# Patient Record
Sex: FEMALE | Race: WHITE | NOT HISPANIC OR LATINO | Employment: OTHER | URBAN - METROPOLITAN AREA
[De-identification: names, ages, dates, MRNs, and addresses within clinical notes are randomized per-mention and may not be internally consistent; named-entity substitution may affect disease eponyms.]

---

## 2017-02-14 ENCOUNTER — TELEPHONE (OUTPATIENT)
Dept: FAMILY MEDICINE | Facility: CLINIC | Age: 77
End: 2017-02-14

## 2017-02-14 NOTE — TELEPHONE ENCOUNTER
----- Message from Lida Cobb sent at 2/14/2017 11:10 AM CST -----  Contact: self   Patient wants to speak with a nurse regarding vaccine please call back at [phone]

## 2017-02-14 NOTE — TELEPHONE ENCOUNTER
----- Message from Jacklyn Kemp sent at 2/14/2017  4:17 PM CST -----  Pt called returning a call back at to the nurse/pls call back at [phone]

## 2017-02-14 NOTE — TELEPHONE ENCOUNTER
Called pt, her daughter is expecting mid march and the OB is requesting everyone get Tdap. She has concerns with her history of having reaction after flu vaccine. She wants to know if Dr. Hills recommends her to get it and if so, can she get it feeling a little under the weather (cold symptoms) or wait until all cleared up. Please advise.

## 2017-02-15 ENCOUNTER — PATIENT MESSAGE (OUTPATIENT)
Dept: FAMILY MEDICINE | Facility: CLINIC | Age: 77
End: 2017-02-15

## 2017-02-15 NOTE — TELEPHONE ENCOUNTER
Called pt, notified of Dr. Hills message and she verbally understood. She will go to her local drug store and get it.

## 2017-02-15 NOTE — TELEPHONE ENCOUNTER
I would recommend it.  It is different than the flu vaccine and there is no cross reactivity.  Timing is not important as long as she does not have fever.

## 2017-02-15 NOTE — TELEPHONE ENCOUNTER
----- Message from Lida Cobb sent at 2/15/2017  3:04 PM CST -----  Contact: self   Placed a call to pod, patient missed call from your office please call back at [phone] (home)

## 2017-02-17 ENCOUNTER — CLINICAL SUPPORT (OUTPATIENT)
Dept: FAMILY MEDICINE | Facility: CLINIC | Age: 77
End: 2017-02-17
Payer: MEDICARE

## 2017-02-17 ENCOUNTER — TELEPHONE (OUTPATIENT)
Dept: FAMILY MEDICINE | Facility: CLINIC | Age: 77
End: 2017-02-17

## 2017-02-17 DIAGNOSIS — Z23 NEED FOR TDAP VACCINATION: Primary | ICD-10-CM

## 2017-02-17 PROCEDURE — 90471 IMMUNIZATION ADMIN: CPT | Mod: S$GLB,,, | Performed by: FAMILY MEDICINE

## 2017-02-17 PROCEDURE — 90715 TDAP VACCINE 7 YRS/> IM: CPT | Mod: S$GLB,,, | Performed by: FAMILY MEDICINE

## 2017-02-17 NOTE — TELEPHONE ENCOUNTER
----- Message from Harleen Ayoub sent at 2/17/2017 12:01 PM CST -----  Contact: yin   Requesting TDAP   Call back

## 2017-03-15 ENCOUNTER — PATIENT OUTREACH (OUTPATIENT)
Dept: ADMINISTRATIVE | Facility: HOSPITAL | Age: 77
End: 2017-03-15

## 2017-03-29 ENCOUNTER — OFFICE VISIT (OUTPATIENT)
Dept: FAMILY MEDICINE | Facility: CLINIC | Age: 77
End: 2017-03-29
Payer: MEDICARE

## 2017-03-29 VITALS
DIASTOLIC BLOOD PRESSURE: 72 MMHG | HEIGHT: 64 IN | BODY MASS INDEX: 26.76 KG/M2 | WEIGHT: 156.75 LBS | TEMPERATURE: 98 F | HEART RATE: 82 BPM | OXYGEN SATURATION: 98 % | RESPIRATION RATE: 12 BRPM | SYSTOLIC BLOOD PRESSURE: 138 MMHG

## 2017-03-29 DIAGNOSIS — Z12.31 ENCOUNTER FOR SCREENING MAMMOGRAM FOR BREAST CANCER: ICD-10-CM

## 2017-03-29 DIAGNOSIS — I10 ESSENTIAL HYPERTENSION: ICD-10-CM

## 2017-03-29 DIAGNOSIS — Z00.00 ROUTINE HEALTH MAINTENANCE: Primary | ICD-10-CM

## 2017-03-29 DIAGNOSIS — E03.9 HYPOTHYROIDISM, UNSPECIFIED TYPE: ICD-10-CM

## 2017-03-29 PROCEDURE — 99999 PR PBB SHADOW E&M-EST. PATIENT-LVL III: CPT | Mod: PBBFAC,,, | Performed by: FAMILY MEDICINE

## 2017-03-29 PROCEDURE — 99397 PER PM REEVAL EST PAT 65+ YR: CPT | Mod: S$GLB,,, | Performed by: FAMILY MEDICINE

## 2017-03-29 PROCEDURE — 3075F SYST BP GE 130 - 139MM HG: CPT | Mod: S$GLB,,, | Performed by: FAMILY MEDICINE

## 2017-03-29 PROCEDURE — 3078F DIAST BP <80 MM HG: CPT | Mod: S$GLB,,, | Performed by: FAMILY MEDICINE

## 2017-03-29 RX ORDER — LEVOTHYROXINE SODIUM 88 UG/1
88 TABLET ORAL DAILY
Qty: 90 TABLET | Refills: 3 | Status: SHIPPED | OUTPATIENT
Start: 2017-03-29 | End: 2018-04-08 | Stop reason: SDUPTHER

## 2017-03-29 RX ORDER — MAGNESIUM 30 MG
1 TABLET ORAL
COMMUNITY
End: 2023-06-30 | Stop reason: CLARIF

## 2017-03-29 RX ORDER — ESTRADIOL 1 MG/1
TABLET ORAL
Qty: 90 TABLET | Refills: 3 | Status: SHIPPED | OUTPATIENT
Start: 2017-03-29 | End: 2018-04-22 | Stop reason: SDUPTHER

## 2017-03-29 RX ORDER — LOSARTAN POTASSIUM AND HYDROCHLOROTHIAZIDE 25; 100 MG/1; MG/1
1 TABLET ORAL DAILY
Qty: 90 TABLET | Refills: 3 | Status: SHIPPED | OUTPATIENT
Start: 2017-03-29 | End: 2017-06-07 | Stop reason: SDUPTHER

## 2017-03-29 NOTE — PROGRESS NOTES
HPI  Dorothy Kramer is a 76 y.o. female with multiple medical diagnoses as listed in the medical history and problem list that presents for Annual Exam  .      HPI  She is s/p her hip replacement.    PAST MEDICAL HISTORY:  Past Medical History:   Diagnosis Date    DDD (degenerative disc disease), cervical     DJD (degenerative joint disease) of hip     Dyspepsia     GERD (gastroesophageal reflux disease)     History of melanoma 5/20/2015    HTN (hypertension)     Hyperlipidemia     Melanoma     Migraine headache     Skin cancer     melanoma on face    Thyroid disease     hypo    Trouble in sleeping     Uterine prolaps     followed by GYN    Vulvar lesion 5/20/2015       PAST SURGICAL HISTORY:  Past Surgical History:   Procedure Laterality Date    APPENDECTOMY  1962    BACK SURGERY      BREAST BIOPSY      BREAST LUMPECTOMY  1989    CERVICAL FUSION  1991    CHOLECYSTECTOMY      HYSTERECTOMY      TRACEE/BSO for benign disease    IN REMOVAL OF OVARY/TUBE(S)      TONSILLECTOMY      TONSILLECTOMY, ADENOIDECTOMY, BILATERAL MYRINGOTOMY AND TUBES      TOTAL ABDOMINAL HYSTERECTOMY W/ BILATERAL SALPINGOOPHORECTOMY  1990       SOCIAL HISTORY:  Social History     Social History    Marital status:      Spouse name: N/A    Number of children: N/A    Years of education: N/A     Occupational History    Not on file.     Social History Main Topics    Smoking status: Never Smoker    Smokeless tobacco: Never Used    Alcohol use No    Drug use: No    Sexual activity: Not Currently     Other Topics Concern    Not on file     Social History Narrative       FAMILY HISTORY:  Family History   Problem Relation Age of Onset    Heart failure Mother     Cancer Mother      Bladder    Colon cancer Mother 63    Cancer Father      Bladder    Parkinsonism Father     Uterine cancer Maternal Aunt     Ovarian cancer Neg Hx     Breast cancer Neg Hx        ALLERGIES AND MEDICATIONS: updated and reviewed.  Review  "of patient's allergies indicates:  No Known Allergies  Current Outpatient Prescriptions   Medication Sig Dispense Refill    aspirin (ECOTRIN) 81 MG EC tablet Take 81 mg by mouth once daily.      estradiol (ESTRACE) 1 MG tablet TAKE 1 TABLET (1 MG TOTAL) BY MOUTH ONCE DAILY. 90 tablet 3    levothyroxine (SYNTHROID) 88 MCG tablet TAKE 1 TABLET (88 MCG TOTAL) BY MOUTH ONCE DAILY. 90 tablet 3    losartan-hydrochlorothiazide 100-25 mg (HYZAAR) 100-25 mg per tablet Take 1 tablet by mouth once daily. 90 tablet 3    magnesium 30 mg Tab Take 1 tablet by mouth.      MULTIVIT-IRON-MIN-FOLIC ACID 3,500-18-0.4 UNIT-MG-MG ORAL CHEW Take by mouth.       No current facility-administered medications for this visit.        ROS  Review of Systems   Constitutional: Negative for fatigue, fever and unexpected weight change.   HENT: Negative for congestion, hearing loss, rhinorrhea and sore throat.    Eyes: Negative for visual disturbance.   Respiratory: Negative for cough, chest tightness, shortness of breath and wheezing.    Cardiovascular: Negative for chest pain, palpitations and leg swelling.   Gastrointestinal: Negative for abdominal distention, abdominal pain, blood in stool, constipation, diarrhea, nausea and vomiting.   Genitourinary: Negative for difficulty urinating, dysuria, frequency, hematuria, menstrual problem, pelvic pain, urgency and vaginal bleeding.   Musculoskeletal: Negative for back pain, joint swelling and neck pain.   Skin: Negative for rash.   Neurological: Negative for dizziness, tremors, weakness, light-headedness, numbness and headaches.   Psychiatric/Behavioral: Negative for confusion, dysphoric mood and sleep disturbance. The patient is not nervous/anxious.        Physical Exam  Vitals:    03/29/17 0856   BP: 138/72   Pulse: 82   Resp: 12   Temp: 98.2 °F (36.8 °C)    Body mass index is 26.91 kg/(m^2).  Weight: 71.1 kg (156 lb 12 oz)   Height: 5' 4" (162.6 cm)     Physical Exam   Constitutional: She is " oriented to person, place, and time. She appears well-developed and well-nourished.   HENT:   Head: Normocephalic and atraumatic.   Right Ear: External ear normal.   Left Ear: External ear normal.   Nose: Nose normal.   Mouth/Throat: Oropharynx is clear and moist.   Eyes: Conjunctivae and EOM are normal. Pupils are equal, round, and reactive to light. No scleral icterus.   Neck: Normal range of motion. Neck supple. No JVD present. No thyromegaly present.   Cardiovascular: Normal rate, regular rhythm and normal heart sounds.  Exam reveals no gallop and no friction rub.    No murmur heard.  Pulmonary/Chest: Effort normal and breath sounds normal. She has no wheezes. She has no rales.   Abdominal: Soft. Bowel sounds are normal. She exhibits no distension and no mass. There is no tenderness. There is no rebound and no guarding.   Musculoskeletal: Normal range of motion. She exhibits no edema.   Lymphadenopathy:     She has no cervical adenopathy.   Neurological: She is alert and oriented to person, place, and time. She has normal strength. No cranial nerve deficit or sensory deficit.   Skin: Skin is warm and dry. No rash noted.   Vitals reviewed.      Health Maintenance       Date Due Completion Date    Mammogram 9/11/2016 9/11/2015    Colonoscopy 4/25/2017 4/25/2007 (N/S)    Override on 4/25/2007: (N/S)    DEXA SCAN 2/27/2018 2/27/2015    Lipid Panel 4/22/2021 4/22/2016    TETANUS VACCINE 2/17/2027 2/17/2017          Assessment & Plan    Routine health maintenance  - Health maintenance reviewed  - Diet and exercise education.    Essential hypertension  -     Comprehensive metabolic panel; Future; Expected date: 3/29/17  -     Lipid panel; Future; Expected date: 3/29/17  - Continue current therapy  - Serial blood pressure monitoring  - Diet and exercise education.    Hypothyroidism, unspecified type  -     TSH; Future; Expected date: 3/29/17  - Continue current therapy     Encounter for screening mammogram for breast  cancer  -     Mammo Digital Screening Bilat with CAD; Future; Expected date: 3/29/17        Return in about 6 months (around 9/29/2017).

## 2017-03-29 NOTE — MR AVS SNAPSHOT
[facility]  [address]  [facility] [address]  Phone: [phone]  Fax: [phone]                  Dorothy Kramer   3/29/2017 8:45 AM   Office Visit    Description:  Female : 1940   Provider:  Demetrio Hills MD   Department:  [facility]           Reason for Visit     Annual Exam           Diagnoses this Visit        Comments    Routine health maintenance    -  Primary     Essential hypertension         Hypothyroidism, unspecified type         Encounter for screening mammogram for breast cancer                To Do List           Future Appointments        Provider Department Dept Phone    4/3/2017 8:45 AM LAB, [place] [facility] [phone]    4/3/2017 3:30 PM [facility] [id number] [facility] [phone]      Goals (5 Years of Data)     None      Follow-Up and Disposition     Return in about 6 months (around 2017).    Follow-up and Disposition History       These Medications        Disp Refills Start End    levothyroxine (SYNTHROID) 88 MCG tablet 90 tablet 3 3/29/2017     Take 1 tablet (88 mcg total) by mouth once daily. - Oral    Pharmacy: [facility]/pharmacy #[id number] - [place] - [address] Ph #: [phone]       losartan-hydrochlorothiazide 100-25 mg (HYZAAR) 100-25 mg per tablet 90 tablet 3 3/29/2017     Take 1 tablet by mouth once daily. - Oral    Pharmacy: [facility]/pharmacy #[id number] - [place] - [address] Ph #: [phone]       estradiol (ESTRACE) 1 MG tablet 90 tablet 3 3/29/2017     TAKE 1 TABLET (1 MG TOTAL) BY MOUTH ONCE DAILY.    Pharmacy: [facility]/pharmacy #[id number] - [place] - [address] Ph #: [phone]         [facility] On [facility]     [facility] Nurse Care Line -  Assistance  Registered nurses in the [facility] provide clinical advisement, health education, appointment booking, and other advisory services.  Call for this free service at [phone].            "  Medications           Message regarding Medications     Verify the changes and/or additions to your medication regime listed below are the same as discussed with your clinician today.  If any of these changes or additions are incorrect, please notify your healthcare provider.        CHANGE how you are taking these medications     Start Taking Instead of    levothyroxine (SYNTHROID) 88 MCG tablet levothyroxine (SYNTHROID) 88 MCG tablet    Dosage:  Take 1 tablet (88 mcg total) by mouth once daily. Dosage:  TAKE 1 TABLET (88 MCG TOTAL) BY MOUTH ONCE DAILY.    Reason for Change:  Reorder            Verify that the below list of medications is an accurate representation of the medications you are currently taking.  If none reported, the list may be blank. If incorrect, please contact your healthcare provider. Carry this list with you in case of emergency.           Current Medications     aspirin (ECOTRIN) 81 MG EC tablet Take 81 mg by mouth once daily.    estradiol (ESTRACE) 1 MG tablet TAKE 1 TABLET (1 MG TOTAL) BY MOUTH ONCE DAILY.    levothyroxine (SYNTHROID) 88 MCG tablet Take 1 tablet (88 mcg total) by mouth once daily.    losartan-hydrochlorothiazide 100-25 mg (HYZAAR) 100-25 mg per tablet Take 1 tablet by mouth once daily.    magnesium 30 mg Tab Take 1 tablet by mouth.    MULTIVIT-IRON-MIN-FOLIC ACID 3,500-18-0.4 UNIT-MG-MG ORAL CHEW Take by mouth.           Clinical Reference Information           Your Vitals Were     BP Pulse Temp Resp Height Weight    138/72 (BP Location: Left arm, Patient Position: Sitting) 82 98.2 °F (36.8 °C) (Oral) 12 5' 4" (1.626 m) 71.1 kg (156 lb 12 oz)    SpO2 BMI             98% 26.91 kg/m2         Blood Pressure          Most Recent Value    BP  138/72      Allergies as of 3/29/2017     No Known Allergies      Immunizations Administered on Date of Encounter - 3/29/2017     None      Orders Placed During Today's Visit     Future Labs/Procedures Expected by Expires    Comprehensive " metabolic panel  3/29/2017 6/27/2017    Lipid panel  3/29/2017 6/27/2017    Mammo Digital Screening Bilat with CAD  3/29/2017 6/27/2017    TSH  3/29/2017 6/27/2017      Instructions    Consider Dr. Nba Becker for urinary issues       Language Assistance Services     ATTENTION: Language assistance services are available, free of charge. Please call [phone].      ATENCIÓN: Si habla español, tiene a river disposición servicios gratuitos de asistencia lingüística. Llame al [phone].     CHÚ Ý: N?u b?n nói Ti?ng Vi?t, có các d?ch v? h? tr? ngôn ng? mi?n phí dành cho b?n. G?i s? [phone].         [facility] complies with applicable Federal civil rights laws and does not discriminate on the basis of race, color, national origin, age, disability, or sex.

## 2017-04-03 ENCOUNTER — HOSPITAL ENCOUNTER (OUTPATIENT)
Dept: RADIOLOGY | Facility: HOSPITAL | Age: 77
Discharge: HOME | End: 2017-04-03
Attending: FAMILY MEDICINE
Payer: MEDICARE

## 2017-04-03 DIAGNOSIS — Z12.31 ENCOUNTER FOR SCREENING MAMMOGRAM FOR BREAST CANCER: ICD-10-CM

## 2017-04-03 PROCEDURE — 77067 SCR MAMMO BI INCL CAD: CPT | Mod: 26,,, | Performed by: RADIOLOGY

## 2017-04-03 PROCEDURE — 77067 SCR MAMMO BI INCL CAD: CPT | Mod: TC

## 2017-04-03 PROCEDURE — 77063 BREAST TOMOSYNTHESIS BI: CPT | Mod: 26,,, | Performed by: RADIOLOGY

## 2017-06-07 RX ORDER — LOSARTAN POTASSIUM AND HYDROCHLOROTHIAZIDE 25; 100 MG/1; MG/1
1 TABLET ORAL DAILY
Qty: 90 TABLET | Refills: 3 | Status: SHIPPED | OUTPATIENT
Start: 2017-06-07 | End: 2018-05-08 | Stop reason: SDUPTHER

## 2017-08-10 ENCOUNTER — OFFICE VISIT (OUTPATIENT)
Dept: FAMILY MEDICINE | Facility: CLINIC | Age: 77
End: 2017-08-10
Payer: MEDICARE

## 2017-08-10 VITALS
OXYGEN SATURATION: 99 % | WEIGHT: 158.75 LBS | HEART RATE: 74 BPM | SYSTOLIC BLOOD PRESSURE: 146 MMHG | HEIGHT: 64 IN | BODY MASS INDEX: 27.1 KG/M2 | DIASTOLIC BLOOD PRESSURE: 66 MMHG

## 2017-08-10 DIAGNOSIS — M75.40 IMPINGEMENT SYNDROME, SHOULDER, UNSPECIFIED LATERALITY: ICD-10-CM

## 2017-08-10 DIAGNOSIS — I10 ESSENTIAL HYPERTENSION: ICD-10-CM

## 2017-08-10 DIAGNOSIS — M16.11 PRIMARY OSTEOARTHRITIS OF RIGHT HIP: ICD-10-CM

## 2017-08-10 DIAGNOSIS — N81.6 RECTOCELE: ICD-10-CM

## 2017-08-10 DIAGNOSIS — Z96.641 STATUS POST RIGHT HIP REPLACEMENT: ICD-10-CM

## 2017-08-10 DIAGNOSIS — M54.32 SCIATICA OF LEFT SIDE: ICD-10-CM

## 2017-08-10 DIAGNOSIS — E78.1 HYPERTRIGLYCERIDEMIA: ICD-10-CM

## 2017-08-10 DIAGNOSIS — E03.9 HYPOTHYROIDISM, UNSPECIFIED TYPE: ICD-10-CM

## 2017-08-10 DIAGNOSIS — Z85.820 HISTORY OF MELANOMA: ICD-10-CM

## 2017-08-10 DIAGNOSIS — Z00.00 ENCOUNTER FOR PREVENTIVE HEALTH EXAMINATION: Primary | ICD-10-CM

## 2017-08-10 PROCEDURE — G0439 PPPS, SUBSEQ VISIT: HCPCS | Mod: S$GLB,,, | Performed by: NURSE PRACTITIONER

## 2017-08-10 PROCEDURE — 99499 UNLISTED E&M SERVICE: CPT | Mod: S$GLB,,, | Performed by: NURSE PRACTITIONER

## 2017-08-10 PROCEDURE — 99999 PR PBB SHADOW E&M-EST. PATIENT-LVL IV: CPT | Mod: PBBFAC,,, | Performed by: NURSE PRACTITIONER

## 2017-08-10 RX ORDER — GABAPENTIN 100 MG/1
100 CAPSULE ORAL NIGHTLY
Qty: 30 CAPSULE | Refills: 2 | Status: SHIPPED | OUTPATIENT
Start: 2017-08-10 | End: 2018-05-01

## 2017-08-10 NOTE — PATIENT INSTRUCTIONS
Counseling and Referral of Other Preventative  (Italic type indicates deductible and co-insurance are waived)    Patient Name: Dorothy Kramer  Today's Date: 8/10/2017      SERVICE LIMITATIONS RECOMMENDATION    Vaccines    · Pneumococcal (once after 65)    · Influenza (annually)    · Hepatitis B (if medium/high risk)    · Prevnar 13      Hepatitis B medium/high risk factors:       - End-stage renal disease       - Hemophiliacs who received Factor VII or         IX concentrates       - Clients of institutions for the mentally             retarded       - Persons who live in the same house as          a HepB carrier       - Homosexual men       - Illicit injectable drug abusers     Pneumococcal: Done, no repeat necessary     Influenza: N/A     Hepatitis B: N/A     Prevnar 13: Done, no repeat necessary    Mammogram (biennial age 50-74)  Annually (age 40 or over)  Done this year, repeat every year    Pap (up to age 70 and after 70 if unknown history or abnormal study last 10 years)    N/A     The USPSTF recommends against screening for cervical cancer in women older than age 65 years who have had adequate prior screening and are not otherwise at high risk for cervical cancer.      Colorectal cancer screening (to age 75)    · Fecal occult blood test (annual)  · Flexible sigmoidoscopy (5y)  · Screening colonoscopy (10y)  · Barium enema   Last done 2007, recommend to repeat every 3-5  years    Diabetes self-management training (no USPSTF recommendations)  Requires referral by treating physician for patient with diabetes or renal disease. 10 hours of initial DSMT sessions of no less than 30 minutes each in a continuous 12-month period. 2 hours of follow-up DSMT in subsequent years.  N/A    Bone mass measurements (age 65 & older, biennial)  Requires diagnosis related to osteoporosis or estrogen deficiency. Biennial benefit unless patient has history of long-term glucocorticoid  Last done 2015, recommend to repeat every 3  years     Glaucoma screening (no USPSTF recommendation)  Diabetes mellitus, family history   , age 50 or over    American, age 65 or over  Recommend follow up with eye care professional regularly    Medical nutrition therapy for diabetes or renal disease (no recommended schedule)  Requires referral by treating physician for patient with diabetes or renal disease or kidney transplant within the past 3 years.  Can be provided in same year as diabetes self-management training (DSMT), and CMS recommends medical nutrition therapy take place after DSMT. Up to 3 hours for initial year and 2 hours in subsequent years.  N/A    Cardiovascular screening blood tests (every 5 years)  · Fasting lipid panel  Order as a panel if possible  Done this year, repeat every year    Diabetes screening tests (at least every 3 years, Medicare covers annually or at 6-month intervals for prediabetic patients)  · Fasting blood sugar (FBS) or glucose tolerance test (GTT)  Patient must be diagnosed with one of the following:       - Hypertension       - Dyslipidemia       - Obesity (BMI 30kg/m2)       - Previous elevated impaired FBS or GTT       ... or any two of the following:       - Overweight (BMI 25 but <30)       - Family history of diabetes       - Age 65 or older       - History of gestational diabetes or birth of baby weighing more than 9 pounds  Done this year, repeat every year    HIV screening (annually for increased risk patients)  · HIV-1 and HIV-2 by EIA, or KIA, rapid antibody test or oral mucosa transudate  Patients must be at increased risk for HIV infection per USPSTF guidelines or pregnant. Tests covered annually for patient at increased risk or as requested by the patient. Pregnant patients may receive up to 3 tests during pregnancy.  Risks discussed, screening is not recommended    Smoking cessation counseling (up to 8 sessions per year)  Patients must be asymptomatic of tobacco-related conditions to  receive as a preventative service.  Non-smoker    Subsequent annual wellness visit  At least 12 months since last AWV  Return in one year     The following information is provided to all patients.  This information is to help you find resources for any of the problems found today that may be affecting your health:                Living healthy guide: www.[facility].[place].[facility]      Understanding Diabetes: www.diabetes.org      Eating healthy: www.cdc.gov/healthyweight      CDC home safety checklist: www.cdc.gov/steadi/patient.html      Agency on Aging: www.goea.[place]      Alcoholics anonymous (AA): www.aa.org      Physical Activity: www.paz.nih.gov/ut5jstz      Tobacco use: www.quitwithusla.org

## 2017-08-17 NOTE — PROGRESS NOTES
"Dorothy Kramer presented for a  Medicare AWV and comprehensive Health Risk Assessment today. The following components were reviewed and updated:    · Medical history  · Family History  · Social history  · Allergies and Current Medications  · Health Risk Assessment  · Health Maintenance  · Care Team     ** See Completed Assessments for Annual Wellness Visit within the encounter summary.**       The following assessments were completed:  · Living Situation  · CAGE  · Depression Screening  · Timed Get Up and Go  · Whisper Test  · Cognitive Function Screening  · Nutrition Screening  · ADL Screening  · PAQ Screening    Vitals:    08/10/17 1506   BP: (!) 146/66   Pulse: 74   SpO2: 99%   Weight: 72 kg (158 lb 11.7 oz)   Height: 5' 4" (1.626 m)     Body mass index is 27.25 kg/m².  Physical Exam   Constitutional: She is oriented to person, place, and time. She appears well-developed and well-nourished.   Eyes: Conjunctivae and EOM are normal. Pupils are equal, round, and reactive to light. No scleral icterus.   Cardiovascular: Normal rate, regular rhythm and normal heart sounds.  Exam reveals no gallop and no friction rub.    No murmur heard.  Pulmonary/Chest: Effort normal and breath sounds normal. She has no wheezes. She has no rales.   Musculoskeletal: Normal range of motion. She exhibits no edema.   Neurological: She is alert and oriented to person, place, and time. She has normal strength. No cranial nerve deficit or sensory deficit.   Skin: Skin is warm and dry.   Psychiatric: She has a normal mood and affect. Her behavior is normal.   Vitals reviewed.        Diagnoses and health risks identified today and associated recommendations/orders:    1. Encounter for preventive health examination  Recommend yearly HRA visit    2. Essential hypertension  Stable.   Taking arb and hctz  Followed by Demetrio Hills MD .       3. Hypertriglyceridemia  Stable.   Continue to monitor   Followed by Demetrio Hills MD .       4. " History of melanoma  Stable.   Recommend yearly skin checks  Followed by Demetrio Hills MD .       5. Hypothyroidism, unspecified type  Stable.   Taking levothyroxine  Followed by MD Bryce Khan       6. Rectocele  Stable.     Followed by Demetrio Hills MD .       7. Impingement syndrome, shoulder, unspecified laterality  Unchanged   Taking gabapentin  Followed by Demetrio Hills MD .       8. Primary osteoarthritis of right hip  Stable.     Followed by MD Bryce Khan       9. Status post right hip replacement  Stable.     Followed by Demetrio Hills MD .       10. Sciatica of left side  Taking gabapentin  Continue exercises/stretches  Followed by Demetrio Hills MD .         Provided Dorothy with a 5-10 year written screening schedule and personal prevention plan. Recommendations were developed using the USPSTF age appropriate recommendations. Education, counseling, and referrals were provided as needed. After Visit Summary printed and given to patient which includes a list of additional screenings\tests needed.    Return in about 1 year (around 8/10/2018).    Barbara Dotson NP

## 2018-03-14 ENCOUNTER — TELEPHONE (OUTPATIENT)
Dept: FAMILY MEDICINE | Facility: CLINIC | Age: 78
End: 2018-03-14

## 2018-03-14 ENCOUNTER — PATIENT MESSAGE (OUTPATIENT)
Dept: FAMILY MEDICINE | Facility: CLINIC | Age: 78
End: 2018-03-14

## 2018-03-14 NOTE — TELEPHONE ENCOUNTER
----- Message from Aggie Bhatti sent at 3/13/2018  4:33 PM CDT -----  Returning your call.  Please call patient at [phone]

## 2018-03-14 NOTE — TELEPHONE ENCOUNTER
----- Message from Tammy Snell sent at 3/14/2018  2:09 PM CDT -----  Contact: self                             attn:  Mariaelena  Patient [phone] or [phone] is returning call to Nurse Mariaelena from earlier today/please call

## 2018-03-14 NOTE — TELEPHONE ENCOUNTER
----- Message from Alberto Arroyo sent at 3/14/2018 12:24 PM CDT -----  Contact: self [phone]  Patient returning the nurse phone call.. Thanks!

## 2018-04-09 RX ORDER — LEVOTHYROXINE SODIUM 88 UG/1
88 TABLET ORAL DAILY
Qty: 90 TABLET | Refills: 3 | Status: SHIPPED | OUTPATIENT
Start: 2018-04-09 | End: 2018-05-01 | Stop reason: SDUPTHER

## 2018-04-23 RX ORDER — ESTRADIOL 1 MG/1
TABLET ORAL
Qty: 90 TABLET | Refills: 3 | Status: SHIPPED | OUTPATIENT
Start: 2018-04-23 | End: 2018-05-01 | Stop reason: SDUPTHER

## 2018-05-01 ENCOUNTER — OFFICE VISIT (OUTPATIENT)
Dept: FAMILY MEDICINE | Facility: CLINIC | Age: 78
End: 2018-05-01
Payer: MEDICARE

## 2018-05-01 VITALS
HEART RATE: 79 BPM | WEIGHT: 159.81 LBS | SYSTOLIC BLOOD PRESSURE: 138 MMHG | BODY MASS INDEX: 27.28 KG/M2 | HEIGHT: 64 IN | OXYGEN SATURATION: 99 % | DIASTOLIC BLOOD PRESSURE: 72 MMHG

## 2018-05-01 DIAGNOSIS — Z78.0 POSTMENOPAUSAL: ICD-10-CM

## 2018-05-01 DIAGNOSIS — E03.9 HYPOTHYROIDISM, UNSPECIFIED TYPE: ICD-10-CM

## 2018-05-01 DIAGNOSIS — N81.6 RECTOCELE: ICD-10-CM

## 2018-05-01 DIAGNOSIS — Z96.641 STATUS POST RIGHT HIP REPLACEMENT: ICD-10-CM

## 2018-05-01 DIAGNOSIS — I10 ESSENTIAL HYPERTENSION: ICD-10-CM

## 2018-05-01 DIAGNOSIS — M16.11 PRIMARY OSTEOARTHRITIS OF RIGHT HIP: ICD-10-CM

## 2018-05-01 DIAGNOSIS — M54.32 SCIATICA OF LEFT SIDE: ICD-10-CM

## 2018-05-01 DIAGNOSIS — M75.40 IMPINGEMENT SYNDROME OF SHOULDER REGION, UNSPECIFIED LATERALITY: ICD-10-CM

## 2018-05-01 DIAGNOSIS — Z85.820 HISTORY OF MELANOMA: ICD-10-CM

## 2018-05-01 DIAGNOSIS — Z00.00 ENCOUNTER FOR PREVENTIVE HEALTH EXAMINATION: Primary | ICD-10-CM

## 2018-05-01 DIAGNOSIS — E78.1 HYPERTRIGLYCERIDEMIA: ICD-10-CM

## 2018-05-01 PROCEDURE — 3075F SYST BP GE 130 - 139MM HG: CPT | Mod: CPTII,S$GLB,, | Performed by: NURSE PRACTITIONER

## 2018-05-01 PROCEDURE — 99999 PR PBB SHADOW E&M-EST. PATIENT-LVL IV: CPT | Mod: PBBFAC,,, | Performed by: NURSE PRACTITIONER

## 2018-05-01 PROCEDURE — 3078F DIAST BP <80 MM HG: CPT | Mod: CPTII,S$GLB,, | Performed by: NURSE PRACTITIONER

## 2018-05-01 PROCEDURE — 99499 UNLISTED E&M SERVICE: CPT | Mod: S$PBB,,, | Performed by: NURSE PRACTITIONER

## 2018-05-01 PROCEDURE — G0439 PPPS, SUBSEQ VISIT: HCPCS | Mod: S$GLB,,, | Performed by: NURSE PRACTITIONER

## 2018-05-01 RX ORDER — ESTRADIOL 1 MG/1
TABLET ORAL
Qty: 90 TABLET | Refills: 0 | Status: SHIPPED | OUTPATIENT
Start: 2018-05-01 | End: 2018-05-08 | Stop reason: SDUPTHER

## 2018-05-01 RX ORDER — LEVOTHYROXINE SODIUM 88 UG/1
88 TABLET ORAL DAILY
Qty: 90 TABLET | Refills: 0 | Status: SHIPPED | OUTPATIENT
Start: 2018-05-01 | End: 2018-05-08 | Stop reason: SDUPTHER

## 2018-05-01 NOTE — PATIENT INSTRUCTIONS
Counseling and Referral of Other Preventative  (Italic type indicates deductible and co-insurance are waived)    Patient Name: Dorothy Kramer  Today's Date: 5/1/2018    Health Maintenance       Date Due Completion Date    Colonoscopy 04/25/2017 4/25/2007 (N/S)    Override on 4/25/2007: (N/S)    DEXA SCAN 02/27/2018 2/27/2015    Influenza Vaccine 08/01/2018 9/1/2016 (Not Clinical)    Override on 9/1/2016: Not Clinically Appropriate (Patient has had repeated and more reaction per administration)    Override on 2/4/2014: Not Clinically Appropriate    Lipid Panel 04/03/2022 4/3/2017    TETANUS VACCINE 02/17/2027 2/17/2017        Orders Placed This Encounter   Procedures    DXA Bone Density Spine And Hip     The following information is provided to all patients.  This information is to help you find resources for any of the problems found today that may be affecting your health:                Living healthy guide: www.[facility].[place].[facility]      Understanding Diabetes: www.diabetes.org      Eating healthy: www.cdc.gov/healthyweight      CDC home safety checklist: www.cdc.gov/steadi/patient.html      Agency on Aging: www.goea.[place].[facility]      Alcoholics anonymous (AA): www.aa.org      Physical Activity: www.paz.nih.gov/io0iesm      Tobacco use: www.quitwithusla.org

## 2018-05-08 ENCOUNTER — OFFICE VISIT (OUTPATIENT)
Dept: FAMILY MEDICINE | Facility: CLINIC | Age: 78
End: 2018-05-08
Payer: MEDICARE

## 2018-05-08 VITALS
BODY MASS INDEX: 27.25 KG/M2 | SYSTOLIC BLOOD PRESSURE: 136 MMHG | DIASTOLIC BLOOD PRESSURE: 72 MMHG | WEIGHT: 159.63 LBS | HEART RATE: 76 BPM | HEIGHT: 64 IN

## 2018-05-08 DIAGNOSIS — Z78.0 ASYMPTOMATIC MENOPAUSAL STATE: ICD-10-CM

## 2018-05-08 DIAGNOSIS — Z00.00 ROUTINE HEALTH MAINTENANCE: Primary | ICD-10-CM

## 2018-05-08 DIAGNOSIS — E03.9 HYPOTHYROIDISM, UNSPECIFIED TYPE: ICD-10-CM

## 2018-05-08 DIAGNOSIS — M25.50 ARTHRALGIA, UNSPECIFIED JOINT: ICD-10-CM

## 2018-05-08 DIAGNOSIS — I10 ESSENTIAL HYPERTENSION: ICD-10-CM

## 2018-05-08 PROCEDURE — 3078F DIAST BP <80 MM HG: CPT | Mod: CPTII,S$GLB,, | Performed by: FAMILY MEDICINE

## 2018-05-08 PROCEDURE — 99499 UNLISTED E&M SERVICE: CPT | Mod: S$PBB,,, | Performed by: FAMILY MEDICINE

## 2018-05-08 PROCEDURE — 99397 PER PM REEVAL EST PAT 65+ YR: CPT | Mod: S$GLB,,, | Performed by: FAMILY MEDICINE

## 2018-05-08 PROCEDURE — 99999 PR PBB SHADOW E&M-EST. PATIENT-LVL III: CPT | Mod: PBBFAC,,, | Performed by: FAMILY MEDICINE

## 2018-05-08 PROCEDURE — 3075F SYST BP GE 130 - 139MM HG: CPT | Mod: CPTII,S$GLB,, | Performed by: FAMILY MEDICINE

## 2018-05-08 RX ORDER — LOSARTAN POTASSIUM AND HYDROCHLOROTHIAZIDE 25; 100 MG/1; MG/1
1 TABLET ORAL DAILY
Qty: 90 TABLET | Refills: 3 | Status: SHIPPED | OUTPATIENT
Start: 2018-05-08 | End: 2019-05-27 | Stop reason: SDUPTHER

## 2018-05-08 RX ORDER — LEVOTHYROXINE SODIUM 88 UG/1
88 TABLET ORAL DAILY
Qty: 90 TABLET | Refills: 3 | Status: SHIPPED | OUTPATIENT
Start: 2018-05-08 | End: 2019-06-05 | Stop reason: SDUPTHER

## 2018-05-08 RX ORDER — ESTRADIOL 1 MG/1
TABLET ORAL
Qty: 90 TABLET | Refills: 3 | Status: SHIPPED | OUTPATIENT
Start: 2018-05-08 | End: 2019-06-05 | Stop reason: SDUPTHER

## 2018-05-08 NOTE — PROGRESS NOTES
HPI  Dorothy Kramer is a 77 y.o. female with multiple medical diagnoses as listed in the medical history and problem list that presents for Hypertension; Hypothyroidism; dexa scan; and Mammogram  .      HPI  Here today for routine health maintenance.    PAST MEDICAL HISTORY:  Past Medical History:   Diagnosis Date    DDD (degenerative disc disease), cervical     DJD (degenerative joint disease) of hip     Dyspepsia     GERD (gastroesophageal reflux disease)     History of melanoma 5/20/2015    HTN (hypertension)     Hyperlipidemia     Melanoma     Migraine headache     Skin cancer     melanoma on face    Thyroid disease     hypo    Trouble in sleeping     Uterine prolaps     followed by GYN    Vulvar lesion 5/20/2015       PAST SURGICAL HISTORY:  Past Surgical History:   Procedure Laterality Date    APPENDECTOMY  1962    BACK SURGERY      BREAST BIOPSY      BREAST LUMPECTOMY  1989    CERVICAL FUSION  1991    CHOLECYSTECTOMY      HYSTERECTOMY      TRACEE/BSO for benign disease    ME REMOVAL OF OVARY/TUBE(S)      TONSILLECTOMY      TONSILLECTOMY, ADENOIDECTOMY, BILATERAL MYRINGOTOMY AND TUBES      TOTAL ABDOMINAL HYSTERECTOMY W/ BILATERAL SALPINGOOPHORECTOMY  1990       SOCIAL HISTORY:  Social History     Social History    Marital status:      Spouse name: N/A    Number of children: N/A    Years of education: N/A     Occupational History    Not on file.     Social History Main Topics    Smoking status: Never Smoker    Smokeless tobacco: Never Used    Alcohol use No    Drug use: No    Sexual activity: Not Currently     Other Topics Concern    Not on file     Social History Narrative    No narrative on file       FAMILY HISTORY:  Family History   Problem Relation Age of Onset    Heart failure Mother     Cancer Mother         Bladder    Colon cancer Mother 63    Cancer Father         Bladder    Parkinsonism Father     Uterine cancer Maternal Aunt     Ovarian cancer Neg Hx      Breast cancer Neg Hx        ALLERGIES AND MEDICATIONS: updated and reviewed.  Review of patient's allergies indicates:  No Known Allergies  Current Outpatient Prescriptions   Medication Sig Dispense Refill    aspirin (ECOTRIN) 81 MG EC tablet Take 81 mg by mouth once daily.      estradiol (ESTRACE) 1 MG tablet TAKE 1 TABLET (1 MG TOTAL) BY MOUTH ONCE DAILY. 90 tablet 0    levothyroxine (SYNTHROID) 88 MCG tablet Take 1 tablet (88 mcg total) by mouth once daily. 90 tablet 0    losartan-hydrochlorothiazide 100-25 mg (HYZAAR) 100-25 mg per tablet TAKE 1 TABLET BY MOUTH ONCE DAILY. 90 tablet 3    magnesium 30 mg Tab Take 1 tablet by mouth.      MULTIVIT-IRON-MIN-FOLIC ACID 3,500-18-0.4 UNIT-MG-MG ORAL CHEW Take by mouth.       No current facility-administered medications for this visit.        ROS  Review of Systems   Constitutional: Negative for fatigue, fever and unexpected weight change.   HENT: Negative for congestion, hearing loss, rhinorrhea and sore throat.    Eyes: Negative for visual disturbance.   Respiratory: Negative for cough, chest tightness, shortness of breath and wheezing.    Cardiovascular: Negative for chest pain, palpitations and leg swelling.   Gastrointestinal: Negative for abdominal distention, abdominal pain, blood in stool, constipation, diarrhea, nausea and vomiting.   Genitourinary: Negative for difficulty urinating, dysuria, frequency, hematuria, menstrual problem, pelvic pain, urgency and vaginal bleeding.   Musculoskeletal: Positive for arthralgias (pain in right hip) and neck pain (with radiation into upper back). Negative for back pain and joint swelling.   Skin: Negative for rash.   Neurological: Positive for headaches (worse in the morning). Negative for dizziness, tremors, weakness, light-headedness and numbness.   Psychiatric/Behavioral: Positive for sleep disturbance (snoring and witnessed apnea when supine). Negative for confusion and dysphoric mood. The patient is not  "nervous/anxious.        Physical Exam  Vitals:    05/08/18 1453   BP: 136/72   Pulse: 76    Body mass index is 27.4 kg/m².  Weight: 72.4 kg (159 lb 9.8 oz)   Height: 5' 4" (162.6 cm)     Physical Exam   Constitutional: She is oriented to person, place, and time. She appears well-developed and well-nourished.   HENT:   Head: Normocephalic and atraumatic.   Right Ear: External ear normal.   Left Ear: External ear normal.   Nose: Nose normal.   Mouth/Throat: Oropharynx is clear and moist.   Eyes: Conjunctivae and EOM are normal. Pupils are equal, round, and reactive to light. No scleral icterus.   Neck: Normal range of motion. Neck supple. No JVD present. No thyromegaly present.   Cardiovascular: Normal rate, regular rhythm and normal heart sounds.  Exam reveals no gallop and no friction rub.    No murmur heard.  Pulmonary/Chest: Effort normal and breath sounds normal. She has no wheezes. She has no rales.   Abdominal: Soft. Bowel sounds are normal. She exhibits no distension and no mass. There is no tenderness. There is no rebound and no guarding.   Musculoskeletal: Normal range of motion. She exhibits no edema.   Lymphadenopathy:     She has no cervical adenopathy.   Neurological: She is alert and oriented to person, place, and time. She has normal strength. No cranial nerve deficit or sensory deficit.   Skin: Skin is warm and dry. No rash noted.   Vitals reviewed.      Health Maintenance       Date Due Completion Date    Colonoscopy 04/25/2017 4/25/2007 (N/S)    Override on 4/25/2007: (N/S)    DEXA SCAN 02/27/2018 2/27/2015    Mammogram 04/04/2018 4/4/2017    Influenza Vaccine 08/01/2018 9/1/2016 (ClinicallyNA)    Override on 9/1/2016: Not Clinically Appropriate (Patient has had repeated and more reaction per administration)    Override on 2/4/2014: Not Clinically Appropriate    Lipid Panel 04/03/2022 4/3/2017    TETANUS VACCINE 02/17/2027 2/17/2017          Assessment & Plan    Routine health maintenance  - Health " maintenance reviewed  - Diet and exercise education.    Essential hypertension  -     Comprehensive metabolic panel; Future; Expected date: 05/08/2018  -     Lipid panel; Future; Expected date: 05/08/2018  - Continue current therapy  - Serial blood pressure monitoring  - Diet and exercise education.    Hypothyroidism, unspecified type  -     TSH; Future; Expected date: 05/08/2018  - Continue current therapy    Arthralgia, unspecified joint  -     Sedimentation rate, manual; Future; Expected date: 05/08/2018  -     C-reactive protein; Future; Expected date: 05/08/2018  -     SASCHA; Future; Expected date: 05/08/2018  -     Rheumatoid factor; Future; Expected date: 05/08/2018    Asymptomatic menopausal state  - Check BMD      Follow-up in about 1 year (around 5/8/2019).

## 2018-05-08 NOTE — PROGRESS NOTES
"Dorothy Kramer presented for a  Medicare AWV and comprehensive Health Risk Assessment today. The following components were reviewed and updated:    · Medical history  · Family History  · Social history  · Allergies and Current Medications  · Health Risk Assessment  · Health Maintenance  · Care Team     ** See Completed Assessments for Annual Wellness Visit within the encounter summary.**       The following assessments were completed:  · Living Situation  · CAGE  · Depression Screening  · Timed Get Up and Go  · Whisper Test  · Cognitive Function Screening           · Nutrition Screening  · ADL Screening  · PAQ Screening    Vitals:    05/01/18 1303   BP: 138/72   BP Location: Left arm   Patient Position: Sitting   BP Method: Medium (Manual)   Pulse: 79   SpO2: 99%   Weight: 72.5 kg (159 lb 13.3 oz)   Height: 5' 4" (1.626 m)     Body mass index is 27.44 kg/m².  Physical Exam   Constitutional: She is oriented to person, place, and time. She appears well-developed and well-nourished.   HENT:   Head: Normocephalic and atraumatic.   Right Ear: External ear normal.   Left Ear: External ear normal.   Nose: Nose normal.   Mouth/Throat: Oropharynx is clear and moist.   Eyes: Conjunctivae are normal. No scleral icterus.   Cardiovascular: Normal rate, regular rhythm and normal heart sounds.  Exam reveals no gallop and no friction rub.    No murmur heard.  Pulmonary/Chest: Effort normal and breath sounds normal. She has no wheezes. She has no rales.   Musculoskeletal: Normal range of motion. She exhibits no edema.   Neurological: She is alert and oriented to person, place, and time. She has normal strength. No cranial nerve deficit or sensory deficit.   Skin: Skin is warm and dry. No rash noted.   Vitals reviewed.        Diagnoses and health risks identified today and associated recommendations/orders:    1. Encounter for preventive health examination  Reviewed health maintenance and provided recommendations        2. " Postmenopausal    - DXA Bone Density Spine And Hip; Future    3. Essential hypertension  Stable.   Controlled on current medications.  Followed by Demetrio Hills MD .      4. Hypertriglyceridemia  Continue to monitor   Followed by Demetrio Hills MD .      5. History of melanoma  Continue skin checks as recommended by dermatology  Followed by dermatology.      6. Hypothyroidism, unspecified type  Continue to monitor   Followed by Demetrio Hills MD .      7. Impingement syndrome of shoulder region, unspecified laterality  Unchanged  Followed by Demetrio Hills MD .      8. Rectocele  Stable.     Followed by Demetrio Hills MD .      9. Primary osteoarthritis of right hip  Stable.     Followed by Demetrio Hills MD .      10. Sciatica of left side  Stable.     Followed by Demetrio Hills MD .      11. Status post right hip replacement  Stable.     Followed by Demetrio Hills MD .        Provided Dorothy with a 5-10 year written screening schedule and personal prevention plan. Recommendations were developed using the USPSTF age appropriate recommendations. Education, counseling, and referrals were provided as needed. After Visit Summary printed and given to patient which includes a list of additional screenings\tests needed.    Follow-up in about 1 year (around 5/1/2019).    Barbara Dotson NP

## 2018-05-11 ENCOUNTER — HOSPITAL ENCOUNTER (OUTPATIENT)
Dept: RADIOLOGY | Facility: HOSPITAL | Age: 78
Discharge: HOME | End: 2018-05-11
Attending: FAMILY MEDICINE
Payer: MEDICARE

## 2018-05-11 DIAGNOSIS — Z78.0 ASYMPTOMATIC MENOPAUSAL STATE: ICD-10-CM

## 2018-05-11 PROCEDURE — 77080 DXA BONE DENSITY AXIAL: CPT | Mod: 26,,, | Performed by: RADIOLOGY

## 2018-05-11 PROCEDURE — 77080 DXA BONE DENSITY AXIAL: CPT | Mod: TC,PO

## 2018-11-02 ENCOUNTER — PATIENT MESSAGE (OUTPATIENT)
Dept: FAMILY MEDICINE | Facility: CLINIC | Age: 78
End: 2018-11-02

## 2018-11-02 ENCOUNTER — TELEPHONE (OUTPATIENT)
Dept: FAMILY MEDICINE | Facility: CLINIC | Age: 78
End: 2018-11-02

## 2018-11-02 DIAGNOSIS — Z12.11 SCREEN FOR COLON CANCER: Primary | ICD-10-CM

## 2018-11-02 NOTE — TELEPHONE ENCOUNTER
Phoned pt per request. Pt states she is requesting to have a colonoscopy ASAP as she is noticing bright red blood tinged mucus in her stool and her mother had colon cancer. Pended order for a colonoscopy but is very concerned. Please review and advise. Thank you. CLC

## 2018-11-05 ENCOUNTER — TELEPHONE (OUTPATIENT)
Dept: FAMILY MEDICINE | Facility: CLINIC | Age: 78
End: 2018-11-05

## 2018-11-05 NOTE — TELEPHONE ENCOUNTER
----- Message from Jane Horta sent at 11/5/2018  1:58 PM CST -----  Contact: self  Patient would like to schedule a colonoscopy appointment. Please call patient at [phone] or [phone]. Thanks!

## 2018-11-05 NOTE — TELEPHONE ENCOUNTER
Spoke to pt. Pt is requesting to have a colonoscopy for blood in her stool. Order for colonoscopy in. Thank you!

## 2018-11-06 ENCOUNTER — PATIENT MESSAGE (OUTPATIENT)
Dept: FAMILY MEDICINE | Facility: CLINIC | Age: 78
End: 2018-11-06

## 2018-11-07 ENCOUNTER — LAB VISIT (OUTPATIENT)
Dept: LAB | Facility: HOSPITAL | Age: 78
End: 2018-11-07
Attending: FAMILY MEDICINE
Payer: MEDICARE

## 2018-11-07 ENCOUNTER — TELEPHONE (OUTPATIENT)
Dept: FAMILY MEDICINE | Facility: CLINIC | Age: 78
End: 2018-11-07

## 2018-11-07 DIAGNOSIS — Z12.11 SCREEN FOR COLON CANCER: ICD-10-CM

## 2018-11-07 LAB
BASOPHILS # BLD AUTO: 0.05 K/UL
BASOPHILS NFR BLD: 0.6 %
DIFFERENTIAL METHOD: ABNORMAL
EOSINOPHIL # BLD AUTO: 0.1 K/UL
EOSINOPHIL NFR BLD: 1.3 %
ERYTHROCYTE [DISTWIDTH] IN BLOOD BY AUTOMATED COUNT: 12.7 %
HCT VFR BLD AUTO: 41.1 %
HGB BLD-MCNC: 13.4 G/DL
IMM GRANULOCYTES # BLD AUTO: 0.01 K/UL
IMM GRANULOCYTES NFR BLD AUTO: 0.1 %
LYMPHOCYTES # BLD AUTO: 2.6 K/UL
LYMPHOCYTES NFR BLD: 32.8 %
MCH RBC QN AUTO: 30.5 PG
MCHC RBC AUTO-ENTMCNC: 32.6 G/DL
MCV RBC AUTO: 94 FL
MONOCYTES # BLD AUTO: 0.9 K/UL
MONOCYTES NFR BLD: 10.7 %
NEUTROPHILS # BLD AUTO: 4.3 K/UL
NEUTROPHILS NFR BLD: 54.5 %
NRBC BLD-RTO: 0 /100 WBC
PLATELET # BLD AUTO: 449 K/UL
PMV BLD AUTO: 10.3 FL
RBC # BLD AUTO: 4.39 M/UL
WBC # BLD AUTO: 7.93 K/UL

## 2018-11-07 PROCEDURE — 85025 COMPLETE CBC W/AUTO DIFF WBC: CPT | Mod: HCWC

## 2018-11-07 PROCEDURE — 36415 COLL VENOUS BLD VENIPUNCTURE: CPT | Mod: HCWC,PO

## 2018-11-07 NOTE — TELEPHONE ENCOUNTER
Pt has been scheduled for colon on 11/9. Reviewed mg citrate prep. Pt is aware I will be mailing instructions and confirmation letter.

## 2018-11-09 ENCOUNTER — ANESTHESIA (OUTPATIENT)
Dept: ENDOSCOPY | Facility: HOSPITAL | Age: 78
End: 2018-11-09
Payer: MEDICARE

## 2018-11-09 ENCOUNTER — ANESTHESIA EVENT (OUTPATIENT)
Dept: ENDOSCOPY | Facility: HOSPITAL | Age: 78
End: 2018-11-09
Payer: MEDICARE

## 2018-11-09 ENCOUNTER — HOSPITAL ENCOUNTER (OUTPATIENT)
Facility: HOSPITAL | Age: 78
Discharge: HOME | End: 2018-11-09
Attending: INTERNAL MEDICINE | Admitting: INTERNAL MEDICINE
Payer: MEDICARE

## 2018-11-09 VITALS
HEIGHT: 64 IN | HEART RATE: 71 BPM | SYSTOLIC BLOOD PRESSURE: 150 MMHG | TEMPERATURE: 98 F | WEIGHT: 157 LBS | DIASTOLIC BLOOD PRESSURE: 66 MMHG | OXYGEN SATURATION: 95 % | BODY MASS INDEX: 26.8 KG/M2 | RESPIRATION RATE: 14 BRPM

## 2018-11-09 DIAGNOSIS — Z12.11 COLON CANCER SCREENING: ICD-10-CM

## 2018-11-09 PROCEDURE — 25000003 PHARM REV CODE 250: Mod: HCWC,PO | Performed by: INTERNAL MEDICINE

## 2018-11-09 PROCEDURE — 27201012 HC FORCEPS, HOT/COLD, DISP: Mod: HCWC,PO | Performed by: INTERNAL MEDICINE

## 2018-11-09 PROCEDURE — 45380 COLONOSCOPY AND BIOPSY: CPT | Mod: HCWC,PO | Performed by: INTERNAL MEDICINE

## 2018-11-09 PROCEDURE — 88305 TISSUE EXAM BY PATHOLOGIST: CPT | Mod: HCWC | Performed by: PATHOLOGY

## 2018-11-09 PROCEDURE — 88305 TISSUE EXAM BY PATHOLOGIST: CPT | Mod: 26,HCWC,, | Performed by: PATHOLOGY

## 2018-11-09 PROCEDURE — 45380 COLONOSCOPY AND BIOPSY: CPT | Mod: HCWC,,, | Performed by: INTERNAL MEDICINE

## 2018-11-09 PROCEDURE — D9220A PRA ANESTHESIA: Mod: HCWC,ANES,, | Performed by: ANESTHESIOLOGY

## 2018-11-09 PROCEDURE — 37000008 HC ANESTHESIA 1ST 15 MINUTES: Mod: HCWC,PO | Performed by: INTERNAL MEDICINE

## 2018-11-09 PROCEDURE — 63600175 PHARM REV CODE 636 W HCPCS: Mod: HCWC,PO | Performed by: NURSE ANESTHETIST, CERTIFIED REGISTERED

## 2018-11-09 PROCEDURE — 37000009 HC ANESTHESIA EA ADD 15 MINS: Mod: HCWC,PO | Performed by: INTERNAL MEDICINE

## 2018-11-09 RX ORDER — PROPOFOL 10 MG/ML
VIAL (ML) INTRAVENOUS
Status: DISCONTINUED | OUTPATIENT
Start: 2018-11-09 | End: 2018-11-09

## 2018-11-09 RX ORDER — SODIUM CHLORIDE 0.9 % (FLUSH) 0.9 %
3 SYRINGE (ML) INJECTION
Status: DISCONTINUED | OUTPATIENT
Start: 2018-11-09 | End: 2018-11-09 | Stop reason: HOSPADM

## 2018-11-09 RX ORDER — LIDOCAINE HCL/PF 100 MG/5ML
SYRINGE (ML) INTRAVENOUS
Status: DISCONTINUED | OUTPATIENT
Start: 2018-11-09 | End: 2018-11-09

## 2018-11-09 RX ORDER — LIDOCAINE HYDROCHLORIDE 10 MG/ML
1 INJECTION INFILTRATION; PERINEURAL ONCE
Status: COMPLETED | OUTPATIENT
Start: 2018-11-09 | End: 2018-11-09

## 2018-11-09 RX ORDER — SODIUM CHLORIDE 0.9 % (FLUSH) 0.9 %
3 SYRINGE (ML) INJECTION
Status: CANCELLED | OUTPATIENT
Start: 2018-11-09

## 2018-11-09 RX ORDER — SODIUM CHLORIDE, SODIUM LACTATE, POTASSIUM CHLORIDE, CALCIUM CHLORIDE 600; 310; 30; 20 MG/100ML; MG/100ML; MG/100ML; MG/100ML
INJECTION, SOLUTION INTRAVENOUS CONTINUOUS
Status: DISCONTINUED | OUTPATIENT
Start: 2018-11-09 | End: 2018-11-09 | Stop reason: HOSPADM

## 2018-11-09 RX ADMIN — PROPOFOL 20 MG: 10 INJECTION, EMULSION INTRAVENOUS at 11:11

## 2018-11-09 RX ADMIN — PROPOFOL 25 MG: 10 INJECTION, EMULSION INTRAVENOUS at 11:11

## 2018-11-09 RX ADMIN — PROPOFOL 75 MG: 10 INJECTION, EMULSION INTRAVENOUS at 11:11

## 2018-11-09 RX ADMIN — SODIUM CHLORIDE, SODIUM LACTATE, POTASSIUM CHLORIDE, AND CALCIUM CHLORIDE: .6; .31; .03; .02 INJECTION, SOLUTION INTRAVENOUS at 10:11

## 2018-11-09 RX ADMIN — LIDOCAINE HYDROCHLORIDE 50 MG: 20 INJECTION, SOLUTION INTRAVENOUS at 11:11

## 2018-11-09 RX ADMIN — LIDOCAINE HYDROCHLORIDE: 10 INJECTION, SOLUTION EPIDURAL; INFILTRATION; INTRACAUDAL; PERINEURAL at 10:11

## 2018-11-09 NOTE — [employer] PATIENT INSTRUCTIONS
Discharge Summary/Instructions after an Endoscopic Procedure  Patient Name: Dorothy Kramer  Patient MRN: [id number]  Patient YOB: 1940 Friday, November 09, 2018  Fadi Oscar MD  RESTRICTIONS:  During your procedure today, you received medications for sedation.  These   medications may affect your judgment, balance and coordination.  Therefore,   for 24 hours, you have the following restrictions:   - DO NOT drive a car, operate machinery, make legal/financial decisions,   sign important papers or drink alcohol.    ACTIVITY:  Today: no heavy lifting, straining or running due to procedural   sedation/anesthesia.  The following day: return to full activity including work.  DIET:  Eat and drink normally unless instructed otherwise.     TREATMENT FOR COMMON SIDE EFFECTS:  - Mild abdominal pain, nausea, belching, bloating or excessive gas:  rest,   eat lightly and use a heating pad.  - Sore Throat: treat with throat lozenges and/or gargle with warm salt   water.  - Because air was used during the procedure, expelling large amounts of air   from your rectum or belching is normal.  - If a bowel prep was taken, you may not have a bowel movement for 1-3 days.    This is normal.  SYMPTOMS TO WATCH FOR AND REPORT TO YOUR PHYSICIAN:  1. Abdominal pain or bloating, other than gas cramps.  2. Chest pain.  3. Back pain.  4. Signs of infection such as: chills or fever occurring within 24 hours   after the procedure.  5. Rectal bleeding, which would show as bright red, maroon, or black stools.   (A tablespoon of blood from the rectum is not serious, especially if   hemorrhoids are present.)  6. Vomiting.  7. Weakness or dizziness.  GO DIRECTLY TO THE NEAREST EMERGENCY ROOM IF YOU HAVE ANY OF THE FOLLOWING:      Difficulty breathing              Chills and/or fever over 101 F   Persistent vomiting and/or vomiting blood   Severe abdominal pain   Severe chest pain   Black, tarry stools   Bleeding- more than one  tablespoon   Any other symptom or condition that you feel may need urgent attention  Your doctor recommends these additional instructions:  If any biopsies were taken, your doctors clinic will contact you in 1 to 2   weeks with any results.  We are waiting for your pathology results.   Your physician has recommended a repeat colonoscopy in five years for   screening purposes.   You are being discharged to home.  For questions, problems or results please call your physician - Fadi Oscar MD at Work: [phone].  EMERGENCY PHONE NUMBER: [phone], LAB RESULTS: [phone]  IF A COMPLICATION OR EMERGENCY SITUATION ARISES AND YOU ARE UNABLE TO REACH   YOUR PHYSICIAN - GO DIRECTLY TO THE EMERGENCY ROOM.  ___________________________________________  Nurse Signature  ___________________________________________  Patient/Designated Responsible Party Signature  Fadi Oscar MD  11/9/2018 11:22:29 AM  This report has been verified and signed electronically.  [employer]

## 2018-11-09 NOTE — TRANSFER OF CARE
"Anesthesia Transfer of Care Note    Patient: Dorothy Kramer    Procedure(s) Performed: Procedure(s) (LRB):  COLONOSCOPY (N/A)    Patient location: PACU    Anesthesia Type: general    Transport from OR: Transported from OR on room air with adequate spontaneous ventilation    Post pain: adequate analgesia    Post assessment: no apparent anesthetic complications    Post vital signs: stable    Level of consciousness: sedated    Nausea/Vomiting: no nausea/vomiting    Complications: none    Transfer of care protocol was followed      Last vitals:   Visit Vitals  /70 (BP Location: Right arm, Patient Position: Lying)   Pulse 74   Temp 36.5 °C (97.7 °F) (Skin)   Resp 16   Ht 5' 4" (1.626 m)   Wt 71.2 kg (157 lb)   SpO2 96%   Breastfeeding? No   BMI 26.95 kg/m²     "

## 2018-11-09 NOTE — H&P
History & Physical - Short Stay  Gastroenterology      SUBJECTIVE:     Procedure: Colonoscopy    Chief Complaint/Indication for Procedure: Screening    PTA Medications   Medication Sig    aspirin (ECOTRIN) 81 MG EC tablet Take 81 mg by mouth once daily.    estradiol (ESTRACE) 1 MG tablet TAKE 1 TABLET (1 MG TOTAL) BY MOUTH ONCE DAILY.    levothyroxine (SYNTHROID) 88 MCG tablet Take 1 tablet (88 mcg total) by mouth once daily.    losartan-hydrochlorothiazide 100-25 mg (HYZAAR) 100-25 mg per tablet Take 1 tablet by mouth once daily.    magnesium 30 mg Tab Take 1 tablet by mouth.    MULTIVIT-IRON-MIN-FOLIC ACID 3,500-18-0.4 UNIT-MG-MG ORAL CHEW Take by mouth.       Review of patient's allergies indicates:  No Known Allergies     Past Medical History:   Diagnosis Date    DDD (degenerative disc disease), cervical     DJD (degenerative joint disease) of hip     Dyspepsia     GERD (gastroesophageal reflux disease)     History of melanoma 5/20/2015    HTN (hypertension)     Melanoma     Migraine headache     Skin cancer     melanoma on face    Thyroid disease     hypo    Trouble in sleeping     Uterine prolaps     followed by GYN    Vulvar lesion 5/20/2015     Past Surgical History:   Procedure Laterality Date    APPENDECTOMY  1962    BACK SURGERY      BREAST BIOPSY      BREAST LUMPECTOMY  1989    CERVICAL FUSION  1991    CHOLECYSTECTOMY      COLONOSCOPY      HYSTERECTOMY      TRACEE/BSO for benign disease    JOINT REPLACEMENT Right 2016    Hip replacement    [place] REMOVAL OF OVARY/TUBE(S)      TONSILLECTOMY      TOTAL ABDOMINAL HYSTERECTOMY W/ BILATERAL SALPINGOOPHORECTOMY  1990     Family History   Problem Relation Age of Onset    Heart failure Mother     Cancer Mother         Bladder    Colon cancer Mother 63    Cancer Father         Bladder    Parkinsonism Father     Uterine cancer Maternal Aunt     Ovarian cancer Neg Hx     Breast cancer Neg Hx      Social History     Tobacco Use     Smoking status: Never Smoker    Smokeless tobacco: Never Used   Substance Use Topics    Alcohol use: No    Drug use: No         OBJECTIVE:     Vital Signs (Most Recent)  Temp: 97.7 °F (36.5 °C) (11/09/18 1021)  Pulse: 88 (11/09/18 1021)  Resp: 17 (11/09/18 1021)  BP: (!) 178/79 (11/09/18 1021)  SpO2: 96 % (11/09/18 1021)    Physical Exam                                                        GENERAL:  Comfortable, in no acute distress.                                 HEENT EXAM:  Nonicteric.  No adenopathy.  Oropharynx is clear.               NECK:  Supple.                                                               LUNGS:  Clear.                                                               CARDIAC:  Regular rate and rhythm.  S1, S2.  No murmur.                      ABDOMEN:  Soft, positive bowel sounds, nontender.  No hepatosplenomegaly or masses.  No rebound or guarding.                                             EXTREMITIES:  No edema.     MENTAL STATUS:  Normal, alert and oriented.      ASSESSMENT/PLAN:     Assessment: Colorectal cancer screening    Plan: Colonoscopy    Anesthesia Plan: General    ASA Grade: ASA 2 - Patient with mild systemic disease with no functional limitations    MALLAMPATI SCORE:  I (soft palate, uvula, fauces, and tonsillar pillars visible)

## 2018-11-09 NOTE — ANESTHESIA PREPROCEDURE EVALUATION
11/09/2018  Dorothy Kramer is a 77 y.o., female.    Anesthesia Evaluation    I have reviewed the Patient Summary Reports.    I have reviewed the Nursing Notes.      Review of Systems  Anesthesia Hx:  No problems with previous Anesthesia    Cardiovascular:   Hypertension    Hepatic/GI:   GERD, well controlled    Musculoskeletal:   Arthritis     Neurological:   Neuromuscular Disease, Headaches    Endocrine:   Hypothyroidism        Physical Exam  General:  Well nourished    Airway/Jaw/Neck:  Airway Findings: Mouth Opening: Normal Tongue: Normal  Mallampati: III  Improves to II with phonation.  TM Distance: Normal, at least 6 cm  Jaw/Neck Findings:  Neck ROM: Normal ROM     Eyes/Ears/Nose:  Eyes/Ears/Nose Findings:    Dental:  Dental Findings: In tact   Chest/Lungs:  Chest/Lungs Findings: Normal Respiratory Rate     Heart/Vascular:  Heart Findings: Rate: Normal  Rhythm: Regular Rhythm        Mental Status:  Mental Status Findings:  Cooperative, Alert and Oriented         Anesthesia Plan  Type of Anesthesia, risks & benefits discussed:  Anesthesia Type:  general  Patient's Preference: General  Intra-op Monitoring Plan: standard ASA monitors  Intra-op Monitoring Plan Comments:   Post Op Pain Control Plan:   Post Op Pain Control Plan Comments:   Induction:   IV  Beta Blocker:  Patient is not currently on a Beta-Blocker (No further documentation required).       Informed Consent: Patient understands risks and agrees with Anesthesia plan.  Questions answered. Anesthesia consent signed with patient.  ASA Score: 3     Day of Surgery Review of History & Physical:    H&P update referred to the surgeon.         Ready For Surgery From Anesthesia Perspective.

## 2018-11-09 NOTE — DISCHARGE INSTRUCTIONS

## 2018-11-09 NOTE — DISCHARGE SUMMARY
Discharge Note  Short Stay      SUMMARY     Admit Date: 11/9/2018    Attending Physician: Fadi Oscar MD     Discharge Physician: Fadi Oscar MD    Discharge Date: 11/9/2018 11:23 AM    Final Diagnosis: Screen for colon cancer [Z12.11]    Disposition: HOME OR SELF CARE    Patient Instructions:   Current Discharge Medication List      CONTINUE these medications which have NOT CHANGED    Details   aspirin (ECOTRIN) 81 MG EC tablet Take 81 mg by mouth once daily.      estradiol (ESTRACE) 1 MG tablet TAKE 1 TABLET (1 MG TOTAL) BY MOUTH ONCE DAILY.  Qty: 90 tablet, Refills: 3      levothyroxine (SYNTHROID) 88 MCG tablet Take 1 tablet (88 mcg total) by mouth once daily.  Qty: 90 tablet, Refills: 3      losartan-hydrochlorothiazide 100-25 mg (HYZAAR) 100-25 mg per tablet Take 1 tablet by mouth once daily.  Qty: 90 tablet, Refills: 3      magnesium 30 mg Tab Take 1 tablet by mouth.      MULTIVIT-IRON-MIN-FOLIC ACID 3,500-18-0.4 UNIT-MG-MG ORAL CHEW Take by mouth.             Discharge Procedure Orders (must include Diet, Follow-up, Activity)    Follow Up:  Follow up with PCP as previously scheduled  Resume routine diet.  Activity as tolerated.    No driving day of procedure.

## 2018-11-12 NOTE — ANESTHESIA POSTPROCEDURE EVALUATION
"Anesthesia Post Evaluation    Patient: Dorothy Kramer    Procedure(s) Performed: Procedure(s) (LRB):  COLONOSCOPY (N/A)    Final Anesthesia Type: general  Patient location during evaluation: PACU  Patient participation: Yes- Able to Participate  Level of consciousness: awake and alert, oriented and awake  Post-procedure vital signs: reviewed and stable  Pain management: adequate  Airway patency: patent  PONV status at discharge: No PONV  Anesthetic complications: no      Cardiovascular status: blood pressure returned to baseline and hemodynamically stable  Respiratory status: unassisted, spontaneous ventilation and room air  Hydration status: euvolemic  Follow-up not needed.        Visit Vitals  BP (!) 150/66   Pulse 71   Temp 36.6 °C (97.9 °F) (Skin)   Resp 14   Ht 5' 4" (1.626 m)   Wt 71.2 kg (157 lb)   SpO2 95%   Breastfeeding? No   BMI 26.95 kg/m²       Pain/Veena Score: No Data Recorded      "

## 2018-11-14 ENCOUNTER — PATIENT MESSAGE (OUTPATIENT)
Dept: GASTROENTEROLOGY | Facility: CLINIC | Age: 78
End: 2018-11-14

## 2018-11-14 NOTE — TELEPHONE ENCOUNTER
I spoke with pt, reviewed C-scope results (biopsies pending). Pt feeling better, stool getting back to normal, small amount of blood today (minimal, pt feels resolving), although mucus present. No pain. No fever. I suspect the minimal focal erythema may be related to prior diverticulitis. Recommended continued observation as long as pt continues to improve. Pt to update me next 1-2 days. If symptoms recur, will start antibiotic course.

## 2018-11-16 ENCOUNTER — PATIENT MESSAGE (OUTPATIENT)
Dept: GASTROENTEROLOGY | Facility: CLINIC | Age: 78
End: 2018-11-16

## 2018-11-19 ENCOUNTER — PATIENT MESSAGE (OUTPATIENT)
Dept: GASTROENTEROLOGY | Facility: CLINIC | Age: 78
End: 2018-11-19

## 2018-11-19 RX ORDER — METRONIDAZOLE 500 MG/1
500 TABLET ORAL 3 TIMES DAILY
Qty: 30 TABLET | Refills: 0 | Status: SHIPPED | OUTPATIENT
Start: 2018-11-19 | End: 2019-02-04

## 2018-11-19 RX ORDER — CIPROFLOXACIN 500 MG/1
500 TABLET ORAL 2 TIMES DAILY
Qty: 20 TABLET | Refills: 0 | Status: SHIPPED | OUTPATIENT
Start: 2018-11-19 | End: 2018-11-23

## 2018-11-22 ENCOUNTER — PATIENT MESSAGE (OUTPATIENT)
Dept: GASTROENTEROLOGY | Facility: CLINIC | Age: 78
End: 2018-11-22

## 2018-11-23 ENCOUNTER — PATIENT MESSAGE (OUTPATIENT)
Dept: GASTROENTEROLOGY | Facility: CLINIC | Age: 78
End: 2018-11-23

## 2018-11-23 ENCOUNTER — TELEPHONE (OUTPATIENT)
Dept: FAMILY MEDICINE | Facility: CLINIC | Age: 78
End: 2018-11-23

## 2018-11-23 ENCOUNTER — OFFICE VISIT (OUTPATIENT)
Dept: URGENT CARE | Facility: CLINIC | Age: 78
End: 2018-11-23
Payer: MEDICARE

## 2018-11-23 VITALS
TEMPERATURE: 98 F | HEIGHT: 64 IN | DIASTOLIC BLOOD PRESSURE: 68 MMHG | WEIGHT: 157 LBS | OXYGEN SATURATION: 99 % | HEART RATE: 80 BPM | SYSTOLIC BLOOD PRESSURE: 177 MMHG | BODY MASS INDEX: 26.8 KG/M2

## 2018-11-23 DIAGNOSIS — R00.2 PALPITATIONS: Primary | ICD-10-CM

## 2018-11-23 PROCEDURE — 99214 OFFICE O/P EST MOD 30 MIN: CPT | Mod: S$GLB,,, | Performed by: FAMILY MEDICINE

## 2018-11-23 PROCEDURE — 3078F DIAST BP <80 MM HG: CPT | Mod: CPTII,S$GLB,, | Performed by: FAMILY MEDICINE

## 2018-11-23 PROCEDURE — 3077F SYST BP >= 140 MM HG: CPT | Mod: CPTII,S$GLB,, | Performed by: FAMILY MEDICINE

## 2018-11-23 PROCEDURE — 93005 ELECTROCARDIOGRAM TRACING: CPT | Mod: S$GLB,,, | Performed by: FAMILY MEDICINE

## 2018-11-23 PROCEDURE — 1101F PT FALLS ASSESS-DOCD LE1/YR: CPT | Mod: CPTII,S$GLB,, | Performed by: FAMILY MEDICINE

## 2018-11-23 PROCEDURE — 93010 ELECTROCARDIOGRAM REPORT: CPT | Mod: S$GLB,,, | Performed by: INTERNAL MEDICINE

## 2018-11-23 NOTE — TELEPHONE ENCOUNTER
----- Message from Arianna Polk RT sent at 11/23/2018 11:04 AM CST -----  Contact: pt    pt , requesting an appt to be worked in today issues with her colon, was advised to go to [facility] by Dr. Oscar's nurse, thanks

## 2018-11-23 NOTE — PROGRESS NOTES
"Subjective:       Patient ID: Dorothy Kramer is a 77 y.o. female.    Vitals:  height is 5' 4" (1.626 m) and weight is 71.2 kg (157 lb). Her oral temperature is 97.5 °F (36.4 °C). Her blood pressure is 177/68 (abnormal) and her pulse is 80. Her oxygen saturation is 99%.     Chief Complaint: Palpitations    xWednesday pt states she has been having heart palpitations intermittently. Pt concerned palpitations may be due to new medication (flagyl and cipro)  she began taking recently. She was given by GI secondary to ?diverticulitis causing bleeding and mucoid stools. Pt was told to come to  by nurse. Currently no palpitation sx.       Palpitations    This is a new problem. The current episode started in the past 7 days. The problem occurs intermittently. The symptoms are aggravated by unknown. Associated symptoms include anxiety, coughing and an irregular heartbeat. Pertinent negatives include no chest pain, dizziness, fever, nausea, shortness of breath, vomiting or weakness. She has tried nothing for the symptoms.       Constitution: Negative for chills, fatigue and fever.   HENT: Negative for congestion and sore throat.    Neck: Negative for painful lymph nodes.   Cardiovascular: Positive for palpitations. Negative for chest pain and leg swelling.   Eyes: Negative for double vision and blurred vision.   Respiratory: Positive for cough. Negative for shortness of breath.    Gastrointestinal: Negative for nausea, vomiting and diarrhea.   Genitourinary: Negative for dysuria, frequency, urgency and history of kidney stones.   Musculoskeletal: Negative for joint pain, joint swelling, muscle cramps and muscle ache.   Skin: Negative for color change, pale, rash and bruising.   Allergic/Immunologic: Negative for seasonal allergies.   Neurological: Negative for dizziness, history of vertigo, light-headedness, passing out and headaches.   Hematologic/Lymphatic: Negative for swollen lymph nodes.   Psychiatric/Behavioral: Positive " for agitation and nervous/anxious. Negative for sleep disturbance and depression. The patient is nervous/anxious.        Objective:      Physical Exam   Constitutional: She is oriented to person, place, and time. She appears well-developed and well-nourished. She is cooperative.  Non-toxic appearance. She does not appear ill. No distress.   HENT:   Head: Normocephalic and atraumatic.   Right Ear: Hearing, tympanic membrane, external ear and ear canal normal.   Left Ear: Hearing, tympanic membrane, external ear and ear canal normal.   Nose: Nose normal. No mucosal edema, rhinorrhea or nasal deformity. No epistaxis. Right sinus exhibits no maxillary sinus tenderness and no frontal sinus tenderness. Left sinus exhibits no maxillary sinus tenderness and no frontal sinus tenderness.   Mouth/Throat: Uvula is midline, oropharynx is clear and moist and mucous membranes are normal. No trismus in the jaw. Normal dentition. No uvula swelling. No posterior oropharyngeal erythema.   Eyes: Conjunctivae and lids are normal. Right eye exhibits no discharge. Left eye exhibits no discharge. No scleral icterus.   Sclera clear bilat   Neck: Trachea normal, normal range of motion, full passive range of motion without pain and phonation normal. Neck supple.   Cardiovascular: Normal rate, regular rhythm, normal heart sounds, intact distal pulses and normal pulses.   Pulmonary/Chest: Effort normal and breath sounds normal. No respiratory distress.   Abdominal: Soft. Normal appearance and bowel sounds are normal. She exhibits no distension, no pulsatile midline mass and no mass. There is no tenderness.   Musculoskeletal: Normal range of motion. She exhibits no edema or deformity.   Neurological: She is alert and oriented to person, place, and time. She exhibits normal muscle tone. Coordination normal.   Skin: Skin is warm, dry and intact. She is not diaphoretic. No pallor.   Psychiatric: She has a normal mood and affect. Her speech is  normal and behavior is normal. Judgment and thought content normal. Cognition and memory are normal.   Nursing note and vitals reviewed.      Assessment:       1. Palpitations        Plan:         Palpitations  -     IN OFFICE EKG 12-LEAD (to Muse)    EKG: rate 83  Advised pt to go to ED if palpations return for monitoring and further w/u. D/w pt regarding initiating bblocker and anticoag. Pt on ASA (which discussed could be causing bleeding) and would rather not start a new medicine, but instead to stop abx since she is still having some bleeding and mucoid stools since starting them. Pt OK with plan

## 2018-11-23 NOTE — TELEPHONE ENCOUNTER
Spoke to patient. Patient says she thinks she has been having episodes of palpations from taking flagyl prescribed by Dr. Oscar for possible diverticulitis. Advised patient go to Urgent care or ER for evaluation. Patient verbalized understanding.

## 2018-11-26 ENCOUNTER — PATIENT MESSAGE (OUTPATIENT)
Dept: GASTROENTEROLOGY | Facility: CLINIC | Age: 78
End: 2018-11-26

## 2018-11-26 ENCOUNTER — TELEPHONE (OUTPATIENT)
Dept: URGENT CARE | Facility: CLINIC | Age: 78
End: 2018-11-26

## 2018-11-28 ENCOUNTER — PATIENT MESSAGE (OUTPATIENT)
Dept: GASTROENTEROLOGY | Facility: CLINIC | Age: 78
End: 2018-11-28

## 2018-12-31 ENCOUNTER — PATIENT MESSAGE (OUTPATIENT)
Dept: GASTROENTEROLOGY | Facility: CLINIC | Age: 78
End: 2018-12-31

## 2019-01-24 ENCOUNTER — TELEPHONE (OUTPATIENT)
Dept: GASTROENTEROLOGY | Facility: CLINIC | Age: 79
End: 2019-01-24

## 2019-01-24 RX ORDER — AMOXICILLIN AND CLAVULANATE POTASSIUM 875; 125 MG/1; MG/1
1 TABLET, FILM COATED ORAL 2 TIMES DAILY
Qty: 20 TABLET | Refills: 0 | Status: SHIPPED | OUTPATIENT
Start: 2019-01-24 | End: 2019-02-04 | Stop reason: ALTCHOICE

## 2019-01-24 NOTE — TELEPHONE ENCOUNTER
Pt is aware you are out till Monday. I did advise her to call her PCP or go to ER if she felt she can not wait. Pt has been having the same symptoms she had back in November. Persistent bloody bowel movements with mucus. She would like to know if she needs another round of antibiotics. Please advise.

## 2019-01-24 NOTE — TELEPHONE ENCOUNTER
----- Message from Seema Paniagua sent at 1/24/2019  7:51 AM CST -----  Contact: [facility] request  Message     ----- Message from [facility], System Message sent at 1/23/2019  2:52 PM CST -----    Appointment Request From: Dorothy Kramer    With Provider: Dayne    Preferred Date Range: Any    Preferred Times: Any time    Reason for visit: follow-up after colonoscopy; problem persists    Comments:  Had colonoscopy 11/9/18.Continued to have bloody mucous in stool.  Started antibiotic; discontinued 11/23.  Problem persists.  Need help.

## 2019-01-25 ENCOUNTER — TELEPHONE (OUTPATIENT)
Dept: GASTROENTEROLOGY | Facility: CLINIC | Age: 79
End: 2019-01-25

## 2019-01-25 NOTE — TELEPHONE ENCOUNTER
----- Message from RT Musa sent at 1/25/2019  9:39 AM CST -----  Contact: pt    pt  / [phone], returned missed call, called pod, thanks.

## 2019-01-25 NOTE — TELEPHONE ENCOUNTER
Discussed with pt that since she has not actually been seen by one of our providers to further evaluate her having continuous flare ups that she should come in. Pt agreed and appt was scheduled with NP.

## 2019-01-31 ENCOUNTER — PATIENT MESSAGE (OUTPATIENT)
Dept: FAMILY MEDICINE | Facility: CLINIC | Age: 79
End: 2019-01-31

## 2019-02-04 ENCOUNTER — OFFICE VISIT (OUTPATIENT)
Dept: GASTROENTEROLOGY | Facility: CLINIC | Age: 79
End: 2019-02-04
Payer: MEDICARE

## 2019-02-04 ENCOUNTER — LAB VISIT (OUTPATIENT)
Dept: LAB | Facility: HOSPITAL | Age: 79
End: 2019-02-04
Attending: NURSE PRACTITIONER
Payer: MEDICARE

## 2019-02-04 VITALS
WEIGHT: 160.06 LBS | HEIGHT: 64 IN | HEART RATE: 78 BPM | DIASTOLIC BLOOD PRESSURE: 83 MMHG | RESPIRATION RATE: 18 BRPM | SYSTOLIC BLOOD PRESSURE: 145 MMHG | BODY MASS INDEX: 27.33 KG/M2

## 2019-02-04 DIAGNOSIS — R19.5 MUCUS IN STOOL: ICD-10-CM

## 2019-02-04 DIAGNOSIS — K92.1 HEMATOCHEZIA: ICD-10-CM

## 2019-02-04 DIAGNOSIS — Z87.19 HISTORY OF COLITIS: ICD-10-CM

## 2019-02-04 DIAGNOSIS — Z87.19 HISTORY OF DIVERTICULITIS: ICD-10-CM

## 2019-02-04 DIAGNOSIS — K52.9 FREQUENT STOOLS: ICD-10-CM

## 2019-02-04 DIAGNOSIS — R19.8 RECTAL DISCHARGE: Primary | ICD-10-CM

## 2019-02-04 DIAGNOSIS — R19.8 RECTAL DISCHARGE: ICD-10-CM

## 2019-02-04 LAB
BASOPHILS # BLD AUTO: 0.03 K/UL
BASOPHILS NFR BLD: 0.4 %
CREAT SERPL-MCNC: 0.8 MG/DL
CRP SERPL-MCNC: 9.5 MG/L
DIFFERENTIAL METHOD: ABNORMAL
EOSINOPHIL # BLD AUTO: 0 K/UL
EOSINOPHIL NFR BLD: 0.4 %
ERYTHROCYTE [DISTWIDTH] IN BLOOD BY AUTOMATED COUNT: 13.1 %
EST. GFR  (AFRICAN AMERICAN): >60 ML/MIN/1.73 M^2
EST. GFR  (NON AFRICAN AMERICAN): >60 ML/MIN/1.73 M^2
HCT VFR BLD AUTO: 41.7 %
HGB BLD-MCNC: 14 G/DL
LYMPHOCYTES # BLD AUTO: 3 K/UL
LYMPHOCYTES NFR BLD: 35.6 %
MCH RBC QN AUTO: 30.6 PG
MCHC RBC AUTO-ENTMCNC: 33.6 G/DL
MCV RBC AUTO: 91 FL
MONOCYTES # BLD AUTO: 0.9 K/UL
MONOCYTES NFR BLD: 10.2 %
NEUTROPHILS # BLD AUTO: 4.5 K/UL
NEUTROPHILS NFR BLD: 53.4 %
PLATELET # BLD AUTO: 426 K/UL
PMV BLD AUTO: 9.7 FL
RBC # BLD AUTO: 4.57 M/UL
WBC # BLD AUTO: 8.37 K/UL

## 2019-02-04 PROCEDURE — 87427 SHIGA-LIKE TOXIN AG IA: CPT | Mod: HCNC

## 2019-02-04 PROCEDURE — 87045 FECES CULTURE AEROBIC BACT: CPT | Mod: HCNC

## 2019-02-04 PROCEDURE — 1101F PT FALLS ASSESS-DOCD LE1/YR: CPT | Mod: HCNC,CPTII,S$GLB, | Performed by: NURSE PRACTITIONER

## 2019-02-04 PROCEDURE — 3079F PR MOST RECENT DIASTOLIC BLOOD PRESSURE 80-89 MM HG: ICD-10-PCS | Mod: HCNC,CPTII,S$GLB, | Performed by: NURSE PRACTITIONER

## 2019-02-04 PROCEDURE — 89055 LEUKOCYTE ASSESSMENT FECAL: CPT | Mod: HCNC

## 2019-02-04 PROCEDURE — 86140 C-REACTIVE PROTEIN: CPT | Mod: HCNC

## 2019-02-04 PROCEDURE — 82272 OCCULT BLD FECES 1-3 TESTS: CPT | Mod: HCNC

## 2019-02-04 PROCEDURE — 87329 GIARDIA AG IA: CPT | Mod: HCNC

## 2019-02-04 PROCEDURE — 1101F PR PT FALLS ASSESS DOC 0-1 FALLS W/OUT INJ PAST YR: ICD-10-PCS | Mod: HCNC,CPTII,S$GLB, | Performed by: NURSE PRACTITIONER

## 2019-02-04 PROCEDURE — 82565 ASSAY OF CREATININE: CPT | Mod: HCNC,PO

## 2019-02-04 PROCEDURE — 87046 STOOL CULTR AEROBIC BACT EA: CPT | Mod: 59,HCNC

## 2019-02-04 PROCEDURE — 3077F SYST BP >= 140 MM HG: CPT | Mod: HCNC,CPTII,S$GLB, | Performed by: NURSE PRACTITIONER

## 2019-02-04 PROCEDURE — 99214 PR OFFICE/OUTPT VISIT, EST, LEVL IV, 30-39 MIN: ICD-10-PCS | Mod: HCNC,S$GLB,, | Performed by: NURSE PRACTITIONER

## 2019-02-04 PROCEDURE — 3079F DIAST BP 80-89 MM HG: CPT | Mod: HCNC,CPTII,S$GLB, | Performed by: NURSE PRACTITIONER

## 2019-02-04 PROCEDURE — 99999 PR PBB SHADOW E&M-EST. PATIENT-LVL IV: ICD-10-PCS | Mod: PBBFAC,HCNC,, | Performed by: NURSE PRACTITIONER

## 2019-02-04 PROCEDURE — 36415 COLL VENOUS BLD VENIPUNCTURE: CPT | Mod: HCNC,PO

## 2019-02-04 PROCEDURE — 99999 PR PBB SHADOW E&M-EST. PATIENT-LVL IV: CPT | Mod: PBBFAC,HCNC,, | Performed by: NURSE PRACTITIONER

## 2019-02-04 PROCEDURE — 87798 DETECT AGENT NOS DNA AMP: CPT | Mod: HCNC

## 2019-02-04 PROCEDURE — 99214 OFFICE O/P EST MOD 30 MIN: CPT | Mod: HCNC,S$GLB,, | Performed by: NURSE PRACTITIONER

## 2019-02-04 PROCEDURE — 87209 SMEAR COMPLEX STAIN: CPT | Mod: HCNC

## 2019-02-04 PROCEDURE — 3077F PR MOST RECENT SYSTOLIC BLOOD PRESSURE >= 140 MM HG: ICD-10-PCS | Mod: HCNC,CPTII,S$GLB, | Performed by: NURSE PRACTITIONER

## 2019-02-04 PROCEDURE — 85025 COMPLETE CBC W/AUTO DIFF WBC: CPT | Mod: HCNC,PO

## 2019-02-04 PROCEDURE — 87425 ROTAVIRUS AG IA: CPT | Mod: HCNC

## 2019-02-04 PROCEDURE — 83993 ASSAY FOR CALPROTECTIN FECAL: CPT | Mod: HCNC

## 2019-02-04 NOTE — Clinical Note
Jon Oscar,I saw one of your established patients today. Just wanted to forward the progress note for your review and to see if you have any further recommendations.Thanks,Susi

## 2019-02-04 NOTE — PATIENT INSTRUCTIONS
Lower GI Bleeding (Stable)  You have signs of blood in your stool. This is called rectal bleeding. The bleeding may have begun in another part of your gastrointestinal (GI) tract. If the blood is bright red, it is likely coming from the lower part of the GI tract. If the blood is black or dark, it might be coming from higher up in the GI tract. Very small amounts of GI bleeding may not be visible and can only be discovered during a test on your stool. Possible causes of lower GI bleeding include:  · Hemorrhoids  · Anal fissures  · Diverticulitis  · Inflammatory bowel disease (Crohn's disease or ulcerative colitis)  · Polyps (growths) in the intestine  Note: Iron supplements and medicines for diarrhea or upset stomach can cause black stools. Foods such as licorice and red beets can also discolor the stool and be mistaken for bleeding. These are not bleeding and are not a cause for alarm.  Home care  You have not lost a large amount of blood and your condition appears stable at this time. You may resume normal activity as long as you feel well.  Avoid NSAIDs, such as aspirin, ibuprofen, or naproxen. They can irritate the stomach and cause further bleeding. If you are taking these medicines for other medical reasons, talk to your healthcare provider before you stop them.   Follow-up care  Follow up with your healthcare provider as advised. Further tests may be needed to find the cause of your bleeding.  When to seek medical advice  Call your healthcare provider for any of the following:  · Large amount of rectal bleeding   · Increasing abdominal pain  · Weakness, dizziness  Call 911  Get emergency medical care if any of the following occur:  · Loss of consciousness  · Vomiting blood  Date Last Reviewed: 6/24/2015  © 5384-1873 [employer]. [address] [place] [id number]. All rights reserved. This information is not intended as a substitute for professional medical care. Always follow your  healthcare professional's instructions.        Understanding Diverticulosis and Diverticulitis     Pouches or diverticula usually occur in the lower part of the colon called the sigmoid.     The colon (large intestine) is the last part of the digestive tract. It absorbs water from stool and changes it from a liquid to a solid. In certain cases, small pouches called diverticula can form in the colon wall. This condition is called diverticulosis. The pouches can become infected. If this happens, it becomes a more serious problem called diverticulitis. These problems can be painful. But they can be managed.  Managing your condition  Diet changes or medicines may be prescribed.   If you have diverticulosis  Recommendations include:  · Diet changes are often enough to control symptoms. The main changes are adding fiber (roughage) and drinking more water. Fiber absorbs water as it travels through your colon. This helps your stool stay soft and move smoothly. Water helps this process.  · If needed, you may be told to take over-the-counter stool softeners.  · To help relieve pain, antispasmodic medicines may be prescribed.  · Watch for changes in your bowel movements. Tell the healthcare provider if you notice any changes.  · Begin an exercise program. Ask your healthcare provider how to get started.  · Get plenty of rest and sleep.   If you have diverticulitis  Treatment depends on how bad your symptoms are.  · For mild symptoms. You may be put on a liquid diet for a short time. Antibiotics are usually prescribed. If these two steps relieve your symptoms, you may then be prescribed a high-fiber diet. If you still have symptoms, your healthcare provider will discuss more treatment choices with you.  · For severe symptoms. You may need to be admitted to the hospital. There, you can be given IV antibiotics and fluids. You will also be put on a low-fiber or liquid diet. Although not common, surgery is needed in some people  with severe symptoms.  [place] to colon health     Diverticulitis occurs when the pouches become infected or inflamed.     Help keep your colon healthy with a diet that includes plenty of high-fiber fruits, vegetables, and whole grains. Drink plenty of liquids like water and juice. Maintain a healthy lifestyle including regular exercise, stress management, and adequate rest and sleep.   Date Last Reviewed: 7/1/2016  © 9377-0936 [employer]. [address] [place] [address], [place] [address]. All rights reserved. This information is not intended as a substitute for professional medical care. Always follow your healthcare professional's instructions.

## 2019-02-04 NOTE — PROGRESS NOTES
Subjective:       Patient ID: Dorothy Kramer is a 78 y.o. female Body mass index is 27.47 kg/m².    Chief Complaint: GI Problem (mucus and blood in stool)    This patient is new to me.  Established patient of Dr. Oscar.    GI Problem   The primary symptoms include hematochezia. Primary symptoms do not include fever, weight loss, fatigue, abdominal pain, nausea, vomiting, diarrhea, melena, hematemesis or dysuria. Primary symptoms comment: CHIEF COMPLAINT: RECTAL MUCUS AND BLEEDING. The illness began more than 7 days ago. The problem has been gradually improving (since augmentin course; prior to augmentin, symptoms had persisted/were unchanged since it started in 10/2018).   The hematochezia began more than 1 week ago (started late 10/2018, prior to colonoscopy). Frequency: most days it is blood-tinged mucus; occasional has seen blood clots; denies bleeding in between bowel movements. The hematochezia is a chronic problem.   The illness does not include chills, dysphagia, odynophagia or constipation. Associated symptoms comments: Bowel movements 3-6 times a day between 7am-2pm, start with a lot of mucus and sometimes blood and then gets lesser throughout the day; stool soft pasty, rated 4-6 on [facility] stool chart  TREATMENTS TRIED: probiotic daily, reports has taken magnesium supplement daily for the past 3 years for muscle spasms (had stopped it a few weeks ago but had to restart due to muscle spasms recurring)  PAST TREATMENT: Augmentin finished yesterday (last dose was last night)- has noticed symptoms improving, 11/2018 took 4 days of Cipro and flagyl, Cipro caused heart palpitations and was advised to stop it; patient also had stopped flagyl at that time as well. Significant associated medical issues include diverticulitis. Associated medical issues do not include hemorrhoids.     Review of Systems   Constitutional: Negative for appetite change (reports not eating as much; avoiding spicy foods), chills, fatigue,  fever and weight loss.   HENT: Negative for sore throat and trouble swallowing.    Respiratory: Negative for cough, choking and shortness of breath.    Cardiovascular: Negative for chest pain.   Gastrointestinal: Positive for anal bleeding, blood in stool and hematochezia. Negative for abdominal pain, constipation, diarrhea, dysphagia, hematemesis, melena, nausea, rectal pain and vomiting.   Genitourinary: Negative for dysuria.   Neurological: Negative for weakness.       No LMP recorded. Patient has had a hysterectomy.  Past Medical History:   Diagnosis Date    DDD (degenerative disc disease), cervical     Diverticulitis     Diverticulosis     DJD (degenerative joint disease) of hip     Dyspepsia     GERD (gastroesophageal reflux disease)     History of melanoma 5/20/2015    HTN (hypertension)     Melanoma     Migraine headache     Rectocele     Skin cancer     melanoma on face    Thyroid disease     hypo    Trouble in sleeping     Uterine prolaps     followed by GYN    Vulvar lesion 5/20/2015     Past Surgical History:   Procedure Laterality Date    APPENDECTOMY  1962    BACK SURGERY      BREAST BIOPSY      BREAST LUMPECTOMY  1989    CERVICAL FUSION  1991    CHOLECYSTECTOMY      COLONOSCOPY      COLONOSCOPY N/A 11/9/2018    Performed by [facility]    HYSTERECTOMY      TRACEE/BSO for benign disease    JOINT REPLACEMENT Right 2016    Hip replacement    MA REMOVAL OF OVARY/TUBE(S)      TONSILLECTOMY      TOTAL ABDOMINAL HYSTERECTOMY W/ BILATERAL SALPINGOOPHORECTOMY  1990     Family History   Problem Relation Age of Onset    Heart failure Mother     Cancer Mother         Bladder    Colon cancer Mother 63    Cancer Father         Bladder    Parkinsonism Father     Diverticulitis Father     Uterine cancer Maternal Aunt     Ovarian cancer Neg Hx     Breast cancer Neg Hx     Crohn's disease Neg Hx     Ulcerative colitis Neg Hx      Wt Readings from Last 10  Encounters:   02/04/19 72.6 kg (160 lb 0.9 oz)   11/23/18 71.2 kg (157 lb)   11/08/18 71.2 kg (157 lb)   05/08/18 72.4 kg (159 lb 9.8 oz)   05/01/18 72.5 kg (159 lb 13.3 oz)   08/10/17 72 kg (158 lb 11.7 oz)   03/29/17 71.1 kg (156 lb 12 oz)   09/02/16 70.3 kg (155 lb)   09/01/16 70.5 kg (155 lb 6.8 oz)   06/22/16 70.8 kg (156 lb)     Lab Results   Component Value Date    WBC 7.93 11/07/2018    HGB 13.4 11/07/2018    HCT 41.1 11/07/2018    MCV 94 11/07/2018     (H) 11/07/2018     CMP  Sodium   Date Value Ref Range Status   05/11/2018 141 136 - 145 mmol/L Final     Potassium   Date Value Ref Range Status   05/11/2018 3.7 3.5 - 5.1 mmol/L Final     Chloride   Date Value Ref Range Status   05/11/2018 103 95 - 110 mmol/L Final     CO2   Date Value Ref Range Status   05/11/2018 27 23 - 29 mmol/L Final     Glucose   Date Value Ref Range Status   05/11/2018 103 70 - 110 mg/dL Final     BUN, Bld   Date Value Ref Range Status   05/11/2018 18 8 - 23 mg/dL Final     Creatinine   Date Value Ref Range Status   05/11/2018 0.8 0.5 - 1.4 mg/dL Final     Calcium   Date Value Ref Range Status   05/11/2018 9.3 8.7 - 10.5 mg/dL Final     Total Protein   Date Value Ref Range Status   05/11/2018 7.2 6.0 - 8.4 g/dL Final     Albumin   Date Value Ref Range Status   05/11/2018 3.7 3.5 - 5.2 g/dL Final     Total Bilirubin   Date Value Ref Range Status   05/11/2018 0.9 0.1 - 1.0 mg/dL Final     Comment:     For infants and newborns, interpretation of results should be based  on gestational age, weight and in agreement with clinical  observations.  Premature Infant recommended reference ranges:  Up to 24 hours.............<8.0 mg/dL  Up to 48 hours............<12.0 mg/dL  3-5 days..................<15.0 mg/dL  6-29 days.................<15.0 mg/dL       Alkaline Phosphatase   Date Value Ref Range Status   05/11/2018 59 55 - 135 U/L Final     AST   Date Value Ref Range Status   05/11/2018 16 10 - 40 U/L Final     ALT   Date Value Ref  "Range Status   05/11/2018 15 10 - 44 U/L Final     Anion Gap   Date Value Ref Range Status   05/11/2018 11 8 - 16 mmol/L Final     eGFR if    Date Value Ref Range Status   05/11/2018 >60.0 >60 mL/min/1.73 m^2 Final     eGFR if non    Date Value Ref Range Status   05/11/2018 >60.0 >60 mL/min/1.73 m^2 Final     Comment:     Calculation used to obtain the estimated glomerular filtration  rate (eGFR) is the CKD-EPI equation.        Lab Results   Component Value Date    TSH 1.382 05/11/2018 11/9/18 Colonoscopy was reviewed and procedure report states:   "Findings:       Multiple small-mouthed diverticula were found in the sigmoid colon,        with focal erythema/inflammation. Biopsies were taken with a cold        forceps for histology.       The exam was otherwise without abnormality to cecum, including        terminal ileum.  Impression:          - Diverticulosis in the sigmoid colon with focal                        inflammation. Biopsied.                       - The examination was otherwise normal.  Recommendation:      - Await pathology results.                       - Repeat colonoscopy in 5 years for screening                        purposes.                       - Discharge patient to home (ambulatory).".  Biopsy results:   "FINAL PATHOLOGIC DIAGNOSIS  Sigmoid colon, biopsies:  Chronic active colitis with cryptitis.  No evidence of dysplasia or malignancy."  Objective:      Physical Exam   Constitutional: She is oriented to person, place, and time. She appears well-developed and well-nourished. No distress.   HENT:   Mouth/Throat: Oropharynx is clear and moist and mucous membranes are normal. No oral lesions. No oropharyngeal exudate.   Eyes: Conjunctivae are normal. Pupils are equal, round, and reactive to light. No scleral icterus.   Cardiovascular: Normal rate.   Pulmonary/Chest: Effort normal and breath sounds normal. No respiratory distress. She has no wheezes. "   Abdominal: Soft. Normal appearance and bowel sounds are normal. She exhibits no distension, no abdominal bruit and no mass. There is no tenderness. There is no rigidity, no rebound, no guarding, no tenderness at McBurney's point and negative Zhang's sign.   Patient declined rectal exam.   Neurological: She is alert and oriented to person, place, and time.   Skin: Skin is warm and dry. No rash noted. She is not diaphoretic. No erythema. No pallor.   Non-jaundiced   Psychiatric: She has a normal mood and affect. Her behavior is normal. Judgment and thought content normal.   Nursing note and vitals reviewed.      Assessment:       1. Rectal discharge    2. Mucus in stool    3. Hematochezia    4. Frequent stools    5. History of diverticulitis    6. History of colitis        Plan:       Rectal discharge, Frequent stools, & Mucus in stool  -     CBC auto differential; Future; Expected date: 02/04/2019  -     C-reactive protein; Future; Expected date: 02/04/2019  -     Calprotectin; Future; Expected date: 02/04/2019  -     Stool Exam-Ova,Cysts,Parasites; Future; Expected date: 02/04/2019  -     Giardia / Cryptosporidum, EIA; Future; Expected date: 02/04/2019  -     Occult blood x 1, stool; Future; Expected date: 02/04/2019  -     WBC, Stool; Future; Expected date: 02/04/2019  -     Rotavirus antigen, stool; Future; Expected date: 02/04/2019  -     Stool culture; Future; Expected date: 02/04/2019  -     Adenovirus Molecular Detection, PCR, Non-Blood Stool; Future; Expected date: 02/04/2019  -     CT Abdomen Pelvis With Contrast; Future; Expected date: 02/04/2019  -     Creatinine, serum; Future; Expected date: 02/04/2019  - CONTINUE OTC probiotic as directed  - avoid lactose  - recommended increase fiber in diet, especially soluble fiber since this can help bulk up the stool consistency and may help to slow down how fast the stool goes through the colon and can prevent diarrhea  - discussed with patient that certain  medications, such as magnesium, may be contributing to symptoms. I recommend follow-up with provider who manages medication to discuss about possible alternative therapy, patient verbalized understanding    Hematochezia  -     CBC auto differential; Future; Expected date: 02/04/2019  -     CT Abdomen Pelvis With Contrast; Future; Expected date: 02/04/2019  - discussed about different etiologies that can cause rectal bleeding, such as diverticulosis, polyps, colon mass, colon inflammation or infection, anal fissure or hemorrhoids. Colitis noted on last colonoscopy- patient just completed Augmentin for it.  - You may resume normal activity as long as you feel well.  - Avoid/minimize aspirin and anti-inflammatory drugs such as ibuprofen (Advil, Motrin) and naproxen (Aleve and Naprosyn).  - Avoid alcohol.    History of diverticulitis  -     CT Abdomen Pelvis With Contrast; Future; Expected date: 02/04/2019  - discussed the diagnosis of diverticulosis and diverticulitis, & to prevent diverticulitis, high fiber diet is recommended.  -Recommended high fiber diet after episode has completely resolved. Recommended daily exercise, adequate water intake (six 8-oz glasses of water daily), and high fiber diet. OTC fiber supplements are recommended if diet does not reach daily fiber goal (25 grams daily), such as Metamucil, Citrucel, or FiberCon (take as directed, separate from other oral medications by >2 hours).  - Advised to avoid/minimize popcorn, corn, seeds, and nuts.    History of colitis  -     CBC auto differential; Future; Expected date: 02/04/2019  -     C-reactive protein; Future; Expected date: 02/04/2019  -     Calprotectin; Future; Expected date: 02/04/2019  -     CT Abdomen Pelvis With Contrast; Future; Expected date: 02/04/2019  - avoid/minimize use of NSAIDs- since they can cause GI upset, bleeding and/or ulcers. If NSAID must be taken, recommend take with food.    Follow-up in about 1 month (around 3/4/2019),  or if symptoms worsen or fail to improve, for FOLLOW-UP WITH DR. MCCALLUM FOR FURTHER EVALUATION AND MANAGEMENT.      If no improvement in symptoms or symptoms worsen, call/follow-up at clinic or go to ER.

## 2019-02-05 ENCOUNTER — TELEPHONE (OUTPATIENT)
Dept: GASTROENTEROLOGY | Facility: CLINIC | Age: 79
End: 2019-02-05

## 2019-02-05 RX ORDER — MESALAMINE 0.38 G/1
1.5 CAPSULE, EXTENDED RELEASE ORAL DAILY
Qty: 120 CAPSULE | Refills: 2 | Status: SHIPPED | OUTPATIENT
Start: 2019-02-05 | End: 2019-03-28 | Stop reason: SDUPTHER

## 2019-02-05 NOTE — TELEPHONE ENCOUNTER
I called the patient and discussed the recommendations from Dr. Oscar. Discussed the medication with the patient and recommended to keep follow-up appointment with Dr. Oscar as scheduled for continued evaluation and management. Patient verbalized understanding and agreed with management plan. I addressed all of the patient's questions and concerns.  DARRIN

## 2019-02-06 ENCOUNTER — HOSPITAL ENCOUNTER (OUTPATIENT)
Dept: RADIOLOGY | Facility: HOSPITAL | Age: 79
Discharge: HOME | End: 2019-02-06
Attending: NURSE PRACTITIONER
Payer: MEDICARE

## 2019-02-06 DIAGNOSIS — Z87.19 HISTORY OF COLITIS: ICD-10-CM

## 2019-02-06 DIAGNOSIS — Z87.19 HISTORY OF DIVERTICULITIS: ICD-10-CM

## 2019-02-06 DIAGNOSIS — R19.8 RECTAL DISCHARGE: ICD-10-CM

## 2019-02-06 DIAGNOSIS — K92.1 HEMATOCHEZIA: ICD-10-CM

## 2019-02-06 DIAGNOSIS — R19.5 MUCUS IN STOOL: ICD-10-CM

## 2019-02-06 LAB
CRYPTOSP AG STL QL IA: NEGATIVE
G LAMBLIA AG STL QL IA: NEGATIVE
O+P STL TRI STN: NORMAL
OB PNL STL: NEGATIVE
RV AG STL QL IA.RAPID: NEGATIVE
WBC #/AREA STL HPF: NORMAL /[HPF]

## 2019-02-06 PROCEDURE — 74177 CT ABDOMEN PELVIS WITH CONTRAST: ICD-10-PCS | Mod: 26,HCNC,, | Performed by: RADIOLOGY

## 2019-02-06 PROCEDURE — 25500020 PHARM REV CODE 255: Mod: HCNC,PO | Performed by: NURSE PRACTITIONER

## 2019-02-06 PROCEDURE — 74177 CT ABD & PELVIS W/CONTRAST: CPT | Mod: 26,HCNC,, | Performed by: RADIOLOGY

## 2019-02-06 PROCEDURE — 74177 CT ABD & PELVIS W/CONTRAST: CPT | Mod: TC,HCNC,PO

## 2019-02-06 RX ADMIN — IOHEXOL 30 ML: 350 INJECTION, SOLUTION INTRAVENOUS at 10:02

## 2019-02-06 RX ADMIN — IOHEXOL 75 ML: 350 INJECTION, SOLUTION INTRAVENOUS at 10:02

## 2019-02-07 LAB
E COLI SXT1 STL QL IA: NEGATIVE
E COLI SXT2 STL QL IA: NEGATIVE

## 2019-02-08 ENCOUNTER — PATIENT MESSAGE (OUTPATIENT)
Dept: GASTROENTEROLOGY | Facility: CLINIC | Age: 79
End: 2019-02-08

## 2019-02-08 ENCOUNTER — TELEPHONE (OUTPATIENT)
Dept: GASTROENTEROLOGY | Facility: CLINIC | Age: 79
End: 2019-02-08

## 2019-02-08 DIAGNOSIS — R93.429 ABNORMAL CT SCAN, KIDNEY: Primary | ICD-10-CM

## 2019-02-08 LAB
HADV DNA SERPL QL NAA+PROBE: NEGATIVE
SPECIMEN SOURCE: NORMAL

## 2019-02-08 NOTE — TELEPHONE ENCOUNTER
Spoke to pt at length regarding ct rslt/labs/recommendations. U/S sched. Instr she will be notified when resulted. Verbs understanding

## 2019-02-08 NOTE — TELEPHONE ENCOUNTER
"Please call to inform & review the results with the patient- radiology report of the  Ct abdomen pelvis showed "Moderate diverticulosis, without findings of acute diverticulitis." There was an area of the sigmoid colon with some thickening which is nonspecific (can relate to history of colitis or possible from under distension. Recommend following up with Dr. Oscar for further evaluation and management of this finding. Recommend high fiber diet (20-30 grams of fiber daily)/OTC fiber supplements daily as directed.    "Hepatic steatosis. [ For fatty liver recommend: low fat, low cholesterol diet, maintain good control of blood sugars and cholesterol levels, exercise, weight loss (if overweight), minimize/avoid alcohol and tylenol products, & follow-up with PCP for continued evaluation and management; if specialist is needed, recommend seeing hepatology.]    Indeterminate left renal lesion.  Further evaluation with renal ultrasound is recommended." I placed order for renal ultrasound but this needs to continue to be evaluated and managed by PCP.  Otherwise, unremarkable findings.    Blood work showed elevated inflammatory marker which can be due to history of colitis (similar to prior level from 9 months ago). Normal kidney function levels, stable blood counts with mild elevation in platelet count (improved since last blood work). Continue current medications.  Some stool studies are still pending (we will notify you once received and reviewed), but the ones that have come back showed negative/normal findings.    Continue with previous recommendations. If no improvement in symptoms or symptoms worsen, call/follow-up at clinic or go to ER.  Please release results to patient's mychart once you have discussed results and recommendations with patient.  Thanks,  Susi CHADWICK    "

## 2019-02-09 LAB — BACTERIA STL CULT: NORMAL

## 2019-02-11 ENCOUNTER — PATIENT MESSAGE (OUTPATIENT)
Dept: GASTROENTEROLOGY | Facility: CLINIC | Age: 79
End: 2019-02-11

## 2019-02-11 RX ORDER — DICYCLOMINE HYDROCHLORIDE 10 MG/1
10 CAPSULE ORAL EVERY 6 HOURS PRN
Qty: 50 CAPSULE | Refills: 0 | Status: SHIPPED | OUTPATIENT
Start: 2019-02-11 | End: 2019-03-13

## 2019-02-11 NOTE — TELEPHONE ENCOUNTER
Try bentyl PRN cramps (script sent). Continue Apriso for now, while we see if bentyl alleviates the cramps.

## 2019-02-12 LAB — CALPROTECTIN STL-MCNT: 109.3 MCG/G

## 2019-02-13 ENCOUNTER — TELEPHONE (OUTPATIENT)
Dept: FAMILY MEDICINE | Facility: CLINIC | Age: 79
End: 2019-02-13

## 2019-02-13 NOTE — TELEPHONE ENCOUNTER
Annual [facility] wellness visit offered~ pt declines to schedule at this time and requests a call back in 4 months. Ms. Kramer was offered my direct number for scheduling~ pt declined.

## 2019-02-14 ENCOUNTER — TELEPHONE (OUTPATIENT)
Dept: GASTROENTEROLOGY | Facility: CLINIC | Age: 79
End: 2019-02-14

## 2019-02-14 ENCOUNTER — PATIENT MESSAGE (OUTPATIENT)
Dept: GASTROENTEROLOGY | Facility: CLINIC | Age: 79
End: 2019-02-14

## 2019-02-14 DIAGNOSIS — R19.5 ABNORMAL STOOL TEST: Primary | ICD-10-CM

## 2019-02-14 DIAGNOSIS — K52.9 COLITIS: ICD-10-CM

## 2019-02-14 NOTE — TELEPHONE ENCOUNTER
Please call to inform & review the results with the patient- remaining stool studies showed mild elevation (which is borderline) in fecal calprotectin. Elevations in this can be seen with Inflammatory bowel disease. Recommend continuing current medications and recommendations, including follow-up with Dr. Oscar as scheduled and repeat level in 4-6 weeks.  The other remaining stool studies were negative/normal.  Continue with previous recommendations. If no improvement in symptoms or symptoms worsen, call/follow-up at clinic or go to ER.  Please release results to patient's mychart once you have discussed results and recommendations with patient.  Thanks,  Susi BEE-C

## 2019-02-15 ENCOUNTER — HOSPITAL ENCOUNTER (OUTPATIENT)
Dept: RADIOLOGY | Facility: HOSPITAL | Age: 79
Discharge: HOME | End: 2019-02-15
Attending: NURSE PRACTITIONER
Payer: MEDICARE

## 2019-02-15 ENCOUNTER — TELEPHONE (OUTPATIENT)
Dept: GASTROENTEROLOGY | Facility: CLINIC | Age: 79
End: 2019-02-15

## 2019-02-15 DIAGNOSIS — R93.429 ABNORMAL CT SCAN, KIDNEY: ICD-10-CM

## 2019-02-15 PROCEDURE — 76770 US EXAM ABDO BACK WALL COMP: CPT | Mod: 26,HCNC,, | Performed by: RADIOLOGY

## 2019-02-15 PROCEDURE — 76770 US RETROPERITONEAL COMPLETE: ICD-10-PCS | Mod: 26,HCNC,, | Performed by: RADIOLOGY

## 2019-02-15 PROCEDURE — 76770 US EXAM ABDO BACK WALL COMP: CPT | Mod: TC,HCNC,PO

## 2019-02-15 NOTE — TELEPHONE ENCOUNTER
----- Message from CRISTAL Hunt sent at 2/15/2019 12:28 PM CST -----  Please call to inform & review the results with the patient- The radiology report of the renal ultrasound showed a left kidney cyst: Recommend follow-up with Primary Care Provider for continued evaluation and management of this finding.   Continue with previous recommendations.  Thanks,  Susi WUP-C

## 2019-02-15 NOTE — TELEPHONE ENCOUNTER
Spoke with pt and informed of results and recommendations per Sabine Hernandez NP. Pt verbalized understanding.

## 2019-03-13 ENCOUNTER — TELEPHONE (OUTPATIENT)
Dept: FAMILY MEDICINE | Facility: CLINIC | Age: 79
End: 2019-03-13

## 2019-03-13 NOTE — TELEPHONE ENCOUNTER
----- Message from Edithzaria Hunter sent at 3/13/2019 12:09 PM CDT -----  Contact: Patient  Type:  Patient Returning Call    Who Called:  Patient  Who Left Message for Patient:  NA  Does the patient know what this is regarding?:  appt  Best Call Back Number:  [phone]   Additional Information: Patient is stating 3/29 is okay if she does not answer please leave vm about new appt time.Please advise.

## 2019-03-15 DIAGNOSIS — Z12.31 SCREENING MAMMOGRAM, ENCOUNTER FOR: Primary | ICD-10-CM

## 2019-03-18 ENCOUNTER — TELEPHONE (OUTPATIENT)
Dept: FAMILY MEDICINE | Facility: CLINIC | Age: 79
End: 2019-03-18

## 2019-03-18 NOTE — TELEPHONE ENCOUNTER
----- Message from Anel Irizarry sent at 3/18/2019  1:26 PM CDT -----  Contact: pt  Pt states that her appointment on 3/29 that the nurse schedule is not a good day,needing in the afternoon on 3/27 or 3/28,please...[phone] or [phone]

## 2019-03-18 NOTE — TELEPHONE ENCOUNTER
Pt was rescheduled from 3/15 to 3/29, but states that 3/29 will not work for her. Pt is wondering if she could come in 3/27 or 3/28 instead? Please advise if pt can be worked in.

## 2019-03-26 ENCOUNTER — PATIENT MESSAGE (OUTPATIENT)
Dept: GASTROENTEROLOGY | Facility: CLINIC | Age: 79
End: 2019-03-26

## 2019-03-26 ENCOUNTER — OFFICE VISIT (OUTPATIENT)
Dept: GASTROENTEROLOGY | Facility: CLINIC | Age: 79
End: 2019-03-26
Payer: MEDICARE

## 2019-03-26 DIAGNOSIS — K52.9 COLITIS: Primary | ICD-10-CM

## 2019-03-26 PROCEDURE — 1101F PT FALLS ASSESS-DOCD LE1/YR: CPT | Mod: HCNC,CPTII,S$GLB, | Performed by: INTERNAL MEDICINE

## 2019-03-26 PROCEDURE — 99213 PR OFFICE/OUTPT VISIT, EST, LEVL III, 20-29 MIN: ICD-10-PCS | Mod: HCNC,S$GLB,, | Performed by: INTERNAL MEDICINE

## 2019-03-26 PROCEDURE — 99213 OFFICE O/P EST LOW 20 MIN: CPT | Mod: HCNC,S$GLB,, | Performed by: INTERNAL MEDICINE

## 2019-03-26 PROCEDURE — 99999 PR PBB SHADOW E&M-EST. PATIENT-LVL II: CPT | Mod: PBBFAC,HCNC,, | Performed by: INTERNAL MEDICINE

## 2019-03-26 PROCEDURE — 99999 PR PBB SHADOW E&M-EST. PATIENT-LVL II: ICD-10-PCS | Mod: PBBFAC,HCNC,, | Performed by: INTERNAL MEDICINE

## 2019-03-26 PROCEDURE — 1101F PR PT FALLS ASSESS DOC 0-1 FALLS W/OUT INJ PAST YR: ICD-10-PCS | Mod: HCNC,CPTII,S$GLB, | Performed by: INTERNAL MEDICINE

## 2019-03-27 NOTE — PROGRESS NOTES
Pt returns for evaluation of colitis. Doing well on Apriso 2 tabs daily (pt decreased dose). No abd pain. No bleeding or diarrhea. Appetite and weight stable. No fever or jaundice. Recent CT notable for tics without inflammation. C-scope focal left colon colitis, biopsies benign (chronic active colitis). Pt concerned re: potential long term side effects from apriso.    REVIEW OF SYSTEMS:   Constitutional: Negative for fever, appetite change and unexpected weight change.  HENT: Negative for sore throat and trouble swallowing.  Eyes: Negative for visual disturbance.  Respiratory: Negative for chest tightness, shortness of breath and wheezing.  Cardiovascular: Negative for chest pain.  Gastrointestinal:  as per HPI  Genitourinary: Negative for dysuria, frequency and hematuria.    PHYSICAL EXAMINATION:                                                        GENERAL:  Comfortable, in no acute distress.      SKIN: Non-jaundiced.                               IMP: Focal colitis - responding to Apriso, currently doing well.    PLAN: I discussed chronic colitis, potential need for either long-term maintenance therapy vs intermittent therapy for flares, and potential medication side effects, which pt understands. I also discussed underlying IBS, as well as possible diverticulitis, which may cloud etiology of symptoms going forward. Pt agreeable to 3 month course of Apriso, and if continues to do well, will stop the medication at that point (early June) and see how see does.

## 2019-03-28 ENCOUNTER — OFFICE VISIT (OUTPATIENT)
Dept: FAMILY MEDICINE | Facility: CLINIC | Age: 79
End: 2019-03-28
Payer: MEDICARE

## 2019-03-28 VITALS
SYSTOLIC BLOOD PRESSURE: 138 MMHG | BODY MASS INDEX: 27.02 KG/M2 | WEIGHT: 157.44 LBS | OXYGEN SATURATION: 99 % | DIASTOLIC BLOOD PRESSURE: 72 MMHG | HEART RATE: 77 BPM

## 2019-03-28 DIAGNOSIS — I10 ESSENTIAL HYPERTENSION: ICD-10-CM

## 2019-03-28 DIAGNOSIS — M54.2 NECK PAIN: ICD-10-CM

## 2019-03-28 DIAGNOSIS — E03.9 HYPOTHYROIDISM, UNSPECIFIED TYPE: ICD-10-CM

## 2019-03-28 DIAGNOSIS — Z00.00 ROUTINE HEALTH MAINTENANCE: Primary | ICD-10-CM

## 2019-03-28 DIAGNOSIS — K52.9 COLITIS: ICD-10-CM

## 2019-03-28 DIAGNOSIS — M54.50 LOW BACK PAIN, NON-SPECIFIC: ICD-10-CM

## 2019-03-28 PROCEDURE — 99999 PR PBB SHADOW E&M-EST. PATIENT-LVL IV: CPT | Mod: PBBFAC,HCNC,, | Performed by: FAMILY MEDICINE

## 2019-03-28 PROCEDURE — 99213 OFFICE O/P EST LOW 20 MIN: CPT | Mod: HCNC,S$GLB,, | Performed by: FAMILY MEDICINE

## 2019-03-28 PROCEDURE — 99999 PR PBB SHADOW E&M-EST. PATIENT-LVL IV: ICD-10-PCS | Mod: PBBFAC,HCNC,, | Performed by: FAMILY MEDICINE

## 2019-03-28 PROCEDURE — 3075F SYST BP GE 130 - 139MM HG: CPT | Mod: HCNC,CPTII,S$GLB, | Performed by: FAMILY MEDICINE

## 2019-03-28 PROCEDURE — 3075F PR MOST RECENT SYSTOLIC BLOOD PRESS GE 130-139MM HG: ICD-10-PCS | Mod: HCNC,CPTII,S$GLB, | Performed by: FAMILY MEDICINE

## 2019-03-28 PROCEDURE — 3078F DIAST BP <80 MM HG: CPT | Mod: HCNC,CPTII,S$GLB, | Performed by: FAMILY MEDICINE

## 2019-03-28 PROCEDURE — 3078F PR MOST RECENT DIASTOLIC BLOOD PRESSURE < 80 MM HG: ICD-10-PCS | Mod: HCNC,CPTII,S$GLB, | Performed by: FAMILY MEDICINE

## 2019-03-28 PROCEDURE — 99213 PR OFFICE/OUTPT VISIT, EST, LEVL III, 20-29 MIN: ICD-10-PCS | Mod: HCNC,S$GLB,, | Performed by: FAMILY MEDICINE

## 2019-03-28 RX ORDER — MESALAMINE 375 MG/1
CAPSULE, EXTENDED RELEASE ORAL
Qty: 120 CAPSULE | Refills: 1 | Status: SHIPPED | OUTPATIENT
Start: 2019-03-28 | End: 2019-06-17 | Stop reason: SDUPTHER

## 2019-03-28 NOTE — PROGRESS NOTES
HPI  Dorothy Kramer is a 78 y.o. female with multiple medical diagnoses as listed in the medical history and problem list that presents for Annual Exam  .      HPI  Here today for routine health maintenance.    PAST MEDICAL HISTORY:  Past Medical History:   Diagnosis Date    DDD (degenerative disc disease), cervical     Diverticulitis     Diverticulosis     DJD (degenerative joint disease) of hip     Dyspepsia     GERD (gastroesophageal reflux disease)     History of melanoma 5/20/2015    HTN (hypertension)     Melanoma     Migraine headache     Rectocele     Skin cancer     melanoma on face    Thyroid disease     hypo    Trouble in sleeping     Uterine prolaps     followed by GYN    Vulvar lesion 5/20/2015       PAST SURGICAL HISTORY:  Past Surgical History:   Procedure Laterality Date    APPENDECTOMY  1962    BACK SURGERY      BREAST BIOPSY      BREAST LUMPECTOMY  1989    CERVICAL FUSION  1991    CHOLECYSTECTOMY      COLONOSCOPY      COLONOSCOPY N/A 11/9/2018    Performed by [facility]    HYSTERECTOMY      TRACEE/BSO for benign disease    JOINT REPLACEMENT Right 2016    Hip replacement    IN REMOVAL OF OVARY/TUBE(S)      TONSILLECTOMY      TOTAL ABDOMINAL HYSTERECTOMY W/ BILATERAL SALPINGOOPHORECTOMY  1990       SOCIAL HISTORY:  Social History     Socioeconomic History    Marital status:      Spouse name: Not on file    Number of children: Not on file    Years of education: Not on file    Highest education level: Not on file   Occupational History    Not on file   Social Needs    Financial resource strain: Not on file    Food insecurity:     Worry: Not on file     Inability: Not on file    Transportation needs:     Medical: Not on file     Non-medical: Not on file   Tobacco Use    Smoking status: Never Smoker    Smokeless tobacco: Never Used   Substance and Sexual Activity    Alcohol use: No    Drug use: No    Sexual activity: Not Currently    Lifestyle    Physical activity:     Days per week: Not on file     Minutes per session: Not on file    Stress: Not on file   Relationships    Social connections:     Talks on phone: Not on file     Gets together: Not on file     Attends Hindu service: Not on file     Active member of club or organization: Not on file     Attends meetings of clubs or organizations: Not on file     Relationship status: Not on file    Intimate partner violence:     Fear of current or ex partner: Not on file     Emotionally abused: Not on file     Physically abused: Not on file     Forced sexual activity: Not on file   Other Topics Concern    Not on file   Social History Narrative    Not on file       FAMILY HISTORY:  Family History   Problem Relation Age of Onset    Heart failure Mother     Cancer Mother         Bladder    Colon cancer Mother 63    Cancer Father         Bladder    Parkinsonism Father     Diverticulitis Father     Uterine cancer Maternal Aunt     Ovarian cancer Neg Hx     Breast cancer Neg Hx     Crohn's disease Neg Hx     Ulcerative colitis Neg Hx        ALLERGIES AND MEDICATIONS: updated and reviewed.  Review of patient's allergies indicates:   Allergen Reactions    Ciprofloxacin Palpitations    Flagyl [metronidazole] Palpitations     Current Outpatient Medications   Medication Sig Dispense Refill    APRISO 0.375 gram Cp24 TAKE 4 CAPSULES (1.5 G TOTAL) BY MOUTH ONCE DAILY. 120 capsule 1    estradiol (ESTRACE) 1 MG tablet TAKE 1 TABLET (1 MG TOTAL) BY MOUTH ONCE DAILY. 90 tablet 3    Lactobacillus rhamnosus GG (CULTURELLE) 10 billion cell capsule Take 1 capsule by mouth once daily.      levothyroxine (SYNTHROID) 88 MCG tablet Take 1 tablet (88 mcg total) by mouth once daily. 90 tablet 3    losartan-hydrochlorothiazide 100-25 mg (HYZAAR) 100-25 mg per tablet Take 1 tablet by mouth once daily. 90 tablet 3    magnesium 30 mg Tab Take 1 tablet by mouth.      MULTIVIT-IRON-MIN-FOLIC ACID  3,500-18-0.4 UNIT-MG-MG ORAL CHEW Take by mouth.       No current facility-administered medications for this visit.        ROS  Review of Systems   Constitutional: Negative for activity change, appetite change, fatigue, fever and unexpected weight change.   HENT: Negative for congestion, hearing loss, rhinorrhea and sore throat.    Eyes: Negative for visual disturbance.   Respiratory: Negative for cough, chest tightness, shortness of breath and wheezing.    Cardiovascular: Negative for chest pain, palpitations and leg swelling.   Gastrointestinal: Positive for abdominal pain (recent difficulty with colitis, now improved after treatment with Apriso). Negative for abdominal distention, blood in stool, constipation, diarrhea, nausea and vomiting.   Genitourinary: Negative for difficulty urinating, dysuria, frequency, hematuria, menstrual problem, pelvic pain, urgency and vaginal bleeding.   Musculoskeletal: Positive for arthralgias, back pain and neck pain. Negative for joint swelling.   Skin: Negative for rash and wound.   Neurological: Negative for dizziness, tremors, speech difficulty, weakness, light-headedness, numbness and headaches.   Psychiatric/Behavioral: Negative for agitation, behavioral problems, confusion, dysphoric mood and sleep disturbance. The patient is not nervous/anxious.        Physical Exam  Vitals:    03/28/19 0907   BP: 138/72   Pulse: 77    Body mass index is 27.02 kg/m².  Weight: 71.4 kg (157 lb 6.5 oz)         Physical Exam   Constitutional: She is oriented to person, place, and time. She appears well-developed and well-nourished.   HENT:   Head: Normocephalic and atraumatic.   Right Ear: External ear normal.   Left Ear: External ear normal.   Nose: Nose normal.   Mouth/Throat: Oropharynx is clear and moist.   Eyes: Pupils are equal, round, and reactive to light. Conjunctivae and EOM are normal. No scleral icterus.   Neck: Normal range of motion. Neck supple. No JVD present. No thyromegaly  present.   Cardiovascular: Normal rate, regular rhythm and normal heart sounds. Exam reveals no gallop and no friction rub.   No murmur heard.  Pulmonary/Chest: Effort normal and breath sounds normal. She has no wheezes. She has no rales.   Abdominal: Soft. Bowel sounds are normal. She exhibits no distension and no mass. There is no tenderness. There is no rebound and no guarding.   Musculoskeletal: Normal range of motion. She exhibits no edema.   Lymphadenopathy:     She has no cervical adenopathy.   Neurological: She is alert and oriented to person, place, and time. She has normal strength. No cranial nerve deficit or sensory deficit.   Skin: Skin is warm and dry. No rash noted.   Vitals reviewed.      Health Maintenance       Date Due Completion Date    Mammogram 04/04/2018 4/4/2017    Influenza Vaccine 08/01/2018 9/1/2016 (ClinicallyNA)    Override on 9/1/2016: Not Clinically Appropriate (Patient has had repeated and more reaction per administration)    Override on 2/4/2014: Not Clinically Appropriate    DEXA SCAN 05/11/2021 5/11/2018    Lipid Panel 05/11/2023 5/11/2018    TETANUS VACCINE 02/17/2027 2/17/2017          Assessment & Plan    Routine health maintenance  - Health maintenance reviewed  - Diet and exercise education.    Essential hypertension  -     Comprehensive metabolic panel; Future; Expected date: 03/28/2019  -     Lipid panel; Future; Expected date: 03/28/2019  - Continue current therapy  - Serial blood pressure monitoring  - Diet and exercise education.     Hypothyroidism, unspecified type  -     TSH; Future; Expected date: 03/28/2019  - Continue current therapy    Colitis  - Continue current therapy  - Continue GI    Low back pain, non-specific  -     X-Ray Lumbar Spine Ap And Lateral; Future; Expected date: 03/28/2019    Neck pain  -     X-Ray Cervical Spine AP And Lateral; Future; Expected date: 03/28/2019  - Consider PT      Follow up in about 6 months (around 9/28/2019).

## 2019-04-01 ENCOUNTER — PATIENT MESSAGE (OUTPATIENT)
Dept: FAMILY MEDICINE | Facility: CLINIC | Age: 79
End: 2019-04-01

## 2019-05-17 ENCOUNTER — HOSPITAL ENCOUNTER (OUTPATIENT)
Dept: RADIOLOGY | Facility: HOSPITAL | Age: 79
Discharge: HOME | End: 2019-05-17
Attending: FAMILY MEDICINE
Payer: MEDICARE

## 2019-05-17 DIAGNOSIS — M54.50 LOW BACK PAIN, NON-SPECIFIC: ICD-10-CM

## 2019-05-17 DIAGNOSIS — M54.2 NECK PAIN: ICD-10-CM

## 2019-05-17 PROCEDURE — 72100 XR LUMBAR SPINE AP AND LATERAL: ICD-10-PCS | Mod: 26,HCNC,, | Performed by: RADIOLOGY

## 2019-05-17 PROCEDURE — 72100 X-RAY EXAM L-S SPINE 2/3 VWS: CPT | Mod: TC,HCNC,FY,PO

## 2019-05-17 PROCEDURE — 72100 X-RAY EXAM L-S SPINE 2/3 VWS: CPT | Mod: 26,HCNC,, | Performed by: RADIOLOGY

## 2019-05-17 PROCEDURE — 72040 X-RAY EXAM NECK SPINE 2-3 VW: CPT | Mod: 26,HCNC,, | Performed by: RADIOLOGY

## 2019-05-17 PROCEDURE — 72040 XR CERVICAL SPINE AP LATERAL: ICD-10-PCS | Mod: 26,HCNC,, | Performed by: RADIOLOGY

## 2019-05-17 PROCEDURE — 72040 X-RAY EXAM NECK SPINE 2-3 VW: CPT | Mod: TC,HCNC,FY,PO

## 2019-05-20 ENCOUNTER — PES CALL (OUTPATIENT)
Dept: ADMINISTRATIVE | Facility: CLINIC | Age: 79
End: 2019-05-20

## 2019-05-27 RX ORDER — LOSARTAN POTASSIUM AND HYDROCHLOROTHIAZIDE 25; 100 MG/1; MG/1
TABLET ORAL
Qty: 90 TABLET | Refills: 3 | Status: SHIPPED | OUTPATIENT
Start: 2019-05-27 | End: 2019-12-02 | Stop reason: ALTCHOICE

## 2019-06-05 ENCOUNTER — OFFICE VISIT (OUTPATIENT)
Dept: FAMILY MEDICINE | Facility: CLINIC | Age: 79
End: 2019-06-05
Payer: MEDICARE

## 2019-06-05 VITALS
OXYGEN SATURATION: 98 % | WEIGHT: 150.38 LBS | BODY MASS INDEX: 25.67 KG/M2 | DIASTOLIC BLOOD PRESSURE: 76 MMHG | HEART RATE: 82 BPM | SYSTOLIC BLOOD PRESSURE: 138 MMHG | HEIGHT: 64 IN

## 2019-06-05 DIAGNOSIS — E78.1 HYPERTRIGLYCERIDEMIA: ICD-10-CM

## 2019-06-05 DIAGNOSIS — M54.40 LOW BACK PAIN WITH SCIATICA, SCIATICA LATERALITY UNSPECIFIED, UNSPECIFIED BACK PAIN LATERALITY, UNSPECIFIED CHRONICITY: ICD-10-CM

## 2019-06-05 DIAGNOSIS — E03.9 HYPOTHYROIDISM, UNSPECIFIED TYPE: ICD-10-CM

## 2019-06-05 DIAGNOSIS — M25.562 LEFT KNEE PAIN, UNSPECIFIED CHRONICITY: ICD-10-CM

## 2019-06-05 DIAGNOSIS — M25.552 LEFT HIP PAIN: ICD-10-CM

## 2019-06-05 DIAGNOSIS — Z00.00 ENCOUNTER FOR PREVENTIVE HEALTH EXAMINATION: Primary | ICD-10-CM

## 2019-06-05 DIAGNOSIS — I10 ESSENTIAL HYPERTENSION: ICD-10-CM

## 2019-06-05 DIAGNOSIS — M54.2 CERVICALGIA: ICD-10-CM

## 2019-06-05 PROCEDURE — G0439 PR MEDICARE ANNUAL WELLNESS SUBSEQUENT VISIT: ICD-10-PCS | Mod: HCNC,S$GLB,, | Performed by: NURSE PRACTITIONER

## 2019-06-05 PROCEDURE — 3078F PR MOST RECENT DIASTOLIC BLOOD PRESSURE < 80 MM HG: ICD-10-PCS | Mod: HCNC,CPTII,S$GLB, | Performed by: NURSE PRACTITIONER

## 2019-06-05 PROCEDURE — 3075F PR MOST RECENT SYSTOLIC BLOOD PRESS GE 130-139MM HG: ICD-10-PCS | Mod: HCNC,CPTII,S$GLB, | Performed by: NURSE PRACTITIONER

## 2019-06-05 PROCEDURE — G0439 PPPS, SUBSEQ VISIT: HCPCS | Mod: HCNC,S$GLB,, | Performed by: NURSE PRACTITIONER

## 2019-06-05 PROCEDURE — 99999 PR PBB SHADOW E&M-EST. PATIENT-LVL V: CPT | Mod: PBBFAC,HCNC,, | Performed by: NURSE PRACTITIONER

## 2019-06-05 PROCEDURE — 99999 PR PBB SHADOW E&M-EST. PATIENT-LVL V: ICD-10-PCS | Mod: PBBFAC,HCNC,, | Performed by: NURSE PRACTITIONER

## 2019-06-05 PROCEDURE — 3078F DIAST BP <80 MM HG: CPT | Mod: HCNC,CPTII,S$GLB, | Performed by: NURSE PRACTITIONER

## 2019-06-05 PROCEDURE — 3075F SYST BP GE 130 - 139MM HG: CPT | Mod: HCNC,CPTII,S$GLB, | Performed by: NURSE PRACTITIONER

## 2019-06-05 RX ORDER — ESTRADIOL 1 MG/1
TABLET ORAL
Qty: 90 TABLET | Refills: 3 | Status: SHIPPED | OUTPATIENT
Start: 2019-06-05 | End: 2020-03-19 | Stop reason: SDUPTHER

## 2019-06-05 RX ORDER — LEVOTHYROXINE SODIUM 88 UG/1
88 TABLET ORAL DAILY
Qty: 90 TABLET | Refills: 3 | Status: SHIPPED | OUTPATIENT
Start: 2019-06-05 | End: 2020-03-19 | Stop reason: SDUPTHER

## 2019-06-05 NOTE — PATIENT INSTRUCTIONS
Counseling and Referral of Other Preventative  (Italic type indicates deductible and co-insurance are waived)    Patient Name: Dorothy Kramer  Today's Date: 6/5/2019    Health Maintenance       Date Due Completion Date    Shingles Vaccine (2 of 3) 09/26/2015 8/1/2015    Influenza Vaccine 08/01/2019 9/1/2016 (ClinicallyNA)    Override on 9/1/2016: Not Clinically Appropriate (Patient has had repeated and more reaction per administration)    Override on 2/4/2014: Not Clinically Appropriate    DEXA SCAN 05/11/2021 5/11/2018    Lipid Panel 05/17/2024 5/17/2019    TETANUS VACCINE 02/17/2027 2/17/2017        Orders Placed This Encounter   Procedures    Ambulatory Referral to Physical/Occupational Therapy     The following information is provided to all patients.  This information is to help you find resources for any of the problems found today that may be affecting your health:                Living healthy guide: www.[facility].[email or web address].[facility]      Understanding Diabetes: www.diabetes.org      Eating healthy: www.cdc.gov/healthyweight      [facility] home safety checklist: www.cdc.gov/steadi/patient.html      Agency on Aging: www.goea.[place].[facility]      Alcoholics anonymous (AA): www.aa.org      Physical Activity: www.paz.nih.gov/vo2nluy      Tobacco use: www.quitwithusla.org

## 2019-06-05 NOTE — PROGRESS NOTES
"Dorothy Kramer presented for a  Medicare AWV and comprehensive Health Risk Assessment today. The following components were reviewed and updated:    · Medical history  · Family History  · Social history  · Allergies and Current Medications  · Health Risk Assessment  · Health Maintenance  · Care Team     ** See Completed Assessments for Annual Wellness Visit within the encounter summary.**     The following assessments were completed:  · Living Situation  · CAGE  · Depression Screening  · Timed Get Up and Go  · Whisper Test  · Cognitive Function Screening      · Nutrition Screening  · ADL Screening  · PAQ Screening    Vitals:    06/05/19 1158   BP: 138/76   BP Location: Left arm   Patient Position: Sitting   BP Method: Medium (Manual)   Pulse: 82   SpO2: 98%   Weight: 68.2 kg (150 lb 5.7 oz)   Height: 5' 4" (1.626 m)     Body mass index is 25.81 kg/m².  Physical Exam   Constitutional: She is oriented to person, place, and time. She appears well-nourished.   Cardiovascular: Normal rate, regular rhythm, normal heart sounds and intact distal pulses.   Pulmonary/Chest: Effort normal and breath sounds normal.   Neurological: She is alert and oriented to person, place, and time.   Skin: Skin is warm and dry.   Vitals reviewed.      Diagnoses and health risks identified today and associated recommendations/orders:    1. Encounter for preventive health examination  Reviewed and discussed health maintenance.    shingrix rx sent erx to [facility] per pt request    2. Cervicalgia  - Ambulatory Referral to Physical/Occupational Therapy    3. Low back pain with sciatica, sciatica laterality unspecified, unspecified back pain laterality, unspecified chronicity  - Ambulatory Referral to Physical/Occupational Therapy    4. Left knee pain, unspecified chronicity  - Ambulatory Referral to Physical/Occupational Therapy    5. Left hip pain  - Ambulatory Referral to Physical/Occupational Therapy    6. Hypothyroidism, unspecified type  Stable- " continue current treatment and follow up routinely with PCP     7. Hypertriglyceridemia  Stable- continue current treatment and follow up routinely with PCP     8. Essential hypertension  Stable- continue current treatment and follow up routinely with PCP     Provided Dorothy with a 5-10 year written screening schedule and personal prevention plan. Recommendations were developed using the USPSTF age appropriate recommendations. Education, counseling, and referrals were provided as needed. After Visit Summary printed and given to patient which includes a list of additional screenings\tests needed.    Zoe Alfonso, NP

## 2019-06-17 RX ORDER — MESALAMINE 0.38 G/1
1.5 CAPSULE, EXTENDED RELEASE ORAL DAILY
Qty: 120 CAPSULE | Refills: 1 | Status: SHIPPED | OUTPATIENT
Start: 2019-06-17 | End: 2019-07-11 | Stop reason: SDUPTHER

## 2019-06-30 ENCOUNTER — OFFICE VISIT (OUTPATIENT)
Dept: URGENT CARE | Facility: CLINIC | Age: 79
End: 2019-06-30
Payer: MEDICARE

## 2019-06-30 VITALS
HEIGHT: 64 IN | RESPIRATION RATE: 16 BRPM | SYSTOLIC BLOOD PRESSURE: 152 MMHG | BODY MASS INDEX: 25.61 KG/M2 | HEART RATE: 97 BPM | OXYGEN SATURATION: 96 % | DIASTOLIC BLOOD PRESSURE: 74 MMHG | TEMPERATURE: 99 F | WEIGHT: 150 LBS

## 2019-06-30 DIAGNOSIS — Z98.1 HISTORY OF FUSION OF CERVICAL SPINE: ICD-10-CM

## 2019-06-30 DIAGNOSIS — M54.2 ACUTE NECK PAIN: Primary | ICD-10-CM

## 2019-06-30 DIAGNOSIS — M54.2 CERVICALGIA: ICD-10-CM

## 2019-06-30 DIAGNOSIS — M43.10 ANTEROLISTHESIS: ICD-10-CM

## 2019-06-30 PROCEDURE — 72040 X-RAY EXAM NECK SPINE 2-3 VW: CPT | Mod: S$GLB,,, | Performed by: RADIOLOGY

## 2019-06-30 PROCEDURE — 3078F DIAST BP <80 MM HG: CPT | Mod: CPTII,S$GLB,, | Performed by: NURSE PRACTITIONER

## 2019-06-30 PROCEDURE — 99214 PR OFFICE/OUTPT VISIT, EST, LEVL IV, 30-39 MIN: ICD-10-PCS | Mod: S$GLB,,, | Performed by: NURSE PRACTITIONER

## 2019-06-30 PROCEDURE — 99214 OFFICE O/P EST MOD 30 MIN: CPT | Mod: S$GLB,,, | Performed by: NURSE PRACTITIONER

## 2019-06-30 PROCEDURE — 72040 XR CERVICAL SPINE AP LATERAL: ICD-10-PCS | Mod: S$GLB,,, | Performed by: RADIOLOGY

## 2019-06-30 PROCEDURE — 3077F PR MOST RECENT SYSTOLIC BLOOD PRESSURE >= 140 MM HG: ICD-10-PCS | Mod: CPTII,S$GLB,, | Performed by: NURSE PRACTITIONER

## 2019-06-30 PROCEDURE — 3077F SYST BP >= 140 MM HG: CPT | Mod: CPTII,S$GLB,, | Performed by: NURSE PRACTITIONER

## 2019-06-30 PROCEDURE — 3078F PR MOST RECENT DIASTOLIC BLOOD PRESSURE < 80 MM HG: ICD-10-PCS | Mod: CPTII,S$GLB,, | Performed by: NURSE PRACTITIONER

## 2019-06-30 PROCEDURE — 1101F PT FALLS ASSESS-DOCD LE1/YR: CPT | Mod: CPTII,S$GLB,, | Performed by: NURSE PRACTITIONER

## 2019-06-30 PROCEDURE — 1101F PR PT FALLS ASSESS DOC 0-1 FALLS W/OUT INJ PAST YR: ICD-10-PCS | Mod: CPTII,S$GLB,, | Performed by: NURSE PRACTITIONER

## 2019-06-30 RX ORDER — TRAMADOL HYDROCHLORIDE 50 MG/1
50 TABLET ORAL EVERY 8 HOURS PRN
Qty: 15 TABLET | Refills: 0 | Status: SHIPPED | OUTPATIENT
Start: 2019-06-30 | End: 2019-12-19

## 2019-06-30 RX ORDER — METAXALONE 800 MG/1
800 TABLET ORAL 2 TIMES DAILY PRN
Qty: 20 TABLET | Refills: 0 | Status: SHIPPED | OUTPATIENT
Start: 2019-06-30 | End: 2019-07-10

## 2019-06-30 NOTE — PATIENT INSTRUCTIONS
Follow up with your doctor in a few days.  Return to the urgent care or go to the ER if symptoms get worse.    The final reading of your xray showed no dislocation or fracture.      Follow up with spine referral. Please call 1 [phone] if you do not hear from [facility] to get your referral appointment we discussed.        Tylenol, hot shower 10min to neck.  Skelaxin as needed- do not take with tramadol.  Tramadol for break through pain.      Neck Pain    There are several possible causes of neck pain when there is no injury:  · You can get a minor ligament sprain or muscle strain from a sudden minor neck movement. Sleeping with your neck in an awkward position can also cause this.  · Some people respond to emotional stress by tensing the muscles of their neck, shoulders, and upper back. Chronic spasm in these muscles can cause neck pain and sometimes headaches.  · Gradual wear and tear of the joints in the spine can cause degenerative arthritis. This can be a source of occasional or chronic neck pain.  · The spinal disks may bulge and put pressure on a nearby spinal nerve. This can happen as a natural result of aging or repeated small injuries to the neck. The spinal disks are the cushions between each spinal bone. This causes tingling, pain, or numbness that spreads from the neck to the shoulder, arm, or hand on one side.  Acute neck pain usually gets better in 1 to 2 weeks. Neck pain related to disk disease, arthritis in the spinal joints, or spinal stenosis can become chronic and last for months or years. Spinal stenosis is narrowing of the spinal canal.  X-rays are usually not ordered for the initial evaluation of neck pain. However, X-rays may be done if you had a forceful physical injury, such as a car accident or fall. If pain continues and doesnt respond to medical treatment, X-rays and other tests may be done at a later time.  Home care  · Rest and relax the muscles. Use a comfortable pillow that  supports the head. It should also help keep the spine in a neutral position. The position of the head should not be tilted forward or backward. A rolled up towel may help for a custom fit.  · Some people find relief with heat. Heat can be applied with either a warm shower or bath or a moist towel heated in the microwave and massage. Others prefer cold packs. You can make an ice pack by filling a plastic bag that seals at the top with ice cubes or crushed ice and then wrapping it with a thin towel. Try both and use the method that feels best for 15 to 20 minutes, several times a day.  · Whether using ice or heat, be careful that you do not injure your skin. Never put ice directly on the skin. Always wrap the ice in a towel or other type of cloth.This is very important, especially in people with poor skin sensations.   · Try to reduce your stress level. Emotional stress can lead to neck muscle tension and get in the way of or delay the healing process.  · You may use over-the-counter pain medicine to control pain, unless another medicine was prescribed. If you have chronic liver or kidney disease or ever had a stomach ulcer or GI bleeding, talk with your healthcare provider before using these medicines.  Follow-up care  Follow up with your healthcare provider if your symptoms do not show signs of improvement after one week. Physical therapy or further tests may be needed.  If X-rays, CT scans, or MRI scans were taken, you will be told of any new findings that may affect your care.  Call 911  Call 911 if you have:  · Sudden weakness or numbness in one or both arms  · Neck swelling, difficulty or painful swallowing  · Difficulty breathing  · Chest pain  When to seek medical advice  Call your healthcare provider right away if any of these occur:  · Pain becomes worse or spreads into one or both arm  · Increasing headache  · Fever of 100.4°F (38°C) or above lasting for 24 to 48 hours  Date Last Reviewed: 7/1/2016  ©  4531-1048 [employer]. [address] [place] [address], [place] [address]. All rights reserved. This information is not intended as a substitute for professional medical care. Always follow your healthcare professional's instructions.

## 2019-06-30 NOTE — PROGRESS NOTES
"Subjective:       Patient ID: Dorothy Kramer is a 78 y.o. female.    Vitals:  height is 5' 4" (1.626 m) and weight is 68 kg (150 lb). Her oral temperature is 99.1 °F (37.3 °C). Her blood pressure is 152/74 (abnormal) and her pulse is 97. Her respiration is 16 and oxygen saturation is 96%.     Chief Complaint: Neck Pain    Pt went to physical therapy on 6-28-19. Pt states she feels like the physical therapist may have broken her neck by using traction.  Pt has C5-6 fusion x 28 years ago. Hx of degenerative changes and cervalgia. She reports pain at all times, going up and causing headache, limited range of motion.   Last visit with primary care, referred to PT for cervalgia. Last xray cervical spine approx 1 month ago.   Tylenol with no relief.     Neck Pain    This is a new problem. The current episode started in the past 7 days. The problem occurs constantly. The problem has been gradually worsening. The pain is associated with a twisting injury. The pain is present in the left side, midline and occipital region. The pain is at a severity of 10/10. The pain is severe. Pertinent negatives include no chest pain, fever, headaches or weakness.       Constitution: Negative for chills, fatigue and fever.   HENT: Negative for congestion and sore throat.    Neck: Positive for neck pain. Negative for painful lymph nodes.   Cardiovascular: Negative for chest pain and leg swelling.   Eyes: Negative for double vision and blurred vision.   Respiratory: Negative for cough and shortness of breath.    Gastrointestinal: Negative for nausea, vomiting and diarrhea.   Genitourinary: Negative for dysuria, frequency, urgency and history of kidney stones.   Musculoskeletal: Negative for joint pain, joint swelling, muscle cramps and muscle ache.   Skin: Negative for color change, pale, rash and bruising.   Allergic/Immunologic: Negative for seasonal allergies.   Neurological: Negative for dizziness, history of vertigo, light-headedness, " passing out and headaches.   Hematologic/Lymphatic: Negative for swollen lymph nodes.   Psychiatric/Behavioral: Negative for nervous/anxious, sleep disturbance and depression. The patient is not nervous/anxious.        Objective:      Physical Exam   Constitutional: She is oriented to person, place, and time. Vital signs are normal. She appears well-developed and well-nourished. She is active and cooperative. No distress.   HENT:   Head: Normocephalic and atraumatic.   Nose: Nose normal.   Mouth/Throat: Oropharynx is clear and moist and mucous membranes are normal.   Eyes: Conjunctivae and lids are normal.   Neck: Trachea normal, normal range of motion, full passive range of motion without pain and phonation normal. Neck supple.   Cardiovascular: Normal rate, regular rhythm, normal heart sounds, intact distal pulses and normal pulses.   Pulmonary/Chest: Effort normal and breath sounds normal.   Abdominal: Soft. Normal appearance and bowel sounds are normal. She exhibits no abdominal bruit, no pulsatile midline mass and no mass.   Musculoskeletal: She exhibits no edema or deformity.        Cervical back: She exhibits decreased range of motion, tenderness and bony tenderness.        Back:    Cervical spine with midline and paraspinal tenderness, minimal flexion, extension, lateral rotation and flexion with pain. Denies numbness/tingling to arms.    Neurological: She is alert and oriented to person, place, and time. She has normal strength and normal reflexes. No sensory deficit.   Skin: Skin is warm, dry and intact. She is not diaphoretic.   Psychiatric: She has a normal mood and affect. Her speech is normal and behavior is normal. Judgment and thought content normal. Cognition and memory are normal.   Nursing note and vitals reviewed.    Xr Cervical Spine Ap Lateral    Result Date: 6/30/2019  EXAMINATION: XR CERVICAL SPINE AP LATERAL CLINICAL HISTORY: Pain, unspecified TECHNIQUE: AP, lateral and open mouth views of  the cervical spine were performed. COMPARISON: 05/17/2019, 04/07/2014 FINDINGS: Four views. Patient's neck is somewhat flexed.  This limits evaluation of the cervical lordosis. Allowing for the above, lateral imaging demonstrates grade 1 anterolisthesis of C3 on C4 and minimally C4 on C5.  There is osseous fusion of C5-C6.  There is disc space height loss primarily involving C6-C7 with endplate degenerative changes and disc degenerative disease at this level.  The facet joints are aligned.  No significant loss of vertebral body height.  There is osteopenia.  AP spinal alignment is remarkable for slight levo scoliotic curvature of the upper cervical spine.  The visualized lung apices are grossly unremarkable.  The odontoid is intact.  The lateral masses of C1 are in anatomic relationship with C2.  The paraspinous and hypopharyngeal soft tissues are grossly unremarkable.     1. Allowing for positioning, no convincing acute displaced fracture or dislocation of the cervical spine noting degenerative changes and additional findings as above. Electronically signed by: Brian Ramsey MD Date:    06/30/2019 Time:    16:02    Assessment:       1. Acute neck pain    2. History of fusion of cervical spine    3. Cervicalgia    4. Anterolisthesis        Plan:         Acute neck pain  -     XR CERVICAL SPINE AP LATERAL; Future; Expected date: 06/30/2019  -     metaxalone (SKELAXIN) 800 MG tablet; Take 1 tablet (800 mg total) by mouth 2 (two) times daily as needed for Pain.  Dispense: 20 tablet; Refill: 0  -     traMADol (ULTRAM) 50 mg tablet; Take 1 tablet (50 mg total) by mouth every 8 (eight) hours as needed for Pain.  Dispense: 15 tablet; Refill: 0  -     Ambulatory Referral to Spine Surgery    History of fusion of cervical spine  -     Ambulatory Referral to Spine Surgery    Cervicalgia  -     metaxalone (SKELAXIN) 800 MG tablet; Take 1 tablet (800 mg total) by mouth 2 (two) times daily as needed for Pain.  Dispense: 20  tablet; Refill: 0  -     traMADol (ULTRAM) 50 mg tablet; Take 1 tablet (50 mg total) by mouth every 8 (eight) hours as needed for Pain.  Dispense: 15 tablet; Refill: 0  -     Ambulatory Referral to Spine Surgery    Anterolisthesis  -     Ambulatory Referral to Spine Surgery      Patient Instructions   Follow up with your doctor in a few days.  Return to the urgent care or go to the ER if symptoms get worse.    The final reading of your xray showed no dislocation or fracture.      Follow up with spine referral. Please call [phone] if [facility] to get your referral appointment we discussed.        Tylenol, hot shower 10min to neck.  Skelaxin as needed- do not take with tramadol.  Tramadol for break through pain.      Neck Pain    There are several possible causes of neck pain when there is no injury:  · You can get a minor ligament sprain or muscle strain from a sudden minor neck movement. Sleeping with your neck in an awkward position can also cause this.  · Some people respond to emotional stress by tensing the muscles of their neck, shoulders, and upper back. Chronic spasm in these muscles can cause neck pain and sometimes headaches.  · Gradual wear and tear of the joints in the spine can cause degenerative arthritis. This can be a source of occasional or chronic neck pain.  · The spinal disks may bulge and put pressure on a nearby spinal nerve. This can happen as a natural result of aging or repeated small injuries to the neck. The spinal disks are the cushions between each spinal bone. This causes tingling, pain, or numbness that spreads from the neck to the shoulder, arm, or hand on one side.  Acute neck pain usually gets better in 1 to 2 weeks. Neck pain related to disk disease, arthritis in the spinal joints, or spinal stenosis can become chronic and last for months or years. Spinal stenosis is narrowing of the spinal canal.  X-rays are usually not ordered for the initial evaluation  of neck pain. However, X-rays may be done if you had a forceful physical injury, such as a car accident or fall. If pain continues and doesnt respond to medical treatment, X-rays and other tests may be done at a later time.  Home care  · Rest and relax the muscles. Use a comfortable pillow that supports the head. It should also help keep the spine in a neutral position. The position of the head should not be tilted forward or backward. A rolled up towel may help for a custom fit.  · Some people find relief with heat. Heat can be applied with either a warm shower or bath or a moist towel heated in the microwave and massage. Others prefer cold packs. You can make an ice pack by filling a plastic bag that seals at the top with ice cubes or crushed ice and then wrapping it with a thin towel. Try both and use the method that feels best for 15 to 20 minutes, several times a day.  · Whether using ice or heat, be careful that you do not injure your skin. Never put ice directly on the skin. Always wrap the ice in a towel or other type of cloth.This is very important, especially in people with poor skin sensations.   · Try to reduce your stress level. Emotional stress can lead to neck muscle tension and get in the way of or delay the healing process.  · You may use over-the-counter pain medicine to control pain, unless another medicine was prescribed. If you have chronic liver or kidney disease or ever had a stomach ulcer or GI bleeding, talk with your healthcare provider before using these medicines.  Follow-up care  Follow up with your healthcare provider if your symptoms do not show signs of improvement after one week. Physical therapy or further tests may be needed.  If X-rays, CT scans, or MRI scans were taken, you will be told of any new findings that may affect your care.  Call 911  Call 911 if you have:  · Sudden weakness or numbness in one or both arms  · Neck swelling, difficulty or painful swallowing  · Difficulty  breathing  · Chest pain  When to seek medical advice  Call your healthcare provider right away if any of these occur:  · Pain becomes worse or spreads into one or both arm  · Increasing headache  · Fever of 100.4°F (38°C) or above lasting for 24 to 48 hours  Date Last Reviewed: 7/1/2016  © 7035-1370 [employer]. [address] [place] [address], [place] [id number]. All rights reserved. This information is not intended as a substitute for professional medical care. Always follow your healthcare professional's instructions.

## 2019-07-01 NOTE — PROGRESS NOTES
[facility] - Neurosurgery  Clinic Consult     Consult Requested By: Lindsay Sexton  PCP: Demetrio Hills MD    Chief Complaint:   Chief Complaint   Patient presents with    Cervical Spine Pain (C-spine)     patient reports cervical pain that started after physical therapy; denies arm pain;denies numbness and tingling; pain 8/10         Past Medical History:   Diagnosis Date    DDD (degenerative disc disease), cervical     Diverticulitis     Diverticulosis     DJD (degenerative joint disease) of hip     Dyspepsia     GERD (gastroesophageal reflux disease)     History of melanoma 5/20/2015    HTN (hypertension)     Melanoma     Migraine headache     Rectocele     Skin cancer     melanoma on face    Thyroid disease     hypo    Trouble in sleeping     Uterine prolaps     followed by GYN    Vulvar lesion 5/20/2015     Past Surgical History:   Procedure Laterality Date    APPENDECTOMY  1962    BACK SURGERY      BREAST BIOPSY      BREAST LUMPECTOMY  1989    CERVICAL FUSION  1991    CHOLECYSTECTOMY      COLONOSCOPY      COLONOSCOPY N/A 11/9/2018    Performed by F[facility]    HYSTERECTOMY      TRACEE/BSO for benign disease    JOINT REPLACEMENT Right 2016    Hip replacement    AL REMOVAL OF OVARY/TUBE(S)      TONSILLECTOMY      TOTAL ABDOMINAL HYSTERECTOMY W/ BILATERAL SALPINGOOPHORECTOMY  1990     Family History   Problem Relation Age of Onset    Heart failure Mother     Cancer Mother         Bladder    Colon cancer Mother 63    Cancer Father         Bladder    Parkinsonism Father     Diverticulitis Father     Uterine cancer Maternal Aunt     Ovarian cancer Neg Hx     Breast cancer Neg Hx     Crohn's disease Neg Hx     Ulcerative colitis Neg Hx      Social History     Tobacco Use    Smoking status: Never Smoker    Smokeless tobacco: Never Used   Substance Use Topics    Alcohol use: No    Drug use: No      Review of patient's allergies indicates:    Allergen Reactions    Ciprofloxacin Palpitations    Flagyl [metronidazole] Palpitations       Current Outpatient Medications:     estradiol (ESTRACE) 1 MG tablet, TAKE 1 TABLET (1 MG TOTAL) BY MOUTH ONCE DAILY., Disp: 90 tablet, Rfl: 3    Lactobacillus rhamnosus GG (CULTURELLE) 10 billion cell capsule, Take 1 capsule by mouth daily as needed. , Disp: , Rfl:     levothyroxine (SYNTHROID) 88 MCG tablet, Take 1 tablet (88 mcg total) by mouth once daily., Disp: 90 tablet, Rfl: 3    losartan-hydrochlorothiazide 100-25 mg (HYZAAR) 100-25 mg per tablet, TAKE 1 TABLET BY MOUTH EVERY DAY, Disp: 90 tablet, Rfl: 3    magnesium 30 mg Tab, Take 1 tablet by mouth., Disp: , Rfl:     mesalamine (APRISO) 0.375 gram Cp24, Take 4 capsules (1.5 g total) by mouth once daily., Disp: 120 capsule, Rfl: 1    metaxalone (SKELAXIN) 800 MG tablet, Take 1 tablet (800 mg total) by mouth 2 (two) times daily as needed for Pain., Disp: 20 tablet, Rfl: 0    traMADol (ULTRAM) 50 mg tablet, Take 1 tablet (50 mg total) by mouth every 8 (eight) hours as needed for Pain., Disp: 15 tablet, Rfl: 0    Review of Systems:   Constitutional: no fever, chills or night sweats. No changes in weight   Eyes: no visual changes   ENT: no nasal congestion or sore throat   Respiratory: no cough or shortness of breath   Cardiovascular: no chest pain or palpitations   Gastrointestinal: no nausea or vomiting   Genitourinary: no hematuria or dysuria   Integument/Breast: no rash or pruritis   Hematologic/Lymphatic: no easy bruising or lymphadenopathy   Musculoskeletal: +myalgias, neck pain   Neurological: no seizures or tremors   Behavioral/Psych: no auditory or visual hallucinations   Endocrine: no heat or cold intolerance         OBJECTIVE:     Vital Signs (Most Recent):  Vitals:    07/02/19 0938   BP: (!) 143/62   Pulse: 82         Physical Exam:   General: well developed, well nourished, no distress. Appears uncomfortable   Neurologic: Alert and oriented.  Thought content appropriate. GCS 15.   Head: normocephalic, atraumatic  Eyes: EOMI.  Neck: trachea midline, no JVD   Cardiovascular: no LE edema  Pulmonary: normal respirations, no signs of respiratory distress  Abdomen: non-distended  Sensory: intact to light touch throughout  Skin: Skin is warm, dry and intact.    Motor Strength: Moves all extremities spontaneously with good tone. No abnormal movements seen.     Strength  Deltoids Triceps Biceps Wrist Extension Wrist Flexion Hand  Interossei   Upper: R 5/5 5/5 5/5 5/5 5/5 5/5 5/5    L 5/5 5/5 5/5 5/5 5/5 5/5 5/5     DTR's: 2 + and symmetric in UE and LE  Castro: absent  Gait: slow, fluid     Cervical Spine: limited ROM secondary to pain, tenderness to the entire posterior neck, severe tenderness at skull base        Provider Dictation:     Dorothy Kramer is a 78 y.o. right handed female who presents for evaluation of acute neck pain. Patient reports fusion of the cervical spine 30 years ago. She reports she has been experiencing neck pain for several years, but has treated it with NSAIDs. She was diagnosed with colitis in November 2018 and could no longer take NSAIDs. She reports worsening of her neck pain. She was referred to physical therapy, but delayed treatment until last week. Friday was her first session and she states she feels as though the therapist broke her neck. She sought evaluation at urgent care where x-rays were obtained which revealed degenerative changes. She was sent home with mild narcotic and muscle relaxant. She states neither has provided her pain relief. Topical pain creams have not provided her relief. She reports severe posterior neck pain. Denies upper extremity pain, numbness, tingling, weakness. She has limited ROM of neck due to pain. She has not been sleeping well due to the pain. She states while riding in the car to today's appointment, something changed and now she is able to move her head more. She states the pain has  improved slightly.     Imaging independently reviewed. XR cervical spine AP/lat reveals osseous fusion at C5-6 with severe disc degeneration at C6-7. There is anterolisthesis C3-4 and C4-5    On exam, patient is neurologically intact. She is severely tender in the posterior neck and at the base of the skull     VAS 8/10 neck   PHQ 7  Neck Disability Index 80    The patient has a history of previous cervical fusion with adjacent segment degeneration. Patient has chronic neck pain previously treated with NSAIDs until she was diagnosed with colitis. She started physical therapy for her chronic neck with acute exacerbation of pain. The patient reports only severe neck pain. Denies upper extremity symptoms. She is neurologically intact on exam with severe tenderness to the posterior neck. I have recommend rest, muscle relaxants, and continued use of OTC pain creams. I have recommended returning to PT after a few weeks of rest. Patient states she will never go back to PT. I have ordered dynamic xrays and MRI cervical spine to further evaluate adjacent segment degeneration. I will call the patient with the results once complete and give further recommendations.     Patient verbalized understanding of plan. Encouraged to call with any questions or concerns.     History of fusion of cervical spine  -     MRI Cervical Spine Without Contrast; Future; Expected date: 07/02/2019  -     X-Ray Cervical Spine AP Lat with Flexion  Extension; Future; Expected date: 07/02/2019    Acute neck pain  -     MRI Cervical Spine Without Contrast; Future; Expected date: 07/02/2019  -     X-Ray Cervical Spine AP Lat with Flexion  Extension; Future; Expected date: 07/02/2019    DDD (degenerative disc disease), cervical  -     MRI Cervical Spine Without Contrast; Future; Expected date: 07/02/2019  -     X-Ray Cervical Spine AP Lat with Flexion  Extension; Future; Expected date: 07/02/2019    Myofascial pain  -     MRI Cervical Spine Without  Contrast; Future; Expected date: 07/02/2019  -     X-Ray Cervical Spine AP Lat with Flexion  Extension; Future; Expected date: 07/02/2019    Neck muscle spasm  -     MRI Cervical Spine Without Contrast; Future; Expected date: 07/02/2019  -     X-Ray Cervical Spine AP Lat with Flexion  Extension; Future; Expected date: 07/02/2019

## 2019-07-02 ENCOUNTER — OFFICE VISIT (OUTPATIENT)
Dept: NEUROSURGERY | Facility: CLINIC | Age: 79
End: 2019-07-02
Payer: MEDICARE

## 2019-07-02 VITALS
DIASTOLIC BLOOD PRESSURE: 62 MMHG | SYSTOLIC BLOOD PRESSURE: 143 MMHG | HEART RATE: 82 BPM | HEIGHT: 64 IN | BODY MASS INDEX: 25.75 KG/M2

## 2019-07-02 DIAGNOSIS — M79.18 MYOFASCIAL PAIN: ICD-10-CM

## 2019-07-02 DIAGNOSIS — Z98.1 HISTORY OF FUSION OF CERVICAL SPINE: Primary | ICD-10-CM

## 2019-07-02 DIAGNOSIS — M54.2 ACUTE NECK PAIN: ICD-10-CM

## 2019-07-02 DIAGNOSIS — M62.838 NECK MUSCLE SPASM: ICD-10-CM

## 2019-07-02 DIAGNOSIS — M50.30 DDD (DEGENERATIVE DISC DISEASE), CERVICAL: ICD-10-CM

## 2019-07-02 PROCEDURE — 3078F DIAST BP <80 MM HG: CPT | Mod: HCNC,CPTII,S$GLB, | Performed by: PHYSICIAN ASSISTANT

## 2019-07-02 PROCEDURE — 99999 PR PBB SHADOW E&M-EST. PATIENT-LVL III: CPT | Mod: PBBFAC,HCNC,, | Performed by: PHYSICIAN ASSISTANT

## 2019-07-02 PROCEDURE — 3077F SYST BP >= 140 MM HG: CPT | Mod: HCNC,CPTII,S$GLB, | Performed by: PHYSICIAN ASSISTANT

## 2019-07-02 PROCEDURE — 99204 OFFICE O/P NEW MOD 45 MIN: CPT | Mod: HCNC,S$GLB,, | Performed by: PHYSICIAN ASSISTANT

## 2019-07-02 PROCEDURE — 99999 PR PBB SHADOW E&M-EST. PATIENT-LVL III: ICD-10-PCS | Mod: PBBFAC,HCNC,, | Performed by: PHYSICIAN ASSISTANT

## 2019-07-02 PROCEDURE — 3077F PR MOST RECENT SYSTOLIC BLOOD PRESSURE >= 140 MM HG: ICD-10-PCS | Mod: HCNC,CPTII,S$GLB, | Performed by: PHYSICIAN ASSISTANT

## 2019-07-02 PROCEDURE — 3078F PR MOST RECENT DIASTOLIC BLOOD PRESSURE < 80 MM HG: ICD-10-PCS | Mod: HCNC,CPTII,S$GLB, | Performed by: PHYSICIAN ASSISTANT

## 2019-07-02 PROCEDURE — 1101F PR PT FALLS ASSESS DOC 0-1 FALLS W/OUT INJ PAST YR: ICD-10-PCS | Mod: HCNC,CPTII,S$GLB, | Performed by: PHYSICIAN ASSISTANT

## 2019-07-02 PROCEDURE — 1101F PT FALLS ASSESS-DOCD LE1/YR: CPT | Mod: HCNC,CPTII,S$GLB, | Performed by: PHYSICIAN ASSISTANT

## 2019-07-02 PROCEDURE — 99204 PR OFFICE/OUTPT VISIT, NEW, LEVL IV, 45-59 MIN: ICD-10-PCS | Mod: HCNC,S$GLB,, | Performed by: PHYSICIAN ASSISTANT

## 2019-07-03 ENCOUNTER — TELEPHONE (OUTPATIENT)
Dept: URGENT CARE | Facility: CLINIC | Age: 79
End: 2019-07-03

## 2019-07-03 ENCOUNTER — TELEPHONE (OUTPATIENT)
Dept: PAIN MEDICINE | Facility: CLINIC | Age: 79
End: 2019-07-03

## 2019-07-03 ENCOUNTER — PATIENT MESSAGE (OUTPATIENT)
Dept: FAMILY MEDICINE | Facility: CLINIC | Age: 79
End: 2019-07-03

## 2019-07-03 DIAGNOSIS — M54.2 NECK PAIN: ICD-10-CM

## 2019-07-03 DIAGNOSIS — M54.32 SCIATICA OF LEFT SIDE: Primary | ICD-10-CM

## 2019-07-03 DIAGNOSIS — M50.30 DEGENERATIVE, INTERVERTEBRAL DISC, CERVICAL: ICD-10-CM

## 2019-07-03 NOTE — TELEPHONE ENCOUNTER
----- Message from Natalie Yung sent at 7/3/2019 12:01 PM CDT -----  Type:  Sooner Apoointment Request    Caller is requesting a sooner appointment.  Caller declined first available appointment listed below.  Caller will not accept being placed on the waitlist and is requesting a message be sent to doctor.    Name of Caller:  Patient   When is the first available appointment?  7/17  Symptoms:  Pain in neck   Best Call Back Number:  [phone]   [phone]   Additional Information: pt has referral and did schedule the 1st available which was 7/17 however if there is something sooner please contact pt directly and advise

## 2019-07-03 NOTE — TELEPHONE ENCOUNTER
Spoke with the patient and she has a consult appointment already scheduled. She has been placed on the cancellation list for a sooner appointment.   Home

## 2019-07-09 ENCOUNTER — PATIENT MESSAGE (OUTPATIENT)
Dept: GASTROENTEROLOGY | Facility: CLINIC | Age: 79
End: 2019-07-09

## 2019-07-11 RX ORDER — MESALAMINE 375 MG/1
CAPSULE, EXTENDED RELEASE ORAL
Qty: 120 CAPSULE | Refills: 1 | Status: SHIPPED | OUTPATIENT
Start: 2019-07-11 | End: 2020-01-30

## 2019-07-14 ENCOUNTER — PATIENT MESSAGE (OUTPATIENT)
Dept: GASTROENTEROLOGY | Facility: CLINIC | Age: 79
End: 2019-07-14

## 2019-07-15 ENCOUNTER — TELEPHONE (OUTPATIENT)
Dept: GASTROENTEROLOGY | Facility: HOSPITAL | Age: 79
End: 2019-07-15

## 2019-07-15 NOTE — TELEPHONE ENCOUNTER
Pt scheduled to see CARLEE Carballo tomorrow. Informed of Immodium and need for stool studies. Pt verbalized understanding.

## 2019-07-15 NOTE — TELEPHONE ENCOUNTER
Needs stool C diff, C/S, giardia antigen, and OCP. Have pt see NP Carballo one day THIS WEEK (tell pt NP will review everything with me). OK for pt to take Imodium-AD PRN.

## 2019-07-15 NOTE — TELEPHONE ENCOUNTER
----- Message from Augusta Chand sent at 7/15/2019 10:12 AM CDT -----  Contact: patient  Type: Needs Medical Advice    Who Called:  patient  Symptoms (please be specific):  na  How long has patient had these symptoms:  armani  Pharmacy name and phone #:  armani  Best Call Back Number: [phone] (home)   Additional Information: Patient states sent message to request apt and medication that she needs now.  Please call to advise. Thanks!

## 2019-07-16 ENCOUNTER — LAB VISIT (OUTPATIENT)
Dept: LAB | Facility: HOSPITAL | Age: 79
End: 2019-07-16
Attending: NURSE PRACTITIONER
Payer: MEDICARE

## 2019-07-16 ENCOUNTER — OFFICE VISIT (OUTPATIENT)
Dept: GASTROENTEROLOGY | Facility: CLINIC | Age: 79
End: 2019-07-16
Payer: MEDICARE

## 2019-07-16 VITALS
HEIGHT: 64 IN | BODY MASS INDEX: 24.46 KG/M2 | HEART RATE: 97 BPM | SYSTOLIC BLOOD PRESSURE: 130 MMHG | DIASTOLIC BLOOD PRESSURE: 61 MMHG | WEIGHT: 143.31 LBS

## 2019-07-16 DIAGNOSIS — Z87.19 HISTORY OF DIVERTICULITIS: ICD-10-CM

## 2019-07-16 DIAGNOSIS — R19.7 DIARRHEA, UNSPECIFIED TYPE: Primary | ICD-10-CM

## 2019-07-16 DIAGNOSIS — Z87.39 HISTORY OF NECK PAIN: ICD-10-CM

## 2019-07-16 DIAGNOSIS — R63.4 WEIGHT LOSS: ICD-10-CM

## 2019-07-16 DIAGNOSIS — R19.7 DIARRHEA, UNSPECIFIED TYPE: ICD-10-CM

## 2019-07-16 DIAGNOSIS — R19.5 MUCOUS IN STOOLS: ICD-10-CM

## 2019-07-16 DIAGNOSIS — Z87.19 HISTORY OF COLITIS: ICD-10-CM

## 2019-07-16 DIAGNOSIS — K92.1 HEMATOCHEZIA: ICD-10-CM

## 2019-07-16 LAB
BASOPHILS # BLD AUTO: 0.04 K/UL (ref 0–0.2)
BASOPHILS NFR BLD: 0.4 % (ref 0–1.9)
CREAT SERPL-MCNC: 0.9 MG/DL (ref 0.5–1.4)
CRP SERPL-MCNC: 80.1 MG/L (ref 0–8.2)
DIFFERENTIAL METHOD: ABNORMAL
EOSINOPHIL # BLD AUTO: 0.1 K/UL (ref 0–0.5)
EOSINOPHIL NFR BLD: 0.6 % (ref 0–8)
ERYTHROCYTE [DISTWIDTH] IN BLOOD BY AUTOMATED COUNT: 13.2 % (ref 11.5–14.5)
ERYTHROCYTE [SEDIMENTATION RATE] IN BLOOD BY WESTERGREN METHOD: 38 MM/HR (ref 0–20)
EST. GFR  (AFRICAN AMERICAN): >60 ML/MIN/1.73 M^2
EST. GFR  (NON AFRICAN AMERICAN): >60 ML/MIN/1.73 M^2
HCT VFR BLD AUTO: 41.3 % (ref 37–48.5)
HGB BLD-MCNC: 13.4 G/DL (ref 12–16)
IMM GRANULOCYTES # BLD AUTO: 0.05 K/UL (ref 0–0.04)
IMM GRANULOCYTES NFR BLD AUTO: 0.6 % (ref 0–0.5)
LYMPHOCYTES # BLD AUTO: 2.2 K/UL (ref 1–4.8)
LYMPHOCYTES NFR BLD: 24.4 % (ref 18–48)
MCH RBC QN AUTO: 30.7 PG (ref 27–31)
MCHC RBC AUTO-ENTMCNC: 32.4 G/DL (ref 32–36)
MCV RBC AUTO: 95 FL (ref 82–98)
MONOCYTES # BLD AUTO: 1.3 K/UL (ref 0.3–1)
MONOCYTES NFR BLD: 14.1 % (ref 4–15)
NEUTROPHILS # BLD AUTO: 5.4 K/UL (ref 1.8–7.7)
NEUTROPHILS NFR BLD: 59.9 % (ref 38–73)
NRBC BLD-RTO: 0 /100 WBC
PLATELET # BLD AUTO: 519 K/UL (ref 150–350)
PMV BLD AUTO: 9.2 FL (ref 9.2–12.9)
RBC # BLD AUTO: 4.37 M/UL (ref 4–5.4)
WBC # BLD AUTO: 9.03 K/UL (ref 3.9–12.7)

## 2019-07-16 PROCEDURE — 3075F PR MOST RECENT SYSTOLIC BLOOD PRESS GE 130-139MM HG: ICD-10-PCS | Mod: HCNC,CPTII,S$GLB, | Performed by: NURSE PRACTITIONER

## 2019-07-16 PROCEDURE — 3075F SYST BP GE 130 - 139MM HG: CPT | Mod: HCNC,CPTII,S$GLB, | Performed by: NURSE PRACTITIONER

## 2019-07-16 PROCEDURE — 82565 ASSAY OF CREATININE: CPT | Mod: HCNC

## 2019-07-16 PROCEDURE — 99999 PR PBB SHADOW E&M-EST. PATIENT-LVL IV: CPT | Mod: PBBFAC,HCNC,, | Performed by: NURSE PRACTITIONER

## 2019-07-16 PROCEDURE — 99214 OFFICE O/P EST MOD 30 MIN: CPT | Mod: HCNC,S$GLB,, | Performed by: NURSE PRACTITIONER

## 2019-07-16 PROCEDURE — 85025 COMPLETE CBC W/AUTO DIFF WBC: CPT | Mod: HCNC

## 2019-07-16 PROCEDURE — 3078F PR MOST RECENT DIASTOLIC BLOOD PRESSURE < 80 MM HG: ICD-10-PCS | Mod: HCNC,CPTII,S$GLB, | Performed by: NURSE PRACTITIONER

## 2019-07-16 PROCEDURE — 3078F DIAST BP <80 MM HG: CPT | Mod: HCNC,CPTII,S$GLB, | Performed by: NURSE PRACTITIONER

## 2019-07-16 PROCEDURE — 1101F PT FALLS ASSESS-DOCD LE1/YR: CPT | Mod: HCNC,CPTII,S$GLB, | Performed by: NURSE PRACTITIONER

## 2019-07-16 PROCEDURE — 1101F PR PT FALLS ASSESS DOC 0-1 FALLS W/OUT INJ PAST YR: ICD-10-PCS | Mod: HCNC,CPTII,S$GLB, | Performed by: NURSE PRACTITIONER

## 2019-07-16 PROCEDURE — 99214 PR OFFICE/OUTPT VISIT, EST, LEVL IV, 30-39 MIN: ICD-10-PCS | Mod: HCNC,S$GLB,, | Performed by: NURSE PRACTITIONER

## 2019-07-16 PROCEDURE — 86140 C-REACTIVE PROTEIN: CPT | Mod: HCNC

## 2019-07-16 PROCEDURE — 99999 PR PBB SHADOW E&M-EST. PATIENT-LVL IV: ICD-10-PCS | Mod: PBBFAC,HCNC,, | Performed by: NURSE PRACTITIONER

## 2019-07-16 PROCEDURE — 85651 RBC SED RATE NONAUTOMATED: CPT | Mod: HCNC,PO

## 2019-07-16 PROCEDURE — 36415 COLL VENOUS BLD VENIPUNCTURE: CPT | Mod: HCNC,PO

## 2019-07-16 RX ORDER — TIZANIDINE 4 MG/1
4 TABLET ORAL 2 TIMES DAILY PRN
Refills: 0 | COMMUNITY
Start: 2019-06-30 | End: 2020-01-30

## 2019-07-16 RX ORDER — LOPERAMIDE HCL 2 MG
2 TABLET ORAL 4 TIMES DAILY PRN
COMMUNITY
End: 2020-06-08

## 2019-07-16 NOTE — PROGRESS NOTES
Subjective:       Patient ID: Dorothy Kramer is a 78 y.o. female, Body mass index is 24.6 kg/m².    Chief Complaint: Diarrhea      Patient is new to me. Established patient of Dr Oscar.    Diarrhea    This is a new problem. The current episode started 1 to 4 weeks ago (Started ~7/8/19). The problem occurs more than 10 times per day. The problem has been gradually improving. The stool consistency is described as mucous and blood tinged. The patient states that diarrhea awakens her from sleep. Associated symptoms include coughing (recently started; intermittent; has an appointment with PCP 7/16/19 to discuss), a fever (occasional; low grade; highest reported is 99.5 ) and weight loss (has lost 17 pounds since 2/19). Pertinent negatives include no abdominal pain or vomiting. The symptoms are aggravated by stress. There are no known risk factors. She has tried anti-motility drug (imodium prn) for the symptoms. The treatment provided mild relief. Her past medical history is significant for inflammatory bowel disease (colonoscopy 11/9/18 showed chronic active colitis).     Review of Systems   Constitutional: Positive for appetite change (decreased since  fell in April and she is primary caretaker), fever (occasional; low grade; highest reported is 99.5 ), unexpected weight change and weight loss (has lost 17 pounds since 2/19).   HENT: Positive for trouble swallowing (trouble swallowing pills occasionally).    Respiratory: Positive for cough (recently started; intermittent; has an appointment with PCP 7/16/19 to discuss). Negative for shortness of breath.    Cardiovascular: Negative for chest pain.   Gastrointestinal: Positive for blood in stool, diarrhea (Chief complaint) and nausea (chronic; has noticed occasionally over the last year). Negative for abdominal pain, constipation, rectal pain and vomiting.   Genitourinary: Negative for difficulty urinating and dysuria.   Skin: Negative for rash.   Neurological:  Negative for speech difficulty.   Psychiatric/Behavioral: Negative for confusion.       Past Medical History:   Diagnosis Date    DDD (degenerative disc disease), cervical     Diverticulitis     Diverticulosis     DJD (degenerative joint disease) of hip     Dyspepsia     GERD (gastroesophageal reflux disease)     History of melanoma 5/20/2015    HTN (hypertension)     Melanoma     Migraine headache     Rectocele     Skin cancer     melanoma on face    Thyroid disease     hypo    Trouble in sleeping     Uterine prolaps     followed by GYN    Vulvar lesion 5/20/2015      Past Surgical History:   Procedure Laterality Date    APPENDECTOMY  1962    BACK SURGERY      BREAST BIOPSY      BREAST LUMPECTOMY  1989    CERVICAL FUSION  1991    CHOLECYSTECTOMY      COLONOSCOPY      COLONOSCOPY N/A 11/9/2018    Performed by Fadi [facility]    HYSTERECTOMY      TRACEE/BSO for benign disease    JOINT REPLACEMENT Right 2016    Hip replacement    IL REMOVAL OF OVARY/TUBE(S)      TONSILLECTOMY      TOTAL ABDOMINAL HYSTERECTOMY W/ BILATERAL SALPINGOOPHORECTOMY  1990      Family History   Problem Relation Age of Onset    Heart failure Mother     Cancer Mother         Bladder    Colon cancer Mother 63    Cancer Father         Bladder    Parkinsonism Father     Diverticulitis Father     Uterine cancer Maternal Aunt     Ovarian cancer Neg Hx     Breast cancer Neg Hx     Crohn's disease Neg Hx     Ulcerative colitis Neg Hx       Wt Readings from Last 10 Encounters:   07/16/19 65 kg (143 lb 4.8 oz)   06/30/19 68 kg (150 lb)   06/05/19 68.2 kg (150 lb 5.7 oz)   03/28/19 71.4 kg (157 lb 6.5 oz)   02/04/19 72.6 kg (160 lb 0.9 oz)   11/23/18 71.2 kg (157 lb)   11/08/18 71.2 kg (157 lb)   05/08/18 72.4 kg (159 lb 9.8 oz)   05/01/18 72.5 kg (159 lb 13.3 oz)   08/10/17 72 kg (158 lb 11.7 oz)     Lab Results   Component Value Date    WBC 8.37 02/04/2019    HGB 14.0 02/04/2019    HCT 41.7  02/04/2019    MCV 91 02/04/2019     (H) 02/04/2019     CMP  Sodium   Date Value Ref Range Status   05/17/2019 140 136 - 145 mmol/L Final     Potassium   Date Value Ref Range Status   05/17/2019 3.6 3.5 - 5.1 mmol/L Final     Chloride   Date Value Ref Range Status   05/17/2019 103 95 - 110 mmol/L Final     CO2   Date Value Ref Range Status   05/17/2019 28 23 - 29 mmol/L Final     Glucose   Date Value Ref Range Status   05/17/2019 96 70 - 110 mg/dL Final     BUN, Bld   Date Value Ref Range Status   05/17/2019 15 8 - 23 mg/dL Final     Creatinine   Date Value Ref Range Status   05/17/2019 0.9 0.5 - 1.4 mg/dL Final     Calcium   Date Value Ref Range Status   05/17/2019 9.9 8.7 - 10.5 mg/dL Final     Total Protein   Date Value Ref Range Status   05/17/2019 7.6 6.0 - 8.4 g/dL Final     Albumin   Date Value Ref Range Status   05/17/2019 4.0 3.5 - 5.2 g/dL Final     Total Bilirubin   Date Value Ref Range Status   05/17/2019 0.8 0.1 - 1.0 mg/dL Final     Comment:     For infants and newborns, interpretation of results should be based  on gestational age, weight and in agreement with clinical  observations.  Premature Infant recommended reference ranges:  Up to 24 hours.............<8.0 mg/dL  Up to 48 hours............<12.0 mg/dL  3-5 days..................<15.0 mg/dL  6-29 days.................<15.0 mg/dL       Alkaline Phosphatase   Date Value Ref Range Status   05/17/2019 65 55 - 135 U/L Final     AST   Date Value Ref Range Status   05/17/2019 16 10 - 40 U/L Final     ALT   Date Value Ref Range Status   05/17/2019 12 10 - 44 U/L Final     Anion Gap   Date Value Ref Range Status   05/17/2019 9 8 - 16 mmol/L Final     eGFR if    Date Value Ref Range Status   05/17/2019 >60.0 >60 mL/min/1.73 m^2 Final     eGFR if non    Date Value Ref Range Status   05/17/2019 >60.0 >60 mL/min/1.73 m^2 Final     Comment:     Calculation used to obtain the estimated glomerular filtration  rate (eGFR) is  the CKD-EPI equation.                Reviewed prior medical records including radiology report of CT abdomen and pelvis 2/6/19 and US retroperitoneal complete 2/15/19 & endoscopy history (see surgical history).     Objective:      Physical Exam   Constitutional: She is oriented to person, place, and time. She appears well-developed and well-nourished.   HENT:   Head: Normocephalic.   Eyes: Pupils are equal, round, and reactive to light.   Neck: Normal range of motion.   Cardiovascular: Normal rate, regular rhythm and normal heart sounds.   No murmur heard.  Pulmonary/Chest: Breath sounds normal. No respiratory distress. She has no wheezes.   Abdominal: Soft. Bowel sounds are normal. She exhibits no distension. There is no tenderness.   Musculoskeletal: Normal range of motion.   Neurological: She is alert and oriented to person, place, and time.   Skin: Skin is warm and dry. No rash noted.   Non jaundiced   Psychiatric: She has a normal mood and affect. Her speech is normal.         Assessment:       1. Diarrhea, unspecified type    2. Mucous in stools    3. Weight loss    4. Hematochezia    5. History of colitis    6. History of diverticulitis    7. History of neck pain           Plan:   Discussed case with Dr Oscar.    All diagnoses and orders for this visit:    Diarrhea, unspecified type, Mucous in stools, Hematochezia, History of colitis & History of diverticulitis        -     CT Abdomen Pelvis With Contrast; Future; Expected date: 07/16/2019  -     Stool Exam-Ova,Cysts,Parasites; Future; Expected date: 07/16/2019  -     Stool culture; Future; Expected date: 07/16/2019  -     Giardia / Cryptosporidum, EIA; Future; Expected date: 07/16/2019  -     H. pylori antigen, stool; Future; Expected date: 07/16/2019  -     Occult blood x 1, stool; Future; Expected date: 07/16/2019  -     WBC, Stool; Future; Expected date: 07/16/2019  -     Rotavirus antigen, stool; Future; Expected date: 07/16/2019  -     Adenovirus  Molecular Detection, PCR, Non-Blood Stool; Future; Expected date: 07/16/2019  -     CBC auto differential; Future; Expected date: 07/16/2019  -     C-reactive protein; Future; Expected date: 07/16/2019  -     Sedimentation rate; Future; Expected date: 07/16/2019  -     Calprotectin; Future; Expected date: 07/16/2019  -     Clostridium difficile EIA; Future; Expected date: 07/16/2019  - Continue: Apriso 1.5 gm daily as directed  - Continue: Imodium OTC PRN as directed  -  Possible trial of Questran/Colestid if symptoms persist or worsen  - Possible colonoscopy if symptoms persist or worsen    Weight loss  -     CT Abdomen Pelvis With Contrast; Future; Expected date: 07/16/2019  -     Creatinine, serum; Future; Expected date: 07/16/2019    History of neck pain   -Follow up with PCP for continued evaluation and management      If no improvement in symptoms or symptoms worsen, call/follow-up at clinic or go to ER

## 2019-07-16 NOTE — PATIENT INSTRUCTIONS
Uncertain Causes of Diarrhea (Adult)    Diarrhea is when stools are loose and watery. This can be caused by:  · Viral infections  · Bacterial infections  · Food poisoning  · Parasites  · Irritable bowel syndrome (IBS)  · Inflammatory bowel diseases such as ulcerative colitis, Crohn's disease, and celiac disease  · Food intolerance, such as to lactose, the sugar found in milk and milk products  · Reaction to medicines like antibiotics, laxatives, cancer drugs, and antacids  Along with diarrhea, you may also have:  · Abdominal pain and cramping  · Nausea and vomiting  · Loss of bowel control  · Fever and chills  · Bloody stools  In some cases, antibiotics may help to treat diarrhea. You may have a stool sample test. This is done to see what is causing your diarrhea, and if antibiotics will help treat it. The results of a stool sample test may take up to 2 days. The healthcare provider may not give you antibiotics until he or she has the stool test results.  Diarrhea can cause dehydration. This is the loss of too much water and other fluids from the body. When this occurs, body fluid must be replaced. This can be done with oral rehydration solutions. Oral rehydration solutions are available at drugstores and grocery stores without a prescription.  Home care  Follow all instructions given by your healthcare provider. Rest at home for the next 24 hours, or until you feel better. Avoid caffeine, tobacco, and alcohol. These can make diarrhea, cramping, and pain worse.  If taking medicines:  · Dont take over-the-counter diarrhea or nausea medicines unless your healthcare provider tells you to.  · You may use acetaminophen or NSAID medicines like ibuprofen or naproxen to reduce pain and fever. Dont use these if you have chronic liver or kidney disease, or ever had a stomach ulcer or gastrointestinal bleeding. Don't use NSAID medicines if you are already taking one for another condition (like arthritis) or are on daily  aspirin therapy (such as for heart disease or after a stroke). Talk with your healthcare provider first.  · If antibiotics were prescribed, be sure you take them until they are finished. Dont stop taking them even when you feel better. Antibiotics must be taken as a full course.  To prevent the spread of illness:  · Remember that washing with soap and water and using alcohol-based  is the best way to prevent the spread of infection.  · Clean the toilet after each use.  · Wash your hands before eating.  · Wash your hands before and after preparing food. Keep in mind that people with diarrhea or vomiting should not prepare food for others.  · Wash your hands after using cutting boards, countertops, and knives that have been in contact with raw foods.  · Wash and then peel fruits and vegetables.  · Keep uncooked meats away from cooked and ready-to-eat foods.  · Use a food thermometer when cooking. Cook poultry to at least 165°F (74°C). Cook ground meat (beef, veal, pork, lamb) to at least 160°F (71°C). Cook fresh beef, veal, lamb, and pork to at least 145°F (63°C).  · Dont eat raw or undercooked eggs (poached or christian side up), poultry, meat, or unpasteurized milk and juices.  Food and drinks  The main goal while treating vomiting or diarrhea is to prevent dehydration. This is done by taking small amounts of liquids often.  · Keep in mind that liquids are more important than food right now.  · Drink only small amounts of liquids at a time.  · Dont force yourself to eat, especially if you are having cramping, vomiting, or diarrhea. Dont eat large amounts at a time, even if you are hungry.  · If you eat, avoid fatty, greasy, spicy, or fried foods.  · Dont eat dairy foods or drink milk if you have diarrhea. These can make diarrhea worse.  During the first 24 hours you can try:  · Oral rehydration solutions. Do not use sports drinks. They have too much sugar and not enough electrolytes.  · Soft drinks without  caffeine  · Ginger ale  · Water (plain or flavored)  · Decaf tea or coffee  · Clear broth, consommé, or bouillon  · Gelatin, popsicles, or frozen fruit juice bars  The second 24 hours, if you are feeling better, you can add:  · Hot cereal, plain toast, bread, rolls, or crackers  · Plain noodles, rice, mashed potatoes, chicken noodle soup, or rice soup  · Unsweetened canned fruit (no pineapple)  · Bananas  As you recover:  · Limit fat intake to less than 15 grams per day. Dont eat margarine, butter, oils, mayonnaise, sauces, gravies, fried foods, peanut butter, meat, poultry, or fish.  · Limit fiber. Dont eat raw or cooked vegetables, fresh fruits except bananas, or bran cereals.  · Limit caffeine and chocolate.  · Limit dairy.  · Dont use spices or seasonings except salt.  · Go back to your normal diet over time, as you feel better and your symptoms improve.  · If the symptoms come back, go back to a simple diet or clear liquids.  Follow-up care  Follow up with your healthcare provider, or as advised. If a stool sample was taken or cultures were done, call the healthcare provider for the results as instructed.  Call 911  Call 911 if you have any of these symptoms:  · Trouble breathing  · Confusion  · Extreme drowsiness or trouble walking  · Loss of consciousness  · Rapid heart rate  · Chest pain  · Stiff neck  · Seizure  When to seek medical advice  Call your healthcare provider right away if any of these occur:  · Abdominal pain that gets worse  · Constant lower right abdominal pain  · Continued vomiting and inability to keep liquids down  · Diarrhea more than 5 times a day  · Blood in vomit or stool  · Dark urine or no urine for 8 hours, dry mouth and tongue, tiredness, weakness, or dizziness  · Drowsiness  · New rash  · You dont get better in 2 to 3 days  · Fever of 100.4°F (38°C) or higher that doesnt get lower with medicine  Date Last Reviewed: 1/3/2016  © 0356-3716 [employer]. [address] [place] [address]  [place] [address]. All rights reserved. This information is not intended as a substitute for professional medical care. Always follow your healthcare professional's instructions.      Understanding Rectal Bleeding    Rectal bleeding is when blood passes through your rectum and anus. It can happen with or without a bowel movement. Rectal bleeding may be a sign of a serious problem in your rectum, colon, or upper GI tract. Call your healthcare provider right away if you have any rectal bleeding.  The GI Tract  The gastrointestinal (GI) tract includes the mouth, esophagus, stomach, small intestine, large intestine (colon), rectum, and anus. The food you eat is digested as it passes through the GI tract. Solid waste leaves the body through the rectum.   Rectal bleeding and GI problems  The cause of rectal bleeding may be found in any region of the GI tract. The colon or rectum may be the site of your bleeding problem. Or, bleeding may be due to problems farther up the GI tract, such as in the small intestine, duodenum, or stomach.  Causes of rectal bleeding  Rectal bleeding causes include the following:  · Hemorrhoids (swollen veins in the rectum and anus)  · Fissures (tears in or near the anus)  · Diverticulosis (inflamed pockets in the colon wall)  · Infection  · Ischemia (low blood flow)  · Radiation damage  · Inflammatory bowel disease (Crohn's disease or ulcerative colitis)  · Ulcers in the upper GI tract and inflammation of the large intestine  · Abnormal tissue growths (tumors or polyps) in the GI tract  · A bulging rectum (also called a rectal prolapse)  · Abnormal blood vessels in the small intestine or in the colon  Common symptoms  Common symptoms include the following:  · Rectal pain, itching, or soreness  · Belly pain or epigastric pain  · Minor occasional drops of blood that appear on the stool or toilet paper, to greater amounts of stool that appear black or tarry   Rectal bleeding can also  happen without pain.  Date Last Reviewed: 7/1/2016  © 9589-2651 [employer]. [address] [place] [address], [place] [address]. All rights reserved. This information is not intended as a substitute for professional medical care. Always follow your healthcare professional's instructions.

## 2019-07-17 ENCOUNTER — HOSPITAL ENCOUNTER (OUTPATIENT)
Dept: RADIOLOGY | Facility: HOSPITAL | Age: 79
Discharge: HOME | End: 2019-07-17
Attending: NURSE PRACTITIONER
Payer: MEDICARE

## 2019-07-17 ENCOUNTER — TELEPHONE (OUTPATIENT)
Dept: GASTROENTEROLOGY | Facility: CLINIC | Age: 79
End: 2019-07-17

## 2019-07-17 ENCOUNTER — OFFICE VISIT (OUTPATIENT)
Dept: FAMILY MEDICINE | Facility: CLINIC | Age: 79
End: 2019-07-17
Payer: MEDICARE

## 2019-07-17 VITALS
BODY MASS INDEX: 24.84 KG/M2 | HEIGHT: 64 IN | DIASTOLIC BLOOD PRESSURE: 60 MMHG | OXYGEN SATURATION: 96 % | SYSTOLIC BLOOD PRESSURE: 128 MMHG | WEIGHT: 145.5 LBS | HEART RATE: 88 BPM

## 2019-07-17 DIAGNOSIS — K52.9 COLITIS: ICD-10-CM

## 2019-07-17 DIAGNOSIS — R05.9 COUGH: ICD-10-CM

## 2019-07-17 DIAGNOSIS — Z87.19 HISTORY OF DIVERTICULITIS: ICD-10-CM

## 2019-07-17 DIAGNOSIS — R00.2 PALPITATIONS: Primary | ICD-10-CM

## 2019-07-17 DIAGNOSIS — R63.4 WEIGHT LOSS: ICD-10-CM

## 2019-07-17 DIAGNOSIS — Z87.19 HISTORY OF COLITIS: ICD-10-CM

## 2019-07-17 DIAGNOSIS — R93.429 ABNORMAL CT SCAN, KIDNEY: Primary | ICD-10-CM

## 2019-07-17 PROCEDURE — 3078F PR MOST RECENT DIASTOLIC BLOOD PRESSURE < 80 MM HG: ICD-10-PCS | Mod: HCNC,CPTII,S$GLB, | Performed by: PHYSICIAN ASSISTANT

## 2019-07-17 PROCEDURE — 1101F PR PT FALLS ASSESS DOC 0-1 FALLS W/OUT INJ PAST YR: ICD-10-PCS | Mod: HCNC,CPTII,S$GLB, | Performed by: PHYSICIAN ASSISTANT

## 2019-07-17 PROCEDURE — 74177 CT ABDOMEN PELVIS WITH CONTRAST: ICD-10-PCS | Mod: 26,HCNC,, | Performed by: RADIOLOGY

## 2019-07-17 PROCEDURE — 93010 EKG 12-LEAD: ICD-10-PCS | Mod: HCNC,S$GLB,, | Performed by: INTERNAL MEDICINE

## 2019-07-17 PROCEDURE — 74177 CT ABD & PELVIS W/CONTRAST: CPT | Mod: 26,HCNC,, | Performed by: RADIOLOGY

## 2019-07-17 PROCEDURE — 93005 ELECTROCARDIOGRAM TRACING: CPT | Mod: HCNC,S$GLB,, | Performed by: PHYSICIAN ASSISTANT

## 2019-07-17 PROCEDURE — 93010 ELECTROCARDIOGRAM REPORT: CPT | Mod: HCNC,S$GLB,, | Performed by: INTERNAL MEDICINE

## 2019-07-17 PROCEDURE — 3074F SYST BP LT 130 MM HG: CPT | Mod: HCNC,CPTII,S$GLB, | Performed by: PHYSICIAN ASSISTANT

## 2019-07-17 PROCEDURE — 99999 PR PBB SHADOW E&M-EST. PATIENT-LVL III: CPT | Mod: PBBFAC,HCNC,, | Performed by: PHYSICIAN ASSISTANT

## 2019-07-17 PROCEDURE — 99214 PR OFFICE/OUTPT VISIT, EST, LEVL IV, 30-39 MIN: ICD-10-PCS | Mod: HCNC,S$GLB,, | Performed by: PHYSICIAN ASSISTANT

## 2019-07-17 PROCEDURE — 1101F PT FALLS ASSESS-DOCD LE1/YR: CPT | Mod: HCNC,CPTII,S$GLB, | Performed by: PHYSICIAN ASSISTANT

## 2019-07-17 PROCEDURE — 99999 PR PBB SHADOW E&M-EST. PATIENT-LVL III: ICD-10-PCS | Mod: PBBFAC,HCNC,, | Performed by: PHYSICIAN ASSISTANT

## 2019-07-17 PROCEDURE — 3078F DIAST BP <80 MM HG: CPT | Mod: HCNC,CPTII,S$GLB, | Performed by: PHYSICIAN ASSISTANT

## 2019-07-17 PROCEDURE — 3074F PR MOST RECENT SYSTOLIC BLOOD PRESSURE < 130 MM HG: ICD-10-PCS | Mod: HCNC,CPTII,S$GLB, | Performed by: PHYSICIAN ASSISTANT

## 2019-07-17 PROCEDURE — 99214 OFFICE O/P EST MOD 30 MIN: CPT | Mod: HCNC,S$GLB,, | Performed by: PHYSICIAN ASSISTANT

## 2019-07-17 PROCEDURE — 93005 EKG 12-LEAD: ICD-10-PCS | Mod: HCNC,S$GLB,, | Performed by: PHYSICIAN ASSISTANT

## 2019-07-17 PROCEDURE — 25500020 PHARM REV CODE 255: Mod: HCNC,PO | Performed by: NURSE PRACTITIONER

## 2019-07-17 PROCEDURE — 74177 CT ABD & PELVIS W/CONTRAST: CPT | Mod: TC,HCNC,PO

## 2019-07-17 RX ORDER — BUDESONIDE 3 MG/1
9 CAPSULE, COATED PELLETS ORAL DAILY
Qty: 90 CAPSULE | Refills: 1 | Status: SHIPPED | OUTPATIENT
Start: 2019-07-17 | End: 2019-08-08 | Stop reason: SDUPTHER

## 2019-07-17 RX ADMIN — IOHEXOL 75 ML: 350 INJECTION, SOLUTION INTRAVENOUS at 01:07

## 2019-07-17 RX ADMIN — IOHEXOL 30 ML: 350 INJECTION, SOLUTION INTRAVENOUS at 01:07

## 2019-07-17 NOTE — TELEPHONE ENCOUNTER
Please call patient and schedule a return appointment with me in one month and also schedule an appointment with urology. I discussed results of CT scan and blood work with patient and daughter in person. Prescription sent to pharmacy.   Thanks,  Ryann WICK, JAZMINP-C

## 2019-07-17 NOTE — PROGRESS NOTES
Subjective:       Patient ID: Dorothy Kramer is a 78 y.o. female    Chief Complaint: Cough (cough for 3 weeks is hurting her neck,colitis,was referred to [facility] and states she was hurt,also cancelled her pain management appt. Today because she was concerned about her cough.)    HPI  The patient presents today complaining of a dry cough that began 3 weeks ago.  The cough is preceded by a fluttering feeling in her chest.  She denies any chest pain, no shortness of breath, no fever, no nasal congestion, no sinus pain or pressure, no sore throat, no ear pain.  She admits that she has not been very inactive lately because of her colitis in her neck pain.    Review of Systems   Constitutional: Negative for chills and fever.   HENT: Negative for congestion and sore throat.    Respiratory: Positive for cough. Negative for shortness of breath.    Cardiovascular: Positive for palpitations. Negative for chest pain.   Gastrointestinal: Negative for abdominal pain, constipation, diarrhea, nausea and vomiting.   Genitourinary: Negative for dysuria, frequency and vaginal discharge.   Musculoskeletal: Negative for back pain.   Skin: Negative for rash.   Neurological: Negative for dizziness.        Objective:   Physical Exam   Constitutional: She is oriented to person, place, and time. She appears well-developed and well-nourished. No distress.   HENT:   Head: Normocephalic and atraumatic.   Eyes: Pupils are equal, round, and reactive to light. EOM are normal.   Neck: Normal range of motion. Neck supple.   Cardiovascular: Normal rate, regular rhythm, normal heart sounds and intact distal pulses. Exam reveals no gallop and no friction rub.   No murmur heard.  Pulmonary/Chest: Effort normal and breath sounds normal. No respiratory distress. She has no wheezes. She has no rales. She exhibits no tenderness.   Musculoskeletal: Normal range of motion.   Neurological: She is alert and oriented to person, place, and time.   Skin: Skin is warm  and dry. No rash noted. She is not diaphoretic.   Psychiatric: She has a normal mood and affect. Her behavior is normal. Judgment and thought content normal.        Assessment:       1. Palpitations  IN OFFICE EKG 12-LEAD (to [place])    Ambulatory referral to Cardiology   2. Cough          Plan:       Palpitations  -     IN OFFICE EKG 12-LEAD (to [place])  -     Ambulatory referral to Cardiology    Cough  -     X-Ray Chest PA And Lateral; Future; Expected date: 07/18/2019

## 2019-07-18 ENCOUNTER — HOSPITAL ENCOUNTER (OUTPATIENT)
Dept: RADIOLOGY | Facility: HOSPITAL | Age: 79
Discharge: HOME | End: 2019-07-18
Attending: PHYSICIAN ASSISTANT
Payer: MEDICARE

## 2019-07-18 ENCOUNTER — OFFICE VISIT (OUTPATIENT)
Dept: CARDIOLOGY | Facility: CLINIC | Age: 79
End: 2019-07-18
Payer: MEDICARE

## 2019-07-18 VITALS
HEART RATE: 86 BPM | WEIGHT: 144.19 LBS | BODY MASS INDEX: 24.62 KG/M2 | DIASTOLIC BLOOD PRESSURE: 63 MMHG | HEIGHT: 64 IN | SYSTOLIC BLOOD PRESSURE: 125 MMHG

## 2019-07-18 DIAGNOSIS — M50.30 DDD (DEGENERATIVE DISC DISEASE), CERVICAL: ICD-10-CM

## 2019-07-18 DIAGNOSIS — M54.2 ACUTE NECK PAIN: ICD-10-CM

## 2019-07-18 DIAGNOSIS — M62.838 NECK MUSCLE SPASM: ICD-10-CM

## 2019-07-18 DIAGNOSIS — M79.18 MYOFASCIAL PAIN: ICD-10-CM

## 2019-07-18 DIAGNOSIS — Z98.1 HISTORY OF FUSION OF CERVICAL SPINE: ICD-10-CM

## 2019-07-18 DIAGNOSIS — I49.1 PREMATURE ATRIAL COMPLEXES: ICD-10-CM

## 2019-07-18 DIAGNOSIS — I10 ESSENTIAL HYPERTENSION: Primary | ICD-10-CM

## 2019-07-18 DIAGNOSIS — R05.9 COUGH: ICD-10-CM

## 2019-07-18 DIAGNOSIS — R00.2 PALPITATIONS: ICD-10-CM

## 2019-07-18 PROCEDURE — 3078F PR MOST RECENT DIASTOLIC BLOOD PRESSURE < 80 MM HG: ICD-10-PCS | Mod: HCNC,CPTII,S$GLB, | Performed by: INTERNAL MEDICINE

## 2019-07-18 PROCEDURE — 72050 X-RAY EXAM NECK SPINE 4/5VWS: CPT | Mod: TC,HCNC,FY,PO

## 2019-07-18 PROCEDURE — 71046 X-RAY EXAM CHEST 2 VIEWS: CPT | Mod: TC,HCNC,FY,PO

## 2019-07-18 PROCEDURE — 99999 PR PBB SHADOW E&M-EST. PATIENT-LVL III: CPT | Mod: PBBFAC,HCNC,, | Performed by: INTERNAL MEDICINE

## 2019-07-18 PROCEDURE — 1101F PT FALLS ASSESS-DOCD LE1/YR: CPT | Mod: HCNC,CPTII,S$GLB, | Performed by: INTERNAL MEDICINE

## 2019-07-18 PROCEDURE — 72141 MRI NECK SPINE W/O DYE: CPT | Mod: TC,HCNC,PO

## 2019-07-18 PROCEDURE — 99999 PR PBB SHADOW E&M-EST. PATIENT-LVL III: ICD-10-PCS | Mod: PBBFAC,HCNC,, | Performed by: INTERNAL MEDICINE

## 2019-07-18 PROCEDURE — 3074F SYST BP LT 130 MM HG: CPT | Mod: HCNC,CPTII,S$GLB, | Performed by: INTERNAL MEDICINE

## 2019-07-18 PROCEDURE — 99204 OFFICE O/P NEW MOD 45 MIN: CPT | Mod: HCNC,S$GLB,, | Performed by: INTERNAL MEDICINE

## 2019-07-18 PROCEDURE — 71046 XR CHEST PA AND LATERAL: ICD-10-PCS | Mod: 26,HCNC,, | Performed by: RADIOLOGY

## 2019-07-18 PROCEDURE — 72050 X-RAY EXAM NECK SPINE 4/5VWS: CPT | Mod: 26,HCNC,, | Performed by: RADIOLOGY

## 2019-07-18 PROCEDURE — 71046 X-RAY EXAM CHEST 2 VIEWS: CPT | Mod: 26,HCNC,, | Performed by: RADIOLOGY

## 2019-07-18 PROCEDURE — 99204 PR OFFICE/OUTPT VISIT, NEW, LEVL IV, 45-59 MIN: ICD-10-PCS | Mod: HCNC,S$GLB,, | Performed by: INTERNAL MEDICINE

## 2019-07-18 PROCEDURE — 72050 XR CERVICAL SPINE AP LAT WITH FLEX EXTEN: ICD-10-PCS | Mod: 26,HCNC,, | Performed by: RADIOLOGY

## 2019-07-18 PROCEDURE — 3078F DIAST BP <80 MM HG: CPT | Mod: HCNC,CPTII,S$GLB, | Performed by: INTERNAL MEDICINE

## 2019-07-18 PROCEDURE — 72141 MRI NECK SPINE W/O DYE: CPT | Mod: 26,HCNC,, | Performed by: RADIOLOGY

## 2019-07-18 PROCEDURE — 1101F PR PT FALLS ASSESS DOC 0-1 FALLS W/OUT INJ PAST YR: ICD-10-PCS | Mod: HCNC,CPTII,S$GLB, | Performed by: INTERNAL MEDICINE

## 2019-07-18 PROCEDURE — 72141 MRI CERVICAL SPINE WITHOUT CONTRAST: ICD-10-PCS | Mod: 26,HCNC,, | Performed by: RADIOLOGY

## 2019-07-18 PROCEDURE — 3074F PR MOST RECENT SYSTOLIC BLOOD PRESSURE < 130 MM HG: ICD-10-PCS | Mod: HCNC,CPTII,S$GLB, | Performed by: INTERNAL MEDICINE

## 2019-07-18 NOTE — PROGRESS NOTES
Subjective:    Patient ID:  Dorothy Kramer is a 78 y.o. female who presents for evaluation of Abnormal ECG      HPI 77 yo WF who has had multiple issues with her back, colitis, diarrhea and cough for several weeks. Had EKG that showed PAC's with NSSTT changes similar to previous EKG's. Has slight palpitations when she coughs. No sustained tachycardia.    Review of Systems   Constitution: Negative for decreased appetite, fever, malaise/fatigue, weight gain and weight loss.   HENT: Negative for hearing loss and nosebleeds.    Eyes: Negative for visual disturbance.   Cardiovascular: Positive for irregular heartbeat and palpitations. Negative for chest pain, claudication, cyanosis, dyspnea on exertion, leg swelling, near-syncope, orthopnea, paroxysmal nocturnal dyspnea and syncope.   Respiratory: Negative for cough, hemoptysis, shortness of breath, sleep disturbances due to breathing, snoring and wheezing.    Endocrine: Negative for cold intolerance, heat intolerance, polydipsia and polyuria.   Hematologic/Lymphatic: Negative for adenopathy and bleeding problem. Does not bruise/bleed easily.   Skin: Negative for color change, itching, poor wound healing, rash and suspicious lesions.   Musculoskeletal: Positive for arthritis and back pain. Negative for falls, joint pain, joint swelling, muscle cramps, muscle weakness and myalgias.   Gastrointestinal: Positive for diarrhea. Negative for bloating, abdominal pain, change in bowel habit, constipation, flatus, heartburn, hematemesis, hematochezia, hemorrhoids, jaundice, melena, nausea and vomiting.   Genitourinary: Negative for bladder incontinence, decreased libido, frequency, hematuria, hesitancy and urgency.   Neurological: Negative for brief paralysis, difficulty with concentration, excessive daytime sleepiness, dizziness, focal weakness, headaches, light-headedness, loss of balance, numbness, vertigo and weakness.   Psychiatric/Behavioral: Negative for altered mental  "status, depression and memory loss. The patient does not have insomnia and is not nervous/anxious.    Allergic/Immunologic: Negative for environmental allergies, hives and persistent infections.        Objective:    Physical Exam   Constitutional: She is oriented to person, place, and time. She appears well-developed and well-nourished.   /63 (BP Location: Left arm, Patient Position: Sitting, BP Method: Medium (Automatic))   Pulse 86   Ht 5' 4" (1.626 m)   Wt 65.4 kg (144 lb 2.9 oz)   BMI 24.75 kg/m²      HENT:   Head: Normocephalic and atraumatic.   Right Ear: External ear normal.   Left Ear: External ear normal.   Nose: Nose normal.   Mouth/Throat: Oropharynx is clear and moist.   Eyes: Pupils are equal, round, and reactive to light. Conjunctivae, EOM and lids are normal. Right eye exhibits no discharge. Left eye exhibits no discharge. Right conjunctiva has no hemorrhage. No scleral icterus.   Neck: Normal range of motion. Neck supple. No JVD present. No tracheal deviation present. No thyromegaly present.   Cardiovascular: Normal rate, regular rhythm, normal heart sounds and intact distal pulses. Exam reveals no gallop and no friction rub.   No murmur heard.  Pulmonary/Chest: Effort normal and breath sounds normal. No respiratory distress. She has no wheezes. She has no rales. She exhibits no tenderness. Breasts are symmetrical.   Abdominal: Soft. Bowel sounds are normal. She exhibits no distension and no mass. There is no hepatosplenomegaly or hepatomegaly. There is no tenderness. There is no rebound and no guarding.   Musculoskeletal: Normal range of motion. She exhibits no edema or tenderness.   Lymphadenopathy:     She has no cervical adenopathy.   Neurological: She is alert and oriented to person, place, and time. She displays normal reflexes. No cranial nerve deficit. Coordination normal.   Skin: Skin is warm and dry. No rash noted. No erythema. No pallor.   Psychiatric: She has a normal mood and " affect. Her behavior is normal. Judgment and thought content normal.   Nursing note and vitals reviewed.        Assessment:       1. Essential hypertension    2. Palpitations    3. Premature atrial complexes         Plan:     Need to check K and Mg    Offered holter but she refuses    Schedule echo      Orders Placed This Encounter   Procedures    Basic metabolic panel    Magnesium    Transthoracic echo (TTE) 2D with Color Flow     Follow up if symptoms worsen or fail to improve, for Will call results.

## 2019-07-18 NOTE — LETTER
July 18, 2019      Debora Metz PA-C  [address] [facility] [address] [place] [address]           [facility]  [address] [facility] [address] [place] [address]  Phone: [phone]          Patient: Dorothy Kramer   MR Number: [id number]   YOB: 1940   Date of Visit: 7/18/2019       Dear Debora Metz:    Thank you for referring Dorothy Kramer to me for evaluation. Attached you will find relevant portions of my assessment and plan of care.    If you have questions, please do not hesitate to call me. I look forward to following Dorothy Kramer along with you.    Sincerely,    Alek Cronin Jr., MD    Enclosure  CC:  No Recipients    If you would like to receive this communication electronically, please contact [email or web address] or [phone] to request more information on [employer] access.    For providers and/or their staff who would like to refer a patient to [facility], please contact us through our one-stop-shop provider referral line, [facility], at [phone].    If you feel you have received this communication in error or would no longer like to receive these types of communications, please e-mail [email or web address]

## 2019-07-19 ENCOUNTER — TELEPHONE (OUTPATIENT)
Dept: GASTROENTEROLOGY | Facility: CLINIC | Age: 79
End: 2019-07-19

## 2019-07-19 ENCOUNTER — TELEPHONE (OUTPATIENT)
Dept: CARDIOLOGY | Facility: CLINIC | Age: 79
End: 2019-07-19

## 2019-07-19 ENCOUNTER — TELEPHONE (OUTPATIENT)
Dept: NEUROSURGERY | Facility: CLINIC | Age: 79
End: 2019-07-19

## 2019-07-19 DIAGNOSIS — Z98.1 HISTORY OF FUSION OF CERVICAL SPINE: Primary | ICD-10-CM

## 2019-07-19 NOTE — TELEPHONE ENCOUNTER
Called patient to review MRI results. Discussed with Dr. Dowell. Will obtain CT cervical spine and bring patient back to clinic.     Nyasia Thomas PA-C  Neurosurgery - [facility]

## 2019-07-23 ENCOUNTER — HOSPITAL ENCOUNTER (OUTPATIENT)
Dept: RADIOLOGY | Facility: HOSPITAL | Age: 79
Discharge: HOME | End: 2019-07-23
Attending: PHYSICIAN ASSISTANT
Payer: MEDICARE

## 2019-07-23 ENCOUNTER — OFFICE VISIT (OUTPATIENT)
Dept: NEUROSURGERY | Facility: CLINIC | Age: 79
End: 2019-07-23
Payer: MEDICARE

## 2019-07-23 VITALS — SYSTOLIC BLOOD PRESSURE: 137 MMHG | DIASTOLIC BLOOD PRESSURE: 66 MMHG | HEART RATE: 88 BPM

## 2019-07-23 DIAGNOSIS — M79.18 MYOFASCIAL PAIN: ICD-10-CM

## 2019-07-23 DIAGNOSIS — Z98.1 HISTORY OF FUSION OF CERVICAL SPINE: Primary | ICD-10-CM

## 2019-07-23 DIAGNOSIS — M54.2 ACUTE NECK PAIN: ICD-10-CM

## 2019-07-23 DIAGNOSIS — M48.02 CERVICAL STENOSIS OF SPINAL CANAL: ICD-10-CM

## 2019-07-23 DIAGNOSIS — Z98.1 HISTORY OF FUSION OF CERVICAL SPINE: ICD-10-CM

## 2019-07-23 PROCEDURE — 99215 PR OFFICE/OUTPT VISIT, EST, LEVL V, 40-54 MIN: ICD-10-PCS | Mod: HCNC,S$GLB,, | Performed by: STUDENT IN AN ORGANIZED HEALTH CARE EDUCATION/TRAINING PROGRAM

## 2019-07-23 PROCEDURE — 3075F SYST BP GE 130 - 139MM HG: CPT | Mod: HCNC,CPTII,S$GLB, | Performed by: STUDENT IN AN ORGANIZED HEALTH CARE EDUCATION/TRAINING PROGRAM

## 2019-07-23 PROCEDURE — 99999 PR PBB SHADOW E&M-EST. PATIENT-LVL III: ICD-10-PCS | Mod: PBBFAC,HCNC,, | Performed by: STUDENT IN AN ORGANIZED HEALTH CARE EDUCATION/TRAINING PROGRAM

## 2019-07-23 PROCEDURE — 1101F PT FALLS ASSESS-DOCD LE1/YR: CPT | Mod: HCNC,CPTII,S$GLB, | Performed by: STUDENT IN AN ORGANIZED HEALTH CARE EDUCATION/TRAINING PROGRAM

## 2019-07-23 PROCEDURE — 3078F PR MOST RECENT DIASTOLIC BLOOD PRESSURE < 80 MM HG: ICD-10-PCS | Mod: HCNC,CPTII,S$GLB, | Performed by: STUDENT IN AN ORGANIZED HEALTH CARE EDUCATION/TRAINING PROGRAM

## 2019-07-23 PROCEDURE — 1101F PR PT FALLS ASSESS DOC 0-1 FALLS W/OUT INJ PAST YR: ICD-10-PCS | Mod: HCNC,CPTII,S$GLB, | Performed by: STUDENT IN AN ORGANIZED HEALTH CARE EDUCATION/TRAINING PROGRAM

## 2019-07-23 PROCEDURE — 72125 CT CERVICAL SPINE WITHOUT CONTRAST: ICD-10-PCS | Mod: 26,HCNC,, | Performed by: RADIOLOGY

## 2019-07-23 PROCEDURE — 3075F PR MOST RECENT SYSTOLIC BLOOD PRESS GE 130-139MM HG: ICD-10-PCS | Mod: HCNC,CPTII,S$GLB, | Performed by: STUDENT IN AN ORGANIZED HEALTH CARE EDUCATION/TRAINING PROGRAM

## 2019-07-23 PROCEDURE — 99215 OFFICE O/P EST HI 40 MIN: CPT | Mod: HCNC,S$GLB,, | Performed by: STUDENT IN AN ORGANIZED HEALTH CARE EDUCATION/TRAINING PROGRAM

## 2019-07-23 PROCEDURE — 3078F DIAST BP <80 MM HG: CPT | Mod: HCNC,CPTII,S$GLB, | Performed by: STUDENT IN AN ORGANIZED HEALTH CARE EDUCATION/TRAINING PROGRAM

## 2019-07-23 PROCEDURE — 99999 PR PBB SHADOW E&M-EST. PATIENT-LVL III: CPT | Mod: PBBFAC,HCNC,, | Performed by: STUDENT IN AN ORGANIZED HEALTH CARE EDUCATION/TRAINING PROGRAM

## 2019-07-23 PROCEDURE — 72125 CT NECK SPINE W/O DYE: CPT | Mod: 26,HCNC,, | Performed by: RADIOLOGY

## 2019-07-23 PROCEDURE — 72125 CT NECK SPINE W/O DYE: CPT | Mod: TC,HCNC,PO

## 2019-07-24 ENCOUNTER — TELEPHONE (OUTPATIENT)
Dept: GASTROENTEROLOGY | Facility: CLINIC | Age: 79
End: 2019-07-24

## 2019-07-24 ENCOUNTER — TELEPHONE (OUTPATIENT)
Dept: NEUROSURGERY | Facility: CLINIC | Age: 79
End: 2019-07-24

## 2019-07-24 DIAGNOSIS — Z98.1 HISTORY OF FUSION OF CERVICAL SPINE: Primary | ICD-10-CM

## 2019-07-24 NOTE — TELEPHONE ENCOUNTER
Returned patient call. Patient will be schedule with pain management to consider cervical facet injections.

## 2019-07-24 NOTE — TELEPHONE ENCOUNTER
Spoke with the patient and consult appointment scheduled. Appointment reminder mailed along with the new patient paperwork. Offered a sooner appointment, however the patient is taking steroids for colitis and requested to wait until the later part of the month. She will be on steroids for an additional 3 weeks.

## 2019-07-24 NOTE — PROGRESS NOTES
[place] - Neurosurgery  Clinic Consult     Consult Requested By: No ref. provider found  PCP: Demetrio Hills MD    Chief Complaint:   Chief Complaint   Patient presents with    Follow-up     patient continues to have cervical pain and right shoulder pain; pain 8/10     Patient returns today for follow-up with imaging clinic MRI of her cervical spine as well as CT scan  She has no new symptoms  Please see interval summary below    Pertinent and Recent history, provider evaluations, imaging and data reviewed in EPIC      Past Medical History:   Diagnosis Date    DDD (degenerative disc disease), cervical     Diverticulitis     Diverticulosis     DJD (degenerative joint disease) of hip     Dyspepsia     GERD (gastroesophageal reflux disease)     History of melanoma 5/20/2015    HTN (hypertension)     Melanoma     Migraine headache     Rectocele     Skin cancer     melanoma on face    Thyroid disease     hypo    Trouble in sleeping     Uterine prolaps     followed by GYN    Vulvar lesion 5/20/2015     Past Surgical History:   Procedure Laterality Date    APPENDECTOMY  1962    BACK SURGERY      BREAST BIOPSY      BREAST LUMPECTOMY  1989    CERVICAL FUSION  1991    CHOLECYSTECTOMY      COLONOSCOPY      COLONOSCOPY N/A 11/9/2018    Performed by [facility] at [facility]    HYSTERECTOMY      TRACEE/BSO for benign disease    JOINT REPLACEMENT Right 2016    Hip replacement    [place] REMOVAL OF OVARY/TUBE(S)      TONSILLECTOMY      TOTAL ABDOMINAL HYSTERECTOMY W/ BILATERAL SALPINGOOPHORECTOMY  1990     Family History   Problem Relation Age of Onset    Heart failure Mother     Cancer Mother         Bladder    Colon cancer Mother 63    Cancer Father         Bladder    Parkinsonism Father     Diverticulitis Father     Uterine cancer Maternal Aunt     Ovarian cancer Neg Hx     Breast cancer Neg Hx     Crohn's disease Neg Hx     Ulcerative colitis Neg Hx      Social History      Tobacco Use    Smoking status: Never Smoker    Smokeless tobacco: Never Used   Substance Use Topics    Alcohol use: No    Drug use: No      Review of patient's allergies indicates:   Allergen Reactions    Ciprofloxacin Palpitations    Flagyl [metronidazole] Palpitations       Current Outpatient Medications:     APRISO 0.375 gram Cp24, TAKE 4 CAPSULES (1.5 G TOTAL) BY MOUTH ONCE DAILY., Disp: 120 capsule, Rfl: 1    budesonide (ENTOCORT EC) 3 mg capsule, Take 3 capsules (9 mg total) by mouth once daily., Disp: 90 capsule, Rfl: 1    estradiol (ESTRACE) 1 MG tablet, TAKE 1 TABLET (1 MG TOTAL) BY MOUTH ONCE DAILY., Disp: 90 tablet, Rfl: 3    Lactobacillus rhamnosus GG (CULTURELLE) 10 billion cell capsule, Take 1 capsule by mouth daily as needed. , Disp: , Rfl:     levothyroxine (SYNTHROID) 88 MCG tablet, Take 1 tablet (88 mcg total) by mouth once daily., Disp: 90 tablet, Rfl: 3    loperamide (IMODIUM A-D) 2 mg Tab, Take 2 mg by mouth 4 (four) times daily as needed., Disp: , Rfl:     losartan-hydrochlorothiazide 100-25 mg (HYZAAR) 100-25 mg per tablet, TAKE 1 TABLET BY MOUTH EVERY DAY, Disp: 90 tablet, Rfl: 3    magnesium 30 mg Tab, Take 1 tablet by mouth., Disp: , Rfl:     tiZANidine (ZANAFLEX) 4 MG tablet, Take 4 mg by mouth 2 (two) times daily as needed., Disp: , Rfl: 0    traMADol (ULTRAM) 50 mg tablet, Take 1 tablet (50 mg total) by mouth every 8 (eight) hours as needed for Pain., Disp: 15 tablet, Rfl: 0    Review of Systems:   Constitutional: no fever, chills or night sweats. No changes in weight   Eyes: no visual changes   ENT: no nasal congestion or sore throat   Respiratory: no cough or shortness of breath   Cardiovascular: no chest pain or palpitations   Gastrointestinal: no nausea or vomiting   Genitourinary: no hematuria or dysuria   Integument/Breast: no rash or pruritis   Hematologic/Lymphatic: no easy bruising or lymphadenopathy   Musculoskeletal: +myalgias, neck pain   Neurological: no  seizures or tremors   Behavioral/Psych: no auditory or visual hallucinations   Endocrine: no heat or cold intolerance         OBJECTIVE:     Vital Signs (Most Recent):  Vitals:    07/23/19 1608   BP: 137/66   Pulse: 88         Physical Exam:   General: well developed, well nourished, no distress. Appears uncomfortable   Neurologic: Alert and oriented. Thought content appropriate. GCS 15.   Head: normocephalic, atraumatic  Eyes: EOMI.  Neck: trachea midline, no JVD   Cardiovascular: no LE edema  Pulmonary: normal respirations, no signs of respiratory distress  Abdomen: non-distended  Sensory: intact to light touch throughout  Skin: Skin is warm, dry and intact.    Motor Strength: Moves all extremities spontaneously with good tone. No abnormal movements seen.     Strength  Deltoids Triceps Biceps Wrist Extension Wrist Flexion Hand  Interossei   Upper: R 5/5 5/5 5/5 5/5 5/5 5/5 5/5    L 5/5 5/5 5/5 5/5 5/5 5/5 5/5     DTR's: 2 + and symmetric in UE and LE  Castro: absent  Gait: slow, fluid     Cervical Spine: limited ROM secondary to pain, tenderness to the entire posterior neck, severe tenderness at skull base        Provider Dictation:     Dorothy Kramer is a 78 y.o. right handed female who presents for evaluation of acute neck pain. Patient reports fusion of the cervical spine 30 years ago. She reports she has been experiencing neck pain for several years, but has treated it with NSAIDs. She was diagnosed with colitis in November 2018 and could no longer take NSAIDs. She reports worsening of her neck pain. She was referred to physical therapy, but delayed treatment until last week. Friday was her first session and she states she feels as though the therapist broke her neck. She sought evaluation at urgent care where x-rays were obtained which revealed degenerative changes. She was sent home with mild narcotic and muscle relaxant. She states neither has provided her pain relief. Topical pain creams have not  provided her relief. She reports severe posterior neck pain. Denies upper extremity pain, numbness, tingling, weakness. She has limited ROM of neck due to pain. She has not been sleeping well due to the pain. She states while riding in the car to today's appointment, something changed and now she is able to move her head more. She states the pain has improved slightly.     Imaging independently reviewed. XR cervical spine AP/lat reveals osseous fusion at C5-6 with severe disc degeneration at C6-7. There is anterolisthesis C3-4 and C4-5    On exam, patient is neurologically intact. She is severely tender in the posterior neck and at the base of the skull     VAS 8/10 neck   PHQ 7  Neck Disability Index 80    The patient has a history of previous cervical fusion with adjacent segment degeneration. Patient has chronic neck pain previously treated with NSAIDs until she was diagnosed with colitis. She started physical therapy for her chronic neck with acute exacerbation of pain. The patient reports only severe neck pain. Denies upper extremity symptoms. She is neurologically intact on exam with severe tenderness to the posterior neck. I have recommend rest, muscle relaxants, and continued use of OTC pain creams. I have recommended returning to PT after a few weeks of rest. Patient states she will never go back to PT. I have ordered dynamic xrays and MRI cervical spine to further evaluate adjacent segment degeneration. I will call the patient with the results once complete and give further recommendations.     Patient verbalized understanding of plan. Encouraged to call with any questions or concerns.     History of fusion of cervical spine    Adjacent segment disease with spinal stenosis    Acute neck pain    Myofascial pain    Cervical stenosis of spinal canal      Interval summary  Presents with continued chief complaint of subacute axial neck pain.  This was exacerbated worse with physical therapy.  She has had some  improvement with increased range of motion over the last 2 weeks  She has no signs or symptoms of myelopathy or radiculopathy  This complex imaging with a previous non-instrumented fusion at C5-6 and adjacent segment disease with a large broad-based partially calcified disc herniation at C4-5, which elevates to approximately the mid body of C4  This abuts the cord without overt displacement, no myelomalacia or cord signal change.  There is she was spinal fluid posteriorly  At this appears chronic without acute features  There is no imaging in the recent past for comparison  But I suspect this is been there for quite some time  His multiple ongoing issues including colitis, general illness and fatigue  With regard to her neck her chief complaint is neck pain    A thorough discussion of treatment options surgical and nonsurgical risk benefits and pros and cons  Including spinal cord injury or progression  She wished to manage conservatively with surveillance which I think is reasonable surgery would likely require revision exposure and corpectomy for neck pain without radiculopathy or myelopathy    She will be referred to interventional pain management consideration of treatment including facet injections for symptomatic relief  Short term for evaluation 6 weeks to 3 months, earlier if needed

## 2019-07-24 NOTE — TELEPHONE ENCOUNTER
----- Message from Brianda Flores sent at 7/24/2019  1:52 PM CDT -----  Contact: patient  Patient called to speak with a nurse. Gave no further details.    She would like a callback at [phone]     Thanks  KB

## 2019-07-24 NOTE — TELEPHONE ENCOUNTER
Spoke with pt. Pt wants to speak with someone at Dr. Dowell's office not Dr. Oscar. Pt informed message will be sent to his office. Pt verbalized understanding.    See message below

## 2019-08-01 ENCOUNTER — CLINICAL SUPPORT (OUTPATIENT)
Dept: CARDIOLOGY | Facility: CLINIC | Age: 79
End: 2019-08-01
Attending: INTERNAL MEDICINE
Payer: MEDICARE

## 2019-08-01 VITALS
DIASTOLIC BLOOD PRESSURE: 60 MMHG | SYSTOLIC BLOOD PRESSURE: 130 MMHG | WEIGHT: 144 LBS | HEART RATE: 85 BPM | BODY MASS INDEX: 24.59 KG/M2 | HEIGHT: 64 IN

## 2019-08-01 DIAGNOSIS — R00.2 PALPITATIONS: ICD-10-CM

## 2019-08-01 DIAGNOSIS — I10 ESSENTIAL HYPERTENSION: ICD-10-CM

## 2019-08-01 DIAGNOSIS — I49.1 PREMATURE ATRIAL COMPLEXES: ICD-10-CM

## 2019-08-01 PROCEDURE — 93306 TRANSTHORACIC ECHO (TTE) COMPLETE (CUPID ONLY): ICD-10-PCS | Mod: HCNC,S$GLB,, | Performed by: INTERNAL MEDICINE

## 2019-08-01 PROCEDURE — 99999 PR PBB SHADOW E&M-EST. PATIENT-LVL II: CPT | Mod: PBBFAC,HCNC,,

## 2019-08-01 PROCEDURE — 99999 PR PBB SHADOW E&M-EST. PATIENT-LVL II: ICD-10-PCS | Mod: PBBFAC,HCNC,,

## 2019-08-01 PROCEDURE — 93306 TTE W/DOPPLER COMPLETE: CPT | Mod: HCNC,S$GLB,, | Performed by: INTERNAL MEDICINE

## 2019-08-02 ENCOUNTER — TELEPHONE (OUTPATIENT)
Dept: CARDIOLOGY | Facility: CLINIC | Age: 79
End: 2019-08-02

## 2019-08-02 ENCOUNTER — OFFICE VISIT (OUTPATIENT)
Dept: GASTROENTEROLOGY | Facility: CLINIC | Age: 79
End: 2019-08-02
Payer: MEDICARE

## 2019-08-02 VITALS — HEIGHT: 64 IN | RESPIRATION RATE: 18 BRPM | BODY MASS INDEX: 24.17 KG/M2 | WEIGHT: 141.56 LBS

## 2019-08-02 DIAGNOSIS — R19.5 MUCOUS IN STOOLS: ICD-10-CM

## 2019-08-02 DIAGNOSIS — R05.3 CHRONIC COUGH: ICD-10-CM

## 2019-08-02 DIAGNOSIS — Z87.39 HISTORY OF NECK PAIN: ICD-10-CM

## 2019-08-02 DIAGNOSIS — R19.7 DIARRHEA, UNSPECIFIED TYPE: ICD-10-CM

## 2019-08-02 DIAGNOSIS — K52.9 COLITIS: Primary | ICD-10-CM

## 2019-08-02 LAB
ASCENDING AORTA: 2.29 CM
AV INDEX (PROSTH): 0.89
AV MEAN GRADIENT: 5 MMHG
AV PEAK GRADIENT: 10 MMHG
AV VALVE AREA: 2.84 CM2
AV VELOCITY RATIO: 0.86
BSA FOR ECHO PROCEDURE: 1.72 M2
CV ECHO LV RWT: 0.35 CM
DOP CALC AO PEAK VEL: 1.6 M/S
DOP CALC AO VTI: 28.02 CM
DOP CALC LVOT AREA: 3.2 CM2
DOP CALC LVOT DIAMETER: 2.02 CM
DOP CALC LVOT PEAK VEL: 1.38 M/S
DOP CALC LVOT STROKE VOLUME: 79.66 CM3
DOP CALCLVOT PEAK VEL VTI: 24.87 CM
E WAVE DECELERATION TIME: 176.78 MSEC
E/A RATIO: 0.97
E/E' RATIO: 10.14 M/S
ECHO LV POSTERIOR WALL: 0.8 CM (ref 0.6–1.1)
FRACTIONAL SHORTENING: 45 % (ref 28–44)
INTERVENTRICULAR SEPTUM: 0.73 CM (ref 0.6–1.1)
IVRT: 0.1 MSEC
LA MAJOR: 4.71 CM
LA MINOR: 2.61 CM
LA WIDTH: 3.67 CM
LEFT ATRIUM SIZE: 3.02 CM
LEFT ATRIUM VOLUME INDEX: 18.6 ML/M2
LEFT ATRIUM VOLUME: 31.64 CM3
LEFT INTERNAL DIMENSION IN SYSTOLE: 2.51 CM (ref 2.1–4)
LEFT VENTRICLE DIASTOLIC VOLUME INDEX: 57.04 ML/M2
LEFT VENTRICLE DIASTOLIC VOLUME: 97.03 ML
LEFT VENTRICLE MASS INDEX: 65 G/M2
LEFT VENTRICLE SYSTOLIC VOLUME INDEX: 13.3 ML/M2
LEFT VENTRICLE SYSTOLIC VOLUME: 22.57 ML
LEFT VENTRICULAR INTERNAL DIMENSION IN DIASTOLE: 4.59 CM (ref 3.5–6)
LEFT VENTRICULAR MASS: 110.86 G
LV LATERAL E/E' RATIO: 8.88 M/S
LV SEPTAL E/E' RATIO: 11.83 M/S
MV PEAK A VEL: 0.73 M/S
MV PEAK E VEL: 0.71 M/S
PISA TR MAX VEL: 2.62 M/S
PULM VEIN S/D RATIO: 2.05
PV PEAK D VEL: 0.44 M/S
PV PEAK S VEL: 0.9 M/S
RA MAJOR: 4.5 CM
RA PRESSURE: 3 MMHG
RA WIDTH: 3.03 CM
RIGHT VENTRICULAR END-DIASTOLIC DIMENSION: 3.14 CM
RV TISSUE DOPPLER FREE WALL SYSTOLIC VELOCITY 1 (APICAL 4 CHAMBER VIEW): 10.82 CM/S
SINUS: 2.37 CM
STJ: 2.07 CM
TDI LATERAL: 0.08 M/S
TDI SEPTAL: 0.06 M/S
TDI: 0.07 M/S
TR MAX PG: 27 MMHG
TRICUSPID ANNULAR PLANE SYSTOLIC EXCURSION: 2.64 CM
TV REST PULMONARY ARTERY PRESSURE: 30 MMHG

## 2019-08-02 PROCEDURE — 99999 PR PBB SHADOW E&M-EST. PATIENT-LVL III: ICD-10-PCS | Mod: PBBFAC,HCNC,, | Performed by: NURSE PRACTITIONER

## 2019-08-02 PROCEDURE — 1101F PT FALLS ASSESS-DOCD LE1/YR: CPT | Mod: HCNC,CPTII,S$GLB, | Performed by: NURSE PRACTITIONER

## 2019-08-02 PROCEDURE — 99999 PR PBB SHADOW E&M-EST. PATIENT-LVL III: CPT | Mod: PBBFAC,HCNC,, | Performed by: NURSE PRACTITIONER

## 2019-08-02 PROCEDURE — 99214 PR OFFICE/OUTPT VISIT, EST, LEVL IV, 30-39 MIN: ICD-10-PCS | Mod: HCNC,S$GLB,, | Performed by: NURSE PRACTITIONER

## 2019-08-02 PROCEDURE — 99214 OFFICE O/P EST MOD 30 MIN: CPT | Mod: HCNC,S$GLB,, | Performed by: NURSE PRACTITIONER

## 2019-08-02 PROCEDURE — 1101F PR PT FALLS ASSESS DOC 0-1 FALLS W/OUT INJ PAST YR: ICD-10-PCS | Mod: HCNC,CPTII,S$GLB, | Performed by: NURSE PRACTITIONER

## 2019-08-02 NOTE — PROGRESS NOTES
Subjective:       Patient ID: Dorothy Kramer is a 78 y.o. female, Body mass index is 24.29 kg/m².    Chief Complaint: Follow-up      Patient is new to me. Established patient of Dr. Oscar & myself.    Diarrhea    This is a chronic problem. The current episode started more than 1 month ago (Started ~ 7/8/19). The problem occurs 5 to 10 times per day (4-5 times in the am and 2-3 times during the night). The problem has been gradually improving (improved since starting Entocort on 7/17/19). The stool consistency is described as mucous and blood tinged (semi-solid stool with mucous and blood tinge). The patient states that diarrhea awakens her from sleep. Associated symptoms include abdominal pain (occ. discomfort in lower abdomen prior to having bowel movment), coughing (chronic problem; instructed to follow up with PCP), a fever (low grade in the evening; highest recorded temp is 100 degrees) and weight loss (three pounds since visit two weeks ago). Pertinent negatives include no vomiting. The symptoms are aggravated by dairy products. Risk factors: denies recent antibiotics, hospitalization, or foreign travel; stool studies negative. She has tried anti-motility drug (Past: Imodium OTC PRN; Currently on: Apriso and Entocort) for the symptoms. The treatment provided moderate relief. Her past medical history is significant for inflammatory bowel disease. There is no history of a recent abdominal surgery.     Review of Systems   Constitutional: Positive for appetite change (Decreased over the last couple of months), fatigue, fever (low grade in the evening; highest recorded temp is 100 degrees) and weight loss (three pounds since visit two weeks ago). Negative for unexpected weight change.   HENT: Negative for trouble swallowing.    Respiratory: Positive for cough (chronic problem; instructed to follow up with PCP). Negative for shortness of breath.    Cardiovascular: Negative for chest pain.   Gastrointestinal:  Positive for abdominal pain (occ. discomfort in lower abdomen prior to having bowel movment), diarrhea (Chief complaint) and nausea (intermittent; usually occurs in the evening). Negative for blood in stool, constipation and vomiting.   Genitourinary: Negative for difficulty urinating and dysuria.   Musculoskeletal: Positive for neck pain (chronic problem; followed by pain management).   Skin: Negative for rash.   Neurological: Positive for weakness and light-headedness. Negative for speech difficulty.   Psychiatric/Behavioral: Negative for confusion.       Past Medical History:   Diagnosis Date    DDD (degenerative disc disease), cervical     Diverticulitis     Diverticulosis     DJD (degenerative joint disease) of hip     Dyspepsia     GERD (gastroesophageal reflux disease)     History of melanoma 5/20/2015    HTN (hypertension)     Melanoma     Migraine headache     Rectocele     Skin cancer     melanoma on face    Thyroid disease     hypo    Trouble in sleeping     Uterine prolaps     followed by GYN    Vulvar lesion 5/20/2015      Past Surgical History:   Procedure Laterality Date    APPENDECTOMY  1962    BACK SURGERY      BREAST BIOPSY      BREAST LUMPECTOMY  1989    CERVICAL FUSION  1991    CHOLECYSTECTOMY      COLONOSCOPY      COLONOSCOPY N/A 11/9/2018    Performed by Fadi [facility] at [facility]    HYSTERECTOMY      TRACEE/BSO for benign disease    JOINT REPLACEMENT Right 2016    Hip replacement    MS REMOVAL OF OVARY/TUBE(S)      TONSILLECTOMY      TOTAL ABDOMINAL HYSTERECTOMY W/ BILATERAL SALPINGOOPHORECTOMY  1990      Family History   Problem Relation Age of Onset    Heart failure Mother     Cancer Mother         Bladder    Colon cancer Mother 63    Cancer Father         Bladder    Parkinsonism Father     Diverticulitis Father     Uterine cancer Maternal Aunt     Ovarian cancer Neg Hx     Breast cancer Neg Hx     Crohn's disease Neg Hx     Ulcerative colitis  Neg Hx       Wt Readings from Last 10 Encounters:   08/02/19 64.2 kg (141 lb 8.6 oz)   08/01/19 65.3 kg (144 lb)   07/18/19 65.4 kg (144 lb 2.9 oz)   07/17/19 66 kg (145 lb 8.1 oz)   07/16/19 65 kg (143 lb 4.8 oz)   06/30/19 68 kg (150 lb)   06/05/19 68.2 kg (150 lb 5.7 oz)   03/28/19 71.4 kg (157 lb 6.5 oz)   02/04/19 72.6 kg (160 lb 0.9 oz)   11/23/18 71.2 kg (157 lb)     Lab Results   Component Value Date    WBC 9.03 07/16/2019    HGB 13.4 07/16/2019    HCT 41.3 07/16/2019    MCV 95 07/16/2019     (H) 07/16/2019     CMP  Sodium   Date Value Ref Range Status   07/18/2019 138 136 - 145 mmol/L Final     Potassium   Date Value Ref Range Status   07/18/2019 3.5 3.5 - 5.1 mmol/L Final     Chloride   Date Value Ref Range Status   07/18/2019 100 95 - 110 mmol/L Final     CO2   Date Value Ref Range Status   07/18/2019 26 23 - 29 mmol/L Final     Glucose   Date Value Ref Range Status   07/18/2019 110 70 - 110 mg/dL Final     BUN, Bld   Date Value Ref Range Status   07/18/2019 18 8 - 23 mg/dL Final     Creatinine   Date Value Ref Range Status   07/18/2019 1.0 0.5 - 1.4 mg/dL Final     Calcium   Date Value Ref Range Status   07/18/2019 9.2 8.7 - 10.5 mg/dL Final     Total Protein   Date Value Ref Range Status   05/17/2019 7.6 6.0 - 8.4 g/dL Final     Albumin   Date Value Ref Range Status   05/17/2019 4.0 3.5 - 5.2 g/dL Final     Total Bilirubin   Date Value Ref Range Status   05/17/2019 0.8 0.1 - 1.0 mg/dL Final     Comment:     For infants and newborns, interpretation of results should be based  on gestational age, weight and in agreement with clinical  observations.  Premature Infant recommended reference ranges:  Up to 24 hours.............<8.0 mg/dL  Up to 48 hours............<12.0 mg/dL  3-5 days..................<15.0 mg/dL  6-29 days.................<15.0 mg/dL       Alkaline Phosphatase   Date Value Ref Range Status   05/17/2019 65 55 - 135 U/L Final     AST   Date Value Ref Range Status   05/17/2019 16 10 -  40 U/L Final     ALT   Date Value Ref Range Status   05/17/2019 12 10 - 44 U/L Final     Anion Gap   Date Value Ref Range Status   07/18/2019 12 8 - 16 mmol/L Final     eGFR if    Date Value Ref Range Status   07/18/2019 >60.0 >60 mL/min/1.73 m^2 Final     eGFR if non    Date Value Ref Range Status   07/18/2019 54.9 (A) >60 mL/min/1.73 m^2 Final     Comment:     Calculation used to obtain the estimated glomerular filtration  rate (eGFR) is the CKD-EPI equation.                  Reviewed prior medical records including radiology report of CT abdomen and pelvis 7/17/19 & endoscopy history (see surgical history).     Objective:      Physical Exam   Constitutional: She is oriented to person, place, and time. She appears well-developed and well-nourished.   HENT:   Head: Normocephalic.   Eyes: Pupils are equal, round, and reactive to light.   Neck: Normal range of motion.   Cardiovascular: Normal rate, regular rhythm and normal heart sounds.   No murmur heard.  Pulmonary/Chest: Breath sounds normal. No respiratory distress. She has no wheezes.   Abdominal: Soft. Bowel sounds are normal. She exhibits no distension. There is no tenderness.   Musculoskeletal: Normal range of motion.   Limited range of motion in her neck   Neurological: She is alert and oriented to person, place, and time.   Skin: Skin is warm and dry. No rash noted.   Non jaundiced   Psychiatric: She has a normal mood and affect. Her speech is normal.         Assessment:       1. Colitis    2. Diarrhea, unspecified type    3. Mucous in stools    4. Chronic cough    5. History of neck pain           Plan:   Discussed case with Dr. Oscar.    All diagnoses and orders for this visit:    Colitis, Diarrhea, & Mucous in stools   - Continue Entocort and Apresio   - Start: Imodium OTC PRN   - Recommend increase fiber in diet, especially soluble fiber since this can help bulk up the stool consistency and may help to slow down how  fast the stool goes through the colon and can prevent diarrhea   - Recommend adequate water intake (six 8-oz glasses of water daily) to prevent dehydration   - Follow up in two weeks   - If symptoms persist/worsen, possible trial of Prednisone    Chronic cough   - Follow up with PCP for continued evaluation and treatment    History of neck pain   - Follow up with pain management for continued evaluation and treatment    If no improvement in symptoms or symptoms worsen, call/follow-up at clinic or go to ER

## 2019-08-08 ENCOUNTER — PATIENT MESSAGE (OUTPATIENT)
Dept: GASTROENTEROLOGY | Facility: CLINIC | Age: 79
End: 2019-08-08

## 2019-08-08 DIAGNOSIS — K52.9 COLITIS: ICD-10-CM

## 2019-08-08 RX ORDER — MESALAMINE 375 MG/1
CAPSULE, EXTENDED RELEASE ORAL
Qty: 120 CAPSULE | Refills: 1 | Status: SHIPPED | OUTPATIENT
Start: 2019-08-08 | End: 2020-06-08

## 2019-08-08 RX ORDER — BUDESONIDE 3 MG/1
9 CAPSULE, COATED PELLETS ORAL DAILY
Qty: 90 CAPSULE | Refills: 1 | Status: SHIPPED | OUTPATIENT
Start: 2019-08-08 | End: 2019-09-07

## 2019-08-08 NOTE — TELEPHONE ENCOUNTER
It was seen on previous imaging and is most likely a cyst, which are typically benign. The urology appointment can wait.

## 2019-08-12 ENCOUNTER — PATIENT MESSAGE (OUTPATIENT)
Dept: GASTROENTEROLOGY | Facility: CLINIC | Age: 79
End: 2019-08-12

## 2019-08-14 ENCOUNTER — OFFICE VISIT (OUTPATIENT)
Dept: GASTROENTEROLOGY | Facility: CLINIC | Age: 79
End: 2019-08-14
Payer: MEDICARE

## 2019-08-14 VITALS
RESPIRATION RATE: 19 BRPM | HEART RATE: 91 BPM | SYSTOLIC BLOOD PRESSURE: 151 MMHG | BODY MASS INDEX: 24.05 KG/M2 | WEIGHT: 140.88 LBS | HEIGHT: 64 IN | DIASTOLIC BLOOD PRESSURE: 71 MMHG

## 2019-08-14 DIAGNOSIS — Z87.898 HISTORY OF DIARRHEA: ICD-10-CM

## 2019-08-14 DIAGNOSIS — K62.5 RECTAL BLEEDING: ICD-10-CM

## 2019-08-14 DIAGNOSIS — K52.9 COLITIS: Primary | ICD-10-CM

## 2019-08-14 PROCEDURE — 99214 OFFICE O/P EST MOD 30 MIN: CPT | Mod: HCNC,S$GLB,, | Performed by: NURSE PRACTITIONER

## 2019-08-14 PROCEDURE — 3077F SYST BP >= 140 MM HG: CPT | Mod: HCNC,CPTII,S$GLB, | Performed by: NURSE PRACTITIONER

## 2019-08-14 PROCEDURE — 99999 PR PBB SHADOW E&M-EST. PATIENT-LVL IV: CPT | Mod: PBBFAC,HCNC,, | Performed by: NURSE PRACTITIONER

## 2019-08-14 PROCEDURE — 3078F PR MOST RECENT DIASTOLIC BLOOD PRESSURE < 80 MM HG: ICD-10-PCS | Mod: HCNC,CPTII,S$GLB, | Performed by: NURSE PRACTITIONER

## 2019-08-14 PROCEDURE — 99214 PR OFFICE/OUTPT VISIT, EST, LEVL IV, 30-39 MIN: ICD-10-PCS | Mod: HCNC,S$GLB,, | Performed by: NURSE PRACTITIONER

## 2019-08-14 PROCEDURE — 99999 PR PBB SHADOW E&M-EST. PATIENT-LVL IV: ICD-10-PCS | Mod: PBBFAC,HCNC,, | Performed by: NURSE PRACTITIONER

## 2019-08-14 PROCEDURE — 3077F PR MOST RECENT SYSTOLIC BLOOD PRESSURE >= 140 MM HG: ICD-10-PCS | Mod: HCNC,CPTII,S$GLB, | Performed by: NURSE PRACTITIONER

## 2019-08-14 PROCEDURE — 1101F PT FALLS ASSESS-DOCD LE1/YR: CPT | Mod: HCNC,CPTII,S$GLB, | Performed by: NURSE PRACTITIONER

## 2019-08-14 PROCEDURE — 3078F DIAST BP <80 MM HG: CPT | Mod: HCNC,CPTII,S$GLB, | Performed by: NURSE PRACTITIONER

## 2019-08-14 PROCEDURE — 1101F PR PT FALLS ASSESS DOC 0-1 FALLS W/OUT INJ PAST YR: ICD-10-PCS | Mod: HCNC,CPTII,S$GLB, | Performed by: NURSE PRACTITIONER

## 2019-08-14 RX ORDER — MESALAMINE 4 G/60ML
4 SUSPENSION RECTAL NIGHTLY
Qty: 840 ML | Refills: 1 | Status: SHIPPED | OUTPATIENT
Start: 2019-08-14 | End: 2019-08-28

## 2019-08-14 NOTE — PROGRESS NOTES
Subjective:       Patient ID: Dorothy Kramer is a 78 y.o. female, Body mass index is 24.18 kg/m².    Chief Complaint: Follow-up and Rectal Bleeding (blood in stool)      Patient is new to me. Established patient of Dr. Oscar and myself.    Here for follow up colitis. Would like to taper off Entocort due to side effects. Having headaches, eye pain, dizziness, and weakness that she attributes to Entocort. Bowel movements have decreased to 1-3 times per day.Describes as solid. Small amount of bright red blood still present in stool and on tissue paper. Denies bleeding in between bowel movements. Denies diarrhea, abdominal pain, or additional weight loss. Has not noticed a difference by eliminating dairy. Improvement noted from a GI stand point.    Review of Systems   Constitutional: Positive for appetite change (decreased over the past couple of months) and fever. Negative for unexpected weight change.   HENT: Negative for trouble swallowing.    Respiratory: Negative for cough and shortness of breath.    Cardiovascular: Negative for chest pain.   Gastrointestinal: Positive for blood in stool (see hpi). Negative for abdominal pain, constipation, diarrhea, nausea and vomiting.   Genitourinary: Negative for difficulty urinating and dysuria.   Musculoskeletal: Positive for neck pain (followed by pain management) and neck stiffness (followed by pain management). Negative for gait problem.   Skin: Negative for rash.   Neurological: Positive for weakness. Negative for speech difficulty.   Psychiatric/Behavioral: Negative for confusion.       Past Medical History:   Diagnosis Date    DDD (degenerative disc disease), cervical     Diverticulitis     Diverticulosis     DJD (degenerative joint disease) of hip     Dyspepsia     GERD (gastroesophageal reflux disease)     History of melanoma 5/20/2015    HTN (hypertension)     Melanoma     Migraine headache     Rectocele     Skin cancer     melanoma on face    Thyroid  disease     hypo    Trouble in sleeping     Uterine prolaps     followed by GYN    Vulvar lesion 5/20/2015      Past Surgical History:   Procedure Laterality Date    APPENDECTOMY  1962    BACK SURGERY      BREAST BIOPSY      BREAST LUMPECTOMY  1989    CERVICAL FUSION  1991    CHOLECYSTECTOMY      COLONOSCOPY      COLONOSCOPY N/A 11/9/2018    [facility]    HYSTERECTOMY      TRACEE/BSO for benign disease    JOINT REPLACEMENT Right 2016    Hip replacement    [place] REMOVAL OF OVARY/TUBE(S)      TONSILLECTOMY      TOTAL ABDOMINAL HYSTERECTOMY W/ BILATERAL SALPINGOOPHORECTOMY  1990      Family History   Problem Relation Age of Onset    Heart failure Mother     Cancer Mother         Bladder    Colon cancer Mother 63    Cancer Father         Bladder    Parkinsonism Father     Diverticulitis Father     Uterine cancer Maternal Aunt     Ovarian cancer Neg Hx     Breast cancer Neg Hx     Crohn's disease Neg Hx     Ulcerative colitis Neg Hx       Wt Readings from Last 10 Encounters:   08/14/19 63.9 kg (140 lb 14 oz)   08/02/19 64.2 kg (141 lb 8.6 oz)   08/01/19 65.3 kg (144 lb)   07/18/19 65.4 kg (144 lb 2.9 oz)   07/17/19 66 kg (145 lb 8.1 oz)   07/16/19 65 kg (143 lb 4.8 oz)   06/30/19 68 kg (150 lb)   06/05/19 68.2 kg (150 lb 5.7 oz)   03/28/19 71.4 kg (157 lb 6.5 oz)   02/04/19 72.6 kg (160 lb 0.9 oz)     Lab Results   Component Value Date    WBC 9.03 07/16/2019    HGB 13.4 07/16/2019    HCT 41.3 07/16/2019    MCV 95 07/16/2019     (H) 07/16/2019     CMP  Sodium   Date Value Ref Range Status   07/18/2019 138 136 - 145 mmol/L Final     Potassium   Date Value Ref Range Status   07/18/2019 3.5 3.5 - 5.1 mmol/L Final     Chloride   Date Value Ref Range Status   07/18/2019 100 95 - 110 mmol/L Final     CO2   Date Value Ref Range Status   07/18/2019 26 23 - 29 mmol/L Final     Glucose   Date Value Ref Range Status   07/18/2019 110 70 - 110 mg/dL Final     BUN, Bld    Date Value Ref Range Status   07/18/2019 18 8 - 23 mg/dL Final     Creatinine   Date Value Ref Range Status   07/18/2019 1.0 0.5 - 1.4 mg/dL Final     Calcium   Date Value Ref Range Status   07/18/2019 9.2 8.7 - 10.5 mg/dL Final     Total Protein   Date Value Ref Range Status   05/17/2019 7.6 6.0 - 8.4 g/dL Final     Albumin   Date Value Ref Range Status   05/17/2019 4.0 3.5 - 5.2 g/dL Final     Total Bilirubin   Date Value Ref Range Status   05/17/2019 0.8 0.1 - 1.0 mg/dL Final     Comment:     For infants and newborns, interpretation of results should be based  on gestational age, weight and in agreement with clinical  observations.  Premature Infant recommended reference ranges:  Up to 24 hours.............<8.0 mg/dL  Up to 48 hours............<12.0 mg/dL  3-5 days..................<15.0 mg/dL  6-29 days.................<15.0 mg/dL       Alkaline Phosphatase   Date Value Ref Range Status   05/17/2019 65 55 - 135 U/L Final     AST   Date Value Ref Range Status   05/17/2019 16 10 - 40 U/L Final     ALT   Date Value Ref Range Status   05/17/2019 12 10 - 44 U/L Final     Anion Gap   Date Value Ref Range Status   07/18/2019 12 8 - 16 mmol/L Final     eGFR if    Date Value Ref Range Status   07/18/2019 >60.0 >60 mL/min/1.73 m^2 Final     eGFR if non    Date Value Ref Range Status   07/18/2019 54.9 (A) >60 mL/min/1.73 m^2 Final     Comment:     Calculation used to obtain the estimated glomerular filtration  rate (eGFR) is the CKD-EPI equation.                  Reviewed prior medical records including radiology report of CT abdomen and pelvis 7/17/19 & endoscopy history (see surgical history).     Objective:      Physical Exam   Constitutional: She is oriented to person, place, and time. She appears well-developed and well-nourished.   HENT:   Head: Normocephalic.   Eyes: Pupils are equal, round, and reactive to light.   Neck:   Limited range of motion in neck   Cardiovascular: Normal  rate, regular rhythm and normal heart sounds.   No murmur heard.  Pulmonary/Chest: Breath sounds normal. No respiratory distress. She has no wheezes.   Abdominal: Soft. Bowel sounds are normal. She exhibits no distension. There is no tenderness.   Musculoskeletal: Normal range of motion.   Neurological: She is alert and oriented to person, place, and time.   Skin: Skin is warm and dry. No rash noted.   Non jaundiced   Psychiatric: She has a normal mood and affect. Her speech is normal.         Assessment:       1. Colitis    2. Rectal bleeding    3. History of diarrhea           Plan:   Discussed case with Dr Oscar.    All diagnoses and orders for this visit:    Colitis & Rectal bleeding        -     Recommended a short course of oral Prednisone but patient declined  -     Start: mesalamine (ROWASA) 4 gram/60 mL Enem; Place 60 mLs (4 g total) rectally every evening. for 14 days  Dispense: 840 mL; Refill: 1  - Taper off Entocort over the next few days  - Follow up in 4 weeks    History of diarrhea   - May continue Imodium PRN if symptoms return   - May slowly introduce dairy back into diet    If no improvement in symptoms or symptoms worsen, call/follow-up at clinic or go to ER

## 2019-08-15 ENCOUNTER — PATIENT MESSAGE (OUTPATIENT)
Dept: GASTROENTEROLOGY | Facility: CLINIC | Age: 79
End: 2019-08-15

## 2019-08-15 ENCOUNTER — TELEPHONE (OUTPATIENT)
Dept: GASTROENTEROLOGY | Facility: CLINIC | Age: 79
End: 2019-08-15

## 2019-08-15 DIAGNOSIS — K52.9 COLITIS: Primary | ICD-10-CM

## 2019-08-15 RX ORDER — MESALAMINE 1000 MG/1
1000 SUPPOSITORY RECTAL NIGHTLY
Qty: 28 SUPPOSITORY | Refills: 0 | Status: SHIPPED | OUTPATIENT
Start: 2019-08-15 | End: 2019-09-12

## 2019-08-15 NOTE — TELEPHONE ENCOUNTER
Spoke with patient and answered her questions. She feels an enema would be too difficult for her so I will change to suppository. Rx sent to pharmacy.

## 2019-08-15 NOTE — TELEPHONE ENCOUNTER
Pt can do whatever she is most comfortable with. Taper off the entocort. It is OK and appropriate to take both Apriso and a topical mesalamine (Rowasa enema or Canasa suppository). I think the enema would be more effective and less expensive, but if pt wants the canasa suppository instead, its 1000 mg ID q hs x 4 weeks. Thanks

## 2019-08-23 ENCOUNTER — PATIENT MESSAGE (OUTPATIENT)
Dept: GASTROENTEROLOGY | Facility: CLINIC | Age: 79
End: 2019-08-23

## 2019-08-26 ENCOUNTER — TELEPHONE (OUTPATIENT)
Dept: GASTROENTEROLOGY | Facility: CLINIC | Age: 79
End: 2019-08-26

## 2019-08-26 NOTE — TELEPHONE ENCOUNTER
Spoke with patient at length regarding the canasa suppository. Patient would like to hold off on taking for now. Feeling better since stopping the Budesonide. Bowel movements are 1-2 times per day. She will contact me if I need to prescribe a substitute.

## 2019-08-26 NOTE — TELEPHONE ENCOUNTER
"Reassurance. Pt will limit her medical treatment options. The "substitute" for canasa supp was the rowasa enema, which I can't tell if she is taking.   "

## 2019-09-04 ENCOUNTER — PATIENT MESSAGE (OUTPATIENT)
Dept: GASTROENTEROLOGY | Facility: CLINIC | Age: 79
End: 2019-09-04

## 2019-09-05 ENCOUNTER — PATIENT MESSAGE (OUTPATIENT)
Dept: GASTROENTEROLOGY | Facility: CLINIC | Age: 79
End: 2019-09-05

## 2019-09-25 ENCOUNTER — OFFICE VISIT (OUTPATIENT)
Dept: GASTROENTEROLOGY | Facility: CLINIC | Age: 79
End: 2019-09-25
Payer: MEDICARE

## 2019-09-25 VITALS
SYSTOLIC BLOOD PRESSURE: 152 MMHG | RESPIRATION RATE: 18 BRPM | WEIGHT: 138.69 LBS | HEIGHT: 64 IN | BODY MASS INDEX: 23.68 KG/M2 | HEART RATE: 88 BPM | DIASTOLIC BLOOD PRESSURE: 68 MMHG

## 2019-09-25 DIAGNOSIS — R10.30 LOWER ABDOMINAL PAIN: Primary | ICD-10-CM

## 2019-09-25 DIAGNOSIS — K52.9 COLITIS: ICD-10-CM

## 2019-09-25 PROCEDURE — 99214 OFFICE O/P EST MOD 30 MIN: CPT | Mod: HCNC,S$GLB,, | Performed by: NURSE PRACTITIONER

## 2019-09-25 PROCEDURE — 99214 PR OFFICE/OUTPT VISIT, EST, LEVL IV, 30-39 MIN: ICD-10-PCS | Mod: HCNC,S$GLB,, | Performed by: NURSE PRACTITIONER

## 2019-09-25 PROCEDURE — 3077F PR MOST RECENT SYSTOLIC BLOOD PRESSURE >= 140 MM HG: ICD-10-PCS | Mod: HCNC,CPTII,S$GLB, | Performed by: NURSE PRACTITIONER

## 2019-09-25 PROCEDURE — 1101F PR PT FALLS ASSESS DOC 0-1 FALLS W/OUT INJ PAST YR: ICD-10-PCS | Mod: HCNC,CPTII,S$GLB, | Performed by: NURSE PRACTITIONER

## 2019-09-25 PROCEDURE — 99999 PR PBB SHADOW E&M-EST. PATIENT-LVL IV: ICD-10-PCS | Mod: PBBFAC,HCNC,, | Performed by: NURSE PRACTITIONER

## 2019-09-25 PROCEDURE — 99999 PR PBB SHADOW E&M-EST. PATIENT-LVL IV: CPT | Mod: PBBFAC,HCNC,, | Performed by: NURSE PRACTITIONER

## 2019-09-25 PROCEDURE — 1101F PT FALLS ASSESS-DOCD LE1/YR: CPT | Mod: HCNC,CPTII,S$GLB, | Performed by: NURSE PRACTITIONER

## 2019-09-25 PROCEDURE — 3077F SYST BP >= 140 MM HG: CPT | Mod: HCNC,CPTII,S$GLB, | Performed by: NURSE PRACTITIONER

## 2019-09-25 PROCEDURE — 3078F DIAST BP <80 MM HG: CPT | Mod: HCNC,CPTII,S$GLB, | Performed by: NURSE PRACTITIONER

## 2019-09-25 PROCEDURE — 3078F PR MOST RECENT DIASTOLIC BLOOD PRESSURE < 80 MM HG: ICD-10-PCS | Mod: HCNC,CPTII,S$GLB, | Performed by: NURSE PRACTITIONER

## 2019-09-25 NOTE — PROGRESS NOTES
Subjective:       Patient ID: Dorothy Kramer is a 78 y.o. female, Body mass index is 23.8 kg/m².    Chief Complaint: Abdominal Pain and Follow-up      Established patient of Dr. Oscar and myself.    Here for follow up. She was feeling well until this past week. Started with intermittent lower abdominal pain, describes as cramping. Bowel movements are 8-10 per day, denies diarrhea or hematochezia. Describes stool as a type 4 on [place] scale. Denies weight loss or fever. Currently taking Apriso 2 tablets per day (she decreased dose because felt 4 tablets was too high dose). She had similar symptoms this past February and when she decreased Apriso to 2 gm her symptoms improved. At last visit recommended starting Rowasa enema or Canasa suppository but she declined.    Abdominal Pain   This is a recurrent problem. The current episode started in the past 7 days. The onset quality is sudden. The problem occurs daily. The problem has been gradually worsening. The pain is located in the LLQ and RLQ. The pain is at a severity of 5/10. The quality of the pain is cramping. The abdominal pain does not radiate. Pertinent negatives include no belching, constipation, diarrhea, dysuria, fever, hematochezia, melena, nausea, vomiting or weight loss. The pain is aggravated by movement. The pain is relieved by being still. She has tried nothing for the symptoms. Prior diagnostic workup includes CT scan and lower endoscopy (C-scope done 11/9/18, bx showed chronic active colitis; CT abdomen done 7/17/19 showed mild colonic wall thickening).     Review of Systems   Constitutional: Negative for appetite change, fever, unexpected weight change and weight loss.   HENT: Negative for trouble swallowing.    Respiratory: Positive for cough (occasional; instructed to follow up with PCP). Negative for shortness of breath.    Cardiovascular: Negative for chest pain.   Gastrointestinal: Positive for abdominal pain. Negative for blood in stool,  constipation, diarrhea, hematochezia, melena, nausea and vomiting.   Genitourinary: Negative for difficulty urinating and dysuria.   Musculoskeletal: Positive for neck pain (followed by pain management). Negative for gait problem.   Skin: Negative for rash.   Neurological: Negative for speech difficulty.   Psychiatric/Behavioral: Negative for confusion.       Past Medical History:   Diagnosis Date    DDD (degenerative disc disease), cervical     Diverticulitis     Diverticulosis     DJD (degenerative joint disease) of hip     Dyspepsia     GERD (gastroesophageal reflux disease)     History of melanoma 5/20/2015    HTN (hypertension)     Melanoma     Migraine headache     Rectocele     Skin cancer     melanoma on face    Thyroid disease     hypo    Trouble in sleeping     Uterine prolaps     followed by GYN    Vulvar lesion 5/20/2015      Past Surgical History:   Procedure Laterality Date    APPENDECTOMY  1962    BACK SURGERY      BREAST BIOPSY      BREAST LUMPECTOMY  1989    CERVICAL FUSION  1991    CHOLECYSTECTOMY      COLONOSCOPY      COLONOSCOPY N/A 11/9/2018    Dr Oscar; chronic active colitis; repeat in 5 years    HYSTERECTOMY      TRACEE/BSO for benign disease    JOINT REPLACEMENT Right 2016    Hip replacement    [place] REMOVAL OF OVARY/TUBE(S)      TONSILLECTOMY      TOTAL ABDOMINAL HYSTERECTOMY W/ BILATERAL SALPINGOOPHORECTOMY  1990      Family History   Problem Relation Age of Onset    Heart failure Mother     Cancer Mother         Bladder    Colon cancer Mother 63    Cancer Father         Bladder    Parkinsonism Father     Diverticulitis Father     Uterine cancer Maternal Aunt     Ovarian cancer Neg Hx     Breast cancer Neg Hx     Crohn's disease Neg Hx     Ulcerative colitis Neg Hx       Wt Readings from Last 10 Encounters:   09/25/19 62.9 kg (138 lb 10.7 oz)   08/14/19 63.9 kg (140 lb 14 oz)   08/02/19 64.2 kg (141 lb 8.6 oz)   08/01/19 65.3 kg (144 lb)   07/18/19 65.4  kg (144 lb 2.9 oz)   07/17/19 66 kg (145 lb 8.1 oz)   07/16/19 65 kg (143 lb 4.8 oz)   06/30/19 68 kg (150 lb)   06/05/19 68.2 kg (150 lb 5.7 oz)   03/28/19 71.4 kg (157 lb 6.5 oz)     Lab Results   Component Value Date    WBC 9.03 07/16/2019    HGB 13.4 07/16/2019    HCT 41.3 07/16/2019    MCV 95 07/16/2019     (H) 07/16/2019     CMP  Sodium   Date Value Ref Range Status   07/18/2019 138 136 - 145 mmol/L Final     Potassium   Date Value Ref Range Status   07/18/2019 3.5 3.5 - 5.1 mmol/L Final     Chloride   Date Value Ref Range Status   07/18/2019 100 95 - 110 mmol/L Final     CO2   Date Value Ref Range Status   07/18/2019 26 23 - 29 mmol/L Final     Glucose   Date Value Ref Range Status   07/18/2019 110 70 - 110 mg/dL Final     BUN, Bld   Date Value Ref Range Status   07/18/2019 18 8 - 23 mg/dL Final     Creatinine   Date Value Ref Range Status   07/18/2019 1.0 0.5 - 1.4 mg/dL Final     Calcium   Date Value Ref Range Status   07/18/2019 9.2 8.7 - 10.5 mg/dL Final     Total Protein   Date Value Ref Range Status   05/17/2019 7.6 6.0 - 8.4 g/dL Final     Albumin   Date Value Ref Range Status   05/17/2019 4.0 3.5 - 5.2 g/dL Final     Total Bilirubin   Date Value Ref Range Status   05/17/2019 0.8 0.1 - 1.0 mg/dL Final     Comment:     For infants and newborns, interpretation of results should be based  on gestational age, weight and in agreement with clinical  observations.  Premature Infant recommended reference ranges:  Up to 24 hours.............<8.0 mg/dL  Up to 48 hours............<12.0 mg/dL  3-5 days..................<15.0 mg/dL  6-29 days.................<15.0 mg/dL       Alkaline Phosphatase   Date Value Ref Range Status   05/17/2019 65 55 - 135 U/L Final     AST   Date Value Ref Range Status   05/17/2019 16 10 - 40 U/L Final     ALT   Date Value Ref Range Status   05/17/2019 12 10 - 44 U/L Final     Anion Gap   Date Value Ref Range Status   07/18/2019 12 8 - 16 mmol/L Final     eGFR if     Date Value Ref Range Status   07/18/2019 >60.0 >60 mL/min/1.73 m^2 Final     eGFR if non    Date Value Ref Range Status   07/18/2019 54.9 (A) >60 mL/min/1.73 m^2 Final     Comment:     Calculation used to obtain the estimated glomerular filtration  rate (eGFR) is the CKD-EPI equation.                Reviewed prior medical records including radiology report of CT abdomen 7/17/19 & endoscopy history (see surgical history).     Objective:      Physical Exam   Constitutional: She is oriented to person, place, and time. She appears well-developed and well-nourished.   HENT:   Head: Normocephalic.   Eyes: Pupils are equal, round, and reactive to light.   Neck: Normal range of motion.   Cardiovascular: Normal rate, regular rhythm and normal heart sounds.   No murmur heard.  Pulmonary/Chest: Breath sounds normal. No respiratory distress. She has no wheezes.   Abdominal: Soft. Bowel sounds are normal. She exhibits no distension and no mass. There is tenderness in the right lower quadrant and left lower quadrant. There is no guarding.   Musculoskeletal: Normal range of motion.   Neurological: She is alert and oriented to person, place, and time.   Skin: Skin is warm and dry. No rash noted.   Non jaundiced   Psychiatric: She has a normal mood and affect. Her speech is normal.         Assessment:       1. Lower abdominal pain    2. Colitis           Plan:   All diagnoses and orders for this visit:    Lower abdominal pain  - CT Abdomen Pelvis With Contrast; Future; Expected date: 09/25/2019  - Creatinine, serum; Future; Expected date: 09/25/2019    Colitis   - Continue Apriso 2 tablets (0.75 gm total) once daily as directed   - Continue Imodium otc PRN as directed   - Follow up with Dr. Oscar in two months    If no improvement in symptoms or symptoms worsen, call/follow-up at clinic or go to ER

## 2019-09-25 NOTE — PATIENT INSTRUCTIONS
Abdominal Pain    Abdominal pain is pain in the stomach or belly area. Everyone has this pain from time to time. In many cases it goes away on its own. But abdominal pain can sometimes be due to a serious problem, such as appendicitis. So its important to know when to seek help.  Causes of abdominal pain  There are many possible causes of abdominal pain. Common causes in adults include:  · Constipation, diarrhea, or gas  · Stomach acid flowing back up into the esophagus (acid reflux or heartburn)  · Severe acid reflux, called GERD (gastroesophageal reflux disease)  · A sore in the lining of the stomach or small intestine (peptic ulcer)  · Inflammation of the gallbladder, liver, or pancreas  · Gallstones or kidney stones  · Appendicitis   · Intestinal blockage   · An internal organ pushing through a muscle or other tissue (hernia)  · Urinary tract infections  · In women, menstrual cramps, fibroids, or endometriosis  · Inflammation or infection of the intestines  Diagnosing the cause of abdominal pain  Your healthcare provider will do a physical exam help find the cause of your pain. If needed, tests will be ordered. Belly pain has many possible causes. So it can be hard to find the reason for your pain. Giving details about your pain can help. Tell your provider where and when you feel the pain, and what makes it better or worse. Also let your provider know if you have other symptoms such as:  · Fever  · Tiredness  · Upset stomach (nausea)  · Vomiting  · Changes in bathroom habits  Treating abdominal pain  Some causes of pain need emergency medical treatment right away. These include appendicitis or a bowel blockage. Other problems can be treated with rest, fluids, or medicines. Your healthcare provider can give you specific instructions for treatment or self-care based on what is causing your pain.  If you have vomiting or diarrhea, sip water or other clear fluids. When you are ready to eat solid foods again,  start with small amounts of easy-to-digest, low-fat foods. These include apple sauce, toast, or crackers.   When to seek medical care  Call 911 or go to the hospital right away if you:  · Cant pass stool and are vomiting  · Are vomiting blood or have bloody diarrhea or black, tarry diarrhea  · Have chest, neck, or shoulder pain  · Feel like you might pass out  · Have pain in your shoulder blades with nausea  · Have sudden, severe belly pain  · Have new, severe pain unlike any you have felt before  · Have a belly that is rigid, hard, and tender to touch  Call your healthcare provider if you have:  · Pain for more than 5 days  · Bloating for more than 2 days  · Diarrhea for more than 5 days  · A fever of 100.4°F (38.0°C) or higher, or as directed by your provider  · Pain that gets worse  · Weight loss for no reason  · Continued lack of appetite  · Blood in your stool  How to prevent abdominal pain  Here are some tips to help prevent abdominal pain:  · Eat smaller amounts of food at one time.  · Avoid greasy, fried, or other high-fat foods.  · Avoid foods that give you gas.  · Exercise regularly.  · Drink plenty of fluids.  To help prevent GERD symptoms:  · Quit smoking.  · Reduce alcohol and certain foods that increase stomach acid.  · Avoid aspirin and over-the-counter pain and fever medicines (NSAIDS or nonsteroidal anti-inflammatory drugs), if possible  · Lose extra weight.  · Finish eating at least 2 hours before you go to bed or lie down.  · Raise the head of your bed.  Date Last Reviewed: 7/1/2016  © 6660-2515 [employer]. [address] [place] [address], [place] [address]. All rights reserved. This information is not intended as a substitute for professional medical care. Always follow your healthcare professional's instructions.

## 2019-09-30 ENCOUNTER — TELEPHONE (OUTPATIENT)
Dept: GASTROENTEROLOGY | Facility: CLINIC | Age: 79
End: 2019-09-30

## 2019-09-30 ENCOUNTER — HOSPITAL ENCOUNTER (OUTPATIENT)
Dept: RADIOLOGY | Facility: HOSPITAL | Age: 79
Discharge: HOME | End: 2019-09-30
Attending: NURSE PRACTITIONER
Payer: MEDICARE

## 2019-09-30 DIAGNOSIS — R93.5 ABNORMAL CT OF THE ABDOMEN: Primary | ICD-10-CM

## 2019-09-30 DIAGNOSIS — R10.30 LOWER ABDOMINAL PAIN: ICD-10-CM

## 2019-09-30 DIAGNOSIS — K57.92 ACUTE DIVERTICULITIS: Primary | ICD-10-CM

## 2019-09-30 PROCEDURE — 74177 CT ABD & PELVIS W/CONTRAST: CPT | Mod: TC,HCNC,PO

## 2019-09-30 PROCEDURE — 74177 CT ABDOMEN PELVIS WITH CONTRAST: ICD-10-PCS | Mod: 26,HCNC,, | Performed by: RADIOLOGY

## 2019-09-30 PROCEDURE — 74177 CT ABD & PELVIS W/CONTRAST: CPT | Mod: 26,HCNC,, | Performed by: RADIOLOGY

## 2019-09-30 PROCEDURE — 25500020 PHARM REV CODE 255: Mod: HCNC,PO | Performed by: NURSE PRACTITIONER

## 2019-09-30 RX ORDER — AMOXICILLIN AND CLAVULANATE POTASSIUM 875; 125 MG/1; MG/1
1 TABLET, FILM COATED ORAL 2 TIMES DAILY
Qty: 20 TABLET | Refills: 0 | Status: SHIPPED | OUTPATIENT
Start: 2019-09-30 | End: 2019-10-10

## 2019-09-30 RX ADMIN — IOHEXOL 30 ML: 350 INJECTION, SOLUTION INTRAVENOUS at 11:09

## 2019-09-30 RX ADMIN — IOHEXOL 75 ML: 350 INJECTION, SOLUTION INTRAVENOUS at 11:09

## 2019-09-30 NOTE — TELEPHONE ENCOUNTER
Spoke with patient and informed her of CT abdomen and pelvis results, pt verbalized understanding. Augmentin sent to pharmacy for acute diverticulitis.

## 2019-10-01 NOTE — TELEPHONE ENCOUNTER
Spoke w/ pt who was already scheduled for MRI in [place] per CARLEE Carballo on 10/05, but pt wanted me to check to see if there was anything closer to her home for sooner. Nothing available. Pt verbalized understanding.

## 2019-10-02 ENCOUNTER — PATIENT MESSAGE (OUTPATIENT)
Dept: GASTROENTEROLOGY | Facility: CLINIC | Age: 79
End: 2019-10-02

## 2019-10-05 ENCOUNTER — HOSPITAL ENCOUNTER (OUTPATIENT)
Dept: RADIOLOGY | Facility: HOSPITAL | Age: 79
Discharge: HOME | End: 2019-10-05
Attending: NURSE PRACTITIONER
Payer: MEDICARE

## 2019-10-05 DIAGNOSIS — R93.5 ABNORMAL CT OF THE ABDOMEN: ICD-10-CM

## 2019-10-05 PROCEDURE — 74183 MRI ABDOMEN W WO CONTRAST: ICD-10-PCS | Mod: 26,HCNC,, | Performed by: RADIOLOGY

## 2019-10-05 PROCEDURE — A9585 GADOBUTROL INJECTION: HCPCS | Mod: HCNC | Performed by: NURSE PRACTITIONER

## 2019-10-05 PROCEDURE — 74183 MRI ABD W/O CNTR FLWD CNTR: CPT | Mod: 26,HCNC,, | Performed by: RADIOLOGY

## 2019-10-05 PROCEDURE — 25500020 PHARM REV CODE 255: Mod: HCNC | Performed by: NURSE PRACTITIONER

## 2019-10-05 PROCEDURE — 74183 MRI ABD W/O CNTR FLWD CNTR: CPT | Mod: TC,HCNC

## 2019-10-05 RX ORDER — GADOBUTROL 604.72 MG/ML
6 INJECTION INTRAVENOUS
Status: COMPLETED | OUTPATIENT
Start: 2019-10-05 | End: 2019-10-05

## 2019-10-05 RX ADMIN — GADOBUTROL 6 ML: 604.72 INJECTION INTRAVENOUS at 04:10

## 2019-10-07 ENCOUNTER — PATIENT MESSAGE (OUTPATIENT)
Dept: GASTROENTEROLOGY | Facility: CLINIC | Age: 79
End: 2019-10-07

## 2019-10-07 ENCOUNTER — TELEPHONE (OUTPATIENT)
Dept: GASTROENTEROLOGY | Facility: CLINIC | Age: 79
End: 2019-10-07

## 2019-10-07 NOTE — TELEPHONE ENCOUNTER
Spoke w/ pt in regards to results and recommendations from provider. Pt verbalized understanding.

## 2019-10-07 NOTE — TELEPHONE ENCOUNTER
I'm sorry to hear you've been feeling worse. I'm going to speak with Dr. Oscar and get his advice. We could possibly change your antibiotics but I want to get his opinion first.  Thanks,  Ryann WICK, FNP-C

## 2019-10-07 NOTE — TELEPHONE ENCOUNTER
----- Message from Ryann Carballo NP sent at 10/7/2019 11:37 AM CDT -----  Please let patient know her MRI abdomen looked good. It showed no abnormalities on the liver to correspond with the findings on the CT. Also showed a small cyst of the right kidney. Cysts are typically benign.

## 2019-10-08 ENCOUNTER — TELEPHONE (OUTPATIENT)
Dept: GASTROENTEROLOGY | Facility: CLINIC | Age: 79
End: 2019-10-08

## 2019-10-08 DIAGNOSIS — K57.92 ACUTE DIVERTICULITIS: Primary | ICD-10-CM

## 2019-10-08 DIAGNOSIS — K57.92 DIVERTICULITIS: ICD-10-CM

## 2019-10-08 RX ORDER — AMOXICILLIN AND CLAVULANATE POTASSIUM 875; 125 MG/1; MG/1
1 TABLET, FILM COATED ORAL 2 TIMES DAILY
Qty: 8 TABLET | Refills: 0 | Status: SHIPPED | OUTPATIENT
Start: 2019-10-08 | End: 2019-10-12

## 2019-10-08 NOTE — TELEPHONE ENCOUNTER
Please let patient know I spoke with Dr. Oscar and recommend taking an additional four days of the Augmentin (total of 14 days). I sent a rx to her pharmacy. I also placed a referral to general surgery for their opinion on her recurrent diverticulitis. Please have her schedule an appointment with them. Thanks.

## 2019-10-15 ENCOUNTER — PATIENT MESSAGE (OUTPATIENT)
Dept: GASTROENTEROLOGY | Facility: CLINIC | Age: 79
End: 2019-10-15

## 2019-10-16 ENCOUNTER — OFFICE VISIT (OUTPATIENT)
Dept: GASTROENTEROLOGY | Facility: CLINIC | Age: 79
End: 2019-10-16
Payer: MEDICARE

## 2019-10-16 VITALS — WEIGHT: 138.25 LBS | HEIGHT: 64 IN | BODY MASS INDEX: 23.6 KG/M2

## 2019-10-16 DIAGNOSIS — K57.32 DIVERTICULITIS OF LARGE INTESTINE WITHOUT PERFORATION OR ABSCESS WITHOUT BLEEDING: Primary | ICD-10-CM

## 2019-10-16 DIAGNOSIS — R93.3 ABNORMAL CT SCAN, COLON: ICD-10-CM

## 2019-10-16 DIAGNOSIS — K52.9 COLITIS: ICD-10-CM

## 2019-10-16 PROCEDURE — 99999 PR PBB SHADOW E&M-EST. PATIENT-LVL II: CPT | Mod: PBBFAC,HCNC,, | Performed by: INTERNAL MEDICINE

## 2019-10-16 PROCEDURE — 1101F PR PT FALLS ASSESS DOC 0-1 FALLS W/OUT INJ PAST YR: ICD-10-PCS | Mod: HCNC,CPTII,S$GLB, | Performed by: INTERNAL MEDICINE

## 2019-10-16 PROCEDURE — 99214 PR OFFICE/OUTPT VISIT, EST, LEVL IV, 30-39 MIN: ICD-10-PCS | Mod: HCNC,S$GLB,, | Performed by: INTERNAL MEDICINE

## 2019-10-16 PROCEDURE — 99214 OFFICE O/P EST MOD 30 MIN: CPT | Mod: HCNC,S$GLB,, | Performed by: INTERNAL MEDICINE

## 2019-10-16 PROCEDURE — 1101F PT FALLS ASSESS-DOCD LE1/YR: CPT | Mod: HCNC,CPTII,S$GLB, | Performed by: INTERNAL MEDICINE

## 2019-10-16 PROCEDURE — 99999 PR PBB SHADOW E&M-EST. PATIENT-LVL II: ICD-10-PCS | Mod: PBBFAC,HCNC,, | Performed by: INTERNAL MEDICINE

## 2019-10-16 RX ORDER — SULFAMETHOXAZOLE AND TRIMETHOPRIM 800; 160 MG/1; MG/1
1 TABLET ORAL 2 TIMES DAILY
Qty: 20 TABLET | Refills: 0 | Status: SHIPPED | OUTPATIENT
Start: 2019-10-16 | End: 2019-12-19

## 2019-10-16 NOTE — PROGRESS NOTES
Pt returns re: chronic abd pain. Recently treated for diverticulitis with Cipro/flagyl, did not tolerate antibiotics. Subsequent treatment with Augmentin x 14 days, however lower abd pain persists. Pt also taking Apriso for focal colitis. No diarrhea or constipation. No bleeding. No vomiting. Tolerating light diet. No fever or jaundice. Denies hematuria or dysuria. No rashes. Recent CT scan reviewed. Prior C-scope reviewed. Pt has appt with surgery for opinion re: chronic/recurrent diverticulitis.    REVIEW OF SYSTEMS:   Constitutional: Negative for fever, appetite change and unexpected weight change.  HENT: Negative for sore throat and trouble swallowing.  Eyes: Negative for visual disturbance.  Respiratory: Negative for chest tightness, shortness of breath and wheezing.  Cardiovascular: Negative for chest pain.  Gastrointestinal:  as per HPI  Genitourinary: Negative for dysuria, frequency and hematuria.    PHYSICAL EXAMINATION:                                                        GENERAL:  Comfortable, in no acute distress.      SKIN: Non-jaundiced.                             HEENT EXAM:  Nonicteric.  No adenopathy.  Oropharynx is clear.               NECK:  Supple.                                                               LUNGS:  Clear.                                                               CARDIAC:  Regular rate and rhythm.  S1, S2.  No murmur.                      ABDOMEN:  Soft, positive bowel sounds, nontender.  No hepatosplenomegaly or masses.  No rebound or guarding.                                             EXTREMITIES:  No edema.       IMP: 1. Diverticulitis           2. Focal colitis          3. Abd pain (lower, bilat)          4. Abnormal CT scan    PLAN: 1. Trial of Bactrim BID              2. If symptoms persist, will repeat CT scan, then decision re: repeat C-scope vs surgery (pt has f/u appt with me 11/5 and Dr Hatch 11/6).

## 2019-11-05 ENCOUNTER — OFFICE VISIT (OUTPATIENT)
Dept: GASTROENTEROLOGY | Facility: CLINIC | Age: 79
End: 2019-11-05
Payer: MEDICARE

## 2019-11-05 VITALS — WEIGHT: 138.25 LBS | BODY MASS INDEX: 23.6 KG/M2 | HEIGHT: 64 IN | RESPIRATION RATE: 17 BRPM

## 2019-11-05 DIAGNOSIS — K57.32 DIVERTICULITIS OF LARGE INTESTINE WITHOUT PERFORATION OR ABSCESS WITHOUT BLEEDING: Primary | ICD-10-CM

## 2019-11-05 DIAGNOSIS — K52.9 COLITIS: ICD-10-CM

## 2019-11-05 PROCEDURE — 99999 PR PBB SHADOW E&M-EST. PATIENT-LVL II: ICD-10-PCS | Mod: PBBFAC,,, | Performed by: INTERNAL MEDICINE

## 2019-11-05 PROCEDURE — 1101F PT FALLS ASSESS-DOCD LE1/YR: CPT | Mod: CPTII,S$GLB,, | Performed by: INTERNAL MEDICINE

## 2019-11-05 PROCEDURE — 99999 PR PBB SHADOW E&M-EST. PATIENT-LVL II: CPT | Mod: PBBFAC,,, | Performed by: INTERNAL MEDICINE

## 2019-11-05 PROCEDURE — 1101F PR PT FALLS ASSESS DOC 0-1 FALLS W/OUT INJ PAST YR: ICD-10-PCS | Mod: CPTII,S$GLB,, | Performed by: INTERNAL MEDICINE

## 2019-11-05 PROCEDURE — 99213 PR OFFICE/OUTPT VISIT, EST, LEVL III, 20-29 MIN: ICD-10-PCS | Mod: S$GLB,,, | Performed by: INTERNAL MEDICINE

## 2019-11-05 PROCEDURE — 99213 OFFICE O/P EST LOW 20 MIN: CPT | Mod: S$GLB,,, | Performed by: INTERNAL MEDICINE

## 2019-11-05 NOTE — PROGRESS NOTES
Pt presents for evaluation recent diverticulitis. Completed antibiotic. Currently doing well. No abd pain. No N/V. Appetite and weight stable. No bleeding. No diarrhea or constipation. Remains on low dose Apriso two daily. Pt does not feel colitis active, does not want to take any medication not necessary. Recent imaging studies and C-scope 11/2018 reviewed.    REVIEW OF SYSTEMS:   Constitutional: Negative for fever, appetite change and unexpected weight change.  HENT: Negative for sore throat and trouble swallowing.  Eyes: Negative for visual disturbance.  Respiratory: Negative for chest tightness, shortness of breath and wheezing.  Cardiovascular: Negative for chest pain.  Gastrointestinal:  as per HPI  Genitourinary: Negative for dysuria, frequency and hematuria.    PHYSICAL EXAMINATION:                                                        GENERAL:  Comfortable, in no acute distress.      SKIN: Non-jaundiced.                             HEENT EXAM:  Nonicteric.  No adenopathy.  Oropharynx is clear.               NECK:  Supple.                                                               LUNGS:  Clear.                                                               CARDIAC:  Regular rate and rhythm.  S1, S2.  No murmur.                      ABDOMEN:  Soft, positive bowel sounds, nontender.  No hepatosplenomegaly or masses.  No rebound or guarding.                                             EXTREMITIES:  No edema.       IMP: 1. Diverticulitis - resolved.          2. Focal colitis - asymptomatic.    PLAN: 1. Complete current month of Apriso, then D/C.              2. RTC 3 months or PRN.

## 2019-11-11 ENCOUNTER — PATIENT MESSAGE (OUTPATIENT)
Dept: GASTROENTEROLOGY | Facility: CLINIC | Age: 79
End: 2019-11-11

## 2019-12-02 ENCOUNTER — TELEPHONE (OUTPATIENT)
Dept: FAMILY MEDICINE | Facility: CLINIC | Age: 79
End: 2019-12-02

## 2019-12-02 ENCOUNTER — PATIENT MESSAGE (OUTPATIENT)
Dept: FAMILY MEDICINE | Facility: CLINIC | Age: 79
End: 2019-12-02

## 2019-12-02 RX ORDER — LOSARTAN POTASSIUM 100 MG/1
100 TABLET ORAL DAILY
Qty: 90 TABLET | Refills: 3 | Status: SHIPPED | OUTPATIENT
Start: 2019-12-02 | End: 2020-03-19 | Stop reason: SDUPTHER

## 2019-12-02 RX ORDER — HYDROCHLOROTHIAZIDE 25 MG/1
25 TABLET ORAL DAILY
Qty: 90 TABLET | Refills: 3 | Status: SHIPPED | OUTPATIENT
Start: 2019-12-02 | End: 2020-03-19 | Stop reason: SDUPTHER

## 2019-12-02 NOTE — TELEPHONE ENCOUNTER
----- Message from Seth Gregory sent at 12/2/2019 11:14 AM CST -----  Contact: [facility]  Rep with [facility] would like a call back from the office regarding a prescription, and would like a call back from the office. Ms Man can be reached at    [phone] Magda

## 2019-12-18 ENCOUNTER — PATIENT MESSAGE (OUTPATIENT)
Dept: GASTROENTEROLOGY | Facility: CLINIC | Age: 79
End: 2019-12-18

## 2019-12-19 ENCOUNTER — HOSPITAL ENCOUNTER (OUTPATIENT)
Dept: RADIOLOGY | Facility: HOSPITAL | Age: 79
Discharge: HOME | End: 2019-12-19
Attending: PHYSICIAN ASSISTANT
Payer: MEDICARE

## 2019-12-19 ENCOUNTER — OFFICE VISIT (OUTPATIENT)
Dept: FAMILY MEDICINE | Facility: CLINIC | Age: 79
End: 2019-12-19
Payer: MEDICARE

## 2019-12-19 VITALS
BODY MASS INDEX: 23.53 KG/M2 | WEIGHT: 137.81 LBS | HEART RATE: 97 BPM | DIASTOLIC BLOOD PRESSURE: 60 MMHG | TEMPERATURE: 98 F | HEIGHT: 64 IN | OXYGEN SATURATION: 95 % | SYSTOLIC BLOOD PRESSURE: 120 MMHG

## 2019-12-19 DIAGNOSIS — I10 ESSENTIAL HYPERTENSION: ICD-10-CM

## 2019-12-19 DIAGNOSIS — R10.84 GENERALIZED ABDOMINAL PAIN: ICD-10-CM

## 2019-12-19 DIAGNOSIS — Z87.19 H/O DIVERTICULITIS OF COLON: ICD-10-CM

## 2019-12-19 DIAGNOSIS — K57.92 DIVERTICULITIS: Primary | ICD-10-CM

## 2019-12-19 DIAGNOSIS — R10.30 LOWER ABDOMINAL PAIN: ICD-10-CM

## 2019-12-19 PROCEDURE — 1159F MED LIST DOCD IN RCRD: CPT | Mod: HCNC,S$GLB,, | Performed by: PHYSICIAN ASSISTANT

## 2019-12-19 PROCEDURE — 99499 UNLISTED E&M SERVICE: CPT | Mod: HCNC,S$GLB,, | Performed by: PHYSICIAN ASSISTANT

## 2019-12-19 PROCEDURE — 74176 CT ABD & PELVIS W/O CONTRAST: CPT | Mod: 26,HCNC,, | Performed by: RADIOLOGY

## 2019-12-19 PROCEDURE — 3078F DIAST BP <80 MM HG: CPT | Mod: HCNC,CPTII,S$GLB, | Performed by: PHYSICIAN ASSISTANT

## 2019-12-19 PROCEDURE — 74176 CT ABDOMEN PELVIS WITHOUT CONTRAST: ICD-10-PCS | Mod: 26,HCNC,, | Performed by: RADIOLOGY

## 2019-12-19 PROCEDURE — 99214 PR OFFICE/OUTPT VISIT, EST, LEVL IV, 30-39 MIN: ICD-10-PCS | Mod: HCNC,S$GLB,, | Performed by: PHYSICIAN ASSISTANT

## 2019-12-19 PROCEDURE — 99999 PR PBB SHADOW E&M-EST. PATIENT-LVL IV: CPT | Mod: PBBFAC,HCNC,, | Performed by: PHYSICIAN ASSISTANT

## 2019-12-19 PROCEDURE — 1125F PR PAIN SEVERITY QUANTIFIED, PAIN PRESENT: ICD-10-PCS | Mod: HCNC,S$GLB,, | Performed by: PHYSICIAN ASSISTANT

## 2019-12-19 PROCEDURE — 1101F PR PT FALLS ASSESS DOC 0-1 FALLS W/OUT INJ PAST YR: ICD-10-PCS | Mod: HCNC,CPTII,S$GLB, | Performed by: PHYSICIAN ASSISTANT

## 2019-12-19 PROCEDURE — 99999 PR PBB SHADOW E&M-EST. PATIENT-LVL IV: ICD-10-PCS | Mod: PBBFAC,HCNC,, | Performed by: PHYSICIAN ASSISTANT

## 2019-12-19 PROCEDURE — 25500020 PHARM REV CODE 255: Mod: HCNC,PO | Performed by: PHYSICIAN ASSISTANT

## 2019-12-19 PROCEDURE — 99499 RISK ADDL DX/OHS AUDIT: ICD-10-PCS | Mod: HCNC,S$GLB,, | Performed by: PHYSICIAN ASSISTANT

## 2019-12-19 PROCEDURE — 3074F PR MOST RECENT SYSTOLIC BLOOD PRESSURE < 130 MM HG: ICD-10-PCS | Mod: HCNC,CPTII,S$GLB, | Performed by: PHYSICIAN ASSISTANT

## 2019-12-19 PROCEDURE — 1101F PT FALLS ASSESS-DOCD LE1/YR: CPT | Mod: HCNC,CPTII,S$GLB, | Performed by: PHYSICIAN ASSISTANT

## 2019-12-19 PROCEDURE — 74176 CT ABD & PELVIS W/O CONTRAST: CPT | Mod: TC,HCNC,PO

## 2019-12-19 PROCEDURE — 1159F PR MEDICATION LIST DOCUMENTED IN MEDICAL RECORD: ICD-10-PCS | Mod: HCNC,S$GLB,, | Performed by: PHYSICIAN ASSISTANT

## 2019-12-19 PROCEDURE — 1125F AMNT PAIN NOTED PAIN PRSNT: CPT | Mod: HCNC,S$GLB,, | Performed by: PHYSICIAN ASSISTANT

## 2019-12-19 PROCEDURE — 3074F SYST BP LT 130 MM HG: CPT | Mod: HCNC,CPTII,S$GLB, | Performed by: PHYSICIAN ASSISTANT

## 2019-12-19 PROCEDURE — 99214 OFFICE O/P EST MOD 30 MIN: CPT | Mod: HCNC,S$GLB,, | Performed by: PHYSICIAN ASSISTANT

## 2019-12-19 PROCEDURE — 3078F PR MOST RECENT DIASTOLIC BLOOD PRESSURE < 80 MM HG: ICD-10-PCS | Mod: HCNC,CPTII,S$GLB, | Performed by: PHYSICIAN ASSISTANT

## 2019-12-19 RX ORDER — SULFAMETHOXAZOLE AND TRIMETHOPRIM 800; 160 MG/1; MG/1
1 TABLET ORAL 2 TIMES DAILY
Qty: 20 TABLET | Refills: 0 | Status: SHIPPED | OUTPATIENT
Start: 2019-12-19 | End: 2019-12-29

## 2019-12-19 RX ADMIN — IOHEXOL 1000 ML: 9 SOLUTION ORAL at 02:12

## 2019-12-19 NOTE — PROGRESS NOTES
"Subjective:      Patient ID: Dorothy Kramer is a 79 y.o. female.    Chief Complaint: Diverticulitis (thinks she's been having a flare up since Saturday)    Patient is new to me, here today for abdominal pain and diarrhea  Patient concerned she may be having a flare of her diverticulitis   Patient sees Dr. Oscar, last seen in November for diverticulitis follow up and symptoms had resolved at that time   Patient last completed Bactrim 10/26 (can not tolerate Flagyl or Cipro) on 10/26 and currently taking apriso daily for colitis  Patient was due to consult with surgical speciality for recurrent diverticulitis but has not had any issues and so was told she could cancel this appointment by GI    Abdominal Pain   This is a new problem. The current episode started in the past 7 days. The pain is located in the RLQ and LLQ. Associated symptoms include diarrhea (started today), a fever (99/100 in the evenings) and nausea. Pertinent negatives include no constipation, dysuria, frequency, headaches, hematuria or vomiting.     Review of Systems   Constitutional: Positive for fever (99/100 in the evenings). Negative for chills and diaphoresis.   Gastrointestinal: Positive for abdominal pain, diarrhea (started today) and nausea. Negative for blood in stool, constipation and vomiting.   Genitourinary: Negative for dysuria, frequency and hematuria.   Skin: Negative for color change, rash and wound.   Neurological: Negative for dizziness, light-headedness and headaches.       Objective:   /60 (BP Location: Left arm, Patient Position: Sitting)   Pulse 97   Temp 98.1 °F (36.7 °C) (Oral)   Ht 5' 4" (1.626 m)   Wt 62.5 kg (137 lb 12.6 oz)   SpO2 95%   BMI 23.65 kg/m²   Physical Exam   Constitutional: She appears well-developed and well-nourished. She does not appear ill. No distress.   HENT:   Head: Normocephalic and atraumatic.   Cardiovascular: Normal rate, regular rhythm and normal heart sounds.   No murmur " heard.  Pulmonary/Chest: Effort normal and breath sounds normal. No respiratory distress. She has no decreased breath sounds.   Abdominal: Soft. Normal appearance and bowel sounds are normal. There is tenderness in the right lower quadrant, suprapubic area and left lower quadrant.   Skin: Skin is warm, dry and intact. No rash noted.   Psychiatric: She has a normal mood and affect. Her speech is normal and behavior is normal. Thought content normal.     Assessment:      1. Diverticulitis    2. Lower abdominal pain    3. Generalized abdominal pain    4. H/O diverticulitis of colon    5. Essential hypertension       Plan:   Diverticulitis  -     sulfamethoxazole-trimethoprim 800-160mg (BACTRIM DS) 800-160 mg Tab; Take 1 tablet by mouth 2 (two) times daily. for 10 days  Dispense: 20 tablet; Refill: 0    Lower abdominal pain  -     Urinalysis; Future; Expected date: 12/19/2019    Generalized abdominal pain  -     CT Abdomen Pelvis  Without Contrast; Future; Expected date: 12/19/2019    H/O diverticulitis of colon  -     CT Abdomen Pelvis  Without Contrast; Future; Expected date: 12/19/2019    Essential hypertension   Controlled on current regimen    Review CT and urine results with patient over the phone, she is to complete antibiotics and follow up w GI to ensure resolution    Discussed worsening signs/symptoms and when to return to clinic or go to ED.   Patient expresses understanding and agrees with treatment plan.

## 2019-12-19 NOTE — TELEPHONE ENCOUNTER
Spoke with pt. Pt states she had abd pain since Saturday, low grade faver last night, diarrhea x2.   Offered for pt to be seen tomorrow in GI, pt states she would like to be seen today. Made appointment for pt to be seen today in family practice. Pt verbalized understanding.

## 2020-01-01 ENCOUNTER — PATIENT MESSAGE (OUTPATIENT)
Dept: GASTROENTEROLOGY | Facility: CLINIC | Age: 80
End: 2020-01-01

## 2020-01-02 ENCOUNTER — PATIENT MESSAGE (OUTPATIENT)
Dept: GASTROENTEROLOGY | Facility: CLINIC | Age: 80
End: 2020-01-02

## 2020-01-02 ENCOUNTER — OFFICE VISIT (OUTPATIENT)
Dept: URGENT CARE | Facility: CLINIC | Age: 80
End: 2020-01-02
Payer: MEDICARE

## 2020-01-02 VITALS
SYSTOLIC BLOOD PRESSURE: 151 MMHG | TEMPERATURE: 98 F | DIASTOLIC BLOOD PRESSURE: 65 MMHG | HEART RATE: 101 BPM | OXYGEN SATURATION: 99 %

## 2020-01-02 DIAGNOSIS — J34.0 CELLULITIS OF NOSE, EXTERNAL: Primary | ICD-10-CM

## 2020-01-02 PROCEDURE — 99214 OFFICE O/P EST MOD 30 MIN: CPT | Mod: S$GLB,,, | Performed by: PHYSICIAN ASSISTANT

## 2020-01-02 PROCEDURE — 99214 PR OFFICE/OUTPT VISIT, EST, LEVL IV, 30-39 MIN: ICD-10-PCS | Mod: S$GLB,,, | Performed by: PHYSICIAN ASSISTANT

## 2020-01-02 RX ORDER — DOXYCYCLINE HYCLATE 100 MG
100 TABLET ORAL 2 TIMES DAILY
Qty: 20 TABLET | Refills: 0 | Status: SHIPPED | OUTPATIENT
Start: 2020-01-02 | End: 2020-01-12

## 2020-01-02 NOTE — PATIENT INSTRUCTIONS
Facial Cellulitis  Cellulitis is an infection of the deep layers of skin. A break in the skin, such as a cut or scratch, can let bacteria under the skin. It may also occur from an infected oil gland (pimple) or hair follicle. If the bacteria get to deep layers of the skin, it can be serious. If not treated, cellulitis can get into the bloodstream and lymph nodes. The infection can then spread throughout the body. This causes serious illness.  Cellulitis causes the affected skin to become red, swollen, warm, and sore. The reddened areas have a visible border. You may have a fever, chills, and pain.  Cellulitis is treated with antibiotics taken for 7 to 10 days. Symptoms should get better 1 to 2 days after treatment is started. Make sure to take all the antibiotics for the full number of days until they are gone. Keep taking the medication even if your symptoms go away.  Home care  Follow these tips:  · Take all of the antibiotic medicine exactly as directed until it is gone. Dont miss any doses, especially during the first 7 days. Dont stop taking it when your symptoms get better.  · Use a cool compress (face cloth soaked in cool water) on your face to help reduce swelling and pain.  · You may use acetaminophen or ibuprofen to reduce pain. Dont use these if you have chronic liver or kidney disease, or ever had a stomach ulcer or gastrointestinal bleeding. Talk with your healthcare provider first.  Follow-up care  Follow up with your healthcare provider, or as advised. If your infection does not go away on the first antibiotic, your healthcare provider will prescribe a different one.  When to seek medical advice  Call your healthcare provider right away if any of these occur:  · Fever higher of 100.4º F (38.0º C) or higher after 2 days on antibiotics  · Red areas that spread  · Swelling or pain that gets worse  · Fluid leaking from the skin (pus)  · An eyelid that swells shut or leaks fluid (pus)  · Headache or  neck pain that gets worse  · Unusual drowsiness or confusion  · Convulsions (seizure)  · Change in eyesight     Date Last Reviewed: 9/1/2016 © 2000-2017 [employer]. [address] [place] [address], [place] [address]. All rights reserved. This information is not intended as a substitute for professional medical care. Always follow your healthcare professional's instructions.       If not allergic,take tylenol (acetominophen) for fever control, chills, or body aches every 4 hours. Do not exceed 4000 mg/ day.If not allergic, take Motrin (Ibuprofen) every 4 hours for fever, chills, pain or inflammation. Do not exceed 2400 mg/day. You can alternate taking tylenol and motrin.    If you were prescribed a narcotic medication, do not drive or operate heavy equipment or machinery while taking these medications.  You must understand that you've received an Urgent Care treatment only and that you may be released before all your medical problems are known or treated. You, the patient, will arrange for follow up care as instructed.  Follow up with your PCP or specialty clinic as directed in the next 1-2 weeks if not improved or as needed.  You can call [phone] to schedule an appointment with the appropriate provider.  If your condition worsens we recommend that you receive another evaluation at the emergency room immediately or contact your primary medical clinics after hours call service to discuss your concerns.    Please return here or go to the Emergency Department for any concerns or worsening of condition.    If you have been referred to another provider and wish to call to check on the status of your referral, please call [facility] Scheduling at [phone]

## 2020-01-02 NOTE — TELEPHONE ENCOUNTER
Spoke with pt. Scheduled next available appointment with Dr. Oscar & added appointment to waitlist. Pt verbalized understanding to all. Pt states she has too many questions about CT scan that she would feel more better if Dr. Oscar answered them. Pt informed that if her questions starts to bother her to come in & see Sabine & have her answer some of her questions until she can be seen by Dr. Oscar. Pt verbalized understanding & appreciation.

## 2020-01-02 NOTE — PROGRESS NOTES
Subjective:       Patient ID: Dorothy Kramer is a 79 y.o. female.    Vitals:  tympanic temperature is 98.2 °F (36.8 °C). Her blood pressure is 151/65 (abnormal) and her pulse is 101. Her oxygen saturation is 99%.     Chief Complaint: Rash    Patient symptoms started two days ago.  She was on antibiotics for a different issue when the spot on the nose came up.  Today her eyes are swollen, she feels as though her throat is swollen    Rash   This is a new problem. The current episode started in the past 7 days. The problem has been gradually worsening since onset. The affected locations include the face. The rash is characterized by redness, dryness and swelling. It is unknown if there was an exposure to a precipitant. Pertinent negatives include no cough, fever or sore throat.       Constitution: Negative for chills and fever.   HENT: Negative for facial swelling and sore throat.    Neck: Negative for painful lymph nodes.   Eyes: Negative for eye itching and eyelid swelling.   Respiratory: Negative for cough.    Musculoskeletal: Negative for joint pain and joint swelling.   Skin: Positive for rash. Negative for color change, pale, wound, abrasion, laceration, lesion, skin thickening/induration, puncture wound, erythema, bruising, abscess, avulsion and hives.   Allergic/Immunologic: Negative for environmental allergies, immunocompromised state and hives.   Hematologic/Lymphatic: Negative for swollen lymph nodes.       Objective:      Physical Exam   Constitutional: She is oriented to person, place, and time. She appears well-developed and well-nourished. She is cooperative.  Non-toxic appearance. She does not appear ill. No distress.   HENT:   Head: Normocephalic and atraumatic.   Right Ear: Hearing, tympanic membrane, external ear and ear canal normal.   Left Ear: Hearing, tympanic membrane, external ear and ear canal normal.   Nose: Sinus tenderness present. No mucosal edema, rhinorrhea, purulent discharge, nose  lacerations or nasal deformity. No epistaxis. Right sinus exhibits no maxillary sinus tenderness and no frontal sinus tenderness. Left sinus exhibits no maxillary sinus tenderness and no frontal sinus tenderness.       Mouth/Throat: Uvula is midline, oropharynx is clear and moist and mucous membranes are normal. No trismus in the jaw. Normal dentition. No uvula swelling. No posterior oropharyngeal erythema.   Eyes: Conjunctivae and lids are normal. Right eye exhibits no discharge. Left eye exhibits no discharge. No scleral icterus.   Neck: Trachea normal, normal range of motion, full passive range of motion without pain and phonation normal. Neck supple.   Cardiovascular: Normal rate, regular rhythm, normal heart sounds, intact distal pulses and normal pulses.   Pulmonary/Chest: Effort normal and breath sounds normal. No respiratory distress.   Abdominal: Soft. Normal appearance and bowel sounds are normal. She exhibits no distension, no pulsatile midline mass and no mass. There is no tenderness.   Musculoskeletal: Normal range of motion. She exhibits no edema or deformity.   Neurological: She is alert and oriented to person, place, and time. She exhibits normal muscle tone. Coordination normal.   Skin: Skin is warm, dry, intact, not diaphoretic and not pale. erythema  Psychiatric: She has a normal mood and affect. Her speech is normal and behavior is normal. Judgment and thought content normal. Cognition and memory are normal.   Nursing note and vitals reviewed.        Assessment:       1. Cellulitis of nose, external        Plan:         Cellulitis of nose, external  -     doxycycline (VIBRA-TABS) 100 MG tablet; Take 1 tablet (100 mg total) by mouth 2 (two) times daily. Take as directed until bottle is empty for 10 days  Dispense: 20 tablet; Refill: 0    monitor symptoms over next two days. RTC/ED with worsening symptoms.  Patient Instructions     Facial Cellulitis  Cellulitis is an infection of the deep layers of  skin. A break in the skin, such as a cut or scratch, can let bacteria under the skin. It may also occur from an infected oil gland (pimple) or hair follicle. If the bacteria get to deep layers of the skin, it can be serious. If not treated, cellulitis can get into the bloodstream and lymph nodes. The infection can then spread throughout the body. This causes serious illness.  Cellulitis causes the affected skin to become red, swollen, warm, and sore. The reddened areas have a visible border. You may have a fever, chills, and pain.  Cellulitis is treated with antibiotics taken for 7 to 10 days. Symptoms should get better 1 to 2 days after treatment is started. Make sure to take all the antibiotics for the full number of days until they are gone. Keep taking the medication even if your symptoms go away.  Home care  Follow these tips:  · Take all of the antibiotic medicine exactly as directed until it is gone. Dont miss any doses, especially during the first 7 days. Dont stop taking it when your symptoms get better.  · Use a cool compress (face cloth soaked in cool water) on your face to help reduce swelling and pain.  · You may use acetaminophen or ibuprofen to reduce pain. Dont use these if you have chronic liver or kidney disease, or ever had a stomach ulcer or gastrointestinal bleeding. Talk with your healthcare provider first.  Follow-up care  Follow up with your healthcare provider, or as advised. If your infection does not go away on the first antibiotic, your healthcare provider will prescribe a different one.  When to seek medical advice  Call your healthcare provider right away if any of these occur:  · Fever higher of 100.4º F (38.0º C) or higher after 2 days on antibiotics  · Red areas that spread  · Swelling or pain that gets worse  · Fluid leaking from the skin (pus)  · An eyelid that swells shut or leaks fluid (pus)  · Headache or neck pain that gets worse  · Unusual drowsiness or  confusion  · Convulsions (seizure)  · Change in eyesight     Date Last Reviewed: 9/1/2016  © 6849-9904 [employer]. [address] [place] [address], [place] [address]. All rights reserved. This information is not intended as a substitute for professional medical care. Always follow your healthcare professional's instructions.       If not allergic,take tylenol (acetominophen) for fever control, chills, or body aches every 4 hours. Do not exceed 4000 mg/ day.If not allergic, take Motrin (Ibuprofen) every 4 hours for fever, chills, pain or inflammation. Do not exceed 2400 mg/day. You can alternate taking tylenol and motrin.    If you were prescribed a narcotic medication, do not drive or operate heavy equipment or machinery while taking these medications.  You must understand that you've received an Urgent Care treatment only and that you may be released before all your medical problems are known or treated. You, the patient, will arrange for follow up care as instructed.  Follow up with your PCP or specialty clinic as directed in the next 1-2 weeks if not improved or as needed.  You can call [phone] to schedule an appointment with the appropriate provider.  If your condition worsens we recommend that you receive another evaluation at the emergency room immediately or contact your primary medical clinics after hours call service to discuss your concerns.    Please return here or go to the Emergency Department for any concerns or worsening of condition.    If you have been referred to another provider and wish to call to check on the status of your referral, please call [facility] Scheduling at [phone]

## 2020-01-17 ENCOUNTER — PATIENT MESSAGE (OUTPATIENT)
Dept: FAMILY MEDICINE | Facility: CLINIC | Age: 80
End: 2020-01-17

## 2020-01-19 ENCOUNTER — PATIENT MESSAGE (OUTPATIENT)
Dept: FAMILY MEDICINE | Facility: CLINIC | Age: 80
End: 2020-01-19

## 2020-01-30 ENCOUNTER — OFFICE VISIT (OUTPATIENT)
Dept: FAMILY MEDICINE | Facility: CLINIC | Age: 80
End: 2020-01-30
Payer: MEDICARE

## 2020-01-30 VITALS
OXYGEN SATURATION: 98 % | WEIGHT: 138.31 LBS | SYSTOLIC BLOOD PRESSURE: 130 MMHG | DIASTOLIC BLOOD PRESSURE: 56 MMHG | HEART RATE: 75 BPM | BODY MASS INDEX: 23.61 KG/M2 | HEIGHT: 64 IN

## 2020-01-30 DIAGNOSIS — Z85.820 HISTORY OF MELANOMA: ICD-10-CM

## 2020-01-30 DIAGNOSIS — R55 VASOVAGAL SYNCOPE: Primary | ICD-10-CM

## 2020-01-30 DIAGNOSIS — I10 ESSENTIAL HYPERTENSION: ICD-10-CM

## 2020-01-30 DIAGNOSIS — E03.9 HYPOTHYROIDISM, UNSPECIFIED TYPE: ICD-10-CM

## 2020-01-30 DIAGNOSIS — S01.81XD FACIAL LACERATION, SUBSEQUENT ENCOUNTER: ICD-10-CM

## 2020-01-30 PROCEDURE — 99214 OFFICE O/P EST MOD 30 MIN: CPT | Mod: HCNC,S$GLB,, | Performed by: INTERNAL MEDICINE

## 2020-01-30 PROCEDURE — 99999 PR PBB SHADOW E&M-EST. PATIENT-LVL III: CPT | Mod: PBBFAC,HCNC,, | Performed by: INTERNAL MEDICINE

## 2020-01-30 PROCEDURE — 1126F AMNT PAIN NOTED NONE PRSNT: CPT | Mod: HCNC,S$GLB,, | Performed by: INTERNAL MEDICINE

## 2020-01-30 PROCEDURE — 1126F PR PAIN SEVERITY QUANTIFIED, NO PAIN PRESENT: ICD-10-PCS | Mod: HCNC,S$GLB,, | Performed by: INTERNAL MEDICINE

## 2020-01-30 PROCEDURE — 1101F PR PT FALLS ASSESS DOC 0-1 FALLS W/OUT INJ PAST YR: ICD-10-PCS | Mod: HCNC,CPTII,S$GLB, | Performed by: INTERNAL MEDICINE

## 2020-01-30 PROCEDURE — 99214 PR OFFICE/OUTPT VISIT, EST, LEVL IV, 30-39 MIN: ICD-10-PCS | Mod: HCNC,S$GLB,, | Performed by: INTERNAL MEDICINE

## 2020-01-30 PROCEDURE — 99999 PR PBB SHADOW E&M-EST. PATIENT-LVL III: ICD-10-PCS | Mod: PBBFAC,HCNC,, | Performed by: INTERNAL MEDICINE

## 2020-01-30 PROCEDURE — 3078F DIAST BP <80 MM HG: CPT | Mod: HCNC,CPTII,S$GLB, | Performed by: INTERNAL MEDICINE

## 2020-01-30 PROCEDURE — 3075F SYST BP GE 130 - 139MM HG: CPT | Mod: HCNC,CPTII,S$GLB, | Performed by: INTERNAL MEDICINE

## 2020-01-30 PROCEDURE — 1159F MED LIST DOCD IN RCRD: CPT | Mod: HCNC,S$GLB,, | Performed by: INTERNAL MEDICINE

## 2020-01-30 PROCEDURE — 1101F PT FALLS ASSESS-DOCD LE1/YR: CPT | Mod: HCNC,CPTII,S$GLB, | Performed by: INTERNAL MEDICINE

## 2020-01-30 PROCEDURE — 3075F PR MOST RECENT SYSTOLIC BLOOD PRESS GE 130-139MM HG: ICD-10-PCS | Mod: HCNC,CPTII,S$GLB, | Performed by: INTERNAL MEDICINE

## 2020-01-30 PROCEDURE — 1159F PR MEDICATION LIST DOCUMENTED IN MEDICAL RECORD: ICD-10-PCS | Mod: HCNC,S$GLB,, | Performed by: INTERNAL MEDICINE

## 2020-01-30 PROCEDURE — 3078F PR MOST RECENT DIASTOLIC BLOOD PRESSURE < 80 MM HG: ICD-10-PCS | Mod: HCNC,CPTII,S$GLB, | Performed by: INTERNAL MEDICINE

## 2020-01-30 NOTE — PROGRESS NOTES
Assessment and Plan:    1. Vasovagal syncope  2. Facial laceration, subsequent encounter  Reviewed all ER notes and discussed symptoms with patient. All patient questions were answered. No symptoms remaining.    3. Essential hypertension  BP OK today, diastolic on lower side. Discussed that if she is having anything like vomiting or diarrhea that would cause dehydration, I would recommend holding or halving the HCTZ and closely monitoring BP.     4. History of melanoma  Continue follow up with Dr. Yung.    5. Hypothyroidism, unspecified type  TSH has been generally stable, continue current dose of levothyroxine.     Discussed with patient that if she decides to transfer care here, please let me know and we would be happy to take care of her.   ______________________________________________________________________  Subjective:    Chief Complaint:  ER follow up, discuss establishing care    HPI:  Dorothy is a 79 y.o. year old female here mainly for ER follow up, but also notes that she is interviewing multiple doctors to decide who she will see in the future.     She had gone to [facility] ER on 1/4 for headache and facial pain. Exam unremarkable at that visit, CT of brain was normal but incidentally showed trace bilateral ethmoid and right sided maxillary sinus mucosal thickening. She had been referred to ENT, but symptoms resolved so she had cancelled that appointment.     She had a separate visit to [facility] ER on 1/24 for episode of vasovagal syncope with head trauma associated with episode of diarrhea and vomiting. Noted to have a brow laceration 2 cm. She was treated with fluids, zofran, potassium repletion, and tetanus immunization. Laceration repaired with glue. CT scan of the head revealed no acute intracranial traumatic injury. Orbits and temporal bones all appeared intact.  There was no hemorrhage or signs of skull fracture.  The patient's abdominal exam was benign. Orthostatic VS were normal.    She  reports that since the ER visit she has felt totally normal. Has not had any symptoms since the ER visit. No headaches, no diarrhea or vomiting.    She sees Dr. Oscar for colitis and diverticulitis. She is taking apriso, culturelle, and loperamide PRN.     She takes estrace 1 mg daily for HRT    She takes levothyroxine 88 mcg daily for hypothyroidism.     She takes losartan 100 mg daily and HCTZ 25 mg daily for HTN. BP has been     She has a history of melanoma on her left temple 9 years ago. Sees Dr. Yung.    Medications:  Current Outpatient Medications on File Prior to Visit   Medication Sig Dispense Refill    APRISO 0.375 gram Cp24 TAKE 4 CAPSULES (1.5 G TOTAL) BY MOUTH ONCE DAILY. 120 capsule 1    estradiol (ESTRACE) 1 MG tablet TAKE 1 TABLET (1 MG TOTAL) BY MOUTH ONCE DAILY. 90 tablet 3    hydroCHLOROthiazide (HYDRODIURIL) 25 MG tablet Take 1 tablet (25 mg total) by mouth once daily. 90 tablet 3    ibuprofen (ADVIL,MOTRIN) 100 MG tablet Take 100 mg by mouth every 6 (six) hours as needed for Temperature greater than.      Lactobacillus rhamnosus GG (CULTURELLE) 10 billion cell capsule Take 1 capsule by mouth daily as needed.       levothyroxine (SYNTHROID) 88 MCG tablet Take 1 tablet (88 mcg total) by mouth once daily. 90 tablet 3    loperamide (IMODIUM A-D) 2 mg Tab Take 2 mg by mouth 4 (four) times daily as needed.      losartan (COZAAR) 100 MG tablet Take 1 tablet (100 mg total) by mouth once daily. 90 tablet 3    magnesium 30 mg Tab Take 1 tablet by mouth.      multivitamin/iron/folic acid (CENTRUM COMPLETE ORAL) Take by mouth.      [DISCONTINUED] ondansetron (ZOFRAN-ODT) 4 MG TbDL Take 1 tablet (4 mg total) by mouth every 6 (six) hours as needed. 6 tablet 0    [DISCONTINUED] APRISO 0.375 gram Cp24 TAKE 4 CAPSULES (1.5 G TOTAL) BY MOUTH ONCE DAILY. (Patient taking differently: Take 2 capsules by mouth daily) 120 capsule 1    [DISCONTINUED] tiZANidine (ZANAFLEX) 4 MG tablet Take 4 mg by  "mouth 2 (two) times daily as needed.  0     No current facility-administered medications on file prior to visit.        Review of Systems:  Review of Systems   Constitutional: Negative for activity change.   Eyes: Negative for discharge.   Respiratory: Negative for wheezing.    Cardiovascular: Negative for chest pain and palpitations.   Gastrointestinal: Negative for constipation, diarrhea and vomiting.   Genitourinary: Negative for difficulty urinating and hematuria.   Neurological: Negative for headaches.   Psychiatric/Behavioral: Negative for dysphoric mood.     Entered by patient and reviewed and updated during visit      Past Medical History:  Past Medical History:   Diagnosis Date    DDD (degenerative disc disease), cervical     Diverticulitis     Diverticulosis     DJD (degenerative joint disease) of hip     Dyspepsia     GERD (gastroesophageal reflux disease)     History of melanoma 5/20/2015    HTN (hypertension)     Melanoma     Migraine headache     Rectocele     Skin cancer     melanoma on face    Thyroid disease     hypo    Trouble in sleeping     Uterine prolaps     followed by GYN    Vulvar lesion 5/20/2015       Objective:    Vitals:  Vitals:    01/30/20 1302   BP: (!) 130/56   Pulse: 75   SpO2: 98%   Weight: 62.8 kg (138 lb 5.4 oz)   Height: 5' 4" (1.626 m)   PainSc: 0-No pain       Physical Exam   Constitutional: She is oriented to person, place, and time. She appears well-developed and well-nourished. No distress.   HENT:   Mouth/Throat: Oropharynx is clear and moist.   Eyes: No scleral icterus.   Cardiovascular: Normal rate and regular rhythm.   No murmur heard.  Pulmonary/Chest: Effort normal and breath sounds normal. No respiratory distress. She has no wheezes.   Musculoskeletal: She exhibits no edema.   Neurological: She is alert and oriented to person, place, and time.   Skin: Skin is warm and dry.   Psychiatric: She has a normal mood and affect. Her behavior is normal. "   Vitals reviewed.      Data:  Previous imaging reviewed and pertinent for CT head x 2 with no acute findings, some subsegmental atelectasis on CT abdomen last year.      Kamila Gilbert MD  Internal Medicine

## 2020-02-03 ENCOUNTER — TELEPHONE (OUTPATIENT)
Dept: GASTROENTEROLOGY | Facility: CLINIC | Age: 80
End: 2020-02-03

## 2020-02-03 DIAGNOSIS — R19.7 DIARRHEA, UNSPECIFIED TYPE: Primary | ICD-10-CM

## 2020-02-03 NOTE — TELEPHONE ENCOUNTER
Spoke with pt. Informed clinic's need to reschedule 2/13 appointment as Dr. Oscar will not be in this day. Offered to reschedule at Dr. Oscar's next available or at her convenience with one of Dr. Oscar's NPs. Pt was upset & stated that she wants to see Dr. Oscar to discuss a few things with him (images from 10/2019, need to see surgeon due to recurrent diverticulitis, medication issues).    Pt informed her message would be sent to Dr. Oscar & she would get a call back informing of Dr. Oscar's recommendation on how to proceed. Pt verbalized understanding.

## 2020-02-03 NOTE — TELEPHONE ENCOUNTER
I reviewed with pt her recent CT scan in Dec. Pt doing well except recent recurrent diarrhea. No fever. No bleeding. Weight stable. I recommended stool evaluation, followed by C-scope (r/o colitis/ileitis).

## 2020-02-06 ENCOUNTER — LAB VISIT (OUTPATIENT)
Dept: LAB | Facility: HOSPITAL | Age: 80
End: 2020-02-06
Attending: INTERNAL MEDICINE
Payer: MEDICARE

## 2020-02-06 DIAGNOSIS — R19.7 DIARRHEA, UNSPECIFIED TYPE: ICD-10-CM

## 2020-02-06 PROCEDURE — 87046 STOOL CULTR AEROBIC BACT EA: CPT | Mod: HCNC

## 2020-02-06 PROCEDURE — 87209 SMEAR COMPLEX STAIN: CPT | Mod: HCNC

## 2020-02-06 PROCEDURE — 87045 FECES CULTURE AEROBIC BACT: CPT | Mod: HCNC

## 2020-02-06 PROCEDURE — 87427 SHIGA-LIKE TOXIN AG IA: CPT | Mod: HCNC

## 2020-02-06 PROCEDURE — 87329 GIARDIA AG IA: CPT | Mod: HCNC

## 2020-02-07 ENCOUNTER — TELEPHONE (OUTPATIENT)
Dept: GASTROENTEROLOGY | Facility: CLINIC | Age: 80
End: 2020-02-07

## 2020-02-07 LAB
CRYPTOSP AG STL QL IA: NEGATIVE
E COLI SXT1 STL QL IA: NEGATIVE
E COLI SXT2 STL QL IA: NEGATIVE
G LAMBLIA AG STL QL IA: NEGATIVE
O+P STL TRI STN: NORMAL

## 2020-02-07 NOTE — TELEPHONE ENCOUNTER
----- Message from Rosa Joyner sent at 2/6/2020 11:13 PM CST -----  Regarding: Lab Clientv Services    Good Afternoon,    My name is Rosa Joyner I work in the lab. We received a specimen for Clostridium Difficile test. The test was unable to be performed due to the stool sample was formed stool. If have any questions regarding this, please contact Lab Client Services at [phone].   Thank you

## 2020-02-10 LAB — BACTERIA STL CULT: NORMAL

## 2020-02-13 ENCOUNTER — TELEPHONE (OUTPATIENT)
Dept: GASTROENTEROLOGY | Facility: CLINIC | Age: 80
End: 2020-02-13

## 2020-02-13 ENCOUNTER — PATIENT MESSAGE (OUTPATIENT)
Dept: ENDOSCOPY | Facility: HOSPITAL | Age: 80
End: 2020-02-13

## 2020-02-13 NOTE — TELEPHONE ENCOUNTER
----- Message from Kurtis Carter sent at 2/13/2020  9:55 AM CST -----  Contact: same  Patient called in and stated she has a procedure scheduled for tomorrow 2/14/2020 and needs to know all the details asap so she can arrange her transportation.  Patient call back number is [phone] or [phone]

## 2020-02-14 ENCOUNTER — ANESTHESIA EVENT (OUTPATIENT)
Dept: ENDOSCOPY | Facility: HOSPITAL | Age: 80
End: 2020-02-14
Payer: MEDICARE

## 2020-02-14 ENCOUNTER — HOSPITAL ENCOUNTER (OUTPATIENT)
Facility: HOSPITAL | Age: 80
Discharge: HOME | End: 2020-02-14
Attending: INTERNAL MEDICINE | Admitting: INTERNAL MEDICINE
Payer: MEDICARE

## 2020-02-14 ENCOUNTER — ANESTHESIA (OUTPATIENT)
Dept: ENDOSCOPY | Facility: HOSPITAL | Age: 80
End: 2020-02-14
Payer: MEDICARE

## 2020-02-14 DIAGNOSIS — R19.4 CHANGE IN BOWEL HABITS: ICD-10-CM

## 2020-02-14 PROCEDURE — 27201012 HC FORCEPS, HOT/COLD, DISP: Mod: HCNC,PO | Performed by: INTERNAL MEDICINE

## 2020-02-14 PROCEDURE — 88305 TISSUE EXAM BY PATHOLOGIST: CPT | Mod: 26,HCNC,, | Performed by: PATHOLOGY

## 2020-02-14 PROCEDURE — 63600175 PHARM REV CODE 636 W HCPCS: Mod: HCNC,PO | Performed by: INTERNAL MEDICINE

## 2020-02-14 PROCEDURE — 63600175 PHARM REV CODE 636 W HCPCS: Mod: HCNC,PO | Performed by: NURSE ANESTHETIST, CERTIFIED REGISTERED

## 2020-02-14 PROCEDURE — 45380 COLONOSCOPY AND BIOPSY: CPT | Mod: HCNC,PO | Performed by: INTERNAL MEDICINE

## 2020-02-14 PROCEDURE — 88305 TISSUE EXAM BY PATHOLOGIST: CPT | Mod: HCNC | Performed by: PATHOLOGY

## 2020-02-14 PROCEDURE — 88305 TISSUE EXAM BY PATHOLOGIST: ICD-10-PCS | Mod: 26,HCNC,, | Performed by: PATHOLOGY

## 2020-02-14 PROCEDURE — 37000009 HC ANESTHESIA EA ADD 15 MINS: Mod: HCNC,PO | Performed by: INTERNAL MEDICINE

## 2020-02-14 PROCEDURE — D9220A PRA ANESTHESIA: Mod: HCNC,CRNA,, | Performed by: NURSE ANESTHETIST, CERTIFIED REGISTERED

## 2020-02-14 PROCEDURE — 45380 COLONOSCOPY AND BIOPSY: CPT | Mod: HCNC,,, | Performed by: INTERNAL MEDICINE

## 2020-02-14 PROCEDURE — D9220A PRA ANESTHESIA: ICD-10-PCS | Mod: HCNC,CRNA,, | Performed by: NURSE ANESTHETIST, CERTIFIED REGISTERED

## 2020-02-14 PROCEDURE — D9220A PRA ANESTHESIA: ICD-10-PCS | Mod: HCNC,ANES,, | Performed by: ANESTHESIOLOGY

## 2020-02-14 PROCEDURE — 37000008 HC ANESTHESIA 1ST 15 MINUTES: Mod: HCNC,PO | Performed by: INTERNAL MEDICINE

## 2020-02-14 PROCEDURE — 45380 PR COLONOSCOPY,BIOPSY: ICD-10-PCS | Mod: HCNC,,, | Performed by: INTERNAL MEDICINE

## 2020-02-14 PROCEDURE — D9220A PRA ANESTHESIA: Mod: HCNC,ANES,, | Performed by: ANESTHESIOLOGY

## 2020-02-14 RX ORDER — SODIUM CHLORIDE 0.9 % (FLUSH) 0.9 %
10 SYRINGE (ML) INJECTION
Status: DISCONTINUED | OUTPATIENT
Start: 2020-02-14 | End: 2020-02-14 | Stop reason: HOSPADM

## 2020-02-14 RX ORDER — PROPOFOL 10 MG/ML
VIAL (ML) INTRAVENOUS
Status: DISCONTINUED | OUTPATIENT
Start: 2020-02-14 | End: 2020-02-14

## 2020-02-14 RX ORDER — LIDOCAINE HCL/PF 100 MG/5ML
SYRINGE (ML) INTRAVENOUS
Status: DISCONTINUED | OUTPATIENT
Start: 2020-02-14 | End: 2020-02-14

## 2020-02-14 RX ORDER — SODIUM CHLORIDE, SODIUM LACTATE, POTASSIUM CHLORIDE, CALCIUM CHLORIDE 600; 310; 30; 20 MG/100ML; MG/100ML; MG/100ML; MG/100ML
INJECTION, SOLUTION INTRAVENOUS CONTINUOUS
Status: DISCONTINUED | OUTPATIENT
Start: 2020-02-14 | End: 2020-02-14 | Stop reason: HOSPADM

## 2020-02-14 RX ADMIN — PROPOFOL 30 MG: 10 INJECTION, EMULSION INTRAVENOUS at 08:02

## 2020-02-14 RX ADMIN — SODIUM CHLORIDE, SODIUM LACTATE, POTASSIUM CHLORIDE, AND CALCIUM CHLORIDE: .6; .31; .03; .02 INJECTION, SOLUTION INTRAVENOUS at 07:02

## 2020-02-14 RX ADMIN — LIDOCAINE HYDROCHLORIDE 100 MG: 20 INJECTION, SOLUTION INTRAVENOUS at 08:02

## 2020-02-14 NOTE — [employer] PATIENT INSTRUCTIONS
Discharge Summary/Instructions after an Endoscopic Procedure  Patient Name: Dorothy Kramer  Patient MRN: [id number]  Patient YOB: 1940 Friday, February 14, 2020  Fadi Oscar MD  RESTRICTIONS:  During your procedure today, you received medications for sedation.  These   medications may affect your judgment, balance and coordination.  Therefore,   for 24 hours, you have the following restrictions:   - DO NOT drive a car, operate machinery, make legal/financial decisions,   sign important papers or drink alcohol.    ACTIVITY:  Today: no heavy lifting, straining or running due to procedural   sedation/anesthesia.  The following day: return to full activity including work.  DIET:  Eat and drink normally unless instructed otherwise.     TREATMENT FOR COMMON SIDE EFFECTS:  - Mild abdominal pain, nausea, belching, bloating or excessive gas:  rest,   eat lightly and use a heating pad.  - Sore Throat: treat with throat lozenges and/or gargle with warm salt   water.  - Because air was used during the procedure, expelling large amounts of air   from your rectum or belching is normal.  - If a bowel prep was taken, you may not have a bowel movement for 1-3 days.    This is normal.  SYMPTOMS TO WATCH FOR AND REPORT TO YOUR PHYSICIAN:  1. Abdominal pain or bloating, other than gas cramps.  2. Chest pain.  3. Back pain.  4. Signs of infection such as: chills or fever occurring within 24 hours   after the procedure.  5. Rectal bleeding, which would show as bright red, maroon, or black stools.   (A tablespoon of blood from the rectum is not serious, especially if   hemorrhoids are present.)  6. Vomiting.  7. Weakness or dizziness.  GO DIRECTLY TO THE NEAREST EMERGENCY ROOM IF YOU HAVE ANY OF THE FOLLOWING:      Difficulty breathing              Chills and/or fever over 101 F   Persistent vomiting and/or vomiting blood   Severe abdominal pain   Severe chest pain   Black, tarry stools   Bleeding- more than one  tablespoon   Any other symptom or condition that you feel may need urgent attention  Your doctor recommends these additional instructions:  If any biopsies were taken, your doctors clinic will contact you in 1 to 2   weeks with any results.  We are waiting for your pathology results.   Continue your present medications.   Your physician has indicated that a repeat colonoscopy is not recommended   for screening purposes.   You are being discharged to home.  For questions, problems or results please call your physician - Fadi Oscar MD at Work:  [phone].  EMERGENCY PHONE NUMBER: [phone], LAB RESULTS: [phone]  IF A COMPLICATION OR EMERGENCY SITUATION ARISES AND YOU ARE UNABLE TO REACH   YOUR PHYSICIAN - GO DIRECTLY TO THE EMERGENCY ROOM.  ___________________________________________  Nurse Signature  ___________________________________________  Patient/Designated Responsible Party Signature  Fadi Oscar MD  2/14/2020 8:52:34 AM  This report has been verified and signed electronically.  [employer]

## 2020-02-14 NOTE — H&P
History & Physical - Short Stay  Gastroenterology      SUBJECTIVE:     Procedure: Colonoscopy    Chief Complaint/Indication for Procedure: Change in Bowel Habits and Abnormal CT    PTA Medications   Medication Sig    APRISO 0.375 gram Cp24 TAKE 4 CAPSULES (1.5 G TOTAL) BY MOUTH ONCE DAILY.    estradiol (ESTRACE) 1 MG tablet TAKE 1 TABLET (1 MG TOTAL) BY MOUTH ONCE DAILY.    hydroCHLOROthiazide (HYDRODIURIL) 25 MG tablet Take 1 tablet (25 mg total) by mouth once daily.    ibuprofen (ADVIL,MOTRIN) 100 MG tablet Take 100 mg by mouth every 6 (six) hours as needed for Temperature greater than.    Lactobacillus rhamnosus GG (CULTURELLE) 10 billion cell capsule Take 1 capsule by mouth daily as needed.     levothyroxine (SYNTHROID) 88 MCG tablet Take 1 tablet (88 mcg total) by mouth once daily.    loperamide (IMODIUM A-D) 2 mg Tab Take 2 mg by mouth 4 (four) times daily as needed.    losartan (COZAAR) 100 MG tablet Take 1 tablet (100 mg total) by mouth once daily.    magnesium 30 mg Tab Take 1 tablet by mouth.    multivitamin/iron/folic acid (CENTRUM COMPLETE ORAL) Take by mouth.       Review of patient's allergies indicates:   Allergen Reactions    Ciprofloxacin Palpitations    Flagyl [metronidazole] Palpitations        Past Medical History:   Diagnosis Date    DDD (degenerative disc disease), cervical     Diverticulitis     Diverticulosis     DJD (degenerative joint disease) of hip     Dyspepsia     GERD (gastroesophageal reflux disease)     History of melanoma 5/20/2015    HTN (hypertension)     Melanoma     Migraine headache     Rectocele     Skin cancer     melanoma on face    Thyroid disease     hypo    Trouble in sleeping     Uterine prolaps     followed by GYN    Vulvar lesion 5/20/2015     Past Surgical History:   Procedure Laterality Date    APPENDECTOMY  1962    BACK SURGERY      BREAST BIOPSY      BREAST LUMPECTOMY  1989    CERVICAL FUSION  1991    CHOLECYSTECTOMY       COLONOSCOPY      COLONOSCOPY N/A 11/9/2018    Dr Oscar; chronic active colitis; repeat in 5 years    HYSTERECTOMY      TRACEE/BSO for benign disease    JOINT REPLACEMENT Right 2016    Hip replacement    [place] REMOVAL OF OVARY/TUBE(S)      TONSILLECTOMY      TOTAL ABDOMINAL HYSTERECTOMY W/ BILATERAL SALPINGOOPHORECTOMY  1990     Family History   Problem Relation Age of Onset    Heart failure Mother     Cancer Mother         Bladder    Colon cancer Mother 63    Cancer Father         Bladder    Parkinsonism Father     Diverticulitis Father     Uterine cancer Maternal Aunt     Ovarian cancer Neg Hx     Breast cancer Neg Hx     Crohn's disease Neg Hx     Ulcerative colitis Neg Hx      Social History     Tobacco Use    Smoking status: Never Smoker    Smokeless tobacco: Never Used   Substance Use Topics    Alcohol use: No     Frequency: Never     Binge frequency: Never    Drug use: No         OBJECTIVE:     Vital Signs (Most Recent)  Temp: 98 °F (36.7 °C) (02/14/20 0735)  Pulse: 88 (02/14/20 0735)  Resp: 16 (02/14/20 0735)  BP: (!) 186/83 (02/14/20 0735)  SpO2: 99 % (02/14/20 0735)    Physical Exam:                                                       GENERAL:  Comfortable, in no acute distress.                                 HEENT EXAM:  Nonicteric.  No adenopathy.  Oropharynx is clear.               NECK:  Supple.                                                               LUNGS:  Clear.                                                               CARDIAC:  Regular rate and rhythm.  S1, S2.  No murmur.                      ABDOMEN:  Soft, positive bowel sounds, nontender.  No hepatosplenomegaly or masses.  No rebound or guarding.                                             EXTREMITIES:  No edema.     MENTAL STATUS:  Normal, alert and oriented.      ASSESSMENT/PLAN:     Assessment: Change in Bowel Habits and Abnormal CT    Plan: Colonoscopy    Anesthesia Plan: General    ASA Grade: ASA 2 - Patient  with mild systemic disease with no functional limitations    MALLAMPATI SCORE:  I (soft palate, uvula, fauces, and tonsillar pillars visible)

## 2020-02-14 NOTE — DISCHARGE INSTRUCTIONS

## 2020-02-14 NOTE — DISCHARGE SUMMARY
Discharge Note  Short Stay      SUMMARY     Admit Date: 2/14/2020    Attending Physician: Fadi Oscar MD     Discharge Physician: Fadi Oscar MD    Discharge Date: 2/14/2020 8:53 AM    Final Diagnosis: Diarrhea, unspecified type [R19.7]    Disposition: HOME OR SELF CARE    Patient Instructions:   Current Discharge Medication List      CONTINUE these medications which have NOT CHANGED    Details   APRISO 0.375 gram Cp24 TAKE 4 CAPSULES (1.5 G TOTAL) BY MOUTH ONCE DAILY.  Qty: 120 capsule, Refills: 1      estradiol (ESTRACE) 1 MG tablet TAKE 1 TABLET (1 MG TOTAL) BY MOUTH ONCE DAILY.  Qty: 90 tablet, Refills: 3      hydroCHLOROthiazide (HYDRODIURIL) 25 MG tablet Take 1 tablet (25 mg total) by mouth once daily.  Qty: 90 tablet, Refills: 3      ibuprofen (ADVIL,MOTRIN) 100 MG tablet Take 100 mg by mouth every 6 (six) hours as needed for Temperature greater than.      Lactobacillus rhamnosus GG (CULTURELLE) 10 billion cell capsule Take 1 capsule by mouth daily as needed.       levothyroxine (SYNTHROID) 88 MCG tablet Take 1 tablet (88 mcg total) by mouth once daily.  Qty: 90 tablet, Refills: 3      loperamide (IMODIUM A-D) 2 mg Tab Take 2 mg by mouth 4 (four) times daily as needed.      losartan (COZAAR) 100 MG tablet Take 1 tablet (100 mg total) by mouth once daily.  Qty: 90 tablet, Refills: 3      magnesium 30 mg Tab Take 1 tablet by mouth.      multivitamin/iron/folic acid (CENTRUM COMPLETE ORAL) Take by mouth.             Discharge Procedure Orders (must include Diet, Follow-up, Activity)    Follow Up:  Follow up with PCP as previously scheduled  Resume routine diet.  Activity as tolerated.    No driving day of procedure.

## 2020-02-14 NOTE — ANESTHESIA POSTPROCEDURE EVALUATION
Anesthesia Post Evaluation    Patient: Dorothy Kramer    Procedure(s) Performed: Procedure(s) (LRB):  COLONOSCOPY (N/A)    Final Anesthesia Type: general    Patient location during evaluation: PACU  Patient participation: Yes- Able to Participate  Level of consciousness: awake and alert and oriented  Post-procedure vital signs: reviewed and stable  Pain management: adequate  Airway patency: patent    PONV status at discharge: No PONV  Anesthetic complications: no      Cardiovascular status: blood pressure returned to baseline and stable  Respiratory status: unassisted and spontaneous ventilation  Hydration status: euvolemic  Follow-up not needed.          Vitals Value Taken Time   /62 2/14/2020  9:24 AM   Temp 36.7 °C (98 °F) 2/14/2020  9:24 AM   Pulse 69 2/14/2020  9:24 AM   Resp 16 2/14/2020  9:24 AM   SpO2 99 % 2/14/2020  9:24 AM         Event Time     Out of Recovery 09:28:00          Pain/Veena Score: Veena Score: 10 (2/14/2020  9:25 AM)

## 2020-02-14 NOTE — TRANSFER OF CARE
"Anesthesia Transfer of Care Note    Patient: Dorothy Kramer    Procedure(s) Performed: Procedure(s) (LRB):  COLONOSCOPY (N/A)    Patient location: PACU    Anesthesia Type: general    Transport from OR: Transported from OR on room air with adequate spontaneous ventilation    Post pain: adequate analgesia    Post assessment: no apparent anesthetic complications and tolerated procedure well    Post vital signs: stable    Level of consciousness: awake and alert    Nausea/Vomiting: no nausea/vomiting    Complications: none    Transfer of care protocol was followed      Last vitals:   Visit Vitals  BP (!) 186/83   Pulse 88   Temp 36.7 °C (98 °F)   Resp 16   Ht 5' 4" (1.626 m)   Wt 61.7 kg (136 lb)   SpO2 99%   BMI 23.34 kg/m²     "

## 2020-02-14 NOTE — ANESTHESIA PREPROCEDURE EVALUATION
02/14/2020  Dorothy Kramer is a 79 y.o., female.    Anesthesia Evaluation    I have reviewed the Patient Summary Reports.    I have reviewed the Nursing Notes.   I have reviewed the Medications.     Review of Systems  Anesthesia Hx:  No problems with previous Anesthesia    Social:  Non-Smoker    Hematology/Oncology:         -- Cancer in past history (melanoma):   Cardiovascular:   Hypertension, well controlled    Pulmonary:  Pulmonary Normal    Renal/:  Renal/ Normal     Hepatic/GI:   GERD Diverticulitis   Musculoskeletal:   Arthritis     Neurological:   Neuromuscular Disease, Headaches    Endocrine:   Hypothyroidism        Physical Exam  General:  Well nourished    Airway/Jaw/Neck:  Airway Findings: Mouth Opening: Normal Tongue: Normal  General Airway Assessment: Adult  Oropharynx Findings:  Mallampati: II  Jaw/Neck Findings:  Neck ROM: Normal ROM     Eyes/Ears/Nose:  Eyes/Ears/Nose Findings:    Dental:  Dental Findings:   Chest/Lungs:  Chest/Lungs Findings: Normal Respiratory Rate     Heart/Vascular:  Heart Findings: Rate: Normal  Rhythm: Regular Rhythm        Mental Status:  Mental Status Findings:  Cooperative, Alert and Oriented         Anesthesia Plan  Type of Anesthesia, risks & benefits discussed:  Anesthesia Type:  general  Patient's Preference:   Intra-op Monitoring Plan: standard ASA monitors  Intra-op Monitoring Plan Comments:   Post Op Pain Control Plan: multimodal analgesia  Post Op Pain Control Plan Comments:   Induction:   IV  Beta Blocker:  Patient is not currently on a Beta-Blocker (No further documentation required).       Informed Consent: Patient understands risks and agrees with Anesthesia plan.  Questions answered. Anesthesia consent signed with patient.  ASA Score: 2     Day of Surgery Review of History & Physical:  There are no significant changes.   H&P completed by  Anesthesiologist.       Ready For Surgery From Anesthesia Perspective.

## 2020-02-17 VITALS
TEMPERATURE: 98 F | RESPIRATION RATE: 16 BRPM | HEIGHT: 64 IN | SYSTOLIC BLOOD PRESSURE: 147 MMHG | HEART RATE: 69 BPM | BODY MASS INDEX: 23.22 KG/M2 | OXYGEN SATURATION: 99 % | WEIGHT: 136 LBS | DIASTOLIC BLOOD PRESSURE: 62 MMHG

## 2020-02-28 ENCOUNTER — PATIENT MESSAGE (OUTPATIENT)
Dept: GASTROENTEROLOGY | Facility: CLINIC | Age: 80
End: 2020-02-28

## 2020-03-03 ENCOUNTER — PATIENT MESSAGE (OUTPATIENT)
Dept: GASTROENTEROLOGY | Facility: CLINIC | Age: 80
End: 2020-03-03

## 2020-03-04 LAB — FINAL PATHOLOGIC DIAGNOSIS: NORMAL

## 2020-03-09 ENCOUNTER — PATIENT MESSAGE (OUTPATIENT)
Dept: GASTROENTEROLOGY | Facility: CLINIC | Age: 80
End: 2020-03-09

## 2020-03-09 ENCOUNTER — PES CALL (OUTPATIENT)
Dept: ADMINISTRATIVE | Facility: CLINIC | Age: 80
End: 2020-03-09

## 2020-03-12 ENCOUNTER — PATIENT MESSAGE (OUTPATIENT)
Dept: GASTROENTEROLOGY | Facility: CLINIC | Age: 80
End: 2020-03-12

## 2020-03-12 ENCOUNTER — TELEPHONE (OUTPATIENT)
Dept: GASTROENTEROLOGY | Facility: CLINIC | Age: 80
End: 2020-03-12

## 2020-03-12 DIAGNOSIS — K52.9 COLITIS: Primary | ICD-10-CM

## 2020-03-12 RX ORDER — MESALAMINE 0.38 G/1
0.75 CAPSULE, EXTENDED RELEASE ORAL DAILY
Qty: 180 CAPSULE | Refills: 1 | Status: SHIPPED | OUTPATIENT
Start: 2020-03-12 | End: 2020-03-13 | Stop reason: SDUPTHER

## 2020-03-12 RX ORDER — BALSALAZIDE DISODIUM 750 MG/1
2250 CAPSULE ORAL 3 TIMES DAILY
Qty: 270 CAPSULE | Refills: 2 | Status: SHIPPED | OUTPATIENT
Start: 2020-03-12 | End: 2020-06-08

## 2020-03-13 ENCOUNTER — PATIENT MESSAGE (OUTPATIENT)
Dept: GASTROENTEROLOGY | Facility: CLINIC | Age: 80
End: 2020-03-13

## 2020-03-13 DIAGNOSIS — K52.9 COLITIS: ICD-10-CM

## 2020-03-13 RX ORDER — MESALAMINE 0.38 G/1
0.75 CAPSULE, EXTENDED RELEASE ORAL DAILY
Qty: 60 CAPSULE | Refills: 0 | Status: SHIPPED | OUTPATIENT
Start: 2020-03-13 | End: 2020-04-08

## 2020-03-19 ENCOUNTER — PATIENT MESSAGE (OUTPATIENT)
Dept: FAMILY MEDICINE | Facility: CLINIC | Age: 80
End: 2020-03-19

## 2020-03-19 RX ORDER — LOSARTAN POTASSIUM 100 MG/1
100 TABLET ORAL DAILY
Qty: 90 TABLET | Refills: 0 | Status: SHIPPED | OUTPATIENT
Start: 2020-03-19 | End: 2020-06-08

## 2020-03-19 RX ORDER — ESTRADIOL 1 MG/1
TABLET ORAL
Qty: 90 TABLET | Refills: 0 | OUTPATIENT
Start: 2020-03-19 | End: 2020-06-08 | Stop reason: SDUPTHER

## 2020-03-19 RX ORDER — HYDROCHLOROTHIAZIDE 25 MG/1
25 TABLET ORAL DAILY
Qty: 90 TABLET | Refills: 0 | Status: SHIPPED | OUTPATIENT
Start: 2020-03-19 | End: 2020-06-08

## 2020-03-19 RX ORDER — LEVOTHYROXINE SODIUM 88 UG/1
88 TABLET ORAL DAILY
Qty: 90 TABLET | Refills: 0 | OUTPATIENT
Start: 2020-03-19 | End: 2020-06-08 | Stop reason: SDUPTHER

## 2020-03-19 NOTE — TELEPHONE ENCOUNTER
Please see the following assessment. This refill request still requires some action on your part. Estradiol is outside of ORC protocol. Routing to you. Thank you.

## 2020-03-19 NOTE — TELEPHONE ENCOUNTER
Refill Authorization Note     is requesting a refill authorization.    Brief assessment and rationale for refill: APPROVE: prr          Medication Therapy Plan: BP elevated at endoscopy procedure, previously <140/90 mmHg; TSH WNL 5/19; Approve 3 more of each                              Comments:     Refill Center Care Gap Closure protocols temporarily suspended.   Requested Prescriptions   Pending Prescriptions Disp Refills    levothyroxine (SYNTHROID) 88 MCG tablet 90 tablet 0     Sig: Take 1 tablet (88 mcg total) by mouth once daily.       Endocrinology:  Hypothyroid Agents Failed - 3/19/2020  1:50 PM        Failed - Manual Review: FT4 is not required if last TSH is WNL.        Failed - T4 free within 1080 days     Free T4   Date Value Ref Range Status   01/05/2012 1.14 0.71 - 1.51 ng/dl Final   02/18/2011 0.93 0.71 - 1.51 ng/dl Final   01/06/2010 0.85 0.71 - 1.51 ng/dl Final              Passed - Patient is at least 18 years old        Passed - Office visit in past 12 months or future 90 days.     Recent Outpatient Visits            1 month ago Vasovagal syncope    [facility] Kamila Gilbert MD    2 months ago Cellulitis of nose, external    [facility] Eddi Merritt PA-C    3 months ago Diverticulitis    [facility] Dorothy Dickson PA-C    4 months ago Diverticulitis of large intestine without perforation or abscess without bleeding    [facility] Fadi Oscar MD    5 months ago Diverticulitis of large intestine without perforation or abscess without bleeding    [facility] Fadi Oscar MD                    Passed - TSH in normal range and within 360 days     TSH   Date Value Ref Range Status   05/17/2019 1.778 0.400 - 4.000 uIU/mL Final   05/11/2018 1.382 0.400 - 4.000 uIU/mL Final   04/03/2017 1.995 0.400 - 4.000 uIU/mL Final             estradioL (ESTRACE) 1 MG tablet  90 tablet 3     Sig: TAKE 1 TABLET (1 MG TOTAL) BY MOUTH ONCE DAILY.       There is no refill protocol information for this order       losartan (COZAAR) 100 MG tablet 90 tablet 0     Sig: Take 1 tablet (100 mg total) by mouth once daily.       Cardiovascular:  Angiotensin Receptor Blockers Failed - 3/19/2020  1:50 PM        Failed - Last BP in normal range within 360 days.     BP Readings from Last 3 Encounters:   02/14/20 (!) 147/62   01/30/20 (!) 130/56   01/24/20 137/64              Failed - K in normal range and within 360 days     POC Potassium   Date Value Ref Range Status   01/24/2020 3.2 (L) 3.6 - 5.1 mmol/L Final   01/04/2020 3.6 3.6 - 5.1 mmol/L Final     Potassium   Date Value Ref Range Status   07/18/2019 3.5 3.5 - 5.1 mmol/L Final   05/17/2019 3.6 3.5 - 5.1 mmol/L Final   05/11/2018 3.7 3.5 - 5.1 mmol/L Final              Passed - Patient is at least 18 years old        Passed - Office visit in past 12 months or future 90 days.     Recent Outpatient Visits            1 month ago Vasovagal syncope    [facility] Kamila Gilbert MD    2 months ago Cellulitis of nose, external    [facility]    3 months ago Diverticulitis    [facility] Dorothy Dickson PA-C    4 months ago Diverticulitis of large intestine without perforation or abscess without bleeding    [facility] Gastroenterology Fadi Oscar MD    5 months ago Diverticulitis of large intestine without perforation or abscess without bleeding    [facility]astroenterology Fadi Oscar MD                    Passed - Cr is 1.3 or below and within 360 days     Creatinine   Date Value Ref Range Status   09/30/2019 0.8 0.5 - 1.4 mg/dL Final   07/18/2019 1.0 0.5 - 1.4 mg/dL Final   07/16/2019 0.9 0.5 - 1.4 mg/dL Final     POC Creatinine   Date Value Ref Range Status   01/24/2020 1.1 0.6 - 1.2 mg/dL Final   01/04/2020 0.9 0.6 - 1.2 mg/dL  Final              Passed - eGFR within 360 days     POC eGFR   Date Value Ref Range Status   01/24/2020 48 (L) 61 - 2,000 mL/min Final   01/04/2020 60 (L) 61 - 2,000 mL/min Final     eGFR if non    Date Value Ref Range Status   09/30/2019 >60 >60 mL/min/1.73 m^2 Final     Comment:     Calculation used to obtain the estimated glomerular filtration  rate (eGFR) is the CKD-EPI equation.      07/18/2019 54.9 (A) >60 mL/min/1.73 m^2 Final     Comment:     Calculation used to obtain the estimated glomerular filtration  rate (eGFR) is the CKD-EPI equation.      07/16/2019 >60.0 >60 mL/min/1.73 m^2 Final     Comment:     Calculation used to obtain the estimated glomerular filtration  rate (eGFR) is the CKD-EPI equation.        eGFR if    Date Value Ref Range Status   09/30/2019 >60 >60 mL/min/1.73 m^2 Final   07/18/2019 >60.0 >60 mL/min/1.73 m^2 Final   07/16/2019 >60.0 >60 mL/min/1.73 m^2 Final             hydroCHLOROthiazide (HYDRODIURIL) 25 MG tablet 90 tablet 0     Sig: Take 1 tablet (25 mg total) by mouth once daily.       Cardiovascular: Diuretics - Thiazide Failed - 3/19/2020  1:50 PM        Failed - Last BP in normal range within 360 days.     BP Readings from Last 3 Encounters:   02/14/20 (!) 147/62   01/30/20 (!) 130/56   01/24/20 137/64              Failed - K in normal range and within 180 days     POC Potassium   Date Value Ref Range Status   01/24/2020 3.2 (L) 3.6 - 5.1 mmol/L Final   01/04/2020 3.6 3.6 - 5.1 mmol/L Final     Potassium   Date Value Ref Range Status   07/18/2019 3.5 3.5 - 5.1 mmol/L Final   05/17/2019 3.6 3.5 - 5.1 mmol/L Final   05/11/2018 3.7 3.5 - 5.1 mmol/L Final              Passed - Patient is at least 18 years old        Passed - Office visit in past 12 months or future 90 days.     Recent Outpatient Visits            1 month ago Vasovagal syncope    [facility] Kamila Gilbert MD    2 months ago Cellulitis of nose,  external [facility] [place] Eddi Merritt PA-C    3 months ago Diverticulitis    [facility] Dorothy Dickson PA-C    4 months ago Diverticulitis of large intestine without perforation or abscess without bleeding    [facility] Gastroenterology Fadi Oscar MD    5 months ago Diverticulitis of large intestine without perforation or abscess without bleeding    [facility] Fadi Oscar MD                    Passed - Ca in normal range and within 360 days     Calcium   Date Value Ref Range Status   07/18/2019 9.2 8.7 - 10.5 mg/dL Final   05/17/2019 9.9 8.7 - 10.5 mg/dL Final   05/11/2018 9.3 8.7 - 10.5 mg/dL Final     Calcium, POC   Date Value Ref Range Status   01/24/2020 10.1 8.0 - 10.3 mg/dL Final   01/04/2020 10.4 (H) 8.0 - 10.3 mg/dL Final              Passed - Cr is 1.3 or below and within 180 days     Creatinine   Date Value Ref Range Status   09/30/2019 0.8 0.5 - 1.4 mg/dL Final   07/18/2019 1.0 0.5 - 1.4 mg/dL Final   07/16/2019 0.9 0.5 - 1.4 mg/dL Final     POC Creatinine   Date Value Ref Range Status   01/24/2020 1.1 0.6 - 1.2 mg/dL Final   01/04/2020 0.9 0.6 - 1.2 mg/dL Final              Passed - Na is between 130 and 148 and within 180 days     POC Sodium   Date Value Ref Range Status   01/24/2020 143 128 - 145 mmol/L Final   01/04/2020 144 128 - 145 mmol/L Final     Sodium   Date Value Ref Range Status   07/18/2019 138 136 - 145 mmol/L Final   05/17/2019 140 136 - 145 mmol/L Final   05/11/2018 141 136 - 145 mmol/L Final              Passed - eGFR within 180 days     POC eGFR   Date Value Ref Range Status   01/24/2020 48 (L) 61 - 2,000 mL/min Final   01/04/2020 60 (L) 61 - 2,000 mL/min Final     eGFR if non    Date Value Ref Range Status   09/30/2019 >60 >60 mL/min/1.73 m^2 Final     Comment:     Calculation used to obtain the estimated glomerular filtration  rate (eGFR) is the CKD-EPI equation.      07/18/2019 54.9  (A) >60 mL/min/1.73 m^2 Final     Comment:     Calculation used to obtain the estimated glomerular filtration  rate (eGFR) is the CKD-EPI equation.      07/16/2019 >60.0 >60 mL/min/1.73 m^2 Final     Comment:     Calculation used to obtain the estimated glomerular filtration  rate (eGFR) is the CKD-EPI equation.        eGFR if    Date Value Ref Range Status   09/30/2019 >60 >60 mL/min/1.73 m^2 Final   07/18/2019 >60.0 >60 mL/min/1.73 m^2 Final   07/16/2019 >60.0 >60 mL/min/1.73 m^2 Final               Appointments  past 12m or future 3m with PCP    Date Provider   Last Visit   3/28/2019 Demetrio Hills MD   Next Visit   Visit date not found Demetrio Hills MD   .  ED visits in past 90 days: [unfilled]       Note composed:2:15 PM 03/19/2020

## 2020-03-31 RX ORDER — LEVOTHYROXINE SODIUM 88 UG/1
88 TABLET ORAL DAILY
Qty: 90 TABLET | Refills: 0 | OUTPATIENT
Start: 2020-03-31

## 2020-03-31 RX ORDER — ESTRADIOL 1 MG/1
TABLET ORAL
Qty: 90 TABLET | Refills: 0 | OUTPATIENT
Start: 2020-03-31

## 2020-03-31 RX ORDER — LOSARTAN POTASSIUM 100 MG/1
100 TABLET ORAL DAILY
Qty: 90 TABLET | Refills: 0 | OUTPATIENT
Start: 2020-03-31 | End: 2021-03-31

## 2020-03-31 RX ORDER — HYDROCHLOROTHIAZIDE 25 MG/1
25 TABLET ORAL DAILY
Qty: 90 TABLET | Refills: 0 | OUTPATIENT
Start: 2020-03-31

## 2020-03-31 NOTE — PROGRESS NOTES
Quick DC. Duplicate Request  Refill Authorization Note     is requesting a refill authorization.    Brief assessment and rationale for refill: QUICK DC: duplicate request          Medication Therapy Plan: Duplicate request, quick dc; handled in a previous encounter    Medication reconciliation completed: No                         Comments:   Pended Medication(s)   Requested Prescriptions     Pending Prescriptions Disp Refills    levothyroxine (SYNTHROID) 88 MCG tablet 90 tablet 0     Sig: Take 1 tablet (88 mcg total) by mouth once daily.    estradioL (ESTRACE) 1 MG tablet 90 tablet 0     Sig: TAKE 1 TABLET (1 MG TOTAL) BY MOUTH ONCE DAILY.    losartan (COZAAR) 100 MG tablet 90 tablet 0     Sig: Take 1 tablet (100 mg total) by mouth once daily.    hydroCHLOROthiazide (HYDRODIURIL) 25 MG tablet 90 tablet 0     Sig: Take 1 tablet (25 mg total) by mouth once daily.        Duplicate Pended Encounter(s)/ Last Prescribed Details:    Ordering Encounter Report     Associated Reports   View Encounter                  Note composed:3:09 PM 03/31/2020

## 2020-04-08 DIAGNOSIS — K52.9 COLITIS: ICD-10-CM

## 2020-04-08 RX ORDER — MESALAMINE 0.38 G/1
0.75 CAPSULE, EXTENDED RELEASE ORAL DAILY
Qty: 60 CAPSULE | Refills: 0 | Status: SHIPPED | OUTPATIENT
Start: 2020-04-08 | End: 2020-05-01

## 2020-05-01 DIAGNOSIS — K52.9 COLITIS: ICD-10-CM

## 2020-05-01 RX ORDER — MESALAMINE 0.38 G/1
0.75 CAPSULE, EXTENDED RELEASE ORAL DAILY
Qty: 60 CAPSULE | Refills: 0 | Status: SHIPPED | OUTPATIENT
Start: 2020-05-01 | End: 2020-06-01 | Stop reason: SDUPTHER

## 2020-05-06 ENCOUNTER — PATIENT MESSAGE (OUTPATIENT)
Dept: ADMINISTRATIVE | Facility: HOSPITAL | Age: 80
End: 2020-05-06

## 2020-06-01 DIAGNOSIS — K52.9 COLITIS: ICD-10-CM

## 2020-06-01 RX ORDER — MESALAMINE 0.38 G/1
0.75 CAPSULE, EXTENDED RELEASE ORAL DAILY
Qty: 60 CAPSULE | Refills: 0 | Status: SHIPPED | OUTPATIENT
Start: 2020-06-01 | End: 2020-06-29 | Stop reason: SDUPTHER

## 2020-06-08 ENCOUNTER — OFFICE VISIT (OUTPATIENT)
Dept: FAMILY MEDICINE | Facility: CLINIC | Age: 80
End: 2020-06-08
Payer: MEDICARE

## 2020-06-08 ENCOUNTER — LAB VISIT (OUTPATIENT)
Dept: LAB | Facility: HOSPITAL | Age: 80
End: 2020-06-08
Attending: INTERNAL MEDICINE
Payer: MEDICARE

## 2020-06-08 VITALS
SYSTOLIC BLOOD PRESSURE: 124 MMHG | HEART RATE: 78 BPM | WEIGHT: 140 LBS | OXYGEN SATURATION: 97 % | BODY MASS INDEX: 23.9 KG/M2 | HEIGHT: 64 IN | DIASTOLIC BLOOD PRESSURE: 60 MMHG | TEMPERATURE: 98 F

## 2020-06-08 DIAGNOSIS — I10 ESSENTIAL HYPERTENSION: Primary | ICD-10-CM

## 2020-06-08 DIAGNOSIS — E03.9 HYPOTHYROIDISM, UNSPECIFIED TYPE: ICD-10-CM

## 2020-06-08 DIAGNOSIS — I10 ESSENTIAL HYPERTENSION: ICD-10-CM

## 2020-06-08 DIAGNOSIS — Z78.0 POSTMENOPAUSAL: ICD-10-CM

## 2020-06-08 LAB
ALBUMIN SERPL BCP-MCNC: 4 G/DL (ref 3.5–5.2)
ALP SERPL-CCNC: 61 U/L (ref 55–135)
ALT SERPL W/O P-5'-P-CCNC: 13 U/L (ref 10–44)
ANION GAP SERPL CALC-SCNC: 9 MMOL/L (ref 8–16)
AST SERPL-CCNC: 18 U/L (ref 10–40)
BILIRUB SERPL-MCNC: 0.9 MG/DL (ref 0.1–1)
BUN SERPL-MCNC: 16 MG/DL (ref 8–23)
CALCIUM SERPL-MCNC: 9.9 MG/DL (ref 8.7–10.5)
CHLORIDE SERPL-SCNC: 105 MMOL/L (ref 95–110)
CO2 SERPL-SCNC: 27 MMOL/L (ref 23–29)
CREAT SERPL-MCNC: 0.8 MG/DL (ref 0.5–1.4)
EST. GFR  (AFRICAN AMERICAN): >60 ML/MIN/1.73 M^2
EST. GFR  (NON AFRICAN AMERICAN): >60 ML/MIN/1.73 M^2
GLUCOSE SERPL-MCNC: 100 MG/DL (ref 70–110)
POTASSIUM SERPL-SCNC: 3.8 MMOL/L (ref 3.5–5.1)
PROT SERPL-MCNC: 7.6 G/DL (ref 6–8.4)
SODIUM SERPL-SCNC: 141 MMOL/L (ref 136–145)
T4 FREE SERPL-MCNC: 1.44 NG/DL (ref 0.71–1.51)
TSH SERPL DL<=0.005 MIU/L-ACNC: 0.12 UIU/ML (ref 0.4–4)

## 2020-06-08 PROCEDURE — 3078F PR MOST RECENT DIASTOLIC BLOOD PRESSURE < 80 MM HG: ICD-10-PCS | Mod: HCNC,CPTII,S$GLB, | Performed by: INTERNAL MEDICINE

## 2020-06-08 PROCEDURE — 3288F FALL RISK ASSESSMENT DOCD: CPT | Mod: HCNC,CPTII,S$GLB, | Performed by: INTERNAL MEDICINE

## 2020-06-08 PROCEDURE — 1125F PR PAIN SEVERITY QUANTIFIED, PAIN PRESENT: ICD-10-PCS | Mod: HCNC,S$GLB,, | Performed by: INTERNAL MEDICINE

## 2020-06-08 PROCEDURE — 1100F PTFALLS ASSESS-DOCD GE2>/YR: CPT | Mod: HCNC,CPTII,S$GLB, | Performed by: INTERNAL MEDICINE

## 2020-06-08 PROCEDURE — 1100F PR PT FALLS ASSESS DOC 2+ FALLS/FALL W/INJURY/YR: ICD-10-PCS | Mod: HCNC,CPTII,S$GLB, | Performed by: INTERNAL MEDICINE

## 2020-06-08 PROCEDURE — 3288F PR FALLS RISK ASSESSMENT DOCUMENTED: ICD-10-PCS | Mod: HCNC,CPTII,S$GLB, | Performed by: INTERNAL MEDICINE

## 2020-06-08 PROCEDURE — 3074F SYST BP LT 130 MM HG: CPT | Mod: HCNC,CPTII,S$GLB, | Performed by: INTERNAL MEDICINE

## 2020-06-08 PROCEDURE — 84443 ASSAY THYROID STIM HORMONE: CPT | Mod: HCNC

## 2020-06-08 PROCEDURE — 3074F PR MOST RECENT SYSTOLIC BLOOD PRESSURE < 130 MM HG: ICD-10-PCS | Mod: HCNC,CPTII,S$GLB, | Performed by: INTERNAL MEDICINE

## 2020-06-08 PROCEDURE — 99214 OFFICE O/P EST MOD 30 MIN: CPT | Mod: HCNC,S$GLB,, | Performed by: INTERNAL MEDICINE

## 2020-06-08 PROCEDURE — 84439 ASSAY OF FREE THYROXINE: CPT | Mod: HCNC

## 2020-06-08 PROCEDURE — 99214 PR OFFICE/OUTPT VISIT, EST, LEVL IV, 30-39 MIN: ICD-10-PCS | Mod: HCNC,S$GLB,, | Performed by: INTERNAL MEDICINE

## 2020-06-08 PROCEDURE — 99999 PR PBB SHADOW E&M-EST. PATIENT-LVL IV: CPT | Mod: PBBFAC,HCNC,, | Performed by: INTERNAL MEDICINE

## 2020-06-08 PROCEDURE — 99499 UNLISTED E&M SERVICE: CPT | Mod: S$GLB,,, | Performed by: INTERNAL MEDICINE

## 2020-06-08 PROCEDURE — 3078F DIAST BP <80 MM HG: CPT | Mod: HCNC,CPTII,S$GLB, | Performed by: INTERNAL MEDICINE

## 2020-06-08 PROCEDURE — 1159F MED LIST DOCD IN RCRD: CPT | Mod: HCNC,S$GLB,, | Performed by: INTERNAL MEDICINE

## 2020-06-08 PROCEDURE — 36415 COLL VENOUS BLD VENIPUNCTURE: CPT | Mod: HCNC,PN

## 2020-06-08 PROCEDURE — 80053 COMPREHEN METABOLIC PANEL: CPT | Mod: HCNC

## 2020-06-08 PROCEDURE — 99999 PR PBB SHADOW E&M-EST. PATIENT-LVL IV: ICD-10-PCS | Mod: PBBFAC,HCNC,, | Performed by: INTERNAL MEDICINE

## 2020-06-08 PROCEDURE — 1125F AMNT PAIN NOTED PAIN PRSNT: CPT | Mod: HCNC,S$GLB,, | Performed by: INTERNAL MEDICINE

## 2020-06-08 PROCEDURE — 99499 RISK ADDL DX/OHS AUDIT: ICD-10-PCS | Mod: S$GLB,,, | Performed by: INTERNAL MEDICINE

## 2020-06-08 PROCEDURE — 1159F PR MEDICATION LIST DOCUMENTED IN MEDICAL RECORD: ICD-10-PCS | Mod: HCNC,S$GLB,, | Performed by: INTERNAL MEDICINE

## 2020-06-08 RX ORDER — LOSARTAN POTASSIUM AND HYDROCHLOROTHIAZIDE 25; 100 MG/1; MG/1
1 TABLET ORAL DAILY
Qty: 90 TABLET | Refills: 2 | Status: SHIPPED | OUTPATIENT
Start: 2020-06-08 | End: 2021-03-05

## 2020-06-08 RX ORDER — ESTRADIOL 1 MG/1
TABLET ORAL
Qty: 90 TABLET | Refills: 2 | Status: SHIPPED | OUTPATIENT
Start: 2020-06-08 | End: 2021-04-20

## 2020-06-08 RX ORDER — LEVOTHYROXINE SODIUM 88 UG/1
88 TABLET ORAL DAILY
Qty: 90 TABLET | Refills: 2 | Status: SHIPPED | OUTPATIENT
Start: 2020-06-08 | End: 2021-04-20

## 2020-06-08 NOTE — PROGRESS NOTES
Assessment and Plan:    1. Essential hypertension  BP controlled on current medications, will continue, update labs.   - losartan-hydrochlorothiazide 100-25 mg (HYZAAR) 100-25 mg per tablet; Take 1 tablet by mouth once daily.  Dispense: 90 tablet; Refill: 2  - Comprehensive metabolic panel; Future    2. Postmenopausal  Doing well on HRT, OK to continue.   - estradioL (ESTRACE) 1 MG tablet; TAKE 1 TABLET (1 MG TOTAL) BY MOUTH ONCE DAILY.  Dispense: 90 tablet; Refill: 2    3. Hypothyroidism, unspecified type  Symptomatically controlled. Update labs and assuming normal range will plan to continue current medications.  - levothyroxine (SYNTHROID) 88 MCG tablet; Take 1 tablet (88 mcg total) by mouth once daily.  Dispense: 90 tablet; Refill: 2  - TSH; Future    ______________________________________________________________________  Subjective:    Chief Complaint:  Establish care, follow up chronic medical conditions.    HPI:  Dorothy is a 79 y.o. year old female here to follow up chronic medical conditions. I had seen her once in January, she was not sure at that time if she wanted to transfer care here, now states that she would like to switch to me as PCP.    She takes levothyroxine 88 mcg daily for hypothyroidism. No symptoms currently.      She takes losartan 100 mg daily and HCTZ 25 mg daily for HTN. BP has been well controlled at home.    She sees Dr. Oscar for colitis and diverticulitis. She is taking apriso, culturelle, and loperamide PRN. She reports that she has not been having any symptoms lately. Doing well with this.      She takes estrace 1 mg daily for HRT, doing well with this.     She has a history of melanoma on her left temple 9 years ago. Sees Dr. Yung, most recently seen last week.     Medications:  Current Outpatient Medications on File Prior to Visit   Medication Sig Dispense Refill    estradioL (ESTRACE) 1 MG tablet TAKE 1 TABLET (1 MG TOTAL) BY MOUTH ONCE DAILY. 90 tablet 0     hydroCHLOROthiazide (HYDRODIURIL) 25 MG tablet Take 1 tablet (25 mg total) by mouth once daily. 90 tablet 0    ibuprofen (ADVIL,MOTRIN) 100 MG tablet Take 100 mg by mouth every 6 (six) hours as needed for Temperature greater than.      levothyroxine (SYNTHROID) 88 MCG tablet Take 1 tablet (88 mcg total) by mouth once daily. 90 tablet 0    losartan (COZAAR) 100 MG tablet Take 1 tablet (100 mg total) by mouth once daily. 90 tablet 0    magnesium 30 mg Tab Take 1 tablet by mouth.      mesalamine (APRISO) 0.375 gram Cp24 Take 2 capsules (0.75 g total) by mouth once daily. 60 capsule 0    multivitamin/iron/folic acid (CENTRUM COMPLETE ORAL) Take by mouth.      [DISCONTINUED] APRISO 0.375 gram Cp24 TAKE 4 CAPSULES (1.5 G TOTAL) BY MOUTH ONCE DAILY. 120 capsule 1    [DISCONTINUED] balsalazide (COLAZAL) 750 mg capsule Take 3 capsules (2,250 mg total) by mouth 3 (three) times daily. 270 capsule 2    [DISCONTINUED] Lactobacillus rhamnosus GG (CULTURELLE) 10 billion cell capsule Take 1 capsule by mouth daily as needed.       [DISCONTINUED] loperamide (IMODIUM A-D) 2 mg Tab Take 2 mg by mouth 4 (four) times daily as needed.       No current facility-administered medications on file prior to visit.        Review of Systems:  Review of Systems   Constitutional: Negative for activity change and unexpected weight change.   HENT: Negative for hearing loss and trouble swallowing.    Eyes: Negative for discharge.   Respiratory: Negative for chest tightness and wheezing.    Cardiovascular: Negative for chest pain and palpitations.   Gastrointestinal: Negative for constipation, diarrhea and vomiting.   Genitourinary: Negative for difficulty urinating and hematuria.   Musculoskeletal: Positive for arthralgias.   Neurological: Negative for headaches.   Psychiatric/Behavioral: Negative for dysphoric mood.       Past Medical History:  Past Medical History:   Diagnosis Date    DDD (degenerative disc disease), cervical      "Diverticulitis     Diverticulosis     DJD (degenerative joint disease) of hip     Dyspepsia     GERD (gastroesophageal reflux disease)     History of melanoma 5/20/2015    HTN (hypertension)     Melanoma     Migraine headache     Rectocele     Skin cancer     melanoma on face    Thyroid disease     hypo    Trouble in sleeping     Uterine prolaps     followed by GYN    Vulvar lesion 5/20/2015       Objective:    Vitals:  Vitals:    06/08/20 1502   BP: 124/60   Pulse: 78   Temp: 97.7 °F (36.5 °C)   TempSrc: Oral   SpO2: 97%   Weight: 63.5 kg (139 lb 15.9 oz)   Height: 5' 4" (1.626 m)   PainSc:   4   PainLoc: Generalized       Physical Exam   Constitutional: She is oriented to person, place, and time. She appears well-developed and well-nourished. No distress.   HENT:   Mouth/Throat: Oropharynx is clear and moist.   Eyes: No scleral icterus.   Cardiovascular: Normal rate and regular rhythm.   No murmur heard.  Pulmonary/Chest: Effort normal and breath sounds normal. No respiratory distress. She has no wheezes.   Musculoskeletal: She exhibits no edema.   Neurological: She is alert and oriented to person, place, and time.   no tremor at rest or ridgidity   Skin: Skin is warm and dry.   Psychiatric: She has a normal mood and affect. Her behavior is normal.   Vitals reviewed.      Data:  Previous labs reviewed and pertinent for lipids have been remarkably stable.      Kamila Gilbert MD  Internal Medicine  "

## 2020-06-29 DIAGNOSIS — K52.9 COLITIS: ICD-10-CM

## 2020-06-29 RX ORDER — MESALAMINE 0.38 G/1
0.75 CAPSULE, EXTENDED RELEASE ORAL DAILY
Qty: 60 CAPSULE | Refills: 0 | Status: SHIPPED | OUTPATIENT
Start: 2020-06-29 | End: 2020-07-28

## 2020-07-28 ENCOUNTER — PES CALL (OUTPATIENT)
Dept: ADMINISTRATIVE | Facility: CLINIC | Age: 80
End: 2020-07-28

## 2020-09-09 ENCOUNTER — PATIENT MESSAGE (OUTPATIENT)
Dept: GASTROENTEROLOGY | Facility: CLINIC | Age: 80
End: 2020-09-09

## 2020-09-18 NOTE — TELEPHONE ENCOUNTER
No outpatient medications have been marked as taking for the 9/22/20 encounter [facility] Encounter)  Preop,medications and showering instructions using an antibacterial soap reviewed  Spoke with pt. Informed of need for stool studies & results needed prior to having c-scope. Pt informed she can get stool kit from [facility] in [place] as this is more convenient to her. Pt informed she can return stool here as well. Scheduled c-scope. Pt verbalized understanding to date.   Prep instructions sent to pt's mychart.

## 2020-09-29 ENCOUNTER — PATIENT MESSAGE (OUTPATIENT)
Dept: OTHER | Facility: OTHER | Age: 80
End: 2020-09-29

## 2020-10-06 ENCOUNTER — PATIENT MESSAGE (OUTPATIENT)
Dept: GASTROENTEROLOGY | Facility: CLINIC | Age: 80
End: 2020-10-06

## 2020-10-06 DIAGNOSIS — K52.9 COLITIS: Primary | ICD-10-CM

## 2020-10-06 RX ORDER — MESALAMINE 0.38 G/1
0.75 CAPSULE, EXTENDED RELEASE ORAL DAILY
Qty: 60 CAPSULE | Refills: 1 | Status: SHIPPED | OUTPATIENT
Start: 2020-10-06 | End: 2020-12-10 | Stop reason: SDUPTHER

## 2020-10-06 RX ORDER — MESALAMINE 0.38 G/1
0.75 CAPSULE, EXTENDED RELEASE ORAL DAILY
COMMUNITY
Start: 2020-09-09 | End: 2020-10-06 | Stop reason: SDUPTHER

## 2020-11-16 NOTE — TELEPHONE ENCOUNTER
Received Appeal Determination from [employer] regarding Apriso, medication ruling is denied. Please note   detailed exam

## 2020-12-10 DIAGNOSIS — K52.9 COLITIS: ICD-10-CM

## 2020-12-10 RX ORDER — MESALAMINE 0.38 G/1
0.75 CAPSULE, EXTENDED RELEASE ORAL DAILY
Qty: 60 CAPSULE | Refills: 3 | Status: SHIPPED | OUTPATIENT
Start: 2020-12-10 | End: 2021-04-29

## 2020-12-11 ENCOUNTER — PATIENT MESSAGE (OUTPATIENT)
Dept: OTHER | Facility: OTHER | Age: 80
End: 2020-12-11

## 2021-01-09 ENCOUNTER — IMMUNIZATION (OUTPATIENT)
Dept: PRIMARY CARE CLINIC | Facility: CLINIC | Age: 81
End: 2021-01-09
Payer: MEDICARE

## 2021-01-09 DIAGNOSIS — Z23 NEED FOR VACCINATION: ICD-10-CM

## 2021-01-09 PROCEDURE — 91300 COVID-19, MRNA, LNP-S, PF, 30 MCG/0.3 ML DOSE VACCINE: ICD-10-PCS | Mod: S$GLB,,, | Performed by: FAMILY MEDICINE

## 2021-01-09 PROCEDURE — 0001A COVID-19, MRNA, LNP-S, PF, 30 MCG/0.3 ML DOSE VACCINE: CPT | Mod: S$GLB,,, | Performed by: FAMILY MEDICINE

## 2021-01-09 PROCEDURE — 0001A COVID-19, MRNA, LNP-S, PF, 30 MCG/0.3 ML DOSE VACCINE: ICD-10-PCS | Mod: S$GLB,,, | Performed by: FAMILY MEDICINE

## 2021-01-09 PROCEDURE — 91300 COVID-19, MRNA, LNP-S, PF, 30 MCG/0.3 ML DOSE VACCINE: CPT | Mod: S$GLB,,, | Performed by: FAMILY MEDICINE

## 2021-01-30 ENCOUNTER — IMMUNIZATION (OUTPATIENT)
Dept: PRIMARY CARE CLINIC | Facility: CLINIC | Age: 81
End: 2021-01-30
Payer: MEDICARE

## 2021-01-30 DIAGNOSIS — Z23 NEED FOR VACCINATION: Primary | ICD-10-CM

## 2021-01-30 PROCEDURE — 0002A COVID-19, MRNA, LNP-S, PF, 30 MCG/0.3 ML DOSE VACCINE: ICD-10-PCS | Mod: S$GLB,,, | Performed by: FAMILY MEDICINE

## 2021-01-30 PROCEDURE — 91300 COVID-19, MRNA, LNP-S, PF, 30 MCG/0.3 ML DOSE VACCINE: ICD-10-PCS | Mod: S$GLB,,, | Performed by: FAMILY MEDICINE

## 2021-01-30 PROCEDURE — 0002A COVID-19, MRNA, LNP-S, PF, 30 MCG/0.3 ML DOSE VACCINE: CPT | Mod: S$GLB,,, | Performed by: FAMILY MEDICINE

## 2021-01-30 PROCEDURE — 91300 COVID-19, MRNA, LNP-S, PF, 30 MCG/0.3 ML DOSE VACCINE: CPT | Mod: S$GLB,,, | Performed by: FAMILY MEDICINE

## 2021-02-09 ENCOUNTER — OFFICE VISIT (OUTPATIENT)
Dept: OPHTHALMOLOGY | Facility: CLINIC | Age: 81
End: 2021-02-09
Payer: MEDICARE

## 2021-02-09 DIAGNOSIS — H25.11 NUCLEAR SCLEROTIC CATARACT OF RIGHT EYE: ICD-10-CM

## 2021-02-09 DIAGNOSIS — H25.12 NUCLEAR SCLEROTIC CATARACT OF LEFT EYE: Primary | ICD-10-CM

## 2021-02-09 PROCEDURE — 1101F PT FALLS ASSESS-DOCD LE1/YR: CPT | Mod: CPTII,S$GLB,, | Performed by: OPHTHALMOLOGY

## 2021-02-09 PROCEDURE — 92136 IOL MASTER - OU - BOTH EYES: ICD-10-PCS | Mod: LT,S$GLB,, | Performed by: OPHTHALMOLOGY

## 2021-02-09 PROCEDURE — 1159F PR MEDICATION LIST DOCUMENTED IN MEDICAL RECORD: ICD-10-PCS | Mod: S$GLB,,, | Performed by: OPHTHALMOLOGY

## 2021-02-09 PROCEDURE — 3288F FALL RISK ASSESSMENT DOCD: CPT | Mod: CPTII,S$GLB,, | Performed by: OPHTHALMOLOGY

## 2021-02-09 PROCEDURE — 1159F MED LIST DOCD IN RCRD: CPT | Mod: S$GLB,,, | Performed by: OPHTHALMOLOGY

## 2021-02-09 PROCEDURE — 99999 PR PBB SHADOW E&M-EST. PATIENT-LVL II: CPT | Mod: PBBFAC,,, | Performed by: OPHTHALMOLOGY

## 2021-02-09 PROCEDURE — 99204 PR OFFICE/OUTPT VISIT, NEW, LEVL IV, 45-59 MIN: ICD-10-PCS | Mod: S$GLB,,, | Performed by: OPHTHALMOLOGY

## 2021-02-09 PROCEDURE — 99999 PR PBB SHADOW E&M-EST. PATIENT-LVL II: ICD-10-PCS | Mod: PBBFAC,,, | Performed by: OPHTHALMOLOGY

## 2021-02-09 PROCEDURE — 1126F PR PAIN SEVERITY QUANTIFIED, NO PAIN PRESENT: ICD-10-PCS | Mod: S$GLB,,, | Performed by: OPHTHALMOLOGY

## 2021-02-09 PROCEDURE — 1126F AMNT PAIN NOTED NONE PRSNT: CPT | Mod: S$GLB,,, | Performed by: OPHTHALMOLOGY

## 2021-02-09 PROCEDURE — 99204 OFFICE O/P NEW MOD 45 MIN: CPT | Mod: S$GLB,,, | Performed by: OPHTHALMOLOGY

## 2021-02-09 PROCEDURE — 1101F PR PT FALLS ASSESS DOC 0-1 FALLS W/OUT INJ PAST YR: ICD-10-PCS | Mod: CPTII,S$GLB,, | Performed by: OPHTHALMOLOGY

## 2021-02-09 PROCEDURE — 3288F PR FALLS RISK ASSESSMENT DOCUMENTED: ICD-10-PCS | Mod: CPTII,S$GLB,, | Performed by: OPHTHALMOLOGY

## 2021-02-09 PROCEDURE — 92136 OPHTHALMIC BIOMETRY: CPT | Mod: LT,S$GLB,, | Performed by: OPHTHALMOLOGY

## 2021-02-10 DIAGNOSIS — H25.12 NUCLEAR SCLEROTIC CATARACT OF LEFT EYE: Primary | ICD-10-CM

## 2021-02-10 DIAGNOSIS — Z13.9 SCREENING PROCEDURE: ICD-10-CM

## 2021-02-10 RX ORDER — PREDNISOLONE ACETATE-GATIFLOXACIN-BROMFENAC .75; 5; 1 MG/ML; MG/ML; MG/ML
1 SUSPENSION/ DROPS OPHTHALMIC 3 TIMES DAILY
Qty: 5 ML | Refills: 3 | Status: SHIPPED | OUTPATIENT
Start: 2021-02-10 | End: 2021-06-30

## 2021-02-12 ENCOUNTER — TELEPHONE (OUTPATIENT)
Dept: OPHTHALMOLOGY | Facility: CLINIC | Age: 81
End: 2021-02-12

## 2021-02-19 ENCOUNTER — ANESTHESIA EVENT (OUTPATIENT)
Dept: SURGERY | Facility: HOSPITAL | Age: 81
End: 2021-02-19
Payer: MEDICARE

## 2021-02-19 ENCOUNTER — LAB VISIT (OUTPATIENT)
Dept: FAMILY MEDICINE | Facility: CLINIC | Age: 81
End: 2021-02-19
Payer: MEDICARE

## 2021-02-19 DIAGNOSIS — Z13.9 SCREENING PROCEDURE: ICD-10-CM

## 2021-02-19 PROCEDURE — U0003 INFECTIOUS AGENT DETECTION BY NUCLEIC ACID (DNA OR RNA); SEVERE ACUTE RESPIRATORY SYNDROME CORONAVIRUS 2 (SARS-COV-2) (CORONAVIRUS DISEASE [COVID-19]), AMPLIFIED PROBE TECHNIQUE, MAKING USE OF HIGH THROUGHPUT TECHNOLOGIES AS DESCRIBED BY CMS-2020-01-R: HCPCS

## 2021-02-19 PROCEDURE — U0005 INFEC AGEN DETEC AMPLI PROBE: HCPCS

## 2021-02-20 LAB — SARS-COV-2 RNA RESP QL NAA+PROBE: NOT DETECTED

## 2021-02-22 ENCOUNTER — HOSPITAL ENCOUNTER (OUTPATIENT)
Facility: HOSPITAL | Age: 81
Discharge: HOME | End: 2021-02-22
Attending: OPHTHALMOLOGY | Admitting: OPHTHALMOLOGY
Payer: MEDICARE

## 2021-02-22 ENCOUNTER — ANESTHESIA (OUTPATIENT)
Dept: SURGERY | Facility: HOSPITAL | Age: 81
End: 2021-02-22
Payer: MEDICARE

## 2021-02-22 VITALS
HEART RATE: 69 BPM | TEMPERATURE: 98 F | SYSTOLIC BLOOD PRESSURE: 127 MMHG | WEIGHT: 144 LBS | HEIGHT: 64 IN | RESPIRATION RATE: 13 BRPM | DIASTOLIC BLOOD PRESSURE: 61 MMHG | BODY MASS INDEX: 24.59 KG/M2 | OXYGEN SATURATION: 99 %

## 2021-02-22 DIAGNOSIS — H25.13 NUCLEAR SCLEROTIC CATARACT OF BOTH EYES: Primary | ICD-10-CM

## 2021-02-22 DIAGNOSIS — H25.10 SENILE NUCLEAR SCLEROSIS: ICD-10-CM

## 2021-02-22 DIAGNOSIS — H25.12 NUCLEAR SENILE CATARACT OF LEFT EYE: ICD-10-CM

## 2021-02-22 PROCEDURE — 66984 XCAPSL CTRC RMVL W/O ECP: CPT | Mod: LT,,, | Performed by: OPHTHALMOLOGY

## 2021-02-22 PROCEDURE — 25000003 PHARM REV CODE 250: Mod: PO | Performed by: NURSE ANESTHETIST, CERTIFIED REGISTERED

## 2021-02-22 PROCEDURE — 66999 UNLISTED PX ANT SEGMENT EYE: CPT | Mod: CSM,LT,, | Performed by: OPHTHALMOLOGY

## 2021-02-22 PROCEDURE — 63600175 PHARM REV CODE 636 W HCPCS: Mod: PO | Performed by: OPHTHALMOLOGY

## 2021-02-22 PROCEDURE — 25000003 PHARM REV CODE 250: Mod: PO | Performed by: OPHTHALMOLOGY

## 2021-02-22 PROCEDURE — 36000707: Mod: PO | Performed by: OPHTHALMOLOGY

## 2021-02-22 PROCEDURE — D9220A PRA ANESTHESIA: Mod: ANES,,, | Performed by: ANESTHESIOLOGY

## 2021-02-22 PROCEDURE — 37000009 HC ANESTHESIA EA ADD 15 MINS: Mod: PO | Performed by: OPHTHALMOLOGY

## 2021-02-22 PROCEDURE — D9220A PRA ANESTHESIA: ICD-10-PCS | Mod: CRNA,,, | Performed by: NURSE ANESTHETIST, CERTIFIED REGISTERED

## 2021-02-22 PROCEDURE — V2787 ASTIGMATISM-CORRECT FUNCTION: HCPCS | Mod: PO | Performed by: OPHTHALMOLOGY

## 2021-02-22 PROCEDURE — 66999 PR FEMTO TORIC: ICD-10-PCS | Mod: CSM,LT,, | Performed by: OPHTHALMOLOGY

## 2021-02-22 PROCEDURE — 66984 PR REMOVAL, CATARACT, W/INSRT INTRAOC LENS, W/O ENDO CYCLO: ICD-10-PCS | Mod: LT,,, | Performed by: OPHTHALMOLOGY

## 2021-02-22 PROCEDURE — 63600175 PHARM REV CODE 636 W HCPCS: Mod: PO | Performed by: ANESTHESIOLOGY

## 2021-02-22 PROCEDURE — 25000003 PHARM REV CODE 250: Mod: PO

## 2021-02-22 PROCEDURE — D9220A PRA ANESTHESIA: ICD-10-PCS | Mod: ANES,,, | Performed by: ANESTHESIOLOGY

## 2021-02-22 PROCEDURE — 63600175 PHARM REV CODE 636 W HCPCS: Mod: PO | Performed by: NURSE ANESTHETIST, CERTIFIED REGISTERED

## 2021-02-22 PROCEDURE — 71000015 HC POSTOP RECOV 1ST HR: Mod: PO | Performed by: OPHTHALMOLOGY

## 2021-02-22 PROCEDURE — 36000706: Mod: PO | Performed by: OPHTHALMOLOGY

## 2021-02-22 PROCEDURE — 37000008 HC ANESTHESIA 1ST 15 MINUTES: Mod: PO | Performed by: OPHTHALMOLOGY

## 2021-02-22 PROCEDURE — D9220A PRA ANESTHESIA: Mod: CRNA,,, | Performed by: NURSE ANESTHETIST, CERTIFIED REGISTERED

## 2021-02-22 DEVICE — IMPLANTABLE DEVICE: Type: IMPLANTABLE DEVICE | Site: EYE | Status: FUNCTIONAL

## 2021-02-22 RX ORDER — LIDOCAINE HYDROCHLORIDE 40 MG/ML
1 INJECTION, SOLUTION RETROBULBAR
Status: ACTIVE | OUTPATIENT
Start: 2021-02-22 | End: 2021-02-22

## 2021-02-22 RX ORDER — ONDANSETRON 2 MG/ML
INJECTION INTRAMUSCULAR; INTRAVENOUS
Status: DISCONTINUED | OUTPATIENT
Start: 2021-02-22 | End: 2021-02-22

## 2021-02-22 RX ORDER — MOXIFLOXACIN 5 MG/ML
1 SOLUTION/ DROPS OPHTHALMIC 3 TIMES DAILY
Status: COMPLETED | OUTPATIENT
Start: 2021-02-22 | End: 2021-02-22

## 2021-02-22 RX ORDER — SODIUM CHLORIDE, SODIUM LACTATE, POTASSIUM CHLORIDE, CALCIUM CHLORIDE 600; 310; 30; 20 MG/100ML; MG/100ML; MG/100ML; MG/100ML
INJECTION, SOLUTION INTRAVENOUS CONTINUOUS
Status: DISCONTINUED | OUTPATIENT
Start: 2021-02-22 | End: 2021-02-22 | Stop reason: HOSPADM

## 2021-02-22 RX ORDER — LIDOCAINE HYDROCHLORIDE 10 MG/ML
INJECTION, SOLUTION EPIDURAL; INFILTRATION; INTRACAUDAL; PERINEURAL
Status: DISCONTINUED | OUTPATIENT
Start: 2021-02-22 | End: 2021-02-22 | Stop reason: HOSPADM

## 2021-02-22 RX ORDER — MIDAZOLAM HYDROCHLORIDE 1 MG/ML
INJECTION INTRAMUSCULAR; INTRAVENOUS
Status: DISCONTINUED | OUTPATIENT
Start: 2021-02-22 | End: 2021-02-22

## 2021-02-22 RX ORDER — PHENYLEPHRINE HYDROCHLORIDE 25 MG/ML
1 SOLUTION/ DROPS OPHTHALMIC
Status: COMPLETED | OUTPATIENT
Start: 2021-02-22 | End: 2021-02-22

## 2021-02-22 RX ORDER — ONDANSETRON 2 MG/ML
4 INJECTION INTRAMUSCULAR; INTRAVENOUS ONCE AS NEEDED
Status: DISCONTINUED | OUTPATIENT
Start: 2021-02-22 | End: 2021-02-22 | Stop reason: HOSPADM

## 2021-02-22 RX ORDER — PROPARACAINE HYDROCHLORIDE 5 MG/ML
1 SOLUTION/ DROPS OPHTHALMIC ONCE
Status: COMPLETED | OUTPATIENT
Start: 2021-02-22 | End: 2021-02-22

## 2021-02-22 RX ORDER — KETOROLAC TROMETHAMINE 5 MG/ML
1 SOLUTION OPHTHALMIC ONCE
Status: COMPLETED | OUTPATIENT
Start: 2021-02-22 | End: 2021-02-22

## 2021-02-22 RX ORDER — FENTANYL CITRATE 50 UG/ML
INJECTION, SOLUTION INTRAMUSCULAR; INTRAVENOUS
Status: DISCONTINUED | OUTPATIENT
Start: 2021-02-22 | End: 2021-02-22

## 2021-02-22 RX ORDER — PREDNISOLONE ACETATE 10 MG/ML
SUSPENSION/ DROPS OPHTHALMIC
Status: DISCONTINUED | OUTPATIENT
Start: 2021-02-22 | End: 2021-02-22 | Stop reason: HOSPADM

## 2021-02-22 RX ORDER — LIDOCAINE HYDROCHLORIDE 10 MG/ML
1 INJECTION, SOLUTION EPIDURAL; INFILTRATION; INTRACAUDAL; PERINEURAL ONCE
Status: DISCONTINUED | OUTPATIENT
Start: 2021-02-22 | End: 2021-02-22 | Stop reason: HOSPADM

## 2021-02-22 RX ORDER — LIDOCAINE HYDROCHLORIDE 40 MG/ML
INJECTION, SOLUTION RETROBULBAR
Status: DISCONTINUED | OUTPATIENT
Start: 2021-02-22 | End: 2021-02-22 | Stop reason: HOSPADM

## 2021-02-22 RX ORDER — LIDOCAINE HYDROCHLORIDE 40 MG/ML
INJECTION, SOLUTION RETROBULBAR
Status: DISCONTINUED | OUTPATIENT
Start: 2021-02-22 | End: 2021-02-22

## 2021-02-22 RX ORDER — ACETAMINOPHEN 325 MG/1
650 TABLET ORAL EVERY 4 HOURS PRN
Status: DISCONTINUED | OUTPATIENT
Start: 2021-02-22 | End: 2021-02-22 | Stop reason: HOSPADM

## 2021-02-22 RX ORDER — OFLOXACIN 3 MG/ML
1 SOLUTION/ DROPS OPHTHALMIC
Status: DISCONTINUED | OUTPATIENT
Start: 2021-02-22 | End: 2021-02-22

## 2021-02-22 RX ORDER — TROPICAMIDE 10 MG/ML
1 SOLUTION/ DROPS OPHTHALMIC
Status: COMPLETED | OUTPATIENT
Start: 2021-02-22 | End: 2021-02-22

## 2021-02-22 RX ORDER — MOXIFLOXACIN 5 MG/ML
SOLUTION/ DROPS OPHTHALMIC
Status: DISCONTINUED | OUTPATIENT
Start: 2021-02-22 | End: 2021-02-22 | Stop reason: HOSPADM

## 2021-02-22 RX ADMIN — SODIUM CHLORIDE, SODIUM LACTATE, POTASSIUM CHLORIDE, AND CALCIUM CHLORIDE: .6; .31; .03; .02 INJECTION, SOLUTION INTRAVENOUS at 08:02

## 2021-02-22 RX ADMIN — LIDOCAINE HYDROCHLORIDE 5 DROP: 40 SOLUTION RETROBULBAR; TOPICAL at 09:02

## 2021-02-22 RX ADMIN — MOXIFLOXACIN 1 DROP: 5 SOLUTION/ DROPS OPHTHALMIC at 08:02

## 2021-02-22 RX ADMIN — TROPICAMIDE 1 DROP: 10 SOLUTION/ DROPS OPHTHALMIC at 08:02

## 2021-02-22 RX ADMIN — PROPARACAINE HYDROCHLORIDE 1 DROP: 5 SOLUTION/ DROPS OPHTHALMIC at 08:02

## 2021-02-22 RX ADMIN — MIDAZOLAM HYDROCHLORIDE 0.5 MG: 1 INJECTION, SOLUTION INTRAMUSCULAR; INTRAVENOUS at 09:02

## 2021-02-22 RX ADMIN — FENTANYL CITRATE 25 MCG: 50 INJECTION, SOLUTION INTRAMUSCULAR; INTRAVENOUS at 09:02

## 2021-02-22 RX ADMIN — PHENYLEPHRINE HYDROCHLORIDE 1 DROP: 25 SOLUTION/ DROPS OPHTHALMIC at 08:02

## 2021-02-22 RX ADMIN — KETOROLAC TROMETHAMINE 1 DROP: 5 SOLUTION OPHTHALMIC at 08:02

## 2021-02-22 RX ADMIN — MIDAZOLAM HYDROCHLORIDE 1 MG: 1 INJECTION, SOLUTION INTRAMUSCULAR; INTRAVENOUS at 09:02

## 2021-02-22 RX ADMIN — ONDANSETRON 4 MG: 2 INJECTION, SOLUTION INTRAMUSCULAR; INTRAVENOUS at 09:02

## 2021-02-22 RX ADMIN — FENTANYL CITRATE 50 MCG: 50 INJECTION, SOLUTION INTRAMUSCULAR; INTRAVENOUS at 09:02

## 2021-02-23 ENCOUNTER — OFFICE VISIT (OUTPATIENT)
Dept: OPHTHALMOLOGY | Facility: CLINIC | Age: 81
End: 2021-02-23
Payer: MEDICARE

## 2021-02-23 DIAGNOSIS — Z96.1 STATUS POST CATARACT EXTRACTION AND INSERTION OF INTRAOCULAR LENS, LEFT: Primary | ICD-10-CM

## 2021-02-23 DIAGNOSIS — Z98.42 STATUS POST CATARACT EXTRACTION AND INSERTION OF INTRAOCULAR LENS, LEFT: Primary | ICD-10-CM

## 2021-02-23 PROCEDURE — 99024 PR POST-OP FOLLOW-UP VISIT: ICD-10-PCS | Mod: S$GLB,,, | Performed by: OPHTHALMOLOGY

## 2021-02-23 PROCEDURE — 99999 PR PBB SHADOW E&M-EST. PATIENT-LVL II: CPT | Mod: PBBFAC,,, | Performed by: OPHTHALMOLOGY

## 2021-02-23 PROCEDURE — 3288F FALL RISK ASSESSMENT DOCD: CPT | Mod: CPTII,S$GLB,, | Performed by: OPHTHALMOLOGY

## 2021-02-23 PROCEDURE — 1101F PT FALLS ASSESS-DOCD LE1/YR: CPT | Mod: CPTII,S$GLB,, | Performed by: OPHTHALMOLOGY

## 2021-02-23 PROCEDURE — 1101F PR PT FALLS ASSESS DOC 0-1 FALLS W/OUT INJ PAST YR: ICD-10-PCS | Mod: CPTII,S$GLB,, | Performed by: OPHTHALMOLOGY

## 2021-02-23 PROCEDURE — 1126F AMNT PAIN NOTED NONE PRSNT: CPT | Mod: S$GLB,,, | Performed by: OPHTHALMOLOGY

## 2021-02-23 PROCEDURE — 1126F PR PAIN SEVERITY QUANTIFIED, NO PAIN PRESENT: ICD-10-PCS | Mod: S$GLB,,, | Performed by: OPHTHALMOLOGY

## 2021-02-23 PROCEDURE — 99024 POSTOP FOLLOW-UP VISIT: CPT | Mod: S$GLB,,, | Performed by: OPHTHALMOLOGY

## 2021-02-23 PROCEDURE — 99999 PR PBB SHADOW E&M-EST. PATIENT-LVL II: ICD-10-PCS | Mod: PBBFAC,,, | Performed by: OPHTHALMOLOGY

## 2021-02-23 PROCEDURE — 3288F PR FALLS RISK ASSESSMENT DOCUMENTED: ICD-10-PCS | Mod: CPTII,S$GLB,, | Performed by: OPHTHALMOLOGY

## 2021-03-03 ENCOUNTER — TELEPHONE (OUTPATIENT)
Dept: OPHTHALMOLOGY | Facility: CLINIC | Age: 81
End: 2021-03-03

## 2021-03-03 DIAGNOSIS — I10 ESSENTIAL HYPERTENSION: ICD-10-CM

## 2021-03-04 ENCOUNTER — TELEPHONE (OUTPATIENT)
Dept: OPHTHALMOLOGY | Facility: CLINIC | Age: 81
End: 2021-03-04

## 2021-03-05 RX ORDER — LOSARTAN POTASSIUM AND HYDROCHLOROTHIAZIDE 25; 100 MG/1; MG/1
TABLET ORAL
Qty: 90 TABLET | Refills: 2 | Status: SHIPPED | OUTPATIENT
Start: 2021-03-05 | End: 2021-07-01 | Stop reason: SDUPTHER

## 2021-03-08 ENCOUNTER — TELEPHONE (OUTPATIENT)
Dept: OPHTHALMOLOGY | Facility: CLINIC | Age: 81
End: 2021-03-08

## 2021-03-08 DIAGNOSIS — H25.11 NUCLEAR SCLEROTIC CATARACT OF RIGHT EYE: Primary | ICD-10-CM

## 2021-03-08 DIAGNOSIS — Z13.9 SCREENING PROCEDURE: ICD-10-CM

## 2021-03-09 ENCOUNTER — OFFICE VISIT (OUTPATIENT)
Dept: OPHTHALMOLOGY | Facility: CLINIC | Age: 81
End: 2021-03-09
Payer: MEDICARE

## 2021-03-09 DIAGNOSIS — Z98.42 STATUS POST CATARACT EXTRACTION AND INSERTION OF INTRAOCULAR LENS, LEFT: Primary | ICD-10-CM

## 2021-03-09 DIAGNOSIS — H25.11 NUCLEAR SCLEROTIC CATARACT OF RIGHT EYE: ICD-10-CM

## 2021-03-09 DIAGNOSIS — Z96.1 STATUS POST CATARACT EXTRACTION AND INSERTION OF INTRAOCULAR LENS, LEFT: Primary | ICD-10-CM

## 2021-03-09 PROCEDURE — 3288F PR FALLS RISK ASSESSMENT DOCUMENTED: ICD-10-PCS | Mod: CPTII,S$GLB,, | Performed by: OPHTHALMOLOGY

## 2021-03-09 PROCEDURE — 99024 POSTOP FOLLOW-UP VISIT: CPT | Mod: S$GLB,,, | Performed by: OPHTHALMOLOGY

## 2021-03-09 PROCEDURE — 92136 OPHTHALMIC BIOMETRY: CPT | Mod: 26,RT,S$GLB, | Performed by: OPHTHALMOLOGY

## 2021-03-09 PROCEDURE — 1126F AMNT PAIN NOTED NONE PRSNT: CPT | Mod: S$GLB,,, | Performed by: OPHTHALMOLOGY

## 2021-03-09 PROCEDURE — 99999 PR PBB SHADOW E&M-EST. PATIENT-LVL III: ICD-10-PCS | Mod: PBBFAC,,, | Performed by: OPHTHALMOLOGY

## 2021-03-09 PROCEDURE — 1101F PT FALLS ASSESS-DOCD LE1/YR: CPT | Mod: CPTII,S$GLB,, | Performed by: OPHTHALMOLOGY

## 2021-03-09 PROCEDURE — 99024 PR POST-OP FOLLOW-UP VISIT: ICD-10-PCS | Mod: S$GLB,,, | Performed by: OPHTHALMOLOGY

## 2021-03-09 PROCEDURE — 92136 IOL MASTER - OD - RIGHT EYE: ICD-10-PCS | Mod: 26,RT,S$GLB, | Performed by: OPHTHALMOLOGY

## 2021-03-09 PROCEDURE — 3288F FALL RISK ASSESSMENT DOCD: CPT | Mod: CPTII,S$GLB,, | Performed by: OPHTHALMOLOGY

## 2021-03-09 PROCEDURE — 1101F PR PT FALLS ASSESS DOC 0-1 FALLS W/OUT INJ PAST YR: ICD-10-PCS | Mod: CPTII,S$GLB,, | Performed by: OPHTHALMOLOGY

## 2021-03-09 PROCEDURE — 1126F PR PAIN SEVERITY QUANTIFIED, NO PAIN PRESENT: ICD-10-PCS | Mod: S$GLB,,, | Performed by: OPHTHALMOLOGY

## 2021-03-09 PROCEDURE — 99999 PR PBB SHADOW E&M-EST. PATIENT-LVL III: CPT | Mod: PBBFAC,,, | Performed by: OPHTHALMOLOGY

## 2021-03-16 ENCOUNTER — PATIENT MESSAGE (OUTPATIENT)
Dept: SURGERY | Facility: HOSPITAL | Age: 81
End: 2021-03-16

## 2021-03-16 ENCOUNTER — PATIENT OUTREACH (OUTPATIENT)
Dept: ADMINISTRATIVE | Facility: OTHER | Age: 81
End: 2021-03-16

## 2021-03-16 ENCOUNTER — OFFICE VISIT (OUTPATIENT)
Dept: OPHTHALMOLOGY | Facility: CLINIC | Age: 81
End: 2021-03-16
Payer: MEDICARE

## 2021-03-16 DIAGNOSIS — H00.12 CHALAZION RIGHT LOWER EYELID: Primary | ICD-10-CM

## 2021-03-16 PROCEDURE — 1101F PR PT FALLS ASSESS DOC 0-1 FALLS W/OUT INJ PAST YR: ICD-10-PCS | Mod: CPTII,S$GLB,, | Performed by: OPHTHALMOLOGY

## 2021-03-16 PROCEDURE — 92014 PR EYE EXAM, EST PATIENT,COMPREHESV: ICD-10-PCS | Mod: S$GLB,,, | Performed by: OPHTHALMOLOGY

## 2021-03-16 PROCEDURE — 3288F FALL RISK ASSESSMENT DOCD: CPT | Mod: CPTII,S$GLB,, | Performed by: OPHTHALMOLOGY

## 2021-03-16 PROCEDURE — 3288F PR FALLS RISK ASSESSMENT DOCUMENTED: ICD-10-PCS | Mod: CPTII,S$GLB,, | Performed by: OPHTHALMOLOGY

## 2021-03-16 PROCEDURE — 99999 PR PBB SHADOW E&M-EST. PATIENT-LVL III: CPT | Mod: PBBFAC,,, | Performed by: OPHTHALMOLOGY

## 2021-03-16 PROCEDURE — 1126F AMNT PAIN NOTED NONE PRSNT: CPT | Mod: S$GLB,,, | Performed by: OPHTHALMOLOGY

## 2021-03-16 PROCEDURE — 1126F PR PAIN SEVERITY QUANTIFIED, NO PAIN PRESENT: ICD-10-PCS | Mod: S$GLB,,, | Performed by: OPHTHALMOLOGY

## 2021-03-16 PROCEDURE — 92014 COMPRE OPH EXAM EST PT 1/>: CPT | Mod: S$GLB,,, | Performed by: OPHTHALMOLOGY

## 2021-03-16 PROCEDURE — 99999 PR PBB SHADOW E&M-EST. PATIENT-LVL III: ICD-10-PCS | Mod: PBBFAC,,, | Performed by: OPHTHALMOLOGY

## 2021-03-16 PROCEDURE — 1101F PT FALLS ASSESS-DOCD LE1/YR: CPT | Mod: CPTII,S$GLB,, | Performed by: OPHTHALMOLOGY

## 2021-03-16 RX ORDER — DOXYCYCLINE 50 MG/1
50 CAPSULE ORAL 2 TIMES DAILY
Qty: 60 CAPSULE | Refills: 6 | Status: SHIPPED | OUTPATIENT
Start: 2021-03-16 | End: 2021-06-30

## 2021-03-16 RX ORDER — NEOMYCIN SULFATE, POLYMYXIN B SULFATE, AND DEXAMETHASONE 3.5; 10000; 1 MG/G; [USP'U]/G; MG/G
OINTMENT OPHTHALMIC 2 TIMES DAILY
Qty: 3.5 G | Refills: 2 | Status: SHIPPED | OUTPATIENT
Start: 2021-03-16 | End: 2021-04-05

## 2021-03-19 ENCOUNTER — LAB VISIT (OUTPATIENT)
Dept: FAMILY MEDICINE | Facility: CLINIC | Age: 81
End: 2021-03-19
Payer: MEDICARE

## 2021-03-19 ENCOUNTER — TELEPHONE (OUTPATIENT)
Dept: OPHTHALMOLOGY | Facility: CLINIC | Age: 81
End: 2021-03-19

## 2021-03-19 ENCOUNTER — ANESTHESIA EVENT (OUTPATIENT)
Dept: SURGERY | Facility: HOSPITAL | Age: 81
End: 2021-03-19
Payer: MEDICARE

## 2021-03-19 DIAGNOSIS — Z13.9 SCREENING PROCEDURE: ICD-10-CM

## 2021-03-19 PROCEDURE — U0003 INFECTIOUS AGENT DETECTION BY NUCLEIC ACID (DNA OR RNA); SEVERE ACUTE RESPIRATORY SYNDROME CORONAVIRUS 2 (SARS-COV-2) (CORONAVIRUS DISEASE [COVID-19]), AMPLIFIED PROBE TECHNIQUE, MAKING USE OF HIGH THROUGHPUT TECHNOLOGIES AS DESCRIBED BY CMS-2020-01-R: HCPCS | Performed by: OPHTHALMOLOGY

## 2021-03-19 PROCEDURE — U0005 INFEC AGEN DETEC AMPLI PROBE: HCPCS | Performed by: OPHTHALMOLOGY

## 2021-03-20 LAB — SARS-COV-2 RNA RESP QL NAA+PROBE: NOT DETECTED

## 2021-03-22 ENCOUNTER — HOSPITAL ENCOUNTER (OUTPATIENT)
Facility: HOSPITAL | Age: 81
Discharge: HOME | End: 2021-03-22
Attending: OPHTHALMOLOGY | Admitting: OPHTHALMOLOGY
Payer: MEDICARE

## 2021-03-22 ENCOUNTER — ANESTHESIA (OUTPATIENT)
Dept: SURGERY | Facility: HOSPITAL | Age: 81
End: 2021-03-22
Payer: MEDICARE

## 2021-03-22 VITALS
SYSTOLIC BLOOD PRESSURE: 157 MMHG | HEIGHT: 64 IN | OXYGEN SATURATION: 97 % | HEART RATE: 72 BPM | BODY MASS INDEX: 24.59 KG/M2 | DIASTOLIC BLOOD PRESSURE: 74 MMHG | WEIGHT: 144 LBS | TEMPERATURE: 98 F | RESPIRATION RATE: 18 BRPM

## 2021-03-22 DIAGNOSIS — H25.12 NUCLEAR SENILE CATARACT OF LEFT EYE: Primary | ICD-10-CM

## 2021-03-22 DIAGNOSIS — H25.10 SENILE NUCLEAR SCLEROSIS: ICD-10-CM

## 2021-03-22 DIAGNOSIS — H25.11 NUCLEAR SENILE CATARACT OF RIGHT EYE: ICD-10-CM

## 2021-03-22 PROCEDURE — 37000009 HC ANESTHESIA EA ADD 15 MINS: Mod: PO | Performed by: OPHTHALMOLOGY

## 2021-03-22 PROCEDURE — 63600175 PHARM REV CODE 636 W HCPCS: Mod: PO | Performed by: NURSE ANESTHETIST, CERTIFIED REGISTERED

## 2021-03-22 PROCEDURE — 63600175 PHARM REV CODE 636 W HCPCS: Mod: PO | Performed by: ANESTHESIOLOGY

## 2021-03-22 PROCEDURE — D9220A PRA ANESTHESIA: ICD-10-PCS | Mod: CRNA,,, | Performed by: NURSE ANESTHETIST, CERTIFIED REGISTERED

## 2021-03-22 PROCEDURE — 66999 UNLISTED PX ANT SEGMENT EYE: CPT | Mod: CSM,RT,, | Performed by: OPHTHALMOLOGY

## 2021-03-22 PROCEDURE — 36000706: Mod: PO | Performed by: OPHTHALMOLOGY

## 2021-03-22 PROCEDURE — 71000015 HC POSTOP RECOV 1ST HR: Mod: PO | Performed by: OPHTHALMOLOGY

## 2021-03-22 PROCEDURE — 66984 PR REMOVAL, CATARACT, W/INSRT INTRAOC LENS, W/O ENDO CYCLO: ICD-10-PCS | Mod: 79,RT,, | Performed by: OPHTHALMOLOGY

## 2021-03-22 PROCEDURE — 25000003 PHARM REV CODE 250: Mod: PO

## 2021-03-22 PROCEDURE — V2787 ASTIGMATISM-CORRECT FUNCTION: HCPCS | Mod: PO | Performed by: OPHTHALMOLOGY

## 2021-03-22 PROCEDURE — 66984 XCAPSL CTRC RMVL W/O ECP: CPT | Mod: 79,RT,, | Performed by: OPHTHALMOLOGY

## 2021-03-22 PROCEDURE — 63600175 PHARM REV CODE 636 W HCPCS: Mod: PO | Performed by: OPHTHALMOLOGY

## 2021-03-22 PROCEDURE — D9220A PRA ANESTHESIA: Mod: ANES,,, | Performed by: ANESTHESIOLOGY

## 2021-03-22 PROCEDURE — 37000008 HC ANESTHESIA 1ST 15 MINUTES: Mod: PO | Performed by: OPHTHALMOLOGY

## 2021-03-22 PROCEDURE — 36000707: Mod: PO | Performed by: OPHTHALMOLOGY

## 2021-03-22 PROCEDURE — D9220A PRA ANESTHESIA: Mod: CRNA,,, | Performed by: NURSE ANESTHETIST, CERTIFIED REGISTERED

## 2021-03-22 PROCEDURE — D9220A PRA ANESTHESIA: ICD-10-PCS | Mod: ANES,,, | Performed by: ANESTHESIOLOGY

## 2021-03-22 PROCEDURE — 25000003 PHARM REV CODE 250: Mod: PO | Performed by: NURSE ANESTHETIST, CERTIFIED REGISTERED

## 2021-03-22 PROCEDURE — 25000003 PHARM REV CODE 250: Mod: PO | Performed by: OPHTHALMOLOGY

## 2021-03-22 PROCEDURE — 66999 PR FEMTO TORIC: ICD-10-PCS | Mod: CSM,RT,, | Performed by: OPHTHALMOLOGY

## 2021-03-22 DEVICE — IMPLANTABLE DEVICE: Type: IMPLANTABLE DEVICE | Site: EYE | Status: FUNCTIONAL

## 2021-03-22 RX ORDER — PROPARACAINE HYDROCHLORIDE 5 MG/ML
1 SOLUTION/ DROPS OPHTHALMIC
Status: DISCONTINUED | OUTPATIENT
Start: 2021-03-22 | End: 2021-03-22 | Stop reason: HOSPADM

## 2021-03-22 RX ORDER — PHENYLEPHRINE HYDROCHLORIDE 25 MG/ML
1 SOLUTION/ DROPS OPHTHALMIC
Status: COMPLETED | OUTPATIENT
Start: 2021-03-22 | End: 2021-03-22

## 2021-03-22 RX ORDER — MIDAZOLAM HYDROCHLORIDE 1 MG/ML
INJECTION INTRAMUSCULAR; INTRAVENOUS
Status: DISCONTINUED | OUTPATIENT
Start: 2021-03-22 | End: 2021-03-22

## 2021-03-22 RX ORDER — LIDOCAINE HYDROCHLORIDE 10 MG/ML
INJECTION, SOLUTION EPIDURAL; INFILTRATION; INTRACAUDAL; PERINEURAL
Status: DISCONTINUED | OUTPATIENT
Start: 2021-03-22 | End: 2021-03-22 | Stop reason: HOSPADM

## 2021-03-22 RX ORDER — TROPICAMIDE 10 MG/ML
1 SOLUTION/ DROPS OPHTHALMIC
Status: COMPLETED | OUTPATIENT
Start: 2021-03-22 | End: 2021-03-22

## 2021-03-22 RX ORDER — LIDOCAINE HYDROCHLORIDE 40 MG/ML
1 INJECTION, SOLUTION RETROBULBAR
Status: DISCONTINUED | OUTPATIENT
Start: 2021-03-22 | End: 2021-03-22 | Stop reason: HOSPADM

## 2021-03-22 RX ORDER — LIDOCAINE HYDROCHLORIDE 10 MG/ML
1 INJECTION, SOLUTION EPIDURAL; INFILTRATION; INTRACAUDAL; PERINEURAL ONCE
Status: DISCONTINUED | OUTPATIENT
Start: 2021-03-22 | End: 2021-03-22 | Stop reason: HOSPADM

## 2021-03-22 RX ORDER — ONDANSETRON 2 MG/ML
INJECTION INTRAMUSCULAR; INTRAVENOUS
Status: DISCONTINUED | OUTPATIENT
Start: 2021-03-22 | End: 2021-03-22

## 2021-03-22 RX ORDER — KETOROLAC TROMETHAMINE 5 MG/ML
1 SOLUTION OPHTHALMIC ONCE
Status: COMPLETED | OUTPATIENT
Start: 2021-03-22 | End: 2021-03-22

## 2021-03-22 RX ORDER — MOXIFLOXACIN 5 MG/ML
SOLUTION/ DROPS OPHTHALMIC
Status: DISCONTINUED | OUTPATIENT
Start: 2021-03-22 | End: 2021-03-22 | Stop reason: HOSPADM

## 2021-03-22 RX ORDER — PREDNISOLONE ACETATE 10 MG/ML
SUSPENSION/ DROPS OPHTHALMIC
Status: DISCONTINUED | OUTPATIENT
Start: 2021-03-22 | End: 2021-03-22 | Stop reason: HOSPADM

## 2021-03-22 RX ORDER — ACETAMINOPHEN 325 MG/1
650 TABLET ORAL EVERY 4 HOURS PRN
Status: DISCONTINUED | OUTPATIENT
Start: 2021-03-22 | End: 2021-03-22 | Stop reason: HOSPADM

## 2021-03-22 RX ORDER — LIDOCAINE HYDROCHLORIDE 40 MG/ML
INJECTION, SOLUTION RETROBULBAR
Status: DISCONTINUED | OUTPATIENT
Start: 2021-03-22 | End: 2021-03-22

## 2021-03-22 RX ORDER — OFLOXACIN 3 MG/ML
1 SOLUTION/ DROPS OPHTHALMIC
Status: COMPLETED | OUTPATIENT
Start: 2021-03-22 | End: 2021-03-22

## 2021-03-22 RX ORDER — SODIUM CHLORIDE, SODIUM LACTATE, POTASSIUM CHLORIDE, CALCIUM CHLORIDE 600; 310; 30; 20 MG/100ML; MG/100ML; MG/100ML; MG/100ML
INJECTION, SOLUTION INTRAVENOUS CONTINUOUS
Status: DISCONTINUED | OUTPATIENT
Start: 2021-03-22 | End: 2021-03-22 | Stop reason: HOSPADM

## 2021-03-22 RX ORDER — LIDOCAINE HYDROCHLORIDE 40 MG/ML
INJECTION, SOLUTION RETROBULBAR
Status: DISCONTINUED | OUTPATIENT
Start: 2021-03-22 | End: 2021-03-22 | Stop reason: HOSPADM

## 2021-03-22 RX ADMIN — PHENYLEPHRINE HYDROCHLORIDE 1 DROP: 25 SOLUTION/ DROPS OPHTHALMIC at 08:03

## 2021-03-22 RX ADMIN — OFLOXACIN 1 DROP: 3 SOLUTION/ DROPS OPHTHALMIC at 08:03

## 2021-03-22 RX ADMIN — TROPICAMIDE 1 DROP: 10 SOLUTION/ DROPS OPHTHALMIC at 08:03

## 2021-03-22 RX ADMIN — ACETAMINOPHEN 650 MG: 325 TABLET ORAL at 10:03

## 2021-03-22 RX ADMIN — ONDANSETRON 4 MG: 2 INJECTION, SOLUTION INTRAMUSCULAR; INTRAVENOUS at 09:03

## 2021-03-22 RX ADMIN — KETOROLAC TROMETHAMINE 1 DROP: 5 SOLUTION OPHTHALMIC at 08:03

## 2021-03-22 RX ADMIN — MIDAZOLAM HYDROCHLORIDE 1 MG: 1 INJECTION, SOLUTION INTRAMUSCULAR; INTRAVENOUS at 09:03

## 2021-03-22 RX ADMIN — PROPARACAINE HYDROCHLORIDE 1 DROP: 5 SOLUTION/ DROPS OPHTHALMIC at 08:03

## 2021-03-22 RX ADMIN — LIDOCAINE HYDROCHLORIDE 4 DROP: 40 SOLUTION RETROBULBAR; TOPICAL at 09:03

## 2021-03-22 RX ADMIN — SODIUM CHLORIDE, SODIUM LACTATE, POTASSIUM CHLORIDE, AND CALCIUM CHLORIDE: .6; .31; .03; .02 INJECTION, SOLUTION INTRAVENOUS at 08:03

## 2021-03-23 ENCOUNTER — OFFICE VISIT (OUTPATIENT)
Dept: OPHTHALMOLOGY | Facility: CLINIC | Age: 81
End: 2021-03-23
Payer: MEDICARE

## 2021-03-23 VITALS — DIASTOLIC BLOOD PRESSURE: 61 MMHG | SYSTOLIC BLOOD PRESSURE: 141 MMHG | HEART RATE: 77 BPM

## 2021-03-23 DIAGNOSIS — Z96.1 STATUS POST CATARACT EXTRACTION AND INSERTION OF INTRAOCULAR LENS, RIGHT: ICD-10-CM

## 2021-03-23 DIAGNOSIS — Z98.42 STATUS POST CATARACT EXTRACTION AND INSERTION OF INTRAOCULAR LENS, LEFT: Primary | ICD-10-CM

## 2021-03-23 DIAGNOSIS — Z98.41 STATUS POST CATARACT EXTRACTION AND INSERTION OF INTRAOCULAR LENS, RIGHT: ICD-10-CM

## 2021-03-23 DIAGNOSIS — Z96.1 STATUS POST CATARACT EXTRACTION AND INSERTION OF INTRAOCULAR LENS, LEFT: Primary | ICD-10-CM

## 2021-03-23 PROCEDURE — 1126F PR PAIN SEVERITY QUANTIFIED, NO PAIN PRESENT: ICD-10-PCS | Mod: S$GLB,,, | Performed by: OPHTHALMOLOGY

## 2021-03-23 PROCEDURE — 99999 PR PBB SHADOW E&M-EST. PATIENT-LVL III: CPT | Mod: PBBFAC,,, | Performed by: OPHTHALMOLOGY

## 2021-03-23 PROCEDURE — 1101F PR PT FALLS ASSESS DOC 0-1 FALLS W/OUT INJ PAST YR: ICD-10-PCS | Mod: CPTII,S$GLB,, | Performed by: OPHTHALMOLOGY

## 2021-03-23 PROCEDURE — 1101F PT FALLS ASSESS-DOCD LE1/YR: CPT | Mod: CPTII,S$GLB,, | Performed by: OPHTHALMOLOGY

## 2021-03-23 PROCEDURE — 1126F AMNT PAIN NOTED NONE PRSNT: CPT | Mod: S$GLB,,, | Performed by: OPHTHALMOLOGY

## 2021-03-23 PROCEDURE — 99999 PR PBB SHADOW E&M-EST. PATIENT-LVL III: ICD-10-PCS | Mod: PBBFAC,,, | Performed by: OPHTHALMOLOGY

## 2021-03-23 PROCEDURE — 3288F PR FALLS RISK ASSESSMENT DOCUMENTED: ICD-10-PCS | Mod: CPTII,S$GLB,, | Performed by: OPHTHALMOLOGY

## 2021-03-23 PROCEDURE — 99024 POSTOP FOLLOW-UP VISIT: CPT | Mod: S$GLB,,, | Performed by: OPHTHALMOLOGY

## 2021-03-23 PROCEDURE — 3288F FALL RISK ASSESSMENT DOCD: CPT | Mod: CPTII,S$GLB,, | Performed by: OPHTHALMOLOGY

## 2021-03-23 PROCEDURE — 99024 PR POST-OP FOLLOW-UP VISIT: ICD-10-PCS | Mod: S$GLB,,, | Performed by: OPHTHALMOLOGY

## 2021-04-17 DIAGNOSIS — Z78.0 POSTMENOPAUSAL: ICD-10-CM

## 2021-04-17 DIAGNOSIS — E03.9 HYPOTHYROIDISM, UNSPECIFIED TYPE: ICD-10-CM

## 2021-04-20 ENCOUNTER — PES CALL (OUTPATIENT)
Dept: ADMINISTRATIVE | Facility: CLINIC | Age: 81
End: 2021-04-20

## 2021-04-20 RX ORDER — ESTRADIOL 1 MG/1
TABLET ORAL
Qty: 90 TABLET | Refills: 0 | Status: SHIPPED | OUTPATIENT
Start: 2021-04-20 | End: 2021-06-30

## 2021-04-20 RX ORDER — LEVOTHYROXINE SODIUM 88 UG/1
TABLET ORAL
Qty: 90 TABLET | Refills: 0 | Status: SHIPPED | OUTPATIENT
Start: 2021-04-20 | End: 2021-07-01 | Stop reason: SDUPTHER

## 2021-04-27 ENCOUNTER — OFFICE VISIT (OUTPATIENT)
Dept: OPHTHALMOLOGY | Facility: CLINIC | Age: 81
End: 2021-04-27
Payer: MEDICARE

## 2021-04-27 DIAGNOSIS — Z96.1 STATUS POST CATARACT EXTRACTION AND INSERTION OF INTRAOCULAR LENS, RIGHT: ICD-10-CM

## 2021-04-27 DIAGNOSIS — Z96.1 STATUS POST CATARACT EXTRACTION AND INSERTION OF INTRAOCULAR LENS, LEFT: Primary | ICD-10-CM

## 2021-04-27 DIAGNOSIS — Z98.41 STATUS POST CATARACT EXTRACTION AND INSERTION OF INTRAOCULAR LENS, RIGHT: ICD-10-CM

## 2021-04-27 DIAGNOSIS — Z98.42 STATUS POST CATARACT EXTRACTION AND INSERTION OF INTRAOCULAR LENS, LEFT: Primary | ICD-10-CM

## 2021-04-27 PROCEDURE — 3288F FALL RISK ASSESSMENT DOCD: CPT | Mod: CPTII,S$GLB,, | Performed by: OPHTHALMOLOGY

## 2021-04-27 PROCEDURE — 1126F AMNT PAIN NOTED NONE PRSNT: CPT | Mod: S$GLB,,, | Performed by: OPHTHALMOLOGY

## 2021-04-27 PROCEDURE — 99024 POSTOP FOLLOW-UP VISIT: CPT | Mod: S$GLB,,, | Performed by: OPHTHALMOLOGY

## 2021-04-27 PROCEDURE — 99999 PR PBB SHADOW E&M-EST. PATIENT-LVL III: CPT | Mod: PBBFAC,,, | Performed by: OPHTHALMOLOGY

## 2021-04-27 PROCEDURE — 99024 PR POST-OP FOLLOW-UP VISIT: ICD-10-PCS | Mod: S$GLB,,, | Performed by: OPHTHALMOLOGY

## 2021-04-27 PROCEDURE — 1101F PR PT FALLS ASSESS DOC 0-1 FALLS W/OUT INJ PAST YR: ICD-10-PCS | Mod: CPTII,S$GLB,, | Performed by: OPHTHALMOLOGY

## 2021-04-27 PROCEDURE — 3288F PR FALLS RISK ASSESSMENT DOCUMENTED: ICD-10-PCS | Mod: CPTII,S$GLB,, | Performed by: OPHTHALMOLOGY

## 2021-04-27 PROCEDURE — 1126F PR PAIN SEVERITY QUANTIFIED, NO PAIN PRESENT: ICD-10-PCS | Mod: S$GLB,,, | Performed by: OPHTHALMOLOGY

## 2021-04-27 PROCEDURE — 99999 PR PBB SHADOW E&M-EST. PATIENT-LVL III: ICD-10-PCS | Mod: PBBFAC,,, | Performed by: OPHTHALMOLOGY

## 2021-04-27 PROCEDURE — 1101F PT FALLS ASSESS-DOCD LE1/YR: CPT | Mod: CPTII,S$GLB,, | Performed by: OPHTHALMOLOGY

## 2021-06-30 ENCOUNTER — TELEPHONE (OUTPATIENT)
Dept: GASTROENTEROLOGY | Facility: CLINIC | Age: 81
End: 2021-06-30

## 2021-06-30 ENCOUNTER — LAB VISIT (OUTPATIENT)
Dept: LAB | Facility: HOSPITAL | Age: 81
End: 2021-06-30
Attending: INTERNAL MEDICINE
Payer: MEDICARE

## 2021-06-30 ENCOUNTER — OFFICE VISIT (OUTPATIENT)
Dept: FAMILY MEDICINE | Facility: CLINIC | Age: 81
End: 2021-06-30
Payer: MEDICARE

## 2021-06-30 VITALS
SYSTOLIC BLOOD PRESSURE: 116 MMHG | WEIGHT: 144.63 LBS | HEIGHT: 64 IN | BODY MASS INDEX: 24.69 KG/M2 | HEART RATE: 71 BPM | OXYGEN SATURATION: 98 % | DIASTOLIC BLOOD PRESSURE: 70 MMHG

## 2021-06-30 DIAGNOSIS — R10.30 LOWER ABDOMINAL PAIN: ICD-10-CM

## 2021-06-30 DIAGNOSIS — I70.0 AORTIC ATHEROSCLEROSIS: ICD-10-CM

## 2021-06-30 DIAGNOSIS — Z78.0 POSTMENOPAUSAL: ICD-10-CM

## 2021-06-30 DIAGNOSIS — E03.9 HYPOTHYROIDISM, UNSPECIFIED TYPE: ICD-10-CM

## 2021-06-30 DIAGNOSIS — N81.10 VAGINAL PROLAPSE: ICD-10-CM

## 2021-06-30 DIAGNOSIS — Z78.0 ASYMPTOMATIC POSTMENOPAUSAL STATE: ICD-10-CM

## 2021-06-30 DIAGNOSIS — I10 ESSENTIAL HYPERTENSION: ICD-10-CM

## 2021-06-30 DIAGNOSIS — I10 ESSENTIAL HYPERTENSION: Primary | ICD-10-CM

## 2021-06-30 PROCEDURE — 3078F PR MOST RECENT DIASTOLIC BLOOD PRESSURE < 80 MM HG: ICD-10-PCS | Mod: CPTII,S$GLB,, | Performed by: INTERNAL MEDICINE

## 2021-06-30 PROCEDURE — 99214 PR OFFICE/OUTPT VISIT, EST, LEVL IV, 30-39 MIN: ICD-10-PCS | Mod: S$GLB,,, | Performed by: INTERNAL MEDICINE

## 2021-06-30 PROCEDURE — 1101F PR PT FALLS ASSESS DOC 0-1 FALLS W/OUT INJ PAST YR: ICD-10-PCS | Mod: CPTII,S$GLB,, | Performed by: INTERNAL MEDICINE

## 2021-06-30 PROCEDURE — 1159F PR MEDICATION LIST DOCUMENTED IN MEDICAL RECORD: ICD-10-PCS | Mod: S$GLB,,, | Performed by: INTERNAL MEDICINE

## 2021-06-30 PROCEDURE — 99499 UNLISTED E&M SERVICE: CPT | Mod: S$GLB,,, | Performed by: INTERNAL MEDICINE

## 2021-06-30 PROCEDURE — 3074F SYST BP LT 130 MM HG: CPT | Mod: CPTII,S$GLB,, | Performed by: INTERNAL MEDICINE

## 2021-06-30 PROCEDURE — 99499 RISK ADDL DX/OHS AUDIT: ICD-10-PCS | Mod: S$GLB,,, | Performed by: INTERNAL MEDICINE

## 2021-06-30 PROCEDURE — 36415 COLL VENOUS BLD VENIPUNCTURE: CPT | Mod: PN | Performed by: INTERNAL MEDICINE

## 2021-06-30 PROCEDURE — 3288F FALL RISK ASSESSMENT DOCD: CPT | Mod: CPTII,S$GLB,, | Performed by: INTERNAL MEDICINE

## 2021-06-30 PROCEDURE — 80061 LIPID PANEL: CPT | Performed by: INTERNAL MEDICINE

## 2021-06-30 PROCEDURE — 99214 OFFICE O/P EST MOD 30 MIN: CPT | Mod: S$GLB,,, | Performed by: INTERNAL MEDICINE

## 2021-06-30 PROCEDURE — 3288F PR FALLS RISK ASSESSMENT DOCUMENTED: ICD-10-PCS | Mod: CPTII,S$GLB,, | Performed by: INTERNAL MEDICINE

## 2021-06-30 PROCEDURE — 1159F MED LIST DOCD IN RCRD: CPT | Mod: S$GLB,,, | Performed by: INTERNAL MEDICINE

## 2021-06-30 PROCEDURE — 1126F AMNT PAIN NOTED NONE PRSNT: CPT | Mod: S$GLB,,, | Performed by: INTERNAL MEDICINE

## 2021-06-30 PROCEDURE — 99999 PR PBB SHADOW E&M-EST. PATIENT-LVL V: CPT | Mod: PBBFAC,,, | Performed by: INTERNAL MEDICINE

## 2021-06-30 PROCEDURE — 1126F PR PAIN SEVERITY QUANTIFIED, NO PAIN PRESENT: ICD-10-PCS | Mod: S$GLB,,, | Performed by: INTERNAL MEDICINE

## 2021-06-30 PROCEDURE — 3078F DIAST BP <80 MM HG: CPT | Mod: CPTII,S$GLB,, | Performed by: INTERNAL MEDICINE

## 2021-06-30 PROCEDURE — 3074F PR MOST RECENT SYSTOLIC BLOOD PRESSURE < 130 MM HG: ICD-10-PCS | Mod: CPTII,S$GLB,, | Performed by: INTERNAL MEDICINE

## 2021-06-30 PROCEDURE — 84443 ASSAY THYROID STIM HORMONE: CPT | Performed by: INTERNAL MEDICINE

## 2021-06-30 PROCEDURE — 99999 PR PBB SHADOW E&M-EST. PATIENT-LVL V: ICD-10-PCS | Mod: PBBFAC,,, | Performed by: INTERNAL MEDICINE

## 2021-06-30 PROCEDURE — 80053 COMPREHEN METABOLIC PANEL: CPT | Performed by: INTERNAL MEDICINE

## 2021-06-30 PROCEDURE — 1101F PT FALLS ASSESS-DOCD LE1/YR: CPT | Mod: CPTII,S$GLB,, | Performed by: INTERNAL MEDICINE

## 2021-06-30 RX ORDER — ESTRADIOL 0.5 MG/1
0.5 TABLET ORAL DAILY
Qty: 90 TABLET | Refills: 1 | Status: SHIPPED | OUTPATIENT
Start: 2021-06-30 | End: 2022-03-22

## 2021-07-01 ENCOUNTER — TELEPHONE (OUTPATIENT)
Dept: GASTROENTEROLOGY | Facility: CLINIC | Age: 81
End: 2021-07-01

## 2021-07-01 ENCOUNTER — TELEPHONE (OUTPATIENT)
Dept: FAMILY MEDICINE | Facility: CLINIC | Age: 81
End: 2021-07-01

## 2021-07-01 DIAGNOSIS — I10 ESSENTIAL HYPERTENSION: ICD-10-CM

## 2021-07-01 DIAGNOSIS — E03.9 HYPOTHYROIDISM, UNSPECIFIED TYPE: ICD-10-CM

## 2021-07-01 LAB
ALBUMIN SERPL BCP-MCNC: 4 G/DL (ref 3.5–5.2)
ALP SERPL-CCNC: 61 U/L (ref 55–135)
ALT SERPL W/O P-5'-P-CCNC: 14 U/L (ref 10–44)
ANION GAP SERPL CALC-SCNC: 11 MMOL/L (ref 8–16)
AST SERPL-CCNC: 22 U/L (ref 10–40)
BILIRUB SERPL-MCNC: 0.6 MG/DL (ref 0.1–1)
BUN SERPL-MCNC: 20 MG/DL (ref 8–23)
CALCIUM SERPL-MCNC: 10 MG/DL (ref 8.7–10.5)
CHLORIDE SERPL-SCNC: 102 MMOL/L (ref 95–110)
CHOLEST SERPL-MCNC: 171 MG/DL (ref 120–199)
CHOLEST/HDLC SERPL: 3.3 {RATIO} (ref 2–5)
CO2 SERPL-SCNC: 25 MMOL/L (ref 23–29)
CREAT SERPL-MCNC: 0.8 MG/DL (ref 0.5–1.4)
EST. GFR  (AFRICAN AMERICAN): >60 ML/MIN/1.73 M^2
EST. GFR  (NON AFRICAN AMERICAN): >60 ML/MIN/1.73 M^2
GLUCOSE SERPL-MCNC: 71 MG/DL (ref 70–110)
HDLC SERPL-MCNC: 52 MG/DL (ref 40–75)
HDLC SERPL: 30.4 % (ref 20–50)
LDLC SERPL CALC-MCNC: 88 MG/DL (ref 63–159)
NONHDLC SERPL-MCNC: 119 MG/DL
POTASSIUM SERPL-SCNC: 3.6 MMOL/L (ref 3.5–5.1)
PROT SERPL-MCNC: 7.9 G/DL (ref 6–8.4)
SODIUM SERPL-SCNC: 138 MMOL/L (ref 136–145)
TRIGL SERPL-MCNC: 155 MG/DL (ref 30–150)
TSH SERPL DL<=0.005 MIU/L-ACNC: 0.71 UIU/ML (ref 0.4–4)

## 2021-07-01 RX ORDER — LEVOTHYROXINE SODIUM 88 UG/1
88 TABLET ORAL DAILY
Qty: 90 TABLET | Refills: 2 | Status: SHIPPED | OUTPATIENT
Start: 2021-07-01 | End: 2022-04-06 | Stop reason: SDUPTHER

## 2021-07-01 RX ORDER — LOSARTAN POTASSIUM AND HYDROCHLOROTHIAZIDE 25; 100 MG/1; MG/1
1 TABLET ORAL DAILY
Qty: 90 TABLET | Refills: 2 | Status: SHIPPED | OUTPATIENT
Start: 2021-07-01 | End: 2022-05-17

## 2021-07-07 ENCOUNTER — PES CALL (OUTPATIENT)
Dept: ADMINISTRATIVE | Facility: CLINIC | Age: 81
End: 2021-07-07

## 2021-10-05 ENCOUNTER — HOSPITAL ENCOUNTER (OUTPATIENT)
Dept: RADIOLOGY | Facility: HOSPITAL | Age: 81
Discharge: HOME | End: 2021-10-05
Attending: INTERNAL MEDICINE
Payer: MEDICARE

## 2021-10-05 ENCOUNTER — OFFICE VISIT (OUTPATIENT)
Dept: GASTROENTEROLOGY | Facility: CLINIC | Age: 81
End: 2021-10-05
Payer: MEDICARE

## 2021-10-05 VITALS — RESPIRATION RATE: 17 BRPM | WEIGHT: 144.63 LBS | BODY MASS INDEX: 24.69 KG/M2 | HEIGHT: 64 IN

## 2021-10-05 DIAGNOSIS — K52.9 COLITIS: ICD-10-CM

## 2021-10-05 DIAGNOSIS — Z78.0 ASYMPTOMATIC POSTMENOPAUSAL STATE: ICD-10-CM

## 2021-10-05 DIAGNOSIS — K52.9 COLITIS: Primary | ICD-10-CM

## 2021-10-05 PROCEDURE — 1126F AMNT PAIN NOTED NONE PRSNT: CPT | Mod: HCNC,CPTII,S$GLB, | Performed by: INTERNAL MEDICINE

## 2021-10-05 PROCEDURE — 99213 PR OFFICE/OUTPT VISIT, EST, LEVL III, 20-29 MIN: ICD-10-PCS | Mod: HCNC,S$GLB,, | Performed by: INTERNAL MEDICINE

## 2021-10-05 PROCEDURE — 1101F PR PT FALLS ASSESS DOC 0-1 FALLS W/OUT INJ PAST YR: ICD-10-PCS | Mod: HCNC,CPTII,S$GLB, | Performed by: INTERNAL MEDICINE

## 2021-10-05 PROCEDURE — 1101F PT FALLS ASSESS-DOCD LE1/YR: CPT | Mod: HCNC,CPTII,S$GLB, | Performed by: INTERNAL MEDICINE

## 2021-10-05 PROCEDURE — 77080 DXA BONE DENSITY AXIAL: CPT | Mod: TC,HCNC,PO

## 2021-10-05 PROCEDURE — 1160F RVW MEDS BY RX/DR IN RCRD: CPT | Mod: HCNC,CPTII,S$GLB, | Performed by: INTERNAL MEDICINE

## 2021-10-05 PROCEDURE — 1159F PR MEDICATION LIST DOCUMENTED IN MEDICAL RECORD: ICD-10-PCS | Mod: HCNC,CPTII,S$GLB, | Performed by: INTERNAL MEDICINE

## 2021-10-05 PROCEDURE — 1159F MED LIST DOCD IN RCRD: CPT | Mod: HCNC,CPTII,S$GLB, | Performed by: INTERNAL MEDICINE

## 2021-10-05 PROCEDURE — 1126F PR PAIN SEVERITY QUANTIFIED, NO PAIN PRESENT: ICD-10-PCS | Mod: HCNC,CPTII,S$GLB, | Performed by: INTERNAL MEDICINE

## 2021-10-05 PROCEDURE — 3288F FALL RISK ASSESSMENT DOCD: CPT | Mod: HCNC,CPTII,S$GLB, | Performed by: INTERNAL MEDICINE

## 2021-10-05 PROCEDURE — 99213 OFFICE O/P EST LOW 20 MIN: CPT | Mod: HCNC,S$GLB,, | Performed by: INTERNAL MEDICINE

## 2021-10-05 PROCEDURE — 3288F PR FALLS RISK ASSESSMENT DOCUMENTED: ICD-10-PCS | Mod: HCNC,CPTII,S$GLB, | Performed by: INTERNAL MEDICINE

## 2021-10-05 PROCEDURE — 99999 PR PBB SHADOW E&M-EST. PATIENT-LVL III: ICD-10-PCS | Mod: PBBFAC,HCNC,, | Performed by: INTERNAL MEDICINE

## 2021-10-05 PROCEDURE — 99999 PR PBB SHADOW E&M-EST. PATIENT-LVL III: CPT | Mod: PBBFAC,HCNC,, | Performed by: INTERNAL MEDICINE

## 2021-10-05 PROCEDURE — 77080 DXA BONE DENSITY AXIAL: CPT | Mod: 26,HCNC,, | Performed by: RADIOLOGY

## 2021-10-05 PROCEDURE — 77080 DEXA BONE DENSITY SPINE HIP: ICD-10-PCS | Mod: 26,HCNC,, | Performed by: RADIOLOGY

## 2021-10-05 PROCEDURE — 1160F PR REVIEW ALL MEDS BY PRESCRIBER/CLIN PHARMACIST DOCUMENTED: ICD-10-PCS | Mod: HCNC,CPTII,S$GLB, | Performed by: INTERNAL MEDICINE

## 2021-10-05 RX ORDER — MESALAMINE 0.38 G/1
0.75 CAPSULE, EXTENDED RELEASE ORAL DAILY
Qty: 60 CAPSULE | Refills: 12 | Status: SHIPPED | OUTPATIENT
Start: 2021-10-05 | End: 2022-11-22 | Stop reason: SDUPTHER

## 2021-11-01 ENCOUNTER — OFFICE VISIT (OUTPATIENT)
Dept: OPTOMETRY | Facility: CLINIC | Age: 81
End: 2021-11-01
Payer: MEDICARE

## 2021-11-01 DIAGNOSIS — Z13.5 GLAUCOMA SCREENING: ICD-10-CM

## 2021-11-01 DIAGNOSIS — Z85.820 H/O MALIGNANT MELANOMA OF SKIN: ICD-10-CM

## 2021-11-01 DIAGNOSIS — Z96.1 BILATERAL PSEUDOPHAKIA: ICD-10-CM

## 2021-11-01 DIAGNOSIS — H43.813 POSTERIOR VITREOUS DETACHMENT, BILATERAL: Primary | ICD-10-CM

## 2021-11-01 PROCEDURE — 1101F PR PT FALLS ASSESS DOC 0-1 FALLS W/OUT INJ PAST YR: ICD-10-PCS | Mod: HCNC,CPTII,S$GLB, | Performed by: OPTOMETRIST

## 2021-11-01 PROCEDURE — 1159F MED LIST DOCD IN RCRD: CPT | Mod: HCNC,CPTII,S$GLB, | Performed by: OPTOMETRIST

## 2021-11-01 PROCEDURE — 1101F PT FALLS ASSESS-DOCD LE1/YR: CPT | Mod: HCNC,CPTII,S$GLB, | Performed by: OPTOMETRIST

## 2021-11-01 PROCEDURE — 3288F PR FALLS RISK ASSESSMENT DOCUMENTED: ICD-10-PCS | Mod: HCNC,CPTII,S$GLB, | Performed by: OPTOMETRIST

## 2021-11-01 PROCEDURE — 92004 COMPRE OPH EXAM NEW PT 1/>: CPT | Mod: HCNC,S$GLB,, | Performed by: OPTOMETRIST

## 2021-11-01 PROCEDURE — 3288F FALL RISK ASSESSMENT DOCD: CPT | Mod: HCNC,CPTII,S$GLB, | Performed by: OPTOMETRIST

## 2021-11-01 PROCEDURE — 99999 PR PBB SHADOW E&M-EST. PATIENT-LVL III: ICD-10-PCS | Mod: PBBFAC,HCNC,, | Performed by: OPTOMETRIST

## 2021-11-01 PROCEDURE — 99999 PR PBB SHADOW E&M-EST. PATIENT-LVL III: CPT | Mod: PBBFAC,HCNC,, | Performed by: OPTOMETRIST

## 2021-11-01 PROCEDURE — 1159F PR MEDICATION LIST DOCUMENTED IN MEDICAL RECORD: ICD-10-PCS | Mod: HCNC,CPTII,S$GLB, | Performed by: OPTOMETRIST

## 2021-11-01 PROCEDURE — 1126F PR PAIN SEVERITY QUANTIFIED, NO PAIN PRESENT: ICD-10-PCS | Mod: HCNC,CPTII,S$GLB, | Performed by: OPTOMETRIST

## 2021-11-01 PROCEDURE — 1160F RVW MEDS BY RX/DR IN RCRD: CPT | Mod: HCNC,CPTII,S$GLB, | Performed by: OPTOMETRIST

## 2021-11-01 PROCEDURE — 92004 PR EYE EXAM, NEW PATIENT,COMPREHESV: ICD-10-PCS | Mod: HCNC,S$GLB,, | Performed by: OPTOMETRIST

## 2021-11-01 PROCEDURE — 1160F PR REVIEW ALL MEDS BY PRESCRIBER/CLIN PHARMACIST DOCUMENTED: ICD-10-PCS | Mod: HCNC,CPTII,S$GLB, | Performed by: OPTOMETRIST

## 2021-11-01 PROCEDURE — 1126F AMNT PAIN NOTED NONE PRSNT: CPT | Mod: HCNC,CPTII,S$GLB, | Performed by: OPTOMETRIST

## 2021-11-09 ENCOUNTER — OFFICE VISIT (OUTPATIENT)
Dept: OBSTETRICS AND GYNECOLOGY | Facility: CLINIC | Age: 81
End: 2021-11-09
Payer: MEDICARE

## 2021-11-09 VITALS
DIASTOLIC BLOOD PRESSURE: 70 MMHG | BODY MASS INDEX: 25.36 KG/M2 | HEIGHT: 64 IN | SYSTOLIC BLOOD PRESSURE: 148 MMHG | WEIGHT: 148.56 LBS

## 2021-11-09 DIAGNOSIS — N81.6 RECTOCELE: Primary | ICD-10-CM

## 2021-11-09 PROCEDURE — 3288F FALL RISK ASSESSMENT DOCD: CPT | Mod: HCNC,CPTII,S$GLB, | Performed by: OBSTETRICS & GYNECOLOGY

## 2021-11-09 PROCEDURE — 1101F PT FALLS ASSESS-DOCD LE1/YR: CPT | Mod: HCNC,CPTII,S$GLB, | Performed by: OBSTETRICS & GYNECOLOGY

## 2021-11-09 PROCEDURE — 3078F DIAST BP <80 MM HG: CPT | Mod: HCNC,CPTII,S$GLB, | Performed by: OBSTETRICS & GYNECOLOGY

## 2021-11-09 PROCEDURE — 99999 PR PBB SHADOW E&M-EST. PATIENT-LVL III: ICD-10-PCS | Mod: PBBFAC,HCNC,, | Performed by: OBSTETRICS & GYNECOLOGY

## 2021-11-09 PROCEDURE — 99203 PR OFFICE/OUTPT VISIT, NEW, LEVL III, 30-44 MIN: ICD-10-PCS | Mod: HCNC,S$GLB,, | Performed by: OBSTETRICS & GYNECOLOGY

## 2021-11-09 PROCEDURE — 3077F PR MOST RECENT SYSTOLIC BLOOD PRESSURE >= 140 MM HG: ICD-10-PCS | Mod: HCNC,CPTII,S$GLB, | Performed by: OBSTETRICS & GYNECOLOGY

## 2021-11-09 PROCEDURE — 3288F PR FALLS RISK ASSESSMENT DOCUMENTED: ICD-10-PCS | Mod: HCNC,CPTII,S$GLB, | Performed by: OBSTETRICS & GYNECOLOGY

## 2021-11-09 PROCEDURE — 99999 PR PBB SHADOW E&M-EST. PATIENT-LVL III: CPT | Mod: PBBFAC,HCNC,, | Performed by: OBSTETRICS & GYNECOLOGY

## 2021-11-09 PROCEDURE — 3078F PR MOST RECENT DIASTOLIC BLOOD PRESSURE < 80 MM HG: ICD-10-PCS | Mod: HCNC,CPTII,S$GLB, | Performed by: OBSTETRICS & GYNECOLOGY

## 2021-11-09 PROCEDURE — 3077F SYST BP >= 140 MM HG: CPT | Mod: HCNC,CPTII,S$GLB, | Performed by: OBSTETRICS & GYNECOLOGY

## 2021-11-09 PROCEDURE — 1101F PR PT FALLS ASSESS DOC 0-1 FALLS W/OUT INJ PAST YR: ICD-10-PCS | Mod: HCNC,CPTII,S$GLB, | Performed by: OBSTETRICS & GYNECOLOGY

## 2021-11-09 PROCEDURE — 1126F AMNT PAIN NOTED NONE PRSNT: CPT | Mod: HCNC,CPTII,S$GLB, | Performed by: OBSTETRICS & GYNECOLOGY

## 2021-11-09 PROCEDURE — 99203 OFFICE O/P NEW LOW 30 MIN: CPT | Mod: HCNC,S$GLB,, | Performed by: OBSTETRICS & GYNECOLOGY

## 2021-11-09 PROCEDURE — 1126F PR PAIN SEVERITY QUANTIFIED, NO PAIN PRESENT: ICD-10-PCS | Mod: HCNC,CPTII,S$GLB, | Performed by: OBSTETRICS & GYNECOLOGY

## 2021-11-23 ENCOUNTER — PATIENT OUTREACH (OUTPATIENT)
Dept: ADMINISTRATIVE | Facility: OTHER | Age: 81
End: 2021-11-23
Payer: MEDICARE

## 2021-11-24 ENCOUNTER — OFFICE VISIT (OUTPATIENT)
Dept: UROGYNECOLOGY | Facility: CLINIC | Age: 81
End: 2021-11-24
Payer: MEDICARE

## 2021-11-24 VITALS
HEIGHT: 64 IN | HEART RATE: 74 BPM | DIASTOLIC BLOOD PRESSURE: 70 MMHG | SYSTOLIC BLOOD PRESSURE: 155 MMHG | BODY MASS INDEX: 25.36 KG/M2 | WEIGHT: 148.56 LBS

## 2021-11-24 DIAGNOSIS — N99.3 PROLAPSE OF VAGINAL VAULT AFTER HYSTERECTOMY: ICD-10-CM

## 2021-11-24 DIAGNOSIS — N81.6 RECTOCELE: Primary | ICD-10-CM

## 2021-11-24 PROCEDURE — 99999 PR PBB SHADOW E&M-EST. PATIENT-LVL III: ICD-10-PCS | Mod: PBBFAC,HCNC,, | Performed by: OBSTETRICS & GYNECOLOGY

## 2021-11-24 PROCEDURE — 99213 OFFICE O/P EST LOW 20 MIN: CPT | Mod: HCNC,S$GLB,, | Performed by: OBSTETRICS & GYNECOLOGY

## 2021-11-24 PROCEDURE — 99213 PR OFFICE/OUTPT VISIT, EST, LEVL III, 20-29 MIN: ICD-10-PCS | Mod: HCNC,S$GLB,, | Performed by: OBSTETRICS & GYNECOLOGY

## 2021-11-24 PROCEDURE — 99999 PR PBB SHADOW E&M-EST. PATIENT-LVL III: CPT | Mod: PBBFAC,HCNC,, | Performed by: OBSTETRICS & GYNECOLOGY

## 2021-11-29 DIAGNOSIS — N99.3 PROLAPSE OF VAGINAL VAULT AFTER HYSTERECTOMY: ICD-10-CM

## 2021-11-29 DIAGNOSIS — N81.6 RECTOCELE: Primary | ICD-10-CM

## 2021-11-29 RX ORDER — ESTRADIOL 0.1 MG/G
1 CREAM VAGINAL
Qty: 42 G | Refills: 11 | Status: SHIPPED | OUTPATIENT
Start: 2021-11-29 | End: 2022-12-02 | Stop reason: SDUPTHER

## 2021-11-30 ENCOUNTER — TELEPHONE (OUTPATIENT)
Dept: FAMILY MEDICINE | Facility: CLINIC | Age: 81
End: 2021-11-30
Payer: MEDICARE

## 2021-11-30 ENCOUNTER — OFFICE VISIT (OUTPATIENT)
Dept: SURGERY | Facility: CLINIC | Age: 81
End: 2021-11-30
Payer: MEDICARE

## 2021-11-30 VITALS
BODY MASS INDEX: 25.46 KG/M2 | TEMPERATURE: 98 F | HEIGHT: 64 IN | SYSTOLIC BLOOD PRESSURE: 175 MMHG | RESPIRATION RATE: 16 BRPM | HEART RATE: 81 BPM | WEIGHT: 149.13 LBS | DIASTOLIC BLOOD PRESSURE: 77 MMHG

## 2021-11-30 DIAGNOSIS — N81.6 RECTOCELE: Primary | ICD-10-CM

## 2021-11-30 DIAGNOSIS — N99.3 PROLAPSE OF VAGINAL VAULT AFTER HYSTERECTOMY: ICD-10-CM

## 2021-11-30 PROCEDURE — 99999 PR PBB SHADOW E&M-EST. PATIENT-LVL IV: CPT | Mod: PBBFAC,HCNC,, | Performed by: SURGERY

## 2021-11-30 PROCEDURE — 99203 OFFICE O/P NEW LOW 30 MIN: CPT | Mod: HCNC,S$GLB,, | Performed by: SURGERY

## 2021-11-30 PROCEDURE — 99203 PR OFFICE/OUTPT VISIT, NEW, LEVL III, 30-44 MIN: ICD-10-PCS | Mod: HCNC,S$GLB,, | Performed by: SURGERY

## 2021-11-30 PROCEDURE — 99999 PR PBB SHADOW E&M-EST. PATIENT-LVL IV: ICD-10-PCS | Mod: PBBFAC,HCNC,, | Performed by: SURGERY

## 2021-12-06 ENCOUNTER — PATIENT MESSAGE (OUTPATIENT)
Dept: FAMILY MEDICINE | Facility: CLINIC | Age: 81
End: 2021-12-06
Payer: MEDICARE

## 2021-12-06 ENCOUNTER — OFFICE VISIT (OUTPATIENT)
Dept: FAMILY MEDICINE | Facility: CLINIC | Age: 81
End: 2021-12-06
Payer: MEDICARE

## 2021-12-06 ENCOUNTER — PATIENT MESSAGE (OUTPATIENT)
Dept: UROGYNECOLOGY | Facility: CLINIC | Age: 81
End: 2021-12-06
Payer: MEDICARE

## 2021-12-06 ENCOUNTER — TELEPHONE (OUTPATIENT)
Dept: UROGYNECOLOGY | Facility: CLINIC | Age: 81
End: 2021-12-06
Payer: MEDICARE

## 2021-12-06 ENCOUNTER — HOSPITAL ENCOUNTER (OUTPATIENT)
Dept: RADIOLOGY | Facility: HOSPITAL | Age: 81
Discharge: HOME | End: 2021-12-06
Attending: NURSE PRACTITIONER
Payer: MEDICARE

## 2021-12-06 VITALS
OXYGEN SATURATION: 99 % | DIASTOLIC BLOOD PRESSURE: 78 MMHG | SYSTOLIC BLOOD PRESSURE: 136 MMHG | HEIGHT: 64 IN | WEIGHT: 148.06 LBS | BODY MASS INDEX: 25.28 KG/M2 | HEART RATE: 75 BPM

## 2021-12-06 DIAGNOSIS — N81.6 RECTOCELE: ICD-10-CM

## 2021-12-06 DIAGNOSIS — R93.89 ABNORMAL CXR: Primary | ICD-10-CM

## 2021-12-06 DIAGNOSIS — Z01.818 ENCOUNTER FOR PREOPERATIVE ASSESSMENT: ICD-10-CM

## 2021-12-06 DIAGNOSIS — Z01.818 ENCOUNTER FOR PREOPERATIVE ASSESSMENT: Primary | ICD-10-CM

## 2021-12-06 PROCEDURE — 71046 X-RAY EXAM CHEST 2 VIEWS: CPT | Mod: TC,HCNC,PN

## 2021-12-06 PROCEDURE — 99213 OFFICE O/P EST LOW 20 MIN: CPT | Mod: HCNC,S$GLB,, | Performed by: NURSE PRACTITIONER

## 2021-12-06 PROCEDURE — 71046 X-RAY EXAM CHEST 2 VIEWS: CPT | Mod: 26,HCNC,, | Performed by: RADIOLOGY

## 2021-12-06 PROCEDURE — 99213 PR OFFICE/OUTPT VISIT, EST, LEVL III, 20-29 MIN: ICD-10-PCS | Mod: HCNC,S$GLB,, | Performed by: NURSE PRACTITIONER

## 2021-12-06 PROCEDURE — 71046 XR CHEST PA AND LATERAL: ICD-10-PCS | Mod: 26,HCNC,, | Performed by: RADIOLOGY

## 2021-12-06 PROCEDURE — 99999 PR PBB SHADOW E&M-EST. PATIENT-LVL IV: CPT | Mod: PBBFAC,HCNC,, | Performed by: NURSE PRACTITIONER

## 2021-12-06 PROCEDURE — 99999 PR PBB SHADOW E&M-EST. PATIENT-LVL IV: ICD-10-PCS | Mod: PBBFAC,HCNC,, | Performed by: NURSE PRACTITIONER

## 2021-12-08 ENCOUNTER — PATIENT MESSAGE (OUTPATIENT)
Dept: SURGERY | Facility: HOSPITAL | Age: 81
End: 2021-12-08
Payer: MEDICARE

## 2021-12-08 ENCOUNTER — OFFICE VISIT (OUTPATIENT)
Dept: UROGYNECOLOGY | Facility: CLINIC | Age: 81
End: 2021-12-08
Payer: MEDICARE

## 2021-12-08 ENCOUNTER — TELEPHONE (OUTPATIENT)
Dept: FAMILY MEDICINE | Facility: CLINIC | Age: 81
End: 2021-12-08
Payer: MEDICARE

## 2021-12-08 ENCOUNTER — HOSPITAL ENCOUNTER (OUTPATIENT)
Dept: PREADMISSION TESTING | Facility: HOSPITAL | Age: 81
Discharge: HOME | End: 2021-12-08
Attending: OBSTETRICS & GYNECOLOGY
Payer: MEDICARE

## 2021-12-08 VITALS
WEIGHT: 147.94 LBS | DIASTOLIC BLOOD PRESSURE: 70 MMHG | BODY MASS INDEX: 25.25 KG/M2 | HEIGHT: 64 IN | SYSTOLIC BLOOD PRESSURE: 137 MMHG | HEART RATE: 68 BPM

## 2021-12-08 DIAGNOSIS — Z01.818 PREOP TESTING: Primary | ICD-10-CM

## 2021-12-08 DIAGNOSIS — Z01.818 PRE-OP EXAM: ICD-10-CM

## 2021-12-08 DIAGNOSIS — N99.3 PROLAPSE OF VAGINAL VAULT AFTER HYSTERECTOMY: Primary | ICD-10-CM

## 2021-12-08 DIAGNOSIS — N81.6 RECTOCELE: ICD-10-CM

## 2021-12-08 PROBLEM — R00.2 PALPITATIONS: Status: RESOLVED | Noted: 2019-07-18 | Resolved: 2021-12-08

## 2021-12-08 LAB
ABO + RH BLD: NORMAL
BLD GP AB SCN CELLS X3 SERPL QL: NORMAL

## 2021-12-08 PROCEDURE — 99213 OFFICE O/P EST LOW 20 MIN: CPT | Mod: HCNC,S$GLB,, | Performed by: OBSTETRICS & GYNECOLOGY

## 2021-12-08 PROCEDURE — 99999 PR PBB SHADOW E&M-EST. PATIENT-LVL III: CPT | Mod: PBBFAC,HCNC,, | Performed by: OBSTETRICS & GYNECOLOGY

## 2021-12-08 PROCEDURE — 99900103 DSU ONLY-NO CHARGE-INITIAL HR (STAT): Mod: HCNC

## 2021-12-08 PROCEDURE — 86900 BLOOD TYPING SEROLOGIC ABO: CPT | Mod: HCNC | Performed by: OBSTETRICS & GYNECOLOGY

## 2021-12-08 PROCEDURE — 99213 PR OFFICE/OUTPT VISIT, EST, LEVL III, 20-29 MIN: ICD-10-PCS | Mod: HCNC,S$GLB,, | Performed by: OBSTETRICS & GYNECOLOGY

## 2021-12-08 PROCEDURE — 36415 COLL VENOUS BLD VENIPUNCTURE: CPT | Mod: HCNC | Performed by: OBSTETRICS & GYNECOLOGY

## 2021-12-08 PROCEDURE — 99900104 DSU ONLY-NO CHARGE-EA ADD'L HR (STAT): Mod: HCNC

## 2021-12-08 PROCEDURE — 99999 PR PBB SHADOW E&M-EST. PATIENT-LVL III: ICD-10-PCS | Mod: PBBFAC,HCNC,, | Performed by: OBSTETRICS & GYNECOLOGY

## 2021-12-09 ENCOUNTER — PATIENT MESSAGE (OUTPATIENT)
Dept: SURGERY | Facility: HOSPITAL | Age: 81
End: 2021-12-09
Payer: MEDICARE

## 2021-12-10 ENCOUNTER — PATIENT MESSAGE (OUTPATIENT)
Dept: SURGERY | Facility: HOSPITAL | Age: 81
End: 2021-12-10
Payer: MEDICARE

## 2021-12-13 ENCOUNTER — PATIENT MESSAGE (OUTPATIENT)
Dept: SURGERY | Facility: HOSPITAL | Age: 81
End: 2021-12-13
Payer: MEDICARE

## 2021-12-13 ENCOUNTER — ANESTHESIA EVENT (OUTPATIENT)
Dept: SURGERY | Facility: HOSPITAL | Age: 81
End: 2021-12-13
Payer: MEDICARE

## 2021-12-13 DIAGNOSIS — N99.3 PROLAPSE OF VAGINAL VAULT AFTER HYSTERECTOMY: Primary | ICD-10-CM

## 2021-12-13 RX ORDER — LIDOCAINE HYDROCHLORIDE 20 MG/ML
JELLY TOPICAL ONCE
Status: CANCELLED | OUTPATIENT
Start: 2021-12-13 | End: 2021-12-13

## 2021-12-14 ENCOUNTER — ANESTHESIA (OUTPATIENT)
Dept: SURGERY | Facility: HOSPITAL | Age: 81
End: 2021-12-14
Payer: MEDICARE

## 2021-12-14 ENCOUNTER — HOSPITAL ENCOUNTER (OUTPATIENT)
Facility: HOSPITAL | Age: 81
Discharge: HOME | End: 2021-12-15
Attending: OBSTETRICS & GYNECOLOGY | Admitting: OBSTETRICS & GYNECOLOGY
Payer: MEDICARE

## 2021-12-14 DIAGNOSIS — N99.3 PROLAPSE OF VAGINAL VAULT AFTER HYSTERECTOMY: ICD-10-CM

## 2021-12-14 DIAGNOSIS — N81.10 VAGINAL PROLAPSE: Primary | ICD-10-CM

## 2021-12-14 PROCEDURE — 57425 PR LAPAROSCOPY, SURG, COLPOPEXY: ICD-10-PCS | Mod: 22,HCNC,, | Performed by: OBSTETRICS & GYNECOLOGY

## 2021-12-14 PROCEDURE — 27201423 OPTIME MED/SURG SUP & DEVICES STERILE SUPPLY: Mod: HCNC | Performed by: OBSTETRICS & GYNECOLOGY

## 2021-12-14 PROCEDURE — 99900103 DSU ONLY-NO CHARGE-INITIAL HR (STAT): Mod: HCNC | Performed by: OBSTETRICS & GYNECOLOGY

## 2021-12-14 PROCEDURE — D9220A PRA ANESTHESIA: ICD-10-PCS | Mod: HCNC,CRNA,, | Performed by: NURSE ANESTHETIST, CERTIFIED REGISTERED

## 2021-12-14 PROCEDURE — D9220A PRA ANESTHESIA: Mod: HCNC,CRNA,, | Performed by: NURSE ANESTHETIST, CERTIFIED REGISTERED

## 2021-12-14 PROCEDURE — C1781 MESH (IMPLANTABLE): HCPCS | Mod: HCNC | Performed by: OBSTETRICS & GYNECOLOGY

## 2021-12-14 PROCEDURE — 36000712 HC OR TIME LEV V 1ST 15 MIN: Mod: HCNC | Performed by: OBSTETRICS & GYNECOLOGY

## 2021-12-14 PROCEDURE — 37000009 HC ANESTHESIA EA ADD 15 MINS: Mod: HCNC | Performed by: OBSTETRICS & GYNECOLOGY

## 2021-12-14 PROCEDURE — 63600175 PHARM REV CODE 636 W HCPCS: Mod: HCNC | Performed by: OBSTETRICS & GYNECOLOGY

## 2021-12-14 PROCEDURE — 71000033 HC RECOVERY, INTIAL HOUR: Mod: HCNC | Performed by: OBSTETRICS & GYNECOLOGY

## 2021-12-14 PROCEDURE — 63600175 PHARM REV CODE 636 W HCPCS: Mod: HCNC | Performed by: NURSE ANESTHETIST, CERTIFIED REGISTERED

## 2021-12-14 PROCEDURE — 99900035 HC TECH TIME PER 15 MIN (STAT): Mod: HCNC

## 2021-12-14 PROCEDURE — D9220A PRA ANESTHESIA: Mod: HCNC,ANES,, | Performed by: ANESTHESIOLOGY

## 2021-12-14 PROCEDURE — 25000003 PHARM REV CODE 250: Mod: HCNC | Performed by: ANESTHESIOLOGY

## 2021-12-14 PROCEDURE — 37000008 HC ANESTHESIA 1ST 15 MINUTES: Mod: HCNC | Performed by: OBSTETRICS & GYNECOLOGY

## 2021-12-14 PROCEDURE — 25000003 PHARM REV CODE 250: Mod: HCNC | Performed by: NURSE ANESTHETIST, CERTIFIED REGISTERED

## 2021-12-14 PROCEDURE — 63600175 PHARM REV CODE 636 W HCPCS: Mod: HCNC | Performed by: ANESTHESIOLOGY

## 2021-12-14 PROCEDURE — 94761 N-INVAS EAR/PLS OXIMETRY MLT: CPT | Mod: HCNC

## 2021-12-14 PROCEDURE — 71000039 HC RECOVERY, EACH ADD'L HOUR: Mod: HCNC | Performed by: OBSTETRICS & GYNECOLOGY

## 2021-12-14 PROCEDURE — 57425 LAPAROSCOPY SURG COLPOPEXY: CPT | Mod: 22,HCNC,, | Performed by: OBSTETRICS & GYNECOLOGY

## 2021-12-14 PROCEDURE — 25000003 PHARM REV CODE 250: Mod: HCNC | Performed by: OBSTETRICS & GYNECOLOGY

## 2021-12-14 PROCEDURE — 94799 UNLISTED PULMONARY SVC/PX: CPT | Mod: HCNC

## 2021-12-14 PROCEDURE — 36000713 HC OR TIME LEV V EA ADD 15 MIN: Mod: HCNC | Performed by: OBSTETRICS & GYNECOLOGY

## 2021-12-14 PROCEDURE — 27000221 HC OXYGEN, UP TO 24 HOURS: Mod: HCNC

## 2021-12-14 PROCEDURE — 99900104 DSU ONLY-NO CHARGE-EA ADD'L HR (STAT): Mod: HCNC | Performed by: OBSTETRICS & GYNECOLOGY

## 2021-12-14 PROCEDURE — D9220A PRA ANESTHESIA: ICD-10-PCS | Mod: HCNC,ANES,, | Performed by: ANESTHESIOLOGY

## 2021-12-14 DEVICE — MESH RESTORELLE Y 24X4CM: Type: IMPLANTABLE DEVICE | Site: PELVIS | Status: FUNCTIONAL

## 2021-12-14 RX ORDER — LIDOCAINE HYDROCHLORIDE 20 MG/ML
JELLY TOPICAL
Status: DISCONTINUED | OUTPATIENT
Start: 2021-12-14 | End: 2021-12-14 | Stop reason: HOSPADM

## 2021-12-14 RX ORDER — KETOROLAC TROMETHAMINE 30 MG/ML
30 INJECTION, SOLUTION INTRAMUSCULAR; INTRAVENOUS ONCE AS NEEDED
Status: COMPLETED | OUTPATIENT
Start: 2021-12-14 | End: 2021-12-14

## 2021-12-14 RX ORDER — OXYCODONE AND ACETAMINOPHEN 10; 325 MG/1; MG/1
1 TABLET ORAL EVERY 4 HOURS PRN
Status: DISCONTINUED | OUTPATIENT
Start: 2021-12-14 | End: 2021-12-15 | Stop reason: HOSPADM

## 2021-12-14 RX ORDER — FENTANYL CITRATE 50 UG/ML
INJECTION, SOLUTION INTRAMUSCULAR; INTRAVENOUS
Status: DISCONTINUED | OUTPATIENT
Start: 2021-12-14 | End: 2021-12-14

## 2021-12-14 RX ORDER — HYDROMORPHONE HYDROCHLORIDE 2 MG/ML
0.2 INJECTION, SOLUTION INTRAMUSCULAR; INTRAVENOUS; SUBCUTANEOUS EVERY 5 MIN PRN
Status: COMPLETED | OUTPATIENT
Start: 2021-12-14 | End: 2021-12-14

## 2021-12-14 RX ORDER — METOCLOPRAMIDE HYDROCHLORIDE 5 MG/ML
10 INJECTION INTRAMUSCULAR; INTRAVENOUS EVERY 10 MIN PRN
Status: DISCONTINUED | OUTPATIENT
Start: 2021-12-14 | End: 2021-12-14 | Stop reason: HOSPADM

## 2021-12-14 RX ORDER — DIPHENHYDRAMINE HCL 25 MG
25 CAPSULE ORAL EVERY 4 HOURS PRN
Status: DISCONTINUED | OUTPATIENT
Start: 2021-12-14 | End: 2021-12-15 | Stop reason: HOSPADM

## 2021-12-14 RX ORDER — ACETAMINOPHEN 10 MG/ML
INJECTION, SOLUTION INTRAVENOUS
Status: DISCONTINUED | OUTPATIENT
Start: 2021-12-14 | End: 2021-12-14

## 2021-12-14 RX ORDER — BISACODYL 10 MG
10 SUPPOSITORY, RECTAL RECTAL DAILY PRN
Status: DISCONTINUED | OUTPATIENT
Start: 2021-12-14 | End: 2021-12-15 | Stop reason: HOSPADM

## 2021-12-14 RX ORDER — FAMOTIDINE 10 MG/ML
20 INJECTION INTRAVENOUS EVERY 12 HOURS
Status: DISCONTINUED | OUTPATIENT
Start: 2021-12-14 | End: 2021-12-15 | Stop reason: HOSPADM

## 2021-12-14 RX ORDER — IBUPROFEN 600 MG/1
600 TABLET ORAL EVERY 6 HOURS
Status: DISCONTINUED | OUTPATIENT
Start: 2021-12-15 | End: 2021-12-15 | Stop reason: HOSPADM

## 2021-12-14 RX ORDER — SODIUM CHLORIDE, SODIUM LACTATE, POTASSIUM CHLORIDE, CALCIUM CHLORIDE 600; 310; 30; 20 MG/100ML; MG/100ML; MG/100ML; MG/100ML
INJECTION, SOLUTION INTRAVENOUS CONTINUOUS
Status: DISCONTINUED | OUTPATIENT
Start: 2021-12-14 | End: 2021-12-15 | Stop reason: HOSPADM

## 2021-12-14 RX ORDER — ROCURONIUM BROMIDE 10 MG/ML
INJECTION, SOLUTION INTRAVENOUS
Status: DISCONTINUED | OUTPATIENT
Start: 2021-12-14 | End: 2021-12-14

## 2021-12-14 RX ORDER — LOSARTAN POTASSIUM 25 MG/1
25 TABLET ORAL DAILY
Status: DISCONTINUED | OUTPATIENT
Start: 2021-12-14 | End: 2021-12-14 | Stop reason: SDUPTHER

## 2021-12-14 RX ORDER — LEVOTHYROXINE SODIUM 88 UG/1
88 TABLET ORAL
Status: DISCONTINUED | OUTPATIENT
Start: 2021-12-15 | End: 2021-12-15 | Stop reason: HOSPADM

## 2021-12-14 RX ORDER — KETOROLAC TROMETHAMINE 30 MG/ML
15 INJECTION, SOLUTION INTRAMUSCULAR; INTRAVENOUS EVERY 6 HOURS
Status: DISCONTINUED | OUTPATIENT
Start: 2021-12-14 | End: 2021-12-15 | Stop reason: HOSPADM

## 2021-12-14 RX ORDER — PROPOFOL 10 MG/ML
VIAL (ML) INTRAVENOUS
Status: DISCONTINUED | OUTPATIENT
Start: 2021-12-14 | End: 2021-12-14

## 2021-12-14 RX ORDER — ONDANSETRON 8 MG/1
8 TABLET, ORALLY DISINTEGRATING ORAL EVERY 8 HOURS PRN
Status: DISCONTINUED | OUTPATIENT
Start: 2021-12-14 | End: 2021-12-15 | Stop reason: HOSPADM

## 2021-12-14 RX ORDER — DIPHENHYDRAMINE HYDROCHLORIDE 50 MG/ML
25 INJECTION INTRAMUSCULAR; INTRAVENOUS EVERY 4 HOURS PRN
Status: DISCONTINUED | OUTPATIENT
Start: 2021-12-14 | End: 2021-12-15 | Stop reason: HOSPADM

## 2021-12-14 RX ORDER — DIPHENHYDRAMINE HYDROCHLORIDE 50 MG/ML
INJECTION INTRAMUSCULAR; INTRAVENOUS
Status: DISCONTINUED | OUTPATIENT
Start: 2021-12-14 | End: 2021-12-14

## 2021-12-14 RX ORDER — LOSARTAN POTASSIUM AND HYDROCHLOROTHIAZIDE 12.5; 5 MG/1; MG/1
2 TABLET ORAL DAILY
Status: DISCONTINUED | OUTPATIENT
Start: 2021-12-14 | End: 2021-12-15 | Stop reason: HOSPADM

## 2021-12-14 RX ORDER — ONDANSETRON HYDROCHLORIDE 2 MG/ML
INJECTION, SOLUTION INTRAMUSCULAR; INTRAVENOUS
Status: DISCONTINUED | OUTPATIENT
Start: 2021-12-14 | End: 2021-12-14

## 2021-12-14 RX ORDER — OXYCODONE HYDROCHLORIDE 5 MG/1
5 TABLET ORAL ONCE AS NEEDED
Status: COMPLETED | OUTPATIENT
Start: 2021-12-14 | End: 2021-12-14

## 2021-12-14 RX ORDER — CEFAZOLIN SODIUM 2 G/50ML
2 SOLUTION INTRAVENOUS
Status: COMPLETED | OUTPATIENT
Start: 2021-12-14 | End: 2021-12-14

## 2021-12-14 RX ORDER — POLYETHYLENE GLYCOL 3350 17 G/17G
17 POWDER, FOR SOLUTION ORAL DAILY
Status: DISCONTINUED | OUTPATIENT
Start: 2021-12-14 | End: 2021-12-15 | Stop reason: HOSPADM

## 2021-12-14 RX ORDER — OXYCODONE AND ACETAMINOPHEN 5; 325 MG/1; MG/1
1 TABLET ORAL EVERY 4 HOURS PRN
Status: DISCONTINUED | OUTPATIENT
Start: 2021-12-14 | End: 2021-12-15 | Stop reason: HOSPADM

## 2021-12-14 RX ORDER — LIDOCAINE HCL/PF 100 MG/5ML
SYRINGE (ML) INTRAVENOUS
Status: DISCONTINUED | OUTPATIENT
Start: 2021-12-14 | End: 2021-12-14

## 2021-12-14 RX ORDER — DEXAMETHASONE SODIUM PHOSPHATE 4 MG/ML
INJECTION, SOLUTION INTRA-ARTICULAR; INTRALESIONAL; INTRAMUSCULAR; INTRAVENOUS; SOFT TISSUE
Status: DISCONTINUED | OUTPATIENT
Start: 2021-12-14 | End: 2021-12-14

## 2021-12-14 RX ORDER — PROCHLORPERAZINE EDISYLATE 5 MG/ML
5 INJECTION INTRAMUSCULAR; INTRAVENOUS EVERY 6 HOURS PRN
Status: DISCONTINUED | OUTPATIENT
Start: 2021-12-14 | End: 2021-12-15 | Stop reason: HOSPADM

## 2021-12-14 RX ORDER — SODIUM CHLORIDE, SODIUM LACTATE, POTASSIUM CHLORIDE, CALCIUM CHLORIDE 600; 310; 30; 20 MG/100ML; MG/100ML; MG/100ML; MG/100ML
INJECTION, SOLUTION INTRAVENOUS CONTINUOUS
Status: DISCONTINUED | OUTPATIENT
Start: 2021-12-14 | End: 2021-12-14

## 2021-12-14 RX ORDER — LIDOCAINE HYDROCHLORIDE 10 MG/ML
1 INJECTION, SOLUTION EPIDURAL; INFILTRATION; INTRACAUDAL; PERINEURAL ONCE
Status: COMPLETED | OUTPATIENT
Start: 2021-12-14 | End: 2021-12-14

## 2021-12-14 RX ORDER — MUPIROCIN 20 MG/G
1 OINTMENT TOPICAL 2 TIMES DAILY
Status: DISCONTINUED | OUTPATIENT
Start: 2021-12-14 | End: 2021-12-15 | Stop reason: HOSPADM

## 2021-12-14 RX ORDER — MIDAZOLAM HYDROCHLORIDE 1 MG/ML
INJECTION INTRAMUSCULAR; INTRAVENOUS
Status: DISCONTINUED | OUTPATIENT
Start: 2021-12-14 | End: 2021-12-14

## 2021-12-14 RX ORDER — OXYCODONE HYDROCHLORIDE 5 MG/1
5 TABLET ORAL EVERY 4 HOURS PRN
Status: DISCONTINUED | OUTPATIENT
Start: 2021-12-14 | End: 2021-12-14 | Stop reason: HOSPADM

## 2021-12-14 RX ORDER — HYDROMORPHONE HYDROCHLORIDE 1 MG/ML
1 INJECTION, SOLUTION INTRAMUSCULAR; INTRAVENOUS; SUBCUTANEOUS EVERY 4 HOURS PRN
Status: DISCONTINUED | OUTPATIENT
Start: 2021-12-14 | End: 2021-12-15 | Stop reason: HOSPADM

## 2021-12-14 RX ORDER — POLYETHYLENE GLYCOL 3350 17 G/17G
17 POWDER, FOR SOLUTION ORAL DAILY
Status: DISCONTINUED | OUTPATIENT
Start: 2021-12-14 | End: 2021-12-14 | Stop reason: SDUPTHER

## 2021-12-14 RX ADMIN — FENTANYL CITRATE 25 MCG: 50 INJECTION, SOLUTION INTRAMUSCULAR; INTRAVENOUS at 07:12

## 2021-12-14 RX ADMIN — HYDROMORPHONE HYDROCHLORIDE 0.2 MG: 2 INJECTION, SOLUTION INTRAMUSCULAR; INTRAVENOUS; SUBCUTANEOUS at 11:12

## 2021-12-14 RX ADMIN — SODIUM CHLORIDE, SODIUM GLUCONATE, SODIUM ACETATE, POTASSIUM CHLORIDE, MAGNESIUM CHLORIDE, SODIUM PHOSPHATE, DIBASIC, AND POTASSIUM PHOSPHATE: .53; .5; .37; .037; .03; .012; .00082 INJECTION, SOLUTION INTRAVENOUS at 08:12

## 2021-12-14 RX ADMIN — POLYETHYLENE GLYCOL 3350 17 G: 17 POWDER, FOR SOLUTION ORAL at 04:12

## 2021-12-14 RX ADMIN — SUGAMMADEX 200 MG: 100 INJECTION, SOLUTION INTRAVENOUS at 09:12

## 2021-12-14 RX ADMIN — LIDOCAINE HYDROCHLORIDE 10 MG: 10 INJECTION, SOLUTION EPIDURAL; INFILTRATION; INTRACAUDAL; PERINEURAL at 06:12

## 2021-12-14 RX ADMIN — LIDOCAINE HYDROCHLORIDE 100 MG: 20 INJECTION INTRAVENOUS at 07:12

## 2021-12-14 RX ADMIN — KETOROLAC TROMETHAMINE 15 MG: 30 INJECTION, SOLUTION INTRAMUSCULAR at 05:12

## 2021-12-14 RX ADMIN — CEFAZOLIN SODIUM 2 G: 2 SOLUTION INTRAVENOUS at 07:12

## 2021-12-14 RX ADMIN — OXYCODONE 5 MG: 5 TABLET ORAL at 11:12

## 2021-12-14 RX ADMIN — DEXAMETHASONE SODIUM PHOSPHATE 4 MG: 4 INJECTION, SOLUTION INTRA-ARTICULAR; INTRALESIONAL; INTRAMUSCULAR; INTRAVENOUS; SOFT TISSUE at 07:12

## 2021-12-14 RX ADMIN — OXYCODONE 5 MG: 5 TABLET ORAL at 12:12

## 2021-12-14 RX ADMIN — KETOROLAC TROMETHAMINE 30 MG: 30 INJECTION, SOLUTION INTRAMUSCULAR; INTRAVENOUS at 12:12

## 2021-12-14 RX ADMIN — OXYCODONE AND ACETAMINOPHEN 1 TABLET: 10; 325 TABLET ORAL at 04:12

## 2021-12-14 RX ADMIN — DIPHENHYDRAMINE HYDROCHLORIDE 12.5 MG: 50 INJECTION INTRAMUSCULAR; INTRAVENOUS at 07:12

## 2021-12-14 RX ADMIN — MUPIROCIN 1 G: 20 OINTMENT TOPICAL at 08:12

## 2021-12-14 RX ADMIN — CONJUGATED ESTROGENS 0.5 G: 0.62 CREAM VAGINAL at 08:12

## 2021-12-14 RX ADMIN — SODIUM CHLORIDE, SODIUM LACTATE, POTASSIUM CHLORIDE, AND CALCIUM CHLORIDE: .6; .31; .03; .02 INJECTION, SOLUTION INTRAVENOUS at 10:12

## 2021-12-14 RX ADMIN — KETOROLAC TROMETHAMINE 15 MG: 30 INJECTION, SOLUTION INTRAMUSCULAR at 11:12

## 2021-12-14 RX ADMIN — OXYCODONE AND ACETAMINOPHEN 1 TABLET: 10; 325 TABLET ORAL at 08:12

## 2021-12-14 RX ADMIN — ROCURONIUM BROMIDE 40 MG: 10 INJECTION, SOLUTION INTRAVENOUS at 07:12

## 2021-12-14 RX ADMIN — SODIUM CHLORIDE, SODIUM GLUCONATE, SODIUM ACETATE, POTASSIUM CHLORIDE, MAGNESIUM CHLORIDE, SODIUM PHOSPHATE, DIBASIC, AND POTASSIUM PHOSPHATE: .53; .5; .37; .037; .03; .012; .00082 INJECTION, SOLUTION INTRAVENOUS at 06:12

## 2021-12-14 RX ADMIN — PROPOFOL 100 MG: 10 INJECTION, EMULSION INTRAVENOUS at 07:12

## 2021-12-14 RX ADMIN — SODIUM CHLORIDE, SODIUM LACTATE, POTASSIUM CHLORIDE, AND CALCIUM CHLORIDE: .6; .31; .03; .02 INJECTION, SOLUTION INTRAVENOUS at 01:12

## 2021-12-14 RX ADMIN — ROCURONIUM BROMIDE 20 MG: 10 INJECTION, SOLUTION INTRAVENOUS at 08:12

## 2021-12-14 RX ADMIN — MIDAZOLAM HYDROCHLORIDE 1 MG: 1 INJECTION, SOLUTION INTRAMUSCULAR; INTRAVENOUS at 06:12

## 2021-12-14 RX ADMIN — FENTANYL CITRATE 50 MCG: 50 INJECTION, SOLUTION INTRAMUSCULAR; INTRAVENOUS at 09:12

## 2021-12-14 RX ADMIN — FAMOTIDINE 20 MG: 10 INJECTION INTRAVENOUS at 08:12

## 2021-12-14 RX ADMIN — ACETAMINOPHEN 1000 MG: 10 INJECTION, SOLUTION INTRAVENOUS at 07:12

## 2021-12-14 RX ADMIN — LOSARTAN POTASSIUM AND HYDROCHLOROTHIAZIDE 2 TABLET: 12.5; 5 TABLET ORAL at 03:12

## 2021-12-14 RX ADMIN — ONDANSETRON 4 MG: 2 INJECTION, SOLUTION INTRAMUSCULAR; INTRAVENOUS at 07:12

## 2021-12-15 VITALS
RESPIRATION RATE: 16 BRPM | DIASTOLIC BLOOD PRESSURE: 72 MMHG | HEART RATE: 69 BPM | HEIGHT: 64 IN | BODY MASS INDEX: 24.42 KG/M2 | TEMPERATURE: 97 F | SYSTOLIC BLOOD PRESSURE: 163 MMHG | WEIGHT: 143.06 LBS | OXYGEN SATURATION: 100 %

## 2021-12-15 LAB
BASOPHILS # BLD AUTO: 0.03 K/UL (ref 0–0.2)
BASOPHILS NFR BLD: 0.3 % (ref 0–1.9)
DIFFERENTIAL METHOD: ABNORMAL
EOSINOPHIL # BLD AUTO: 0 K/UL (ref 0–0.5)
EOSINOPHIL NFR BLD: 0.5 % (ref 0–8)
ERYTHROCYTE [DISTWIDTH] IN BLOOD BY AUTOMATED COUNT: 12.9 % (ref 11.5–14.5)
HCT VFR BLD AUTO: 35.4 % (ref 37–48.5)
HGB BLD-MCNC: 11.9 G/DL (ref 12–16)
IMM GRANULOCYTES # BLD AUTO: 0.02 K/UL (ref 0–0.04)
IMM GRANULOCYTES NFR BLD AUTO: 0.2 % (ref 0–0.5)
LYMPHOCYTES # BLD AUTO: 2.4 K/UL (ref 1–4.8)
LYMPHOCYTES NFR BLD: 27.2 % (ref 18–48)
MCH RBC QN AUTO: 31.2 PG (ref 27–31)
MCHC RBC AUTO-ENTMCNC: 33.6 G/DL (ref 32–36)
MCV RBC AUTO: 93 FL (ref 82–98)
MONOCYTES # BLD AUTO: 1.2 K/UL (ref 0.3–1)
MONOCYTES NFR BLD: 14.2 % (ref 4–15)
NEUTROPHILS # BLD AUTO: 5.1 K/UL (ref 1.8–7.7)
NEUTROPHILS NFR BLD: 57.6 % (ref 38–73)
NRBC BLD-RTO: 0 /100 WBC
PLATELET # BLD AUTO: 365 K/UL (ref 150–450)
PMV BLD AUTO: 9.5 FL (ref 9.2–12.9)
RBC # BLD AUTO: 3.82 M/UL (ref 4–5.4)
WBC # BLD AUTO: 8.76 K/UL (ref 3.9–12.7)

## 2021-12-15 PROCEDURE — 94799 UNLISTED PULMONARY SVC/PX: CPT | Mod: HCNC

## 2021-12-15 PROCEDURE — 85025 COMPLETE CBC W/AUTO DIFF WBC: CPT | Mod: HCNC | Performed by: OBSTETRICS & GYNECOLOGY

## 2021-12-15 PROCEDURE — 36415 COLL VENOUS BLD VENIPUNCTURE: CPT | Mod: HCNC | Performed by: OBSTETRICS & GYNECOLOGY

## 2021-12-15 PROCEDURE — 94761 N-INVAS EAR/PLS OXIMETRY MLT: CPT | Mod: HCNC

## 2021-12-15 PROCEDURE — 63600175 PHARM REV CODE 636 W HCPCS: Mod: HCNC | Performed by: OBSTETRICS & GYNECOLOGY

## 2021-12-15 PROCEDURE — 27000221 HC OXYGEN, UP TO 24 HOURS: Mod: HCNC

## 2021-12-15 PROCEDURE — 25000003 PHARM REV CODE 250: Mod: HCNC | Performed by: OBSTETRICS & GYNECOLOGY

## 2021-12-15 RX ORDER — OXYCODONE AND ACETAMINOPHEN 7.5; 325 MG/1; MG/1
1 TABLET ORAL EVERY 8 HOURS PRN
Qty: 21 TABLET | Refills: 0 | Status: SHIPPED | OUTPATIENT
Start: 2021-12-15 | End: 2021-12-22

## 2021-12-15 RX ORDER — OXYCODONE AND ACETAMINOPHEN 7.5; 325 MG/1; MG/1
1 TABLET ORAL EVERY 4 HOURS PRN
Qty: 21 TABLET | Refills: 0 | Status: SHIPPED | OUTPATIENT
Start: 2021-12-15 | End: 2021-12-22

## 2021-12-15 RX ADMIN — OXYCODONE HYDROCHLORIDE AND ACETAMINOPHEN 1 TABLET: 5; 325 TABLET ORAL at 10:12

## 2021-12-15 RX ADMIN — OXYCODONE AND ACETAMINOPHEN 1 TABLET: 10; 325 TABLET ORAL at 04:12

## 2021-12-15 RX ADMIN — LEVOTHYROXINE SODIUM 88 MCG: 0.09 TABLET ORAL at 06:12

## 2021-12-15 RX ADMIN — KETOROLAC TROMETHAMINE 15 MG: 30 INJECTION, SOLUTION INTRAMUSCULAR at 06:12

## 2021-12-15 RX ADMIN — SODIUM CHLORIDE, SODIUM LACTATE, POTASSIUM CHLORIDE, AND CALCIUM CHLORIDE: .6; .31; .03; .02 INJECTION, SOLUTION INTRAVENOUS at 06:12

## 2021-12-15 RX ADMIN — OXYCODONE AND ACETAMINOPHEN 1 TABLET: 10; 325 TABLET ORAL at 12:12

## 2021-12-15 RX ADMIN — FAMOTIDINE 20 MG: 10 INJECTION INTRAVENOUS at 08:12

## 2021-12-15 RX ADMIN — LOSARTAN POTASSIUM AND HYDROCHLOROTHIAZIDE 2 TABLET: 12.5; 5 TABLET ORAL at 08:12

## 2021-12-20 ENCOUNTER — PATIENT MESSAGE (OUTPATIENT)
Dept: UROGYNECOLOGY | Facility: CLINIC | Age: 81
End: 2021-12-20
Payer: MEDICARE

## 2021-12-28 ENCOUNTER — OFFICE VISIT (OUTPATIENT)
Dept: UROGYNECOLOGY | Facility: CLINIC | Age: 81
End: 2021-12-28
Payer: MEDICARE

## 2021-12-28 VITALS
HEART RATE: 83 BPM | DIASTOLIC BLOOD PRESSURE: 68 MMHG | WEIGHT: 143.06 LBS | BODY MASS INDEX: 24.42 KG/M2 | HEIGHT: 64 IN | SYSTOLIC BLOOD PRESSURE: 164 MMHG

## 2021-12-28 DIAGNOSIS — R35.0 URINARY FREQUENCY: Primary | ICD-10-CM

## 2021-12-28 DIAGNOSIS — Z09 POSTOP CHECK: ICD-10-CM

## 2021-12-28 LAB
BILIRUB SERPL-MCNC: NORMAL MG/DL
BLOOD URINE, POC: NORMAL
CLARITY, POC UA: CLEAR
COLOR, POC UA: YELLOW
GLUCOSE UR QL STRIP: NORMAL
KETONES UR QL STRIP: NORMAL
LEUKOCYTE ESTERASE URINE, POC: NORMAL
NITRITE, POC UA: NORMAL
PH, POC UA: 5
POC RESIDUAL URINE VOLUME: 0 ML (ref 0–100)
PROTEIN, POC: NORMAL
SPECIFIC GRAVITY, POC UA: 1.01
UROBILINOGEN, POC UA: NORMAL

## 2021-12-28 PROCEDURE — 1126F AMNT PAIN NOTED NONE PRSNT: CPT | Mod: HCNC,CPTII,S$GLB, | Performed by: NURSE PRACTITIONER

## 2021-12-28 PROCEDURE — 99999 PR PBB SHADOW E&M-EST. PATIENT-LVL III: CPT | Mod: PBBFAC,HCNC,, | Performed by: NURSE PRACTITIONER

## 2021-12-28 PROCEDURE — 99999 PR PBB SHADOW E&M-EST. PATIENT-LVL III: ICD-10-PCS | Mod: PBBFAC,HCNC,, | Performed by: NURSE PRACTITIONER

## 2021-12-28 PROCEDURE — 81002 URINALYSIS NONAUTO W/O SCOPE: CPT | Mod: HCNC,S$GLB,, | Performed by: NURSE PRACTITIONER

## 2021-12-28 PROCEDURE — 1160F PR REVIEW ALL MEDS BY PRESCRIBER/CLIN PHARMACIST DOCUMENTED: ICD-10-PCS | Mod: HCNC,CPTII,S$GLB, | Performed by: NURSE PRACTITIONER

## 2021-12-28 PROCEDURE — 1160F RVW MEDS BY RX/DR IN RCRD: CPT | Mod: HCNC,CPTII,S$GLB, | Performed by: NURSE PRACTITIONER

## 2021-12-28 PROCEDURE — 3077F PR MOST RECENT SYSTOLIC BLOOD PRESSURE >= 140 MM HG: ICD-10-PCS | Mod: HCNC,CPTII,S$GLB, | Performed by: NURSE PRACTITIONER

## 2021-12-28 PROCEDURE — 1157F ADVNC CARE PLAN IN RCRD: CPT | Mod: HCNC,CPTII,S$GLB, | Performed by: NURSE PRACTITIONER

## 2021-12-28 PROCEDURE — 99024 POSTOP FOLLOW-UP VISIT: CPT | Mod: HCNC,S$GLB,, | Performed by: NURSE PRACTITIONER

## 2021-12-28 PROCEDURE — 3288F FALL RISK ASSESSMENT DOCD: CPT | Mod: HCNC,CPTII,S$GLB, | Performed by: NURSE PRACTITIONER

## 2021-12-28 PROCEDURE — 3078F PR MOST RECENT DIASTOLIC BLOOD PRESSURE < 80 MM HG: ICD-10-PCS | Mod: HCNC,CPTII,S$GLB, | Performed by: NURSE PRACTITIONER

## 2021-12-28 PROCEDURE — 3288F PR FALLS RISK ASSESSMENT DOCUMENTED: ICD-10-PCS | Mod: HCNC,CPTII,S$GLB, | Performed by: NURSE PRACTITIONER

## 2021-12-28 PROCEDURE — 81002 POCT URINE DIPSTICK WITHOUT MICROSCOPE: ICD-10-PCS | Mod: HCNC,S$GLB,, | Performed by: NURSE PRACTITIONER

## 2021-12-28 PROCEDURE — 1159F MED LIST DOCD IN RCRD: CPT | Mod: HCNC,CPTII,S$GLB, | Performed by: NURSE PRACTITIONER

## 2021-12-28 PROCEDURE — 1159F PR MEDICATION LIST DOCUMENTED IN MEDICAL RECORD: ICD-10-PCS | Mod: HCNC,CPTII,S$GLB, | Performed by: NURSE PRACTITIONER

## 2021-12-28 PROCEDURE — 3077F SYST BP >= 140 MM HG: CPT | Mod: HCNC,CPTII,S$GLB, | Performed by: NURSE PRACTITIONER

## 2021-12-28 PROCEDURE — 1157F PR ADVANCE CARE PLAN OR EQUIV PRESENT IN MEDICAL RECORD: ICD-10-PCS | Mod: HCNC,CPTII,S$GLB, | Performed by: NURSE PRACTITIONER

## 2021-12-28 PROCEDURE — 99024 PR POST-OP FOLLOW-UP VISIT: ICD-10-PCS | Mod: HCNC,S$GLB,, | Performed by: NURSE PRACTITIONER

## 2021-12-28 PROCEDURE — 51798 US URINE CAPACITY MEASURE: CPT | Mod: HCNC,S$GLB,, | Performed by: NURSE PRACTITIONER

## 2021-12-28 PROCEDURE — 3078F DIAST BP <80 MM HG: CPT | Mod: HCNC,CPTII,S$GLB, | Performed by: NURSE PRACTITIONER

## 2021-12-28 PROCEDURE — 51798 PR MEAS,POST-VOID RES,US,NON-IMAGING: ICD-10-PCS | Mod: HCNC,S$GLB,, | Performed by: NURSE PRACTITIONER

## 2021-12-28 PROCEDURE — 1101F PT FALLS ASSESS-DOCD LE1/YR: CPT | Mod: HCNC,CPTII,S$GLB, | Performed by: NURSE PRACTITIONER

## 2021-12-28 PROCEDURE — 1126F PR PAIN SEVERITY QUANTIFIED, NO PAIN PRESENT: ICD-10-PCS | Mod: HCNC,CPTII,S$GLB, | Performed by: NURSE PRACTITIONER

## 2021-12-28 PROCEDURE — 1101F PR PT FALLS ASSESS DOC 0-1 FALLS W/OUT INJ PAST YR: ICD-10-PCS | Mod: HCNC,CPTII,S$GLB, | Performed by: NURSE PRACTITIONER

## 2022-01-04 ENCOUNTER — PATIENT MESSAGE (OUTPATIENT)
Dept: FAMILY MEDICINE | Facility: CLINIC | Age: 82
End: 2022-01-04
Payer: MEDICARE

## 2022-01-07 ENCOUNTER — HOSPITAL ENCOUNTER (OUTPATIENT)
Dept: RADIOLOGY | Facility: HOSPITAL | Age: 82
Discharge: HOME | End: 2022-01-07
Attending: NURSE PRACTITIONER
Payer: MEDICARE

## 2022-01-07 DIAGNOSIS — R93.89 ABNORMAL CXR: ICD-10-CM

## 2022-01-07 PROCEDURE — 71046 X-RAY EXAM CHEST 2 VIEWS: CPT | Mod: TC,HCNC,PN

## 2022-01-07 PROCEDURE — 71046 XR CHEST PA AND LATERAL: ICD-10-PCS | Mod: 26,HCNC,, | Performed by: RADIOLOGY

## 2022-01-07 PROCEDURE — 71046 X-RAY EXAM CHEST 2 VIEWS: CPT | Mod: 26,HCNC,, | Performed by: RADIOLOGY

## 2022-01-31 ENCOUNTER — PATIENT MESSAGE (OUTPATIENT)
Dept: GASTROENTEROLOGY | Facility: CLINIC | Age: 82
End: 2022-01-31
Payer: MEDICARE

## 2022-02-02 ENCOUNTER — OFFICE VISIT (OUTPATIENT)
Dept: UROGYNECOLOGY | Facility: CLINIC | Age: 82
End: 2022-02-02
Payer: MEDICARE

## 2022-02-02 VITALS
WEIGHT: 143.06 LBS | DIASTOLIC BLOOD PRESSURE: 62 MMHG | HEART RATE: 80 BPM | SYSTOLIC BLOOD PRESSURE: 156 MMHG | HEIGHT: 64 IN | BODY MASS INDEX: 24.42 KG/M2

## 2022-02-02 DIAGNOSIS — N39.41 URGE INCONTINENCE OF URINE: Primary | ICD-10-CM

## 2022-02-02 PROCEDURE — 99024 PR POST-OP FOLLOW-UP VISIT: ICD-10-PCS | Mod: HCNC,S$GLB,, | Performed by: OBSTETRICS & GYNECOLOGY

## 2022-02-02 PROCEDURE — 1126F PR PAIN SEVERITY QUANTIFIED, NO PAIN PRESENT: ICD-10-PCS | Mod: HCNC,CPTII,S$GLB, | Performed by: OBSTETRICS & GYNECOLOGY

## 2022-02-02 PROCEDURE — 3077F PR MOST RECENT SYSTOLIC BLOOD PRESSURE >= 140 MM HG: ICD-10-PCS | Mod: HCNC,CPTII,S$GLB, | Performed by: OBSTETRICS & GYNECOLOGY

## 2022-02-02 PROCEDURE — 1157F PR ADVANCE CARE PLAN OR EQUIV PRESENT IN MEDICAL RECORD: ICD-10-PCS | Mod: HCNC,CPTII,S$GLB, | Performed by: OBSTETRICS & GYNECOLOGY

## 2022-02-02 PROCEDURE — 3288F PR FALLS RISK ASSESSMENT DOCUMENTED: ICD-10-PCS | Mod: HCNC,CPTII,S$GLB, | Performed by: OBSTETRICS & GYNECOLOGY

## 2022-02-02 PROCEDURE — 99999 PR PBB SHADOW E&M-EST. PATIENT-LVL III: ICD-10-PCS | Mod: PBBFAC,HCNC,, | Performed by: OBSTETRICS & GYNECOLOGY

## 2022-02-02 PROCEDURE — 1101F PR PT FALLS ASSESS DOC 0-1 FALLS W/OUT INJ PAST YR: ICD-10-PCS | Mod: HCNC,CPTII,S$GLB, | Performed by: OBSTETRICS & GYNECOLOGY

## 2022-02-02 PROCEDURE — 99999 PR PBB SHADOW E&M-EST. PATIENT-LVL III: CPT | Mod: PBBFAC,HCNC,, | Performed by: OBSTETRICS & GYNECOLOGY

## 2022-02-02 PROCEDURE — 1159F PR MEDICATION LIST DOCUMENTED IN MEDICAL RECORD: ICD-10-PCS | Mod: HCNC,CPTII,S$GLB, | Performed by: OBSTETRICS & GYNECOLOGY

## 2022-02-02 PROCEDURE — 1126F AMNT PAIN NOTED NONE PRSNT: CPT | Mod: HCNC,CPTII,S$GLB, | Performed by: OBSTETRICS & GYNECOLOGY

## 2022-02-02 PROCEDURE — 3288F FALL RISK ASSESSMENT DOCD: CPT | Mod: HCNC,CPTII,S$GLB, | Performed by: OBSTETRICS & GYNECOLOGY

## 2022-02-02 PROCEDURE — 3077F SYST BP >= 140 MM HG: CPT | Mod: HCNC,CPTII,S$GLB, | Performed by: OBSTETRICS & GYNECOLOGY

## 2022-02-02 PROCEDURE — 1101F PT FALLS ASSESS-DOCD LE1/YR: CPT | Mod: HCNC,CPTII,S$GLB, | Performed by: OBSTETRICS & GYNECOLOGY

## 2022-02-02 PROCEDURE — 3078F DIAST BP <80 MM HG: CPT | Mod: HCNC,CPTII,S$GLB, | Performed by: OBSTETRICS & GYNECOLOGY

## 2022-02-02 PROCEDURE — 99024 POSTOP FOLLOW-UP VISIT: CPT | Mod: HCNC,S$GLB,, | Performed by: OBSTETRICS & GYNECOLOGY

## 2022-02-02 PROCEDURE — 1157F ADVNC CARE PLAN IN RCRD: CPT | Mod: HCNC,CPTII,S$GLB, | Performed by: OBSTETRICS & GYNECOLOGY

## 2022-02-02 PROCEDURE — 3078F PR MOST RECENT DIASTOLIC BLOOD PRESSURE < 80 MM HG: ICD-10-PCS | Mod: HCNC,CPTII,S$GLB, | Performed by: OBSTETRICS & GYNECOLOGY

## 2022-02-02 PROCEDURE — 1159F MED LIST DOCD IN RCRD: CPT | Mod: HCNC,CPTII,S$GLB, | Performed by: OBSTETRICS & GYNECOLOGY

## 2022-02-02 NOTE — PROGRESS NOTES
Subjective:      Patient ID: Dorothy Kramer is a 81 y.o. female.    Chief Complaint:  No chief complaint on file.      History of Present Illness  Several weeks postop at this point patient is very happy except for some urge type elements there is no ALECIA          Past Medical History:   Diagnosis Date    DDD (degenerative disc disease), cervical     Diverticulitis     Diverticulosis     DJD (degenerative joint disease) of hip     Dyspepsia     GERD (gastroesophageal reflux disease)     History of melanoma 5/20/2015    HTN (hypertension)     Melanoma     Migraine headache     Rectocele     Skin cancer     melanoma on face    Sleep apnea     Thyroid disease     hypo    Trouble in sleeping     Uterine prolaps     followed by GYN    Vulvar lesion 5/20/2015       Past Surgical History:   Procedure Laterality Date    APPENDECTOMY  1962    BACK SURGERY      BREAST BIOPSY      BREAST LUMPECTOMY  1989    CATARACT EXTRACTION Left 02/22/2021    ros    CATARACT EXTRACTION W/  INTRAOCULAR LENS IMPLANT Left 2/22/2021    Procedure: EXTRACTION, CATARACT, WITH IOL INSERTION;  Surgeon: Latisha Baker MD;  Location: [facility] OR;  Service: Ophthalmology;  Laterality: Left;  left    CATARACT EXTRACTION W/  INTRAOCULAR LENS IMPLANT Right 3/22/2021    Procedure: EXTRACTION, CATARACT, WITH IOL INSERTION;  Surgeon: Latisha Baker MD;  Location: [facility] OR;  Service: Ophthalmology;  Laterality: Right;  right    CERVICAL FUSION  1991    CHOLECYSTECTOMY      COLONOSCOPY      COLONOSCOPY N/A 11/9/2018    Dr Oscar; chronic active colitis; repeat in 5 years    COLONOSCOPY N/A 2/14/2020    Procedure: COLONOSCOPY;  Surgeon: Fadi Oscar MD;  Location: [facility] ENDO;  Service: Endoscopy;  Laterality: N/A;    HYSTERECTOMY      TRACEE/BSO for benign disease    JOINT REPLACEMENT Right 2016    Hip replacement    AR REMOVAL OF OVARY/TUBE(S)      ROBOT-ASSISTED LAPAROSCOPIC ABDOMINAL SACROCOLPOPEXY USING DA FLAKITO XI  N/A 2021    Procedure: XI ROBOTIC SACROCOLPOPEXY, ABDOMINAL;  Surgeon: Yannick Cabral MD;  Location: [facility] OR;  Service: OB/GYN;  Laterality: N/A;    ROBOT-ASSISTED LAPAROSCOPIC LYSIS OF ADHESIONS USING DA FLAKITO XI  2021    Procedure: XI ROBOTIC LYSIS, ADHESIONS;  Surgeon: Yannick Cabral MD;  Location: [facility] OR;  Service: OB/GYN;;    TONSILLECTOMY      TOTAL ABDOMINAL HYSTERECTOMY W/ BILATERAL SALPINGOOPHORECTOMY         GYN & OB History  No LMP recorded. Patient has had a hysterectomy.   Date of Last Pap: No result found    OB History    Para Term  AB Living   7 3 3   4     SAB IAB Ectopic Multiple Live Births   4              # Outcome Date GA Lbr Julius/2nd Weight Sex Delivery Anes PTL Lv   7 Term            6 Term            5 Term            4 SAB            3 SAB            2 SAB            1 SAB                Health Maintenance       Date Due Completion Date    Hepatitis C Screening Never done ---    Influenza Vaccine (1) 2021 (Declined)    Override on 2020: Declined    DEXA SCAN 10/05/2024 10/5/2021    Lipid Panel 2026    TETANUS VACCINE 2030          Family History   Problem Relation Age of Onset    Heart failure Mother     Cancer Mother         Bladder    Colon cancer Mother 63    Cancer Father         Bladder    Parkinsonism Father     Diverticulitis Father     Uterine cancer Maternal Aunt     Ovarian cancer Neg Hx     Breast cancer Neg Hx     Crohn's disease Neg Hx     Ulcerative colitis Neg Hx     Glaucoma Neg Hx     Macular degeneration Neg Hx     Retinal detachment Neg Hx        Social History     Socioeconomic History    Marital status:    Tobacco Use    Smoking status: Never Smoker    Smokeless tobacco: Never Used   Substance and Sexual Activity    Alcohol use: No    Drug use: No    Sexual activity: Not Currently     Social Determinants of Health     Food Insecurity: No Food Insecurity  "   Worried About Running Out of Food in the Last Year: Never true    Ran Out of Food in the Last Year: Never true   Transportation Needs: No Transportation Needs    Lack of Transportation (Medical): No    Lack of Transportation (Non-Medical): No   Physical Activity: Unknown    Days of Exercise per Week: 1 day   Social Connections: Unknown    Frequency of Communication with Friends and Family: More than three times a week    Frequency of Social Gatherings with Friends and Family: Twice a week    Active Member of Clubs or Organizations: Yes    Attends Club or Organization Meetings: More than 4 times per year    Marital Status:        Review of Systems  Review of Systems  Urge incontinence     Objective:   BP (!) 156/62   Pulse 80   Ht 5' 4" (1.626 m)   Wt 64.9 kg (143 lb 1.3 oz)   BMI 24.56 kg/m²     Physical Exam   Absolutely perfect anatomy  Assessment:     1. Urge incontinence of urine            Plan:     1. Urge incontinence of urine      This start her on more bed trick at 25.  Patient let us know how she is doing in a month if the is a satisfy sneeze and we will continue with that medication with sending in a prescription for that level if no improvement then we will go to a higher dose.    Patient understanding appreciated the time spent  There are no Patient Instructions on file for this visit.        "

## 2022-02-25 ENCOUNTER — PATIENT MESSAGE (OUTPATIENT)
Dept: UROGYNECOLOGY | Facility: CLINIC | Age: 82
End: 2022-02-25
Payer: MEDICARE

## 2022-02-28 ENCOUNTER — TELEPHONE (OUTPATIENT)
Dept: UROGYNECOLOGY | Facility: CLINIC | Age: 82
End: 2022-02-28
Payer: MEDICARE

## 2022-02-28 NOTE — TELEPHONE ENCOUNTER
----- Message from Josephine  sent at 2/28/2022 11:46 AM CST -----  Regarding: call back  Type: Needs Medical Advice    Who Called:      Best Call Back Number:   Additional Information: Requesting a call back from Nurse, regarding pt is at office to pick meds and needs a call back, no one answered door ,please advise            How Severe Is It?: mild Is This A New Presentation, Or A Follow-Up?: Rash

## 2022-03-20 DIAGNOSIS — Z78.0 POSTMENOPAUSAL: ICD-10-CM

## 2022-03-22 RX ORDER — ESTRADIOL 0.5 MG/1
0.5 TABLET ORAL DAILY
Qty: 90 TABLET | Refills: 0 | Status: SHIPPED | OUTPATIENT
Start: 2022-03-22 | End: 2022-06-01

## 2022-04-05 ENCOUNTER — PATIENT MESSAGE (OUTPATIENT)
Dept: FAMILY MEDICINE | Facility: CLINIC | Age: 82
End: 2022-04-05
Payer: MEDICARE

## 2022-04-05 DIAGNOSIS — E03.9 HYPOTHYROIDISM, UNSPECIFIED TYPE: ICD-10-CM

## 2022-04-06 ENCOUNTER — OFFICE VISIT (OUTPATIENT)
Dept: UROGYNECOLOGY | Facility: CLINIC | Age: 82
End: 2022-04-06
Payer: MEDICARE

## 2022-04-06 VITALS
HEIGHT: 64 IN | WEIGHT: 143 LBS | DIASTOLIC BLOOD PRESSURE: 66 MMHG | HEART RATE: 84 BPM | BODY MASS INDEX: 24.41 KG/M2 | SYSTOLIC BLOOD PRESSURE: 139 MMHG

## 2022-04-06 DIAGNOSIS — N39.46 MIXED INCONTINENCE URGE AND STRESS (MALE)(FEMALE): Primary | ICD-10-CM

## 2022-04-06 PROCEDURE — 1101F PR PT FALLS ASSESS DOC 0-1 FALLS W/OUT INJ PAST YR: ICD-10-PCS | Mod: CPTII,S$GLB,, | Performed by: OBSTETRICS & GYNECOLOGY

## 2022-04-06 PROCEDURE — 99999 PR PBB SHADOW E&M-EST. PATIENT-LVL III: CPT | Mod: PBBFAC,,, | Performed by: OBSTETRICS & GYNECOLOGY

## 2022-04-06 PROCEDURE — 1157F PR ADVANCE CARE PLAN OR EQUIV PRESENT IN MEDICAL RECORD: ICD-10-PCS | Mod: CPTII,S$GLB,, | Performed by: OBSTETRICS & GYNECOLOGY

## 2022-04-06 PROCEDURE — 1101F PT FALLS ASSESS-DOCD LE1/YR: CPT | Mod: CPTII,S$GLB,, | Performed by: OBSTETRICS & GYNECOLOGY

## 2022-04-06 PROCEDURE — 3078F DIAST BP <80 MM HG: CPT | Mod: CPTII,S$GLB,, | Performed by: OBSTETRICS & GYNECOLOGY

## 2022-04-06 PROCEDURE — 1157F ADVNC CARE PLAN IN RCRD: CPT | Mod: CPTII,S$GLB,, | Performed by: OBSTETRICS & GYNECOLOGY

## 2022-04-06 PROCEDURE — 3075F SYST BP GE 130 - 139MM HG: CPT | Mod: CPTII,S$GLB,, | Performed by: OBSTETRICS & GYNECOLOGY

## 2022-04-06 PROCEDURE — 99213 PR OFFICE/OUTPT VISIT, EST, LEVL III, 20-29 MIN: ICD-10-PCS | Mod: S$GLB,,, | Performed by: OBSTETRICS & GYNECOLOGY

## 2022-04-06 PROCEDURE — 1126F PR PAIN SEVERITY QUANTIFIED, NO PAIN PRESENT: ICD-10-PCS | Mod: CPTII,S$GLB,, | Performed by: OBSTETRICS & GYNECOLOGY

## 2022-04-06 PROCEDURE — 99213 OFFICE O/P EST LOW 20 MIN: CPT | Mod: S$GLB,,, | Performed by: OBSTETRICS & GYNECOLOGY

## 2022-04-06 PROCEDURE — 3078F PR MOST RECENT DIASTOLIC BLOOD PRESSURE < 80 MM HG: ICD-10-PCS | Mod: CPTII,S$GLB,, | Performed by: OBSTETRICS & GYNECOLOGY

## 2022-04-06 PROCEDURE — 3288F PR FALLS RISK ASSESSMENT DOCUMENTED: ICD-10-PCS | Mod: CPTII,S$GLB,, | Performed by: OBSTETRICS & GYNECOLOGY

## 2022-04-06 PROCEDURE — 3075F PR MOST RECENT SYSTOLIC BLOOD PRESS GE 130-139MM HG: ICD-10-PCS | Mod: CPTII,S$GLB,, | Performed by: OBSTETRICS & GYNECOLOGY

## 2022-04-06 PROCEDURE — 1159F MED LIST DOCD IN RCRD: CPT | Mod: CPTII,S$GLB,, | Performed by: OBSTETRICS & GYNECOLOGY

## 2022-04-06 PROCEDURE — 3288F FALL RISK ASSESSMENT DOCD: CPT | Mod: CPTII,S$GLB,, | Performed by: OBSTETRICS & GYNECOLOGY

## 2022-04-06 PROCEDURE — 99999 PR PBB SHADOW E&M-EST. PATIENT-LVL III: ICD-10-PCS | Mod: PBBFAC,,, | Performed by: OBSTETRICS & GYNECOLOGY

## 2022-04-06 PROCEDURE — 1126F AMNT PAIN NOTED NONE PRSNT: CPT | Mod: CPTII,S$GLB,, | Performed by: OBSTETRICS & GYNECOLOGY

## 2022-04-06 PROCEDURE — 1159F PR MEDICATION LIST DOCUMENTED IN MEDICAL RECORD: ICD-10-PCS | Mod: CPTII,S$GLB,, | Performed by: OBSTETRICS & GYNECOLOGY

## 2022-04-06 RX ORDER — LEVOTHYROXINE SODIUM 88 UG/1
88 TABLET ORAL DAILY
Qty: 90 TABLET | Refills: 0 | Status: SHIPPED | OUTPATIENT
Start: 2022-04-06 | End: 2022-07-08

## 2022-04-06 NOTE — TELEPHONE ENCOUNTER
LOV 6/2021  NOV not yet scheduled.     Scheduling link sent to patient to assist in self scheduling.

## 2022-04-06 NOTE — PROGRESS NOTES
Subjective:      Patient ID: Dorothy Kramer is a 81 y.o. female.    Chief Complaint:  No chief complaint on file.      History of Present Illness  Patient returns still with incontinence.  But she states more of the possible ALECIA picture as opposed to an urge picture the review of the inception of care in all notes since surgery show that on the postop 2 weeks as that ALECIA was detected.  I did not detect a my 6 weeks the patient is stating that there is element of I incontinence and she finds that very troublesome.            Past Medical History:   Diagnosis Date    DDD (degenerative disc disease), cervical     Diverticulitis     Diverticulosis     DJD (degenerative joint disease) of hip     Dyspepsia     GERD (gastroesophageal reflux disease)     History of melanoma 5/20/2015    HTN (hypertension)     Melanoma     Migraine headache     Rectocele     Skin cancer     melanoma on face    Sleep apnea     Thyroid disease     hypo    Trouble in sleeping     Uterine prolaps     followed by GYN    Vulvar lesion 5/20/2015       Past Surgical History:   Procedure Laterality Date    APPENDECTOMY  1962    BACK SURGERY      BREAST BIOPSY      BREAST LUMPECTOMY  1989    CATARACT EXTRACTION Left 02/22/2021    ros    CATARACT EXTRACTION W/  INTRAOCULAR LENS IMPLANT Left 2/22/2021    Procedure: EXTRACTION, CATARACT, WITH IOL INSERTION;  Surgeon: Latisha Baker MD;  Location: [facility] OR;  Service: Ophthalmology;  Laterality: Left;  left    CATARACT EXTRACTION W/  INTRAOCULAR LENS IMPLANT Right 3/22/2021    Procedure: EXTRACTION, CATARACT, WITH IOL INSERTION;  Surgeon: Latisha Baker MD;  Location: [facility] OR;  Service: Ophthalmology;  Laterality: Right;  right    CERVICAL FUSION  1991    CHOLECYSTECTOMY      COLONOSCOPY      COLONOSCOPY N/A 11/9/2018    Dr Oscar; chronic active colitis; repeat in 5 years    COLONOSCOPY N/A 2/14/2020    Procedure: COLONOSCOPY;  Surgeon: Fadi Oscar MD;   Location: [facility] ENDO;  Service: Endoscopy;  Laterality: N/A;    HYSTERECTOMY      TRACEE/BSO for benign disease    JOINT REPLACEMENT Right 2016    Hip replacement    MT REMOVAL OF OVARY/TUBE(S)      ROBOT-ASSISTED LAPAROSCOPIC ABDOMINAL SACROCOLPOPEXY USING DA FLAKITO XI N/A 2021    Procedure: XI ROBOTIC SACROCOLPOPEXY, ABDOMINAL;  Surgeon: Yannick Cabral MD;  Location: [facility] OR;  Service: OB/GYN;  Laterality: N/A;    ROBOT-ASSISTED LAPAROSCOPIC LYSIS OF ADHESIONS USING DA FLAKITO XI  2021    Procedure: XI ROBOTIC LYSIS, ADHESIONS;  Surgeon: Yannick Cabral MD;  Location: [facility] OR;  Service: OB/GYN;;    TONSILLECTOMY      TOTAL ABDOMINAL HYSTERECTOMY W/ BILATERAL SALPINGOOPHORECTOMY         GYN & OB History  No LMP recorded. Patient has had a hysterectomy.   Date of Last Pap: No result found    OB History    Para Term  AB Living   7 3 3   4     SAB IAB Ectopic Multiple Live Births   4              # Outcome Date GA Lbr Julius/2nd Weight Sex Delivery Anes PTL Lv   7 Term            6 Term            5 Term            4 SAB            3 SAB            2 SAB            1 SAB                Health Maintenance       Date Due Completion Date    Hepatitis C Screening Never done ---    Influenza Vaccine (1) 2021 (Declined)    Override on 2020: Declined    DEXA Scan 10/05/2024 10/5/2021    Lipid Panel 2026    TETANUS VACCINE 2030          Family History   Problem Relation Age of Onset    Heart failure Mother     Cancer Mother         Bladder    Colon cancer Mother 63    Cancer Father         Bladder    Parkinsonism Father     Diverticulitis Father     Uterine cancer Maternal Aunt     Ovarian cancer Neg Hx     Breast cancer Neg Hx     Crohn's disease Neg Hx     Ulcerative colitis Neg Hx     Glaucoma Neg Hx     Macular degeneration Neg Hx     Retinal detachment Neg Hx        Social History     Socioeconomic History    Marital status:  "   Tobacco Use    Smoking status: Never Smoker    Smokeless tobacco: Never Used   Substance and Sexual Activity    Alcohol use: No    Drug use: No    Sexual activity: Not Currently     Social Determinants of Health     Food Insecurity: No Food Insecurity    Worried About Running Out of Food in the Last Year: Never true    Ran Out of Food in the Last Year: Never true   Transportation Needs: No Transportation Needs    Lack of Transportation (Medical): No    Lack of Transportation (Non-Medical): No   Physical Activity: Unknown    Days of Exercise per Week: 1 day   Social Connections: Unknown    Frequency of Communication with Friends and Family: More than three times a week    Frequency of Social Gatherings with Friends and Family: Twice a week    Active Member of Clubs or Organizations: Yes    Attends Club or Organization Meetings: More than 4 times per year    Marital Status:        Review of Systems  Review of Systems     Incontinence of urine  Objective:   /66   Pulse 84   Ht 5' 4" (1.626 m)   Wt 64.9 kg (143 lb)   BMI 24.55 kg/m²     Physical Exam   Excellent reconstitution of anatomy that significant the sulcal of distortion is gone excellent healing  I straight catheterization patient after she voided 10 minutes go postvoid residual is about 10 cc  Assessment:     1. Mixed incontinence urge and stress (male)(female)            Plan:     1. Mixed incontinence urge and stress (male)(female)      Long discussion with patient regarding my findings.  .  Patient is somewhat disappointed and she stated states she thought that she would be feeling better.  Anatomic Knatt and Deon cul objectives have been reached.  But there is incontinence and we will deal with this in the near future the urodynamics machine is being replaced urine [place] and I will get the once that stone should with the pressure flow studies will be done I will move forward with mid urethral sling until then " patient will need hormone supplementation transvaginal.  This interesting patient worries about the future but at this point II do think that hormone supplementation is totally needed she asked for oral and I do not could utilize oral estrogen patient understanding appreciated the time spent and we will move forward with urodynamic testing in the near future  There are no Patient Instructions on file for this visit.

## 2022-04-29 ENCOUNTER — OFFICE VISIT (OUTPATIENT)
Dept: URGENT CARE | Facility: CLINIC | Age: 82
End: 2022-04-29
Payer: MEDICARE

## 2022-04-29 VITALS
SYSTOLIC BLOOD PRESSURE: 160 MMHG | TEMPERATURE: 99 F | HEIGHT: 64 IN | HEART RATE: 98 BPM | DIASTOLIC BLOOD PRESSURE: 73 MMHG | WEIGHT: 147 LBS | OXYGEN SATURATION: 98 % | RESPIRATION RATE: 16 BRPM | BODY MASS INDEX: 25.1 KG/M2

## 2022-04-29 DIAGNOSIS — J32.9 CLINICAL SINUSITIS: Primary | ICD-10-CM

## 2022-04-29 DIAGNOSIS — J02.9 SORE THROAT: ICD-10-CM

## 2022-04-29 LAB
CTP QC/QA: YES
POC MOLECULAR INFLUENZA A AGN: NEGATIVE
POC MOLECULAR INFLUENZA B AGN: NEGATIVE

## 2022-04-29 PROCEDURE — 87502 POCT INFLUENZA A/B MOLECULAR: ICD-10-PCS | Mod: QW,S$GLB,, | Performed by: PHYSICIAN ASSISTANT

## 2022-04-29 PROCEDURE — 1159F PR MEDICATION LIST DOCUMENTED IN MEDICAL RECORD: ICD-10-PCS | Mod: CPTII,S$GLB,, | Performed by: PHYSICIAN ASSISTANT

## 2022-04-29 PROCEDURE — 99213 PR OFFICE/OUTPT VISIT, EST, LEVL III, 20-29 MIN: ICD-10-PCS | Mod: S$GLB,,, | Performed by: PHYSICIAN ASSISTANT

## 2022-04-29 PROCEDURE — 1157F ADVNC CARE PLAN IN RCRD: CPT | Mod: CPTII,S$GLB,, | Performed by: PHYSICIAN ASSISTANT

## 2022-04-29 PROCEDURE — 1125F PR PAIN SEVERITY QUANTIFIED, PAIN PRESENT: ICD-10-PCS | Mod: CPTII,S$GLB,, | Performed by: PHYSICIAN ASSISTANT

## 2022-04-29 PROCEDURE — 3078F DIAST BP <80 MM HG: CPT | Mod: CPTII,S$GLB,, | Performed by: PHYSICIAN ASSISTANT

## 2022-04-29 PROCEDURE — 1159F MED LIST DOCD IN RCRD: CPT | Mod: CPTII,S$GLB,, | Performed by: PHYSICIAN ASSISTANT

## 2022-04-29 PROCEDURE — 1125F AMNT PAIN NOTED PAIN PRSNT: CPT | Mod: CPTII,S$GLB,, | Performed by: PHYSICIAN ASSISTANT

## 2022-04-29 PROCEDURE — 1160F RVW MEDS BY RX/DR IN RCRD: CPT | Mod: CPTII,S$GLB,, | Performed by: PHYSICIAN ASSISTANT

## 2022-04-29 PROCEDURE — 1160F PR REVIEW ALL MEDS BY PRESCRIBER/CLIN PHARMACIST DOCUMENTED: ICD-10-PCS | Mod: CPTII,S$GLB,, | Performed by: PHYSICIAN ASSISTANT

## 2022-04-29 PROCEDURE — 87502 INFLUENZA DNA AMP PROBE: CPT | Mod: QW,S$GLB,, | Performed by: PHYSICIAN ASSISTANT

## 2022-04-29 PROCEDURE — 99213 OFFICE O/P EST LOW 20 MIN: CPT | Mod: S$GLB,,, | Performed by: PHYSICIAN ASSISTANT

## 2022-04-29 PROCEDURE — 3077F PR MOST RECENT SYSTOLIC BLOOD PRESSURE >= 140 MM HG: ICD-10-PCS | Mod: CPTII,S$GLB,, | Performed by: PHYSICIAN ASSISTANT

## 2022-04-29 PROCEDURE — 3077F SYST BP >= 140 MM HG: CPT | Mod: CPTII,S$GLB,, | Performed by: PHYSICIAN ASSISTANT

## 2022-04-29 PROCEDURE — 3078F PR MOST RECENT DIASTOLIC BLOOD PRESSURE < 80 MM HG: ICD-10-PCS | Mod: CPTII,S$GLB,, | Performed by: PHYSICIAN ASSISTANT

## 2022-04-29 PROCEDURE — 1157F PR ADVANCE CARE PLAN OR EQUIV PRESENT IN MEDICAL RECORD: ICD-10-PCS | Mod: CPTII,S$GLB,, | Performed by: PHYSICIAN ASSISTANT

## 2022-04-29 RX ORDER — BENZONATATE 200 MG/1
200 CAPSULE ORAL 3 TIMES DAILY PRN
Qty: 30 CAPSULE | Refills: 0 | Status: SHIPPED | OUTPATIENT
Start: 2022-04-29 | End: 2022-05-09

## 2022-04-29 RX ORDER — AZELASTINE 1 MG/ML
1 SPRAY, METERED NASAL 2 TIMES DAILY
Qty: 30 ML | Refills: 0 | Status: SHIPPED | OUTPATIENT
Start: 2022-04-29 | End: 2022-06-01

## 2022-04-29 RX ORDER — DOXYCYCLINE 100 MG/1
100 CAPSULE ORAL 2 TIMES DAILY
Qty: 14 CAPSULE | Refills: 0 | Status: SHIPPED | OUTPATIENT
Start: 2022-04-29 | End: 2022-05-06

## 2022-04-29 NOTE — PROGRESS NOTES
"Subjective:       Patient ID: Dorothy Kramer is a 81 y.o. female.    Vitals:  height is 5' 4" (1.626 m) and weight is 66.7 kg (147 lb). Her oral temperature is 98.9 °F (37.2 °C). Her blood pressure is 160/73 (abnormal) and her pulse is 98. Her respiration is 16 and oxygen saturation is 98%.     Chief Complaint: Sore Throat    Pt presents to clinic today for sore throat, fever, cough and congestion. Symptoms started 10 days ago.     Sore Throat   This is a new problem. The current episode started 1 to 4 weeks ago. The problem has been gradually worsening. The maximum temperature recorded prior to her arrival was 100.4 - 100.9 F. The pain is at a severity of 4/10. The pain is mild. Associated symptoms include congestion, coughing and trouble swallowing. Pertinent negatives include no diarrhea, headaches, shortness of breath or vomiting. She has had no exposure to strep or mono. She has tried acetaminophen for the symptoms. The treatment provided no relief.       Constitution: Positive for chills and fever (tmax last night 100.8).   HENT: Positive for congestion, sore throat and trouble swallowing.    Respiratory: Positive for cough. Negative for shortness of breath.    Gastrointestinal: Negative for vomiting and diarrhea.   Neurological: Negative for headaches.       Objective:      Physical Exam   Constitutional: She does not appear ill. No distress.   HENT:   Head: Normocephalic and atraumatic.   Ears:   Right Ear: Tympanic membrane, external ear and ear canal normal.   Left Ear: Tympanic membrane, external ear and ear canal normal.   Nose: Right sinus exhibits no maxillary sinus tenderness and no frontal sinus tenderness. Left sinus exhibits no maxillary sinus tenderness and no frontal sinus tenderness.   Mouth/Throat: Mucous membranes are moist. Posterior oropharyngeal erythema (mild) present. No oropharyngeal exudate. Oropharynx is clear.   Eyes: Conjunctivae are normal. Right eye exhibits no discharge. Left eye " exhibits no discharge.      extraocular movement intact   Cardiovascular: Normal rate, regular rhythm and normal heart sounds.   No murmur heard.  Pulmonary/Chest: Effort normal and breath sounds normal. She has no wheezes. She has no rhonchi. She has no rales.   Abdominal: Normal appearance.   Musculoskeletal: Normal range of motion.         General: Normal range of motion.   Neurological: no focal deficit. She is alert.   Skin: Skin is warm, dry and not pale. jaundice  Psychiatric: Her behavior is normal. Mood, judgment and thought content normal.   Nursing note and vitals reviewed.        Assessment:       1. Clinical sinusitis    2. Sore throat          Plan:         Clinical sinusitis    Sore throat  -     POCT Influenza A/B MOLECULAR    Results for orders placed or performed in visit on 04/29/22   POCT Influenza A/B MOLECULAR   Result Value Ref Range    POC Molecular Influenza A Ag Negative Negative, Not Reported    POC Molecular Influenza B Ag Negative Negative, Not Reported     Acceptable Yes        Other orders  -     doxycycline (VIBRAMYCIN) 100 MG Cap; Take 1 capsule (100 mg total) by mouth 2 (two) times daily. for 7 days  Dispense: 14 capsule; Refill: 0  -     benzonatate (TESSALON) 200 MG capsule; Take 1 capsule (200 mg total) by mouth 3 (three) times daily as needed for Cough.  Dispense: 30 capsule; Refill: 0  -     azelastine (ASTELIN) 137 mcg (0.1 %) nasal spray; 1 spray (137 mcg total) by Nasal route 2 (two) times daily.  Dispense: 30 mL; Refill: 0    Patient Instructions   You must understand that you've received an Urgent Care treatment only and that you may be released before all your medical problems are known or treated. You, the patient, will arrange for follow up care as instructed.  Follow up with your PCP or specialty clinic as directed in the next 1-2 weeks if not improved or as needed.  You can call [phone] to schedule an appointment with the appropriate  provider.  If your condition worsens we recommend that you receive another evaluation at the emergency room immediately or contact your primary medical clinics after hours call service to discuss your concerns.  Please return here or go to the Emergency Department for any concerns or worsening of condition.

## 2022-04-29 NOTE — PATIENT INSTRUCTIONS

## 2022-05-14 DIAGNOSIS — I10 ESSENTIAL HYPERTENSION: ICD-10-CM

## 2022-05-14 NOTE — TELEPHONE ENCOUNTER
Refill Routing Note   Medication(s) are not appropriate for processing by [facility] for the following reason(s):      - Patient has been seen in the ED/Hospital since the last PCP visit    [facility] action(s):  Defer          Medication reconciliation completed: No     Appointments  past 12m or future 3m with PCP    Date Provider   Last Visit   6/30/2021 Kamila Gilbert MD   Next Visit   6/1/2022 Kamila Gilbert MD   ED visits in past 90 days: 0        Note composed:4:20 PM 05/14/2022

## 2022-05-14 NOTE — TELEPHONE ENCOUNTER
No new care gaps identified.  [facility] Embedded Care Gaps. Reference number: [id number]. 5/14/2022   7:01:49 AM NELSONT

## 2022-05-17 RX ORDER — LOSARTAN POTASSIUM AND HYDROCHLOROTHIAZIDE 25; 100 MG/1; MG/1
TABLET ORAL
Qty: 90 TABLET | Refills: 2 | Status: SHIPPED | OUTPATIENT
Start: 2022-05-17 | End: 2022-12-03

## 2022-06-01 ENCOUNTER — OFFICE VISIT (OUTPATIENT)
Dept: FAMILY MEDICINE | Facility: CLINIC | Age: 82
End: 2022-06-01
Payer: MEDICARE

## 2022-06-01 VITALS
HEART RATE: 72 BPM | DIASTOLIC BLOOD PRESSURE: 70 MMHG | SYSTOLIC BLOOD PRESSURE: 128 MMHG | HEIGHT: 64 IN | BODY MASS INDEX: 25.33 KG/M2 | WEIGHT: 148.38 LBS

## 2022-06-01 DIAGNOSIS — I10 ESSENTIAL HYPERTENSION: ICD-10-CM

## 2022-06-01 DIAGNOSIS — E03.9 HYPOTHYROIDISM, UNSPECIFIED TYPE: Primary | ICD-10-CM

## 2022-06-01 DIAGNOSIS — M13.0 POLYARTICULAR ARTHRITIS: ICD-10-CM

## 2022-06-01 DIAGNOSIS — I70.0 AORTIC ATHEROSCLEROSIS: ICD-10-CM

## 2022-06-01 DIAGNOSIS — L40.9 PSORIASIS: ICD-10-CM

## 2022-06-01 PROCEDURE — 3078F DIAST BP <80 MM HG: CPT | Mod: CPTII,S$GLB,, | Performed by: INTERNAL MEDICINE

## 2022-06-01 PROCEDURE — 1157F PR ADVANCE CARE PLAN OR EQUIV PRESENT IN MEDICAL RECORD: ICD-10-PCS | Mod: CPTII,S$GLB,, | Performed by: INTERNAL MEDICINE

## 2022-06-01 PROCEDURE — 99214 PR OFFICE/OUTPT VISIT, EST, LEVL IV, 30-39 MIN: ICD-10-PCS | Mod: S$GLB,,, | Performed by: INTERNAL MEDICINE

## 2022-06-01 PROCEDURE — 3074F PR MOST RECENT SYSTOLIC BLOOD PRESSURE < 130 MM HG: ICD-10-PCS | Mod: CPTII,S$GLB,, | Performed by: INTERNAL MEDICINE

## 2022-06-01 PROCEDURE — 3078F PR MOST RECENT DIASTOLIC BLOOD PRESSURE < 80 MM HG: ICD-10-PCS | Mod: CPTII,S$GLB,, | Performed by: INTERNAL MEDICINE

## 2022-06-01 PROCEDURE — 3288F FALL RISK ASSESSMENT DOCD: CPT | Mod: CPTII,S$GLB,, | Performed by: INTERNAL MEDICINE

## 2022-06-01 PROCEDURE — 1159F PR MEDICATION LIST DOCUMENTED IN MEDICAL RECORD: ICD-10-PCS | Mod: CPTII,S$GLB,, | Performed by: INTERNAL MEDICINE

## 2022-06-01 PROCEDURE — 99499 RISK ADDL DX/OHS AUDIT: ICD-10-PCS | Mod: S$GLB,,, | Performed by: INTERNAL MEDICINE

## 2022-06-01 PROCEDURE — 1101F PT FALLS ASSESS-DOCD LE1/YR: CPT | Mod: CPTII,S$GLB,, | Performed by: INTERNAL MEDICINE

## 2022-06-01 PROCEDURE — 1125F AMNT PAIN NOTED PAIN PRSNT: CPT | Mod: CPTII,S$GLB,, | Performed by: INTERNAL MEDICINE

## 2022-06-01 PROCEDURE — 1157F ADVNC CARE PLAN IN RCRD: CPT | Mod: CPTII,S$GLB,, | Performed by: INTERNAL MEDICINE

## 2022-06-01 PROCEDURE — 3288F PR FALLS RISK ASSESSMENT DOCUMENTED: ICD-10-PCS | Mod: CPTII,S$GLB,, | Performed by: INTERNAL MEDICINE

## 2022-06-01 PROCEDURE — 99999 PR PBB SHADOW E&M-EST. PATIENT-LVL IV: CPT | Mod: PBBFAC,,, | Performed by: INTERNAL MEDICINE

## 2022-06-01 PROCEDURE — 1159F MED LIST DOCD IN RCRD: CPT | Mod: CPTII,S$GLB,, | Performed by: INTERNAL MEDICINE

## 2022-06-01 PROCEDURE — 1101F PR PT FALLS ASSESS DOC 0-1 FALLS W/OUT INJ PAST YR: ICD-10-PCS | Mod: CPTII,S$GLB,, | Performed by: INTERNAL MEDICINE

## 2022-06-01 PROCEDURE — 1125F PR PAIN SEVERITY QUANTIFIED, PAIN PRESENT: ICD-10-PCS | Mod: CPTII,S$GLB,, | Performed by: INTERNAL MEDICINE

## 2022-06-01 PROCEDURE — 99214 OFFICE O/P EST MOD 30 MIN: CPT | Mod: S$GLB,,, | Performed by: INTERNAL MEDICINE

## 2022-06-01 PROCEDURE — 99999 PR PBB SHADOW E&M-EST. PATIENT-LVL IV: ICD-10-PCS | Mod: PBBFAC,,, | Performed by: INTERNAL MEDICINE

## 2022-06-01 PROCEDURE — 1160F PR REVIEW ALL MEDS BY PRESCRIBER/CLIN PHARMACIST DOCUMENTED: ICD-10-PCS | Mod: CPTII,S$GLB,, | Performed by: INTERNAL MEDICINE

## 2022-06-01 PROCEDURE — 99499 UNLISTED E&M SERVICE: CPT | Mod: S$GLB,,, | Performed by: INTERNAL MEDICINE

## 2022-06-01 PROCEDURE — 3074F SYST BP LT 130 MM HG: CPT | Mod: CPTII,S$GLB,, | Performed by: INTERNAL MEDICINE

## 2022-06-01 PROCEDURE — 1160F RVW MEDS BY RX/DR IN RCRD: CPT | Mod: CPTII,S$GLB,, | Performed by: INTERNAL MEDICINE

## 2022-06-01 NOTE — PROGRESS NOTES
Assessment and Plan:    1. Hypothyroidism, unspecified type  Update labs, continue levothyroxine  - TSH; Future    2. Essential hypertension  Controlled, continue current medications  - Comprehensive Metabolic Panel; Future  - Lipid Panel; Future    3. Aortic atherosclerosis  Not on statin.  - Lipid Panel; Future    4. Polyarticular arthritis  - Ambulatory referral/consult to Rheumatology; Future    5. Psoriasis  - Ambulatory referral/consult to Rheumatology; Future    ______________________________________________________________________  Subjective:    Chief Complaint:  Follow up chronic medical conditions.    HPI:  Dorothy is a 81 y.o. year old female here to follow up chronic medical conditions.     She takes levothyroxine 88 mcg daily for hypothyroidism. No symptoms currently.      She takes losartan 100 mg daily and HCTZ 25 mg daily for HTN. BP has been well controlled at home when checked occasionally.     Seeing Urogyn for mixed urge and stress incontinence. Using estrace cream. She had been having a lot more stress incontinence with a bad virus she had at the end of April.     She has a history of melanoma on her left temple 9 years ago. Sees Dr. Yung. She has also been diagnosed with psoriasis, has flares from time to time. Notes that she has a lot of arthritis in multiple joints and wonders about this possibly being psoriatic arthritis. She wants to know which type of specialist to see for this.    She sees Dr. Oscar for nonspecific colitis and recurrent diverticulitis. She is taking apriso, culturelle, and loperamide PRN.     Medications:  Current Outpatient Medications on File Prior to Visit   Medication Sig Dispense Refill    estradioL (ESTRACE) 0.01 % (0.1 mg/gram) vaginal cream Place 1 g vaginally every Mon, Wed, Fri. 42 g 11    levothyroxine (SYNTHROID) 88 MCG tablet Take 1 tablet (88 mcg total) by mouth once daily. 90 tablet 0    losartan-hydrochlorothiazide 100-25 mg (HYZAAR) 100-25 mg per  "tablet TAKE 1 TABLET BY MOUTH EVERY DAY 90 tablet 2    magnesium 30 mg Tab Take 1 tablet by mouth.      mesalamine (APRISO) 0.375 gram Cp24 Take 2 capsules (0.75 g total) by mouth once daily. 60 capsule 12    multivitamin/iron/folic acid (CENTRUM COMPLETE ORAL) Take by mouth.      [DISCONTINUED] azelastine (ASTELIN) 137 mcg (0.1 %) nasal spray 1 spray (137 mcg total) by Nasal route 2 (two) times daily. 30 mL 0    [DISCONTINUED] estradioL (ESTRACE) 0.5 MG tablet TAKE 1 TABLET (0.5 MG TOTAL) BY MOUTH ONCE DAILY. 90 tablet 0    [DISCONTINUED] ibuprofen (ADVIL,MOTRIN) 100 MG tablet Take 100 mg by mouth every 6 (six) hours as needed for Temperature greater than.       No current facility-administered medications on file prior to visit.       Review of Systems:  Review of Systems   Constitutional: Negative for chills and fever.   Respiratory: Negative for shortness of breath and wheezing.    Cardiovascular: Negative for chest pain and palpitations.   Gastrointestinal: Negative for nausea and vomiting.   Musculoskeletal: Positive for arthralgias.   Neurological: Negative for dizziness, syncope and light-headedness.       Past Medical History:  Past Medical History:   Diagnosis Date    DDD (degenerative disc disease), cervical     Diverticulitis     Diverticulosis     DJD (degenerative joint disease) of hip     Dyspepsia     GERD (gastroesophageal reflux disease)     History of melanoma 5/20/2015    HTN (hypertension)     Melanoma     Migraine headache     Rectocele     Skin cancer     melanoma on face    Sleep apnea     Thyroid disease     hypo    Trouble in sleeping     Uterine prolaps     followed by GYN    Vulvar lesion 5/20/2015       Objective:    Vitals:  Vitals:    06/01/22 1303   BP: 128/70   Pulse: 72   Weight: 67.3 kg (148 lb 5.9 oz)   Height: 5' 4" (1.626 m)   PainSc:   3   PainLoc: Generalized       Physical Exam  Vitals reviewed.   Constitutional:       General: She is not in acute " distress.     Appearance: She is well-developed.   Eyes:      General: No scleral icterus.  Cardiovascular:      Rate and Rhythm: Normal rate and regular rhythm.      Heart sounds: No murmur heard.  Pulmonary:      Effort: Pulmonary effort is normal. No respiratory distress.      Breath sounds: Normal breath sounds. No wheezing, rhonchi or rales.   Musculoskeletal:      Right lower leg: No edema.      Left lower leg: No edema.   Skin:     General: Skin is warm and dry.   Neurological:      Mental Status: She is alert and oriented to person, place, and time.   Psychiatric:         Behavior: Behavior normal.         Data:  TSH normal last lab    Kamila Gilbert MD  Internal Medicine

## 2022-06-02 ENCOUNTER — LAB VISIT (OUTPATIENT)
Dept: LAB | Facility: HOSPITAL | Age: 82
End: 2022-06-02
Attending: INTERNAL MEDICINE
Payer: MEDICARE

## 2022-06-02 DIAGNOSIS — I10 ESSENTIAL HYPERTENSION: ICD-10-CM

## 2022-06-02 DIAGNOSIS — I70.0 AORTIC ATHEROSCLEROSIS: ICD-10-CM

## 2022-06-02 DIAGNOSIS — E03.9 HYPOTHYROIDISM, UNSPECIFIED TYPE: ICD-10-CM

## 2022-06-02 LAB
ALBUMIN SERPL BCP-MCNC: 4 G/DL (ref 3.5–5.2)
ALP SERPL-CCNC: 60 U/L (ref 55–135)
ALT SERPL W/O P-5'-P-CCNC: 15 U/L (ref 10–44)
ANION GAP SERPL CALC-SCNC: 9 MMOL/L (ref 8–16)
AST SERPL-CCNC: 21 U/L (ref 10–40)
BILIRUB SERPL-MCNC: 0.8 MG/DL (ref 0.1–1)
BUN SERPL-MCNC: 14 MG/DL (ref 8–23)
CALCIUM SERPL-MCNC: 9.8 MG/DL (ref 8.7–10.5)
CHLORIDE SERPL-SCNC: 101 MMOL/L (ref 95–110)
CHOLEST SERPL-MCNC: 164 MG/DL (ref 120–199)
CHOLEST/HDLC SERPL: 3.2 {RATIO} (ref 2–5)
CO2 SERPL-SCNC: 27 MMOL/L (ref 23–29)
CREAT SERPL-MCNC: 0.8 MG/DL (ref 0.5–1.4)
EST. GFR  (AFRICAN AMERICAN): >60 ML/MIN/1.73 M^2
EST. GFR  (NON AFRICAN AMERICAN): >60 ML/MIN/1.73 M^2
GLUCOSE SERPL-MCNC: 105 MG/DL (ref 70–110)
HDLC SERPL-MCNC: 52 MG/DL (ref 40–75)
HDLC SERPL: 31.7 % (ref 20–50)
LDLC SERPL CALC-MCNC: 80.8 MG/DL (ref 63–159)
NONHDLC SERPL-MCNC: 112 MG/DL
POTASSIUM SERPL-SCNC: 3.9 MMOL/L (ref 3.5–5.1)
PROT SERPL-MCNC: 7.3 G/DL (ref 6–8.4)
SODIUM SERPL-SCNC: 137 MMOL/L (ref 136–145)
TRIGL SERPL-MCNC: 156 MG/DL (ref 30–150)
TSH SERPL DL<=0.005 MIU/L-ACNC: 0.46 UIU/ML (ref 0.4–4)

## 2022-06-02 PROCEDURE — 80053 COMPREHEN METABOLIC PANEL: CPT | Performed by: INTERNAL MEDICINE

## 2022-06-02 PROCEDURE — 36415 COLL VENOUS BLD VENIPUNCTURE: CPT | Mod: PN | Performed by: INTERNAL MEDICINE

## 2022-06-02 PROCEDURE — 84443 ASSAY THYROID STIM HORMONE: CPT | Performed by: INTERNAL MEDICINE

## 2022-06-02 PROCEDURE — 80061 LIPID PANEL: CPT | Performed by: INTERNAL MEDICINE

## 2022-06-06 ENCOUNTER — TELEPHONE (OUTPATIENT)
Dept: RHEUMATOLOGY | Facility: CLINIC | Age: 82
End: 2022-06-06
Payer: MEDICARE

## 2022-06-06 NOTE — TELEPHONE ENCOUNTER
Returned patient call regarding new patient appointment and referral. Nurse was able to schedule appointment with Dr Barker in December. Patient aware of date and time of appointment, added to wait list for something sooner, appointment letter mailed to home address on file.

## 2022-06-06 NOTE — TELEPHONE ENCOUNTER
----- Message from Arti Ruiz, Patient Care Assistant sent at 6/6/2022 10:53 AM CDT -----  Contact: yin  Type: Needs Medical Advice  Who Called:  yin Fajardo Call Back Number: [phone]    Additional Information: yin states she needs a RA elevation . Please call to speak and set up with patient , she has referral in system , please call to further discuss, thank you

## 2022-06-23 DIAGNOSIS — M25.551 RIGHT HIP PAIN: Primary | ICD-10-CM

## 2022-06-24 ENCOUNTER — HOSPITAL ENCOUNTER (OUTPATIENT)
Dept: RADIOLOGY | Facility: HOSPITAL | Age: 82
Discharge: HOME | End: 2022-06-24
Attending: ORTHOPAEDIC SURGERY
Payer: MEDICARE

## 2022-06-24 ENCOUNTER — OFFICE VISIT (OUTPATIENT)
Dept: ORTHOPEDICS | Facility: CLINIC | Age: 82
End: 2022-06-24
Payer: MEDICARE

## 2022-06-24 VITALS — WEIGHT: 148 LBS | BODY MASS INDEX: 25.27 KG/M2 | HEIGHT: 64 IN

## 2022-06-24 DIAGNOSIS — M25.551 RIGHT HIP PAIN: ICD-10-CM

## 2022-06-24 DIAGNOSIS — M25.551 RIGHT HIP PAIN: Primary | ICD-10-CM

## 2022-06-24 PROCEDURE — 1159F PR MEDICATION LIST DOCUMENTED IN MEDICAL RECORD: ICD-10-PCS | Mod: CPTII,S$GLB,, | Performed by: ORTHOPAEDIC SURGERY

## 2022-06-24 PROCEDURE — 99203 PR OFFICE/OUTPT VISIT, NEW, LEVL III, 30-44 MIN: ICD-10-PCS | Mod: S$GLB,,, | Performed by: ORTHOPAEDIC SURGERY

## 2022-06-24 PROCEDURE — 1101F PT FALLS ASSESS-DOCD LE1/YR: CPT | Mod: CPTII,S$GLB,, | Performed by: ORTHOPAEDIC SURGERY

## 2022-06-24 PROCEDURE — 99999 PR PBB SHADOW E&M-EST. PATIENT-LVL III: CPT | Mod: PBBFAC,,, | Performed by: ORTHOPAEDIC SURGERY

## 2022-06-24 PROCEDURE — 1101F PR PT FALLS ASSESS DOC 0-1 FALLS W/OUT INJ PAST YR: ICD-10-PCS | Mod: CPTII,S$GLB,, | Performed by: ORTHOPAEDIC SURGERY

## 2022-06-24 PROCEDURE — 73502 XR ORTHO PELVIS_HIP POST OP RIGHT: ICD-10-PCS | Mod: 26,RT,, | Performed by: RADIOLOGY

## 2022-06-24 PROCEDURE — 1160F PR REVIEW ALL MEDS BY PRESCRIBER/CLIN PHARMACIST DOCUMENTED: ICD-10-PCS | Mod: CPTII,S$GLB,, | Performed by: ORTHOPAEDIC SURGERY

## 2022-06-24 PROCEDURE — 73502 X-RAY EXAM HIP UNI 2-3 VIEWS: CPT | Mod: TC,PO,RT

## 2022-06-24 PROCEDURE — 1159F MED LIST DOCD IN RCRD: CPT | Mod: CPTII,S$GLB,, | Performed by: ORTHOPAEDIC SURGERY

## 2022-06-24 PROCEDURE — 3288F PR FALLS RISK ASSESSMENT DOCUMENTED: ICD-10-PCS | Mod: CPTII,S$GLB,, | Performed by: ORTHOPAEDIC SURGERY

## 2022-06-24 PROCEDURE — 99203 OFFICE O/P NEW LOW 30 MIN: CPT | Mod: S$GLB,,, | Performed by: ORTHOPAEDIC SURGERY

## 2022-06-24 PROCEDURE — 99999 PR PBB SHADOW E&M-EST. PATIENT-LVL III: ICD-10-PCS | Mod: PBBFAC,,, | Performed by: ORTHOPAEDIC SURGERY

## 2022-06-24 PROCEDURE — 1160F RVW MEDS BY RX/DR IN RCRD: CPT | Mod: CPTII,S$GLB,, | Performed by: ORTHOPAEDIC SURGERY

## 2022-06-24 PROCEDURE — 1157F PR ADVANCE CARE PLAN OR EQUIV PRESENT IN MEDICAL RECORD: ICD-10-PCS | Mod: CPTII,S$GLB,, | Performed by: ORTHOPAEDIC SURGERY

## 2022-06-24 PROCEDURE — 1125F PR PAIN SEVERITY QUANTIFIED, PAIN PRESENT: ICD-10-PCS | Mod: CPTII,S$GLB,, | Performed by: ORTHOPAEDIC SURGERY

## 2022-06-24 PROCEDURE — 1125F AMNT PAIN NOTED PAIN PRSNT: CPT | Mod: CPTII,S$GLB,, | Performed by: ORTHOPAEDIC SURGERY

## 2022-06-24 PROCEDURE — 73502 X-RAY EXAM HIP UNI 2-3 VIEWS: CPT | Mod: 26,RT,, | Performed by: RADIOLOGY

## 2022-06-24 PROCEDURE — 1157F ADVNC CARE PLAN IN RCRD: CPT | Mod: CPTII,S$GLB,, | Performed by: ORTHOPAEDIC SURGERY

## 2022-06-24 PROCEDURE — 3288F FALL RISK ASSESSMENT DOCD: CPT | Mod: CPTII,S$GLB,, | Performed by: ORTHOPAEDIC SURGERY

## 2022-06-24 NOTE — PROGRESS NOTES
81 years old about 6 years out from hip replacement surgery and reports to have pain in the hip area patient states she has had this ever since surgery.  Pain seems be made worse with lying on that side    Exam shows some tenderness the greater trochanter, incision healing nicely without signs infection, straight leg raise testing with positive, somewhat tender lumbar spine area    X-rays show well placed components no change since previous x-ray, degenerative changes noted lumbar spine    Assessment:  Right hip pain after hip replacement surgery 6 years ago, degenerative disease lumbar spine    Plan:  We offered therapy, she was tried home exercise program, follow-up as needed    Imaging studies ordered and reviewed by me    Further History  Aching pain  Worse with activity  Relieved with rest  No other associated symptoms  No other radiation    Further Exam  Alert and oriented  Pleasant  Contralateral limb has appropriate range of motion for age and condition  Contralateral limb has appropriate strength for age and condition  Contralateral limb has appropriate stability  for age and condition  No adenopathy  Pulses are appropriate for current condition  Skin is intact        Chief Complaint    Chief Complaint   Patient presents with    Right Hip - Pain       HPI  Dorothy Kramer is a 81 y.o.  female who presents with       Past Medical History  Past Medical History:   Diagnosis Date    DDD (degenerative disc disease), cervical     Diverticulitis     Diverticulosis     DJD (degenerative joint disease) of hip     Dyspepsia     GERD (gastroesophageal reflux disease)     History of melanoma 5/20/2015    HTN (hypertension)     Melanoma     Migraine headache     Rectocele     Skin cancer     melanoma on face    Sleep apnea     Thyroid disease     hypo    Trouble in sleeping     Uterine prolaps     followed by GYN    Vulvar lesion 5/20/2015       Past Surgical History  Past Surgical History:   Procedure  Laterality Date    APPENDECTOMY  1962    BACK SURGERY      BREAST BIOPSY      BREAST LUMPECTOMY  1989    CATARACT EXTRACTION Left 02/22/2021    ros    CATARACT EXTRACTION W/  INTRAOCULAR LENS IMPLANT Left 2/22/2021    Procedure: EXTRACTION, CATARACT, WITH IOL INSERTION;  Surgeon: Latisha Baker MD;  Location: [facility] OR;  Service: Ophthalmology;  Laterality: Left;  left    CATARACT EXTRACTION W/  INTRAOCULAR LENS IMPLANT Right 3/22/2021    Procedure: EXTRACTION, CATARACT, WITH IOL INSERTION;  Surgeon: Latisha Baker MD;  Location: [facility] OR;  Service: Ophthalmology;  Laterality: Right;  right    CERVICAL FUSION  1991    CHOLECYSTECTOMY      COLONOSCOPY      COLONOSCOPY N/A 11/9/2018    Dr Oscar; chronic active colitis; repeat in 5 years    COLONOSCOPY N/A 2/14/2020    Procedure: COLONOSCOPY;  Surgeon: Fadi Oscar MD;  Location: [facility] ENDO;  Service: Endoscopy;  Laterality: N/A;    HYSTERECTOMY      TRACEE/BSO for benign disease    JOINT REPLACEMENT Right 2016    Hip replacement    IN REMOVAL OF OVARY/TUBE(S)      ROBOT-ASSISTED LAPAROSCOPIC ABDOMINAL SACROCOLPOPEXY USING DA FLAKITO XI N/A 12/14/2021    Procedure: XI ROBOTIC SACROCOLPOPEXY, ABDOMINAL;  Surgeon: Yannick Cabral MD;  Location: [facility] OR;  Service: OB/GYN;  Laterality: N/A;    ROBOT-ASSISTED LAPAROSCOPIC LYSIS OF ADHESIONS USING DA FLAKITO XI  12/14/2021    Procedure: XI ROBOTIC LYSIS, ADHESIONS;  Surgeon: Yannick Cabral MD;  Location: [facility] OR;  Service: OB/GYN;;    TONSILLECTOMY      TOTAL ABDOMINAL HYSTERECTOMY W/ BILATERAL SALPINGOOPHORECTOMY  1990       Medications  Current Outpatient Medications   Medication Sig    estradioL (ESTRACE) 0.01 % (0.1 mg/gram) vaginal cream Place 1 g vaginally every Mon, Wed, Fri.    levothyroxine (SYNTHROID) 88 MCG tablet Take 1 tablet (88 mcg total) by mouth once daily.    losartan-hydrochlorothiazide 100-25 mg (HYZAAR) 100-25 mg per tablet TAKE 1 TABLET BY MOUTH EVERY DAY    magnesium  30 mg Tab Take 1 tablet by mouth.    mesalamine (APRISO) 0.375 gram Cp24 Take 2 capsules (0.75 g total) by mouth once daily.    multivitamin/iron/folic acid (CENTRUM COMPLETE ORAL) Take by mouth.     No current facility-administered medications for this visit.       Allergies  Review of patient's allergies indicates:   Allergen Reactions    Ciprofloxacin Palpitations    Flagyl [metronidazole] Palpitations       Family History  Family History   Problem Relation Age of Onset    Heart failure Mother     Cancer Mother         Bladder    Colon cancer Mother 63    Cancer Father         Bladder    Parkinsonism Father     Diverticulitis Father     Uterine cancer Maternal Aunt     Ovarian cancer Neg Hx     Breast cancer Neg Hx     Crohn's disease Neg Hx     Ulcerative colitis Neg Hx     Glaucoma Neg Hx     Macular degeneration Neg Hx     Retinal detachment Neg Hx        Social History  Social History     Socioeconomic History    Marital status:    Tobacco Use    Smoking status: Never Smoker    Smokeless tobacco: Never Used   Substance and Sexual Activity    Alcohol use: No    Drug use: No    Sexual activity: Not Currently     Social Determinants of Health     Financial Resource Strain: Unknown    Difficulty of Paying Living Expenses: Patient refused   Food Insecurity: Unknown    Worried About Running Out of Food in the Last Year: Patient refused    Ran Out of Food in the Last Year: Never true   Transportation Needs: No Transportation Needs    Lack of Transportation (Medical): No    Lack of Transportation (Non-Medical): No   Physical Activity: Unknown    Days of Exercise per Week: Patient refused   Stress: Unknown    Feeling of Stress : Patient refused   Social Connections: Unknown    Frequency of Communication with Friends and Family: More than three times a week    Frequency of Social Gatherings with Friends and Family: More than three times a week    Active Member of Clubs or  Organizations: Yes    Attends Club or Organization Meetings: More than 4 times per year    Marital Status:    Housing Stability: Low Risk     Unable to Pay for Housing in the Last Year: No    Number of Places Lived in the Last Year: 1    Unstable Housing in the Last Year: No               Review of Systems     Constitutional: Negative    HENT: Negative  Eyes: Negative  Respiratory: Negative  Cardiovascular: Negative  Musculoskeletal: HPI  Skin: Negative  Neurological: Negative  Hematological: Negative  Endocrine: Negative                 Physical Exam    There were no vitals filed for this visit.  Body mass index is 25.4 kg/m².  Physical Examination:     General appearance -  well appearing, and in no distress  Mental status - awake  Neck - supple  Chest -  symmetric air entry  Heart - normal rate   Abdomen - soft      Assessment     1. Right hip pain          Plan

## 2022-06-28 DIAGNOSIS — M47.816 OSTEOARTHRITIS OF LUMBAR SPINE, UNSPECIFIED SPINAL OSTEOARTHRITIS COMPLICATION STATUS: Primary | ICD-10-CM

## 2022-07-08 DIAGNOSIS — E03.9 HYPOTHYROIDISM, UNSPECIFIED TYPE: ICD-10-CM

## 2022-07-08 RX ORDER — LEVOTHYROXINE SODIUM 88 UG/1
TABLET ORAL
Qty: 90 TABLET | Refills: 3 | Status: SHIPPED | OUTPATIENT
Start: 2022-07-08 | End: 2023-06-30 | Stop reason: SDUPTHER

## 2022-07-08 NOTE — TELEPHONE ENCOUNTER
Refill Decision Note   Dorothy Kramer  is requesting a refill authorization.  Brief Assessment and Rationale for Refill:  Approve     Medication Therapy Plan:       Medication Reconciliation Completed: No   Comments:     No Care Gaps recommended.     Note composed:10:12 AM 07/08/2022

## 2022-07-08 NOTE — TELEPHONE ENCOUNTER
No new care gaps identified.  [facility] Embedded Care Gaps. Reference number: [id number]. 7/08/2022   1:22:23 AM CDT

## 2022-07-11 ENCOUNTER — CLINICAL SUPPORT (OUTPATIENT)
Dept: REHABILITATION | Facility: HOSPITAL | Age: 82
End: 2022-07-11
Attending: ORTHOPAEDIC SURGERY
Payer: MEDICARE

## 2022-07-11 DIAGNOSIS — M62.81 MUSCLE WEAKNESS: ICD-10-CM

## 2022-07-11 DIAGNOSIS — G89.29 CHRONIC BILATERAL LOW BACK PAIN WITH LEFT-SIDED SCIATICA: ICD-10-CM

## 2022-07-11 DIAGNOSIS — M47.816 OSTEOARTHRITIS OF LUMBAR SPINE, UNSPECIFIED SPINAL OSTEOARTHRITIS COMPLICATION STATUS: ICD-10-CM

## 2022-07-11 DIAGNOSIS — R26.89 DECREASED FUNCTIONAL MOBILITY: ICD-10-CM

## 2022-07-11 DIAGNOSIS — M25.60 RANGE OF MOTION DEFICIT: ICD-10-CM

## 2022-07-11 DIAGNOSIS — M25.559 HIP PAIN: ICD-10-CM

## 2022-07-11 DIAGNOSIS — M54.42 CHRONIC BILATERAL LOW BACK PAIN WITH LEFT-SIDED SCIATICA: ICD-10-CM

## 2022-07-11 PROCEDURE — 97161 PT EVAL LOW COMPLEX 20 MIN: CPT | Mod: PN

## 2022-07-11 PROCEDURE — 97110 THERAPEUTIC EXERCISES: CPT | Mod: PN

## 2022-07-11 NOTE — PLAN OF CARE
[facility]  Physical Therapy Initial Evaluation    Name: Dorothy Kramer  Clinic Number: [id number]    Therapy Diagnosis:   Encounter Diagnoses   Name Primary?    Osteoarthritis of lumbar spine, unspecified spinal osteoarthritis complication status     Chronic bilateral low back pain with left-sided sciatica     Hip pain     Muscle weakness     Range of motion deficit     Decreased functional mobility      Physician: Henry Carmona MD    Physician Orders: PT Eval and Treat   Medical Diagnosis from Referral: Osteoarthritis of lumbar spine, unspecified spinal osteoarthritis complication status  Evaluation Date: 7/11/2022  Authorization Period Expiration: 6/28/23  Plan of Care Expiration: 9/9/22  Visit # / Visits authorized: 1/ 1    Time In: 10:00  Time Out: 11:00  Total Billable Time: 60 minutes    Precautions: Standard    Subjective   Date of onset: 6 years ago s/p R NEYDA  History of current condition - Dorothy reports: she's had low back pain since her R NEYDA 6 years ago.  She has pain to R hip when she lies on her R side.  She states she has to sleep on her R side due to pain to R shoulder and L sciatica if she sleeps on L side.  She had cervical fusion 30 yrs ago. She states she has a herniated disc above her fusion and a bulging disc below the fusion.  Patient states she has pain to neck and back. 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        Medical History:   Past Medical History:   Diagnosis Date    DDD (degenerative disc disease), cervical     Diverticulitis     Diverticulosis     DJD (degenerative joint disease) of hip     Dyspepsia     GERD (gastroesophageal reflux disease)     History of melanoma 5/20/2015    HTN (hypertension)     Melanoma     Migraine headache     Rectocele     Skin cancer     melanoma on face    Sleep apnea     Thyroid disease     hypo    Trouble in sleeping     Uterine prolaps     followed by GYN    Vulvar lesion 5/20/2015        Surgical History:   Dorothy Kramer  has a past surgical history that includes Total abdominal hysterectomy w/ bilateral salpingoophorectomy (1990); Cervical fusion (1991); Breast lumpectomy (1989); Appendectomy (1962); pr removal of ovary/tube(s); Breast biopsy; Tonsillectomy; Back surgery; Cholecystectomy; Hysterectomy; Joint replacement (Right, 2016); Colonoscopy; Colonoscopy (N/A, 11/9/2018); Colonoscopy (N/A, 2/14/2020); Cataract extraction (Left, 02/22/2021); Cataract extraction w/  intraocular lens implant (Left, 2/22/2021); Cataract extraction w/  intraocular lens implant (Right, 3/22/2021); Robot-assisted laparoscopic abdominal sacrocolpopexy using da Diego Xi (N/A, 12/14/2021); and Robot-assisted laparoscopic lysis of adhesions using da Diego Xi (12/14/2021).    Medications:   Dorothy has a current medication list which includes the following prescription(s): estradiol, levothyroxine, losartan-hydrochlorothiazide 100-25 mg, magnesium, mesalamine, and multivitamin/iron/folic acid.    Allergies:   Review of patient's allergies indicates:   Allergen Reactions    Ciprofloxacin Palpitations    Flagyl [metronidazole] Palpitations        Imaging, xray: Right total hip arthroplasty without complication    Prior Therapy: prior PT years ago for sciatica  Social History: pt lives with spouse in a 1 story house, 1 curb step to enter  Occupation: retired  Prior Level of Function: I with ADLs  Current Level of Function: pain and difficulty with donning shoes and socks, vacuuming, sweeping    Sleep: pt wakes up every 2 hrs with pain to R hip  Numbness/tingling: down L LE to foot    Pain:  Current 1/10, worst 7/10, best 1/10   Location: pain across low back, numb/tingling down lateral L LE to foot, pain to lateral R hip   Description: Aching and Sharp  Aggravating Factors:fwd bending, vacuuming, sweeping, standing >10-15 min  Easing Factors: sitting    Pts goals: decreased pain, learn HEP, be able to sleep  better        Objective     Observation: pt is pleasant and cooperative    Posture:  Fwd head, rounded shoulders    Lumbar Range of Motion:    % Pain   Flexion 70   L low back and down lateral L LE to gastroc        Extension 25   no        Left Side Bending 50 Pain to L low back        Right Side Bending 50 Pain to central low back        Left rotation   25 Pain down L LE        Right Rotation   25 Pain down L LE             Lower Extremity Strength  Right LE  Left LE    Knee extension: 5/5 Knee extension: 4/5 pain to kne   Knee flexion: 4/5 Knee flexion: 4+/5   Hip flexion: 4/5 Hip flexion: 4/5 pain to low back and sciatica   Hip extension: bridge 3+/5 Hip extension: bridge 3+/5   Hip abduction: 3+/5 Hip abduction: 3+/5   Hip adduction: 3-/5 Hip adduction 3-/5   Ankle dorsiflexion: 5/5 Ankle dorsiflexion: 5/5   Hip IR: R: 5/5, L: 4+/5  Hip ER: R: 4/5 pain to groin, L: 4+/5      Special Tests:  -Bridge Test: (+) for decreased core and glute strength    Palpation: TTP to R grt trochanter    Sensation: grossly intact to light touch, but some numbness and tingling to lateral L LE due to sciatica    Flexibility:    Popliteal Angle: R = -30 degrees ; L = -30 degrees     Functional Limitations Reports - G Codes  CMS Impairment/Limitation/Restriction for FOTO Lumbar Spine Survey  Status Limitation G-Code CMS Severity Modifier  Intake 40% 60% Current Status CL - At least 60 percent but less than 80 percent  Predicted 48% 52% Goal Status+ CK - At least 40 percent but less than 60 percent      PT Evaluation Completed? Yes  Discussed Plan of Care with patient: Yes    TREATMENT   Treatment Time In: 10:45  Treatment Time Out: 11:00  Total Treatment time separate from Evaluation: 15 minutes    Dorothy received therapeutic exercises to develop strength, ROM, flexibility and posture for 15 minutes including:  Modified push/pull (R hip flex, L hip ext with foot into mat) 3x3 sec  Supine glute sets x10, 5 sec hold  hooklying hip add  ball x10, 5 sec hold  hooklying hip abd GTB x10, 5 sec hold  Log roll from supine to sit    May add: piriformis stretch, hamstring stretch, TA set + PPT, sit to stand    Home Exercises and Patient Education Provided    Education provided:   - role of PT  -may have muscle soreness post exercises  -importance of compliance with HEP for improved strength  Pt gave verbal understanding to all education provided    Written Home Exercises Provided: yes.  Exercises were reviewed and oDrothy was able to demonstrate them prior to the end of the session.  Dorothy demonstrated good  understanding of the education provided.     See EMR under Patient Instructions for exercises provided 7/11/2022.    Assessment   Dorothy is a 81 y.o. female referred to outpatient Physical Therapy with a medical diagnosis of Osteoarthritis of lumbar spine, unspecified spinal osteoarthritis complication status  . Pt presents with low back pain, decreased lumbar ROM, decreased LE strength (especially hips), decreased core strength, decreased functional mobility.    Pt prognosis is Fair.   Pt will benefit from skilled outpatient Physical Therapy to address the deficits stated above and in the chart below, provide pt/family education, and to maximize pt's level of independence.     Plan of care discussed with patient: Yes  Pt's spiritual, cultural and educational needs considered and patient is agreeable to the plan of care and goals as stated below:     Anticipated Barriers for therapy: none    Medical Necessity is demonstrated by the following  History  Co-morbidities and personal factors that may impact the plan of care Co-morbidities:   prior hip surgery and prior cervical fusion    Personal Factors:   no deficits     high   Examination  Body Structures and Functions, activity limitations and participation restrictions that may impact the plan of care Body Regions:   back  lower extremities  trunk    Body Systems:    ROM  strength    Participation  Restrictions:        Activity limitations:   Learning and applying knowledge  no deficits    General Tasks and Commands  no deficits    Communication  no deficits    Mobility  walking    Self care  toileting  dressing    Domestic Life  shopping  cooking  doing house work (cleaning house, washing dishes, laundry)    Interactions/Relationships  no deficits    Life Areas  no deficits    Community and Social Life  community life  recreation and leisure         high   Clinical Presentation stable and uncomplicated low   Decision Making/ Complexity Score: low       GOALS: Short Term Goals:  4 weeks (progressing, not met)  1.Report decreased    Low back    pain  <   / =  3  /10  to increase tolerance for ADLs  2. Pt to increase B popliteal angle by > or = 5 degrees in order to improve flexibility and posture.   3. Increased R LE strength by 1/3 muscle grade in all deficient planes to increase tolerance for ADL and work activities.  4. Increased L LE strength by 1/3 muscle grade in all deficient planes to increase tolerance for ADL and work activities.  5. Increased lumbar AROM flexion to 80% for increased ease donning shoes and socks.   6. Pt to tolerate HEP to improve ROM and independence with ADL's  7. Pt will report ability to sleep with only 1-2 disturbances per night for improved tolerance for ADLs during the day.    Long Term Goals: 8 weeks (progressing, not met)  1.Report decreased    Low back    pain  <   / =  2  /10  to increase tolerance for ADLs.  2.Pt to increase B popliteal angle by > or = 10 degrees in order to improve flexibility and posture.   3.Increased R LE strength by 1 muscle grade in all deficient planes to increase tolerance for ADL and work activities.  4. Increased L LE strength by 1 muscle grade in all deficient planes to increase tolerance for ADL and work activities.  5. Increased lumbar AROM rotation to 50% each for increased ease with ADLs.  6. Pt to be Independent with HEP to improve ROM and  independence with ADL's  7. Pt to demonstrate negative Bridge Test in order to show improved core strength for lumbar stabilization.       Plan   Plan of care Certification: 7/11/2022 to 9/9/22.    Outpatient Physical Therapy 2 times weekly for 8 weeks to include the following interventions: Electrical Stimulation  , Gait Training, Manual Therapy, Moist Heat/ Ice, Neuromuscular Re-ed, Patient Education, Self Care, Therapeutic Activities, Therapeutic Exercise and dry needling.     Roro Zapata, PT

## 2022-08-01 ENCOUNTER — CLINICAL SUPPORT (OUTPATIENT)
Dept: REHABILITATION | Facility: HOSPITAL | Age: 82
End: 2022-08-01
Payer: MEDICARE

## 2022-08-01 DIAGNOSIS — M54.42 CHRONIC BILATERAL LOW BACK PAIN WITH LEFT-SIDED SCIATICA: Primary | ICD-10-CM

## 2022-08-01 DIAGNOSIS — M25.559 HIP PAIN: ICD-10-CM

## 2022-08-01 DIAGNOSIS — R26.89 DECREASED FUNCTIONAL MOBILITY: ICD-10-CM

## 2022-08-01 DIAGNOSIS — M25.60 RANGE OF MOTION DEFICIT: ICD-10-CM

## 2022-08-01 DIAGNOSIS — G89.29 CHRONIC BILATERAL LOW BACK PAIN WITH LEFT-SIDED SCIATICA: Primary | ICD-10-CM

## 2022-08-01 DIAGNOSIS — M62.81 MUSCLE WEAKNESS: ICD-10-CM

## 2022-08-01 PROCEDURE — 97110 THERAPEUTIC EXERCISES: CPT | Mod: PN

## 2022-08-01 NOTE — PROGRESS NOTES
[facility]   Physical Therapy Treatment Note     Name: Dorothy Kramer  Clinic Number: [id number]    Therapy Diagnosis:   Encounter Diagnoses   Name Primary?    Chronic bilateral low back pain with left-sided sciatica Yes    Hip pain     Muscle weakness     Range of motion deficit     Decreased functional mobility      Physician: Henry Carmona MD    Visit Date: 8/1/2022    Physician Orders: PT Eval and Treat   Medical Diagnosis from Referral: Osteoarthritis of lumbar spine, unspecified spinal osteoarthritis complication status  Evaluation Date: 7/11/2022  Authorization Period Expiration: 12/31/22  Plan of Care Expiration: 9/9/22  Visit # / Visits authorized: 1/ 20    Time In: 10:05  Time Out: 11:00  Total Billable Time: 40 minutes (1:1)    Precautions: Standard    PTA Visit #: 0/5     SUBJECTIVE     Pt reports: she continues to have pain to low back, L sciatica, and R hip pain (when lying on R side).  She was compliant with home exercise program.  Response to previous treatment: did well  Functional change: too soon to tell    Pain: 0/10 at rest and 3-4/10 at its worst with standing/walking  Location: low back and L sciatica down to L ankle     OBJECTIVE     Objective Measures updated at progress report unless specified.     Treatment     Dorothy received the treatments listed below:      therapeutic exercises to develop strength, endurance, ROM, flexibility, posture and core stabilization for 40 minutes including: (cervical roll pillow in bottom of pillow case)  Modified push/pull (R hip flex, L hip ext with foot into mat) 3x3 sec (resistance performed by PT to avoid neck strain)  Piriformis stretch 2x20 sec towel  TA set x10  TA set + PPT x10  Supine glute sets x20, 5 sec hold  hooklying hip add ball x20, 5 sec hold  hooklying hip abd GTB x20, 5 sec hold  Seated HS stretch 3x20 sec  Hip abd walk in // bars x3 laps Y sport loop  Log roll from supine to sit    May add: TA set  +[facility] [place]      Patient Education and Home Exercises     Home Exercises Provided and Patient Education Provided     Education provided:   - performing exercises within pain free range  Pt gave verbal understanding to all education provided    Written Home Exercises Provided: yes. Exercises were reviewed and Dorothy was able to demonstrate them prior to the end of the session.  Dorothy demonstrated good  understanding of the education provided. See EMR under Patient Instructions for exercises provided during therapy sessions    ASSESSMENT     Pt had difficulty getting into a comfortable position for neck, but she was able to perform exercises with cervical roll for neck support.  She was able to tolerate push/pull with PT providing resistance to avoid neck strain.  She will continue to benefit from PT for increased core/glute and LE strength for stabilization and improved functional mobility for ADLs.    Dorothy Is progressing well towards her goals.   Pt prognosis is Good.     Pt will continue to benefit from skilled outpatient physical therapy to address the deficits listed in the problem list box on initial evaluation, provide pt/family education and to maximize pt's level of independence in the home and community environment.     Pt's spiritual, cultural and educational needs considered and pt agreeable to plan of care and goals.     Anticipated barriers to physical therapy: none    Goals:   Short Term Goals:  4 weeks (progressing, not met)  1.Report decreased    Low back    pain  <   / =  3  /10  to increase tolerance for ADLs  2. Pt to increase B popliteal angle by > or = 5 degrees in order to improve flexibility and posture.   3. Increased R LE strength by 1/3 muscle grade in all deficient planes to increase tolerance for ADL and work activities.  4. Increased L LE strength by 1/3 muscle grade in all deficient planes to increase tolerance for ADL and work activities.  5. Increased lumbar AROM flexion to 80% for increased  ease donning shoes and socks.   6. Pt to tolerate HEP to improve ROM and independence with ADL's  7. Pt will report ability to sleep with only 1-2 disturbances per night for improved tolerance for ADLs during the day.    Long Term Goals: 8 weeks (progressing, not met)  1.Report decreased    Low back    pain  <   / =  2  /10  to increase tolerance for ADLs.  2.Pt to increase B popliteal angle by > or = 10 degrees in order to improve flexibility and posture.   3.Increased R LE strength by 1 muscle grade in all deficient planes to increase tolerance for ADL and work activities.  4. Increased L LE strength by 1 muscle grade in all deficient planes to increase tolerance for ADL and work activities.  5. Increased lumbar AROM rotation to 50% each for increased ease with ADLs.  6. Pt to be Independent with HEP to improve ROM and independence with ADL's  7. Pt to demonstrate negative Bridge Test in order to show improved core strength for lumbar stabilization.       PLAN     Continue PT towards established goals.  Progress core/glute/LE strengthening and stabilization.    Roro Zapata, PT

## 2022-08-08 ENCOUNTER — CLINICAL SUPPORT (OUTPATIENT)
Dept: REHABILITATION | Facility: HOSPITAL | Age: 82
End: 2022-08-08
Payer: MEDICARE

## 2022-08-08 ENCOUNTER — TELEPHONE (OUTPATIENT)
Dept: ORTHOPEDICS | Facility: CLINIC | Age: 82
End: 2022-08-08
Payer: MEDICARE

## 2022-08-08 ENCOUNTER — PATIENT MESSAGE (OUTPATIENT)
Dept: ORTHOPEDICS | Facility: CLINIC | Age: 82
End: 2022-08-08
Payer: MEDICARE

## 2022-08-08 DIAGNOSIS — M54.42 CHRONIC BILATERAL LOW BACK PAIN WITH LEFT-SIDED SCIATICA: Primary | ICD-10-CM

## 2022-08-08 DIAGNOSIS — G89.29 CHRONIC BILATERAL LOW BACK PAIN WITH LEFT-SIDED SCIATICA: Primary | ICD-10-CM

## 2022-08-08 DIAGNOSIS — M25.60 RANGE OF MOTION DEFICIT: ICD-10-CM

## 2022-08-08 DIAGNOSIS — R26.89 DECREASED FUNCTIONAL MOBILITY: ICD-10-CM

## 2022-08-08 DIAGNOSIS — M25.559 HIP PAIN: ICD-10-CM

## 2022-08-08 DIAGNOSIS — M62.81 MUSCLE WEAKNESS: ICD-10-CM

## 2022-08-08 PROCEDURE — 97110 THERAPEUTIC EXERCISES: CPT | Mod: PN

## 2022-08-08 NOTE — PROGRESS NOTES
[facility]   Physical Therapy Treatment Note     Name: Dorothy Kramer  Clinic Number: [id number]    Therapy Diagnosis:   Encounter Diagnoses   Name Primary?    Chronic bilateral low back pain with left-sided sciatica Yes    Hip pain     Muscle weakness     Range of motion deficit     Decreased functional mobility      Physician: Henry Carmona MD    Visit Date: 8/8/2022    Physician Orders: PT Eval and Treat   Medical Diagnosis from Referral: Osteoarthritis of lumbar spine, unspecified spinal osteoarthritis complication status  Evaluation Date: 7/11/2022  Authorization Period Expiration: 12/31/22  Plan of Care Expiration: 9/9/22  Visit # / Visits authorized: 2/ 20    Time In: 9:58   Time Out: 10:50  Total Billable Time: 30 minutes (1:1)    Precautions: Standard    PTA Visit #: 0/5     SUBJECTIVE     Pt reports: her back and hip feel about the same.  She did not have increased neck pain after last visit.   She was compliant with home exercise program.  Response to previous treatment: did well  Functional change: too soon to tell    Pain: 3-4/10 in supine  Location: low back     OBJECTIVE     Objective Measures updated at progress report unless specified.     Treatment     Dorothy received the treatments listed below:      therapeutic exercises to develop strength, endurance, ROM, flexibility, posture and core stabilization for 40 minutes including: (cervical roll pillow in bottom of pillow case)  Modified push/pull (R hip flex, L hip ext with foot into mat) 3x3 sec (resistance performed by PT to avoid neck strain)  Piriformis stretch 2x20 sec towel  TA set x10  TA set + PPT x10  TA set +SKFO x10 ea  Supine glute sets x20, 5 sec hold  hooklying hip add ball x20, 5 sec hold  hooklying hip abd GTB x20, 5 sec hold  Seated HS stretch 3x20 sec  Hip abd walk in // bars x3 laps Y sport loop  Sit to stand from 18in box + foam x2 (pt reporting increased pain to L knee)  Shuttle 2 bands 2x10  Log  roll from supine to sit    May add:TA progressions      Patient Education and Home Exercises     Home Exercises Provided and Patient Education Provided     Education provided:   - performing exercises within pain free range  Pt gave verbal understanding to all education provided    Written Home Exercises Provided: pt instructed to continue previous HEP. Exercises were reviewed and Dorothy was able to demonstrate them prior to the end of the session.  Dorothy demonstrated good  understanding of the education provided. See EMR under Patient Instructions for exercises provided during therapy sessions    ASSESSMENT     Pt reported decreased pain to low back to 0/10 post tx.  She reported some left knee pain with sit to stand so exercise stopped.  She was able to tolerate shuttle without knee pain.  Pt demonstrated good understanding of TA set and was able to tolerate progressions without exacerbation of symptoms.  She will continue to benefit from PT for increased core/glute and LE strength for stabilization and improved functional mobility for ADLs.    Dorothy Is progressing well towards her goals.   Pt prognosis is Good.     Pt will continue to benefit from skilled outpatient physical therapy to address the deficits listed in the problem list box on initial evaluation, provide pt/family education and to maximize pt's level of independence in the home and community environment.     Pt's spiritual, cultural and educational needs considered and pt agreeable to plan of care and goals.     Anticipated barriers to physical therapy: none    Goals:   Short Term Goals:  4 weeks (progressing, not met)  1.Report decreased    Low back    pain  <   / =  3  /10  to increase tolerance for ADLs  2. Pt to increase B popliteal angle by > or = 5 degrees in order to improve flexibility and posture.   3. Increased R LE strength by 1/3 muscle grade in all deficient planes to increase tolerance for ADL and work activities.  4. Increased L LE  strength by 1/3 muscle grade in all deficient planes to increase tolerance for ADL and work activities.  5. Increased lumbar AROM flexion to 80% for increased ease donning shoes and socks.   6. Pt to tolerate HEP to improve ROM and independence with ADL's  7. Pt will report ability to sleep with only 1-2 disturbances per night for improved tolerance for ADLs during the day.    Long Term Goals: 8 weeks (progressing, not met)  1.Report decreased    Low back    pain  <   / =  2  /10  to increase tolerance for ADLs.  2.Pt to increase B popliteal angle by > or = 10 degrees in order to improve flexibility and posture.   3.Increased R LE strength by 1 muscle grade in all deficient planes to increase tolerance for ADL and work activities.  4. Increased L LE strength by 1 muscle grade in all deficient planes to increase tolerance for ADL and work activities.  5. Increased lumbar AROM rotation to 50% each for increased ease with ADLs.  6. Pt to be Independent with HEP to improve ROM and independence with ADL's  7. Pt to demonstrate negative Bridge Test in order to show improved core strength for lumbar stabilization.       PLAN     Continue PT towards established goals.  Progress core/glute/LE strengthening and stabilization.    Roro Zapata, PT

## 2022-08-10 ENCOUNTER — PATIENT MESSAGE (OUTPATIENT)
Dept: REHABILITATION | Facility: HOSPITAL | Age: 82
End: 2022-08-10
Payer: MEDICARE

## 2022-08-16 ENCOUNTER — TELEPHONE (OUTPATIENT)
Dept: UROGYNECOLOGY | Facility: CLINIC | Age: 82
End: 2022-08-16
Payer: MEDICARE

## 2022-08-16 ENCOUNTER — PATIENT MESSAGE (OUTPATIENT)
Dept: UROGYNECOLOGY | Facility: CLINIC | Age: 82
End: 2022-08-16
Payer: MEDICARE

## 2022-08-16 NOTE — TELEPHONE ENCOUNTER
----- Message from Pat Maxwell sent at 8/16/2022 10:27 AM CDT -----  Contact: Pt  Patient Returning Call    Who Called: Pt   Who Left Message for Patient: Unknown  Does the patient know what this is regarding? No  Best Call Back Number: [phone]    Additional Information: Pt is calling the office returning a call from today.. Please call and adv-    Thank you so much!

## 2022-08-17 ENCOUNTER — PROCEDURE VISIT (OUTPATIENT)
Dept: UROGYNECOLOGY | Facility: CLINIC | Age: 82
End: 2022-08-17
Payer: MEDICARE

## 2022-08-17 VITALS
DIASTOLIC BLOOD PRESSURE: 72 MMHG | HEIGHT: 64 IN | HEART RATE: 83 BPM | SYSTOLIC BLOOD PRESSURE: 152 MMHG | BODY MASS INDEX: 25.25 KG/M2 | WEIGHT: 147.94 LBS

## 2022-08-17 DIAGNOSIS — N39.46 MIXED INCONTINENCE URGE AND STRESS (MALE)(FEMALE): Primary | ICD-10-CM

## 2022-08-17 PROCEDURE — 51797 PR VOIDING PRESS STUDY INTRA-ABDOMINAL VOID: ICD-10-PCS | Mod: S$GLB,,, | Performed by: OBSTETRICS & GYNECOLOGY

## 2022-08-17 PROCEDURE — 51784 ANAL/URINARY MUSCLE STUDY: CPT | Mod: 51,S$GLB,, | Performed by: OBSTETRICS & GYNECOLOGY

## 2022-08-17 PROCEDURE — 51728 CYSTOMETROGRAM W/VP: CPT | Mod: S$GLB,,, | Performed by: OBSTETRICS & GYNECOLOGY

## 2022-08-17 PROCEDURE — 51741 ELECTRO-UROFLOWMETRY FIRST: CPT | Mod: 51,S$GLB,, | Performed by: OBSTETRICS & GYNECOLOGY

## 2022-08-17 PROCEDURE — 51728 PR COMPLEX CYSTOMETROGRAM VOIDING PRESSURE STUDIES: ICD-10-PCS | Mod: S$GLB,,, | Performed by: OBSTETRICS & GYNECOLOGY

## 2022-08-17 PROCEDURE — 51784 PR ANAL/URINARY MUSCLE STUDY: ICD-10-PCS | Mod: 51,S$GLB,, | Performed by: OBSTETRICS & GYNECOLOGY

## 2022-08-17 PROCEDURE — 51797 INTRAABDOMINAL PRESSURE TEST: CPT | Mod: S$GLB,,, | Performed by: OBSTETRICS & GYNECOLOGY

## 2022-08-17 PROCEDURE — 51741 PR UROFLOWMETRY, COMPLEX: ICD-10-PCS | Mod: 51,S$GLB,, | Performed by: OBSTETRICS & GYNECOLOGY

## 2022-08-22 ENCOUNTER — CLINICAL SUPPORT (OUTPATIENT)
Dept: REHABILITATION | Facility: HOSPITAL | Age: 82
End: 2022-08-22
Payer: MEDICARE

## 2022-08-22 DIAGNOSIS — M25.60 RANGE OF MOTION DEFICIT: ICD-10-CM

## 2022-08-22 DIAGNOSIS — M25.559 HIP PAIN: ICD-10-CM

## 2022-08-22 DIAGNOSIS — G89.29 CHRONIC BILATERAL LOW BACK PAIN WITH LEFT-SIDED SCIATICA: Primary | ICD-10-CM

## 2022-08-22 DIAGNOSIS — M54.42 CHRONIC BILATERAL LOW BACK PAIN WITH LEFT-SIDED SCIATICA: Primary | ICD-10-CM

## 2022-08-22 DIAGNOSIS — M62.81 MUSCLE WEAKNESS: ICD-10-CM

## 2022-08-22 DIAGNOSIS — R26.89 DECREASED FUNCTIONAL MOBILITY: ICD-10-CM

## 2022-08-22 PROCEDURE — 97110 THERAPEUTIC EXERCISES: CPT | Mod: PN

## 2022-08-22 NOTE — PROGRESS NOTES
[facility]   Physical Therapy Treatment Note     Name: Dorothy Kramer  Clinic Number: [id number]    Therapy Diagnosis:   Encounter Diagnoses   Name Primary?    Chronic bilateral low back pain with left-sided sciatica Yes    Hip pain     Muscle weakness     Range of motion deficit     Decreased functional mobility      Physician: Henry Carmona MD    Visit Date: 8/22/2022    Physician Orders: PT Eval and Treat   Medical Diagnosis from Referral: Osteoarthritis of lumbar spine, unspecified spinal osteoarthritis complication status  Evaluation Date: 7/11/2022  Authorization Period Expiration: 12/31/22  Plan of Care Expiration: 9/9/22  Visit # / Visits authorized: 3/ 20 (1 on prior auth)    Time In: 2:50   Time Out: 3:35  Total Billable Time: 40 minutes (1:1)    Precautions: Standard    PTA Visit #: 0/5     SUBJECTIVE     Pt reports: she has pain to L buttock presently.   She was compliant with home exercise program.  Response to previous treatment: did well  Functional change: too soon to tell    Pain: 4-5/10 in supine  Location: low back     OBJECTIVE     Lumbar Range of Motion:    % Pain   Flexion 75   Pain to low back and buttock        Extension 25   no        Left Side Bending 50 Pain to L low back        Right Side Bending 50 Pain to central low back        Left rotation   25 no   Right Rotation   25 no             Lower Extremity Strength  Right LE  Left LE    Knee extension: 5/5 Knee extension: 4/5 pain to kne   Knee flexion: 5/5 Knee flexion: 5/5   Hip flexion: 4+/5 Hip flexion: 4/5   Hip extension: bridge 3+/5 Hip extension: bridge 3+/5   Hip abduction: 4/5 Hip abduction: 4/5   Hip adduction: 3/5 Hip adduction 3/5   Ankle dorsiflexion: 5/5 Ankle dorsiflexion: 5/5   Hip IR: R: 5/5, L: 4+/5  Hip ER: R: 4+/5, L: 5/5       Special Tests:  -Bridge Test: (+) for decreased core and glute strength    Palpation: TTP to R grt trochanter    Sensation: grossly intact to light touch, but  some numbness and tingling to lateral L LE due to sciatica    Flexibility:    Popliteal Angle: R = -30 degrees ; L = -30 degrees      Treatment     Dorothy received the treatments listed below:      therapeutic exercises to develop strength, endurance, ROM, flexibility, posture and core stabilization for 40 minutes including: (cervical roll pillow in bottom of pillow case)  Reassessment  Modified push/pull (R hip flex, L hip ext with foot into mat) 3x3 sec   SKTC 3x20 sec  Piriformis stretch 2x20 sec towel  supine HS stretch 6x10 sec  TA set +SKFO x10 ea  Bridges x5--stopped due to pt reporting increased low back pain  NP Hip abd walk in // bars x3 laps Y sport loop  NP Sit to stand from 18in box + foam x2 (pt reporting increased pain to L knee)  NP Shuttle 2 bands 2x10  Log roll from supine to sit    May add:TA progressions      Patient Education and Home Exercises     Home Exercises Provided and Patient Education Provided     Education provided:   - performing exercises within pain free range  Pt gave verbal understanding to all education provided    Written Home Exercises Provided: yes. Exercises were reviewed and Dorothy was able to demonstrate them prior to the end of the session.  Dorothy demonstrated good  understanding of the education provided. See EMR under Patient Instructions for exercises provided during therapy sessions  8/22/22    ASSESSMENT     Pt reassessed today.  She has improved with increased LE strength and centralization to low back and L buttock vs down to L gastroc. She will continue to benefit from PT for increased core/glute and LE strength for stabilization and improved functional mobility for ADLs.    Dorothy Is progressing well towards her goals.   Pt prognosis is Good.     Pt will continue to benefit from skilled outpatient physical therapy to address the deficits listed in the problem list box on initial evaluation, provide pt/family education and to maximize pt's level of independence in the  home and community environment.     Pt's spiritual, cultural and educational needs considered and pt agreeable to plan of care and goals.     Anticipated barriers to physical therapy: none    Goals:   Short Term Goals:  4 weeks   1.Report decreased    Low back    pain  <   / =  3  /10  to increase tolerance for ADLs (progressing, not met)  2. Pt to increase B popliteal angle by > or = 5 degrees in order to improve flexibility and posture. (progressing, not met)  3. Increased R LE strength by 1/3 muscle grade in all deficient planes to increase tolerance for ADL and work activities. (partially met)  4. Increased L LE strength by 1/3 muscle grade in all deficient planes to increase tolerance for ADL and work activities. (partially met)  5. Increased lumbar AROM flexion to 80% for increased ease donning shoes and socks. (progressing, able to perform without pain down L LE)  6. Pt to tolerate HEP to improve ROM and independence with ADL's (met)  7. Pt will report ability to sleep with only 1-2 disturbances per night for improved tolerance for ADLs during the day.    Long Term Goals: 8 weeks (progressing, not met)  1.Report decreased    Low back    pain  <   / =  2  /10  to increase tolerance for ADLs.  2.Pt to increase B popliteal angle by > or = 10 degrees in order to improve flexibility and posture.   3.Increased R LE strength by 1 muscle grade in all deficient planes to increase tolerance for ADL and work activities.  4. Increased L LE strength by 1 muscle grade in all deficient planes to increase tolerance for ADL and work activities.  5. Increased lumbar AROM rotation to 50% each for increased ease with ADLs.  6. Pt to be Independent with HEP to improve ROM and independence with ADL's  7. Pt to demonstrate negative Bridge Test in order to show improved core strength for lumbar stabilization.     Unmet goals remain appropriate within 4 weeks.      PLAN     Continue PT towards established goals.  Progress  core/glute/LE strengthening and stabilization.    Roro Zapata, PT

## 2022-08-25 ENCOUNTER — OFFICE VISIT (OUTPATIENT)
Dept: ORTHOPEDICS | Facility: CLINIC | Age: 82
End: 2022-08-25
Payer: MEDICARE

## 2022-08-25 VITALS — BODY MASS INDEX: 25.1 KG/M2 | HEIGHT: 64 IN | WEIGHT: 147 LBS

## 2022-08-25 DIAGNOSIS — Z96.649 S/P HIP REPLACEMENT: Primary | ICD-10-CM

## 2022-08-25 DIAGNOSIS — M25.559 HIP PAIN: ICD-10-CM

## 2022-08-25 PROCEDURE — 1160F PR REVIEW ALL MEDS BY PRESCRIBER/CLIN PHARMACIST DOCUMENTED: ICD-10-PCS | Mod: CPTII,S$GLB,, | Performed by: ORTHOPAEDIC SURGERY

## 2022-08-25 PROCEDURE — 1159F PR MEDICATION LIST DOCUMENTED IN MEDICAL RECORD: ICD-10-PCS | Mod: CPTII,S$GLB,, | Performed by: ORTHOPAEDIC SURGERY

## 2022-08-25 PROCEDURE — 99999 PR PBB SHADOW E&M-EST. PATIENT-LVL III: ICD-10-PCS | Mod: PBBFAC,,, | Performed by: ORTHOPAEDIC SURGERY

## 2022-08-25 PROCEDURE — 99214 PR OFFICE/OUTPT VISIT, EST, LEVL IV, 30-39 MIN: ICD-10-PCS | Mod: 25,S$GLB,, | Performed by: ORTHOPAEDIC SURGERY

## 2022-08-25 PROCEDURE — 1101F PR PT FALLS ASSESS DOC 0-1 FALLS W/OUT INJ PAST YR: ICD-10-PCS | Mod: CPTII,S$GLB,, | Performed by: ORTHOPAEDIC SURGERY

## 2022-08-25 PROCEDURE — 20610 LARGE JOINT ASPIRATION/INJECTION: R GREATER TROCHANTERIC BURSA: ICD-10-PCS | Mod: RT,S$GLB,, | Performed by: ORTHOPAEDIC SURGERY

## 2022-08-25 PROCEDURE — 3288F PR FALLS RISK ASSESSMENT DOCUMENTED: ICD-10-PCS | Mod: CPTII,S$GLB,, | Performed by: ORTHOPAEDIC SURGERY

## 2022-08-25 PROCEDURE — 99999 PR PBB SHADOW E&M-EST. PATIENT-LVL III: CPT | Mod: PBBFAC,,, | Performed by: ORTHOPAEDIC SURGERY

## 2022-08-25 PROCEDURE — 3288F FALL RISK ASSESSMENT DOCD: CPT | Mod: CPTII,S$GLB,, | Performed by: ORTHOPAEDIC SURGERY

## 2022-08-25 PROCEDURE — 99214 OFFICE O/P EST MOD 30 MIN: CPT | Mod: 25,S$GLB,, | Performed by: ORTHOPAEDIC SURGERY

## 2022-08-25 PROCEDURE — 1159F MED LIST DOCD IN RCRD: CPT | Mod: CPTII,S$GLB,, | Performed by: ORTHOPAEDIC SURGERY

## 2022-08-25 PROCEDURE — 1157F PR ADVANCE CARE PLAN OR EQUIV PRESENT IN MEDICAL RECORD: ICD-10-PCS | Mod: CPTII,S$GLB,, | Performed by: ORTHOPAEDIC SURGERY

## 2022-08-25 PROCEDURE — 1160F RVW MEDS BY RX/DR IN RCRD: CPT | Mod: CPTII,S$GLB,, | Performed by: ORTHOPAEDIC SURGERY

## 2022-08-25 PROCEDURE — 1125F PR PAIN SEVERITY QUANTIFIED, PAIN PRESENT: ICD-10-PCS | Mod: CPTII,S$GLB,, | Performed by: ORTHOPAEDIC SURGERY

## 2022-08-25 PROCEDURE — 1125F AMNT PAIN NOTED PAIN PRSNT: CPT | Mod: CPTII,S$GLB,, | Performed by: ORTHOPAEDIC SURGERY

## 2022-08-25 PROCEDURE — 20610 DRAIN/INJ JOINT/BURSA W/O US: CPT | Mod: RT,S$GLB,, | Performed by: ORTHOPAEDIC SURGERY

## 2022-08-25 PROCEDURE — 1157F ADVNC CARE PLAN IN RCRD: CPT | Mod: CPTII,S$GLB,, | Performed by: ORTHOPAEDIC SURGERY

## 2022-08-25 PROCEDURE — 1101F PT FALLS ASSESS-DOCD LE1/YR: CPT | Mod: CPTII,S$GLB,, | Performed by: ORTHOPAEDIC SURGERY

## 2022-08-25 RX ORDER — TRIAMCINOLONE ACETONIDE 40 MG/ML
40 INJECTION, SUSPENSION INTRA-ARTICULAR; INTRAMUSCULAR
Status: DISCONTINUED | OUTPATIENT
Start: 2022-08-25 | End: 2022-08-25 | Stop reason: HOSPADM

## 2022-08-25 RX ADMIN — TRIAMCINOLONE ACETONIDE 40 MG: 40 INJECTION, SUSPENSION INTRA-ARTICULAR; INTRAMUSCULAR at 02:08

## 2022-08-25 NOTE — PROCEDURES
Large Joint Aspiration/Injection: R greater trochanteric bursa    Date/Time: 8/25/2022 2:45 PM  Performed by: Henry Carmona MD  Authorized by: Henry Carmona MD     Location:  Hip  Site:  R greater trochanteric bursa  Medications:  40 mg triamcinolone acetonide 40 mg/mL  Patient tolerance:  Patient tolerated the procedure well with no immediate complications

## 2022-08-25 NOTE — PROGRESS NOTES
Several years out from right hip replacement surgery has been well has been having pain in the right hip area the past several months difficulty she lies on that side points the trochanteric bursa location for pain.  She has been going to therapy without relief physical therapist and primary care physician recommend she get a cortisone shot for this pain    Exam shows incision healed nicely without signs infection, hip motion is full and painless, somewhat tender greater trochanter region     X-rays show well placed components right hip replacement, degenerative changes noted lumbar spine     Assessment: Lumbar spine arthrosis pain in the right hip area    Plan: Recommend continued therapy, we will give her an injection today into trochanteric bursa of the right hip, follow-up as needed    Imaging studies ordered and reviewed by me    Further History  Aching pain  Worse with activity  Relieved with rest  No other associated symptoms  No other radiation    Further Exam  Alert and oriented  Pleasant  Contralateral limb has appropriate range of motion for age and condition  Contralateral limb has appropriate strength for age and condition  Contralateral limb has appropriate stability  for age and condition  No adenopathy  Pulses are appropriate for current condition  Skin is intact        Chief Complaint    Chief Complaint   Patient presents with    Right Hip - Pain       HPI  Dorothy Kramer is a 81 y.o.  female who presents with       Past Medical History  Past Medical History:   Diagnosis Date    DDD (degenerative disc disease), cervical     Diverticulitis     Diverticulosis     DJD (degenerative joint disease) of hip     Dyspepsia     GERD (gastroesophageal reflux disease)     History of melanoma 5/20/2015    HTN (hypertension)     Melanoma     Migraine headache     Rectocele     Skin cancer     melanoma on face    Sleep apnea     Thyroid disease     hypo    Trouble in sleeping     Uterine prolaps      followed by GYN    Vulvar lesion 5/20/2015       Past Surgical History  Past Surgical History:   Procedure Laterality Date    APPENDECTOMY  1962    BACK SURGERY      BREAST BIOPSY      BREAST LUMPECTOMY  1989    CATARACT EXTRACTION Left 02/22/2021    ros    CATARACT EXTRACTION W/  INTRAOCULAR LENS IMPLANT Left 2/22/2021    Procedure: EXTRACTION, CATARACT, WITH IOL INSERTION;  Surgeon: Latisha Baker MD;  Location: [facility] OR;  Service: Ophthalmology;  Laterality: Left;  left    CATARACT EXTRACTION W/  INTRAOCULAR LENS IMPLANT Right 3/22/2021    Procedure: EXTRACTION, CATARACT, WITH IOL INSERTION;  Surgeon: Latisha Baker MD;  Location: [facility] OR;  Service: Ophthalmology;  Laterality: Right;  right    CERVICAL FUSION  1991    CHOLECYSTECTOMY      COLONOSCOPY      COLONOSCOPY N/A 11/9/2018    Dr Oscar; chronic active colitis; repeat in 5 years    COLONOSCOPY N/A 2/14/2020    Procedure: COLONOSCOPY;  Surgeon: Fadi Oscar MD;  Location: [facility] ENDO;  Service: Endoscopy;  Laterality: N/A;    HYSTERECTOMY      TRACEE/BSO for benign disease    JOINT REPLACEMENT Right 2016    Hip replacement    FL REMOVAL OF OVARY/TUBE(S)      ROBOT-ASSISTED LAPAROSCOPIC ABDOMINAL SACROCOLPOPEXY USING DA FLAKITO XI N/A 12/14/2021    Procedure: XI ROBOTIC SACROCOLPOPEXY, ABDOMINAL;  Surgeon: Yannick Cabral MD;  Location: [facility] OR;  Service: OB/GYN;  Laterality: N/A;    ROBOT-ASSISTED LAPAROSCOPIC LYSIS OF ADHESIONS USING DA FLAKITO XI  12/14/2021    Procedure: XI ROBOTIC LYSIS, ADHESIONS;  Surgeon: Yannick Cabral MD;  Location: [facility] OR;  Service: OB/GYN;;    TONSILLECTOMY      TOTAL ABDOMINAL HYSTERECTOMY W/ BILATERAL SALPINGOOPHORECTOMY  1990       Medications  Current Outpatient Medications   Medication Sig    estradioL (ESTRACE) 0.01 % (0.1 mg/gram) vaginal cream Place 1 g vaginally every Mon, Wed, Fri.    levothyroxine (SYNTHROID) 88 MCG tablet TAKE 1 TABLET BY MOUTH EVERY DAY     losartan-hydrochlorothiazide 100-25 mg (HYZAAR) 100-25 mg per tablet TAKE 1 TABLET BY MOUTH EVERY DAY    magnesium 30 mg Tab Take 1 tablet by mouth.    mesalamine (APRISO) 0.375 gram Cp24 Take 2 capsules (0.75 g total) by mouth once daily.    mirabegron (MYRBETRIQ) 50 mg Tb24 Take 1 tablet (50 mg total) by mouth once daily.    multivitamin/iron/folic acid (CENTRUM COMPLETE ORAL) Take by mouth.     No current facility-administered medications for this visit.       Allergies  Review of patient's allergies indicates:   Allergen Reactions    Ciprofloxacin Palpitations    Flagyl [metronidazole] Palpitations       Family History  Family History   Problem Relation Age of Onset    Heart failure Mother     Cancer Mother         Bladder    Colon cancer Mother 63    Cancer Father         Bladder    Parkinsonism Father     Diverticulitis Father     Uterine cancer Maternal Aunt     Ovarian cancer Neg Hx     Breast cancer Neg Hx     Crohn's disease Neg Hx     Ulcerative colitis Neg Hx     Glaucoma Neg Hx     Macular degeneration Neg Hx     Retinal detachment Neg Hx        Social History  Social History     Socioeconomic History    Marital status:    Tobacco Use    Smoking status: Never Smoker    Smokeless tobacco: Never Used   Substance and Sexual Activity    Alcohol use: No    Drug use: No    Sexual activity: Not Currently     Social Determinants of Health     Financial Resource Strain: Unknown    Difficulty of Paying Living Expenses: Patient refused   Food Insecurity: Unknown    Worried About Running Out of Food in the Last Year: Patient refused    Ran Out of Food in the Last Year: Never true   Transportation Needs: No Transportation Needs    Lack of Transportation (Medical): No    Lack of Transportation (Non-Medical): No   Physical Activity: Unknown    Days of Exercise per Week: Patient refused   Stress: Unknown    Feeling of Stress : Patient refused   Social Connections: Unknown     Frequency of Communication with Friends and Family: More than three times a week    Frequency of Social Gatherings with Friends and Family: More than three times a week    Active Member of Clubs or Organizations: Yes    Attends Club or Organization Meetings: More than 4 times per year    Marital Status:    Housing Stability: Low Risk     Unable to Pay for Housing in the Last Year: No    Number of Places Lived in the Last Year: 1    Unstable Housing in the Last Year: No               Review of Systems     Constitutional: Negative    HENT: Negative  Eyes: Negative  Respiratory: Negative  Cardiovascular: Negative  Musculoskeletal: HPI  Skin: Negative  Neurological: Negative  Hematological: Negative  Endocrine: Negative                 Physical Exam    There were no vitals filed for this visit.  Body mass index is 25.23 kg/m².  Physical Examination:     General appearance -  well appearing, and in no distress  Mental status - awake  Neck - supple  Chest -  symmetric air entry  Heart - normal rate   Abdomen - soft      Assessment     1. S/P hip replacement    2. Hip pain          Plan

## 2022-08-26 ENCOUNTER — PATIENT MESSAGE (OUTPATIENT)
Dept: REHABILITATION | Facility: HOSPITAL | Age: 82
End: 2022-08-26
Payer: MEDICARE

## 2022-09-14 ENCOUNTER — OFFICE VISIT (OUTPATIENT)
Dept: UROGYNECOLOGY | Facility: CLINIC | Age: 82
End: 2022-09-14
Payer: MEDICARE

## 2022-09-14 VITALS
SYSTOLIC BLOOD PRESSURE: 170 MMHG | HEART RATE: 84 BPM | HEIGHT: 64 IN | BODY MASS INDEX: 25.11 KG/M2 | DIASTOLIC BLOOD PRESSURE: 76 MMHG | WEIGHT: 147.06 LBS

## 2022-09-14 DIAGNOSIS — N39.3 SUI (STRESS URINARY INCONTINENCE, FEMALE): Primary | ICD-10-CM

## 2022-09-14 PROCEDURE — 3288F PR FALLS RISK ASSESSMENT DOCUMENTED: ICD-10-PCS | Mod: CPTII,S$GLB,, | Performed by: OBSTETRICS & GYNECOLOGY

## 2022-09-14 PROCEDURE — 3078F PR MOST RECENT DIASTOLIC BLOOD PRESSURE < 80 MM HG: ICD-10-PCS | Mod: CPTII,S$GLB,, | Performed by: OBSTETRICS & GYNECOLOGY

## 2022-09-14 PROCEDURE — 1159F PR MEDICATION LIST DOCUMENTED IN MEDICAL RECORD: ICD-10-PCS | Mod: CPTII,S$GLB,, | Performed by: OBSTETRICS & GYNECOLOGY

## 2022-09-14 PROCEDURE — 1157F ADVNC CARE PLAN IN RCRD: CPT | Mod: CPTII,S$GLB,, | Performed by: OBSTETRICS & GYNECOLOGY

## 2022-09-14 PROCEDURE — 51701 PR INSERTION OF NON-INDWELLING BLADDER CATHETERIZATION FOR RESIDUAL UR: ICD-10-PCS | Mod: S$GLB,,, | Performed by: OBSTETRICS & GYNECOLOGY

## 2022-09-14 PROCEDURE — 99999 PR PBB SHADOW E&M-EST. PATIENT-LVL III: CPT | Mod: PBBFAC,,, | Performed by: OBSTETRICS & GYNECOLOGY

## 2022-09-14 PROCEDURE — 3078F DIAST BP <80 MM HG: CPT | Mod: CPTII,S$GLB,, | Performed by: OBSTETRICS & GYNECOLOGY

## 2022-09-14 PROCEDURE — 1157F PR ADVANCE CARE PLAN OR EQUIV PRESENT IN MEDICAL RECORD: ICD-10-PCS | Mod: CPTII,S$GLB,, | Performed by: OBSTETRICS & GYNECOLOGY

## 2022-09-14 PROCEDURE — 1101F PR PT FALLS ASSESS DOC 0-1 FALLS W/OUT INJ PAST YR: ICD-10-PCS | Mod: CPTII,S$GLB,, | Performed by: OBSTETRICS & GYNECOLOGY

## 2022-09-14 PROCEDURE — 99999 PR PBB SHADOW E&M-EST. PATIENT-LVL III: ICD-10-PCS | Mod: PBBFAC,,, | Performed by: OBSTETRICS & GYNECOLOGY

## 2022-09-14 PROCEDURE — 3288F FALL RISK ASSESSMENT DOCD: CPT | Mod: CPTII,S$GLB,, | Performed by: OBSTETRICS & GYNECOLOGY

## 2022-09-14 PROCEDURE — 51701 INSERT BLADDER CATHETER: CPT | Mod: S$GLB,,, | Performed by: OBSTETRICS & GYNECOLOGY

## 2022-09-14 PROCEDURE — 1159F MED LIST DOCD IN RCRD: CPT | Mod: CPTII,S$GLB,, | Performed by: OBSTETRICS & GYNECOLOGY

## 2022-09-14 PROCEDURE — 99213 OFFICE O/P EST LOW 20 MIN: CPT | Mod: 25,S$GLB,, | Performed by: OBSTETRICS & GYNECOLOGY

## 2022-09-14 PROCEDURE — 99213 PR OFFICE/OUTPT VISIT, EST, LEVL III, 20-29 MIN: ICD-10-PCS | Mod: 25,S$GLB,, | Performed by: OBSTETRICS & GYNECOLOGY

## 2022-09-14 PROCEDURE — 3077F SYST BP >= 140 MM HG: CPT | Mod: CPTII,S$GLB,, | Performed by: OBSTETRICS & GYNECOLOGY

## 2022-09-14 PROCEDURE — 3077F PR MOST RECENT SYSTOLIC BLOOD PRESSURE >= 140 MM HG: ICD-10-PCS | Mod: CPTII,S$GLB,, | Performed by: OBSTETRICS & GYNECOLOGY

## 2022-09-14 PROCEDURE — 1101F PT FALLS ASSESS-DOCD LE1/YR: CPT | Mod: CPTII,S$GLB,, | Performed by: OBSTETRICS & GYNECOLOGY

## 2022-09-14 NOTE — PROGRESS NOTES
Subjective:      Patient ID: Dorothy Kramer is a 81 y.o. female.    Chief Complaint:  No chief complaint on file.      History of Present Illness  Patient returns.    She voided 100 cc postvoid residual via catheterization less than 2 cc  She has been doing her mindful voiding she is now ready for her mid urethral sling          Past Medical History:   Diagnosis Date    DDD (degenerative disc disease), cervical     Diverticulitis     Diverticulosis     DJD (degenerative joint disease) of hip     Dyspepsia     GERD (gastroesophageal reflux disease)     History of melanoma 5/20/2015    HTN (hypertension)     Melanoma     Migraine headache     Rectocele     Skin cancer     melanoma on face    Sleep apnea     Thyroid disease     hypo    Trouble in sleeping     Uterine prolaps     followed by GYN    Vulvar lesion 5/20/2015       Past Surgical History:   Procedure Laterality Date    APPENDECTOMY  1962    BACK SURGERY      BREAST BIOPSY      BREAST LUMPECTOMY  1989    CATARACT EXTRACTION Left 02/22/2021    ros    CATARACT EXTRACTION W/  INTRAOCULAR LENS IMPLANT Left 2/22/2021    Procedure: EXTRACTION, CATARACT, WITH IOL INSERTION;  Surgeon: Latisha Baker MD;  Location: [facility] OR;  Service: Ophthalmology;  Laterality: Left;  left    CATARACT EXTRACTION W/  INTRAOCULAR LENS IMPLANT Right 3/22/2021    Procedure: EXTRACTION, CATARACT, WITH IOL INSERTION;  Surgeon: Latisha Baker MD;  Location: [facility] OR;  Service: Ophthalmology;  Laterality: Right;  right    CERVICAL FUSION  1991    CHOLECYSTECTOMY      COLONOSCOPY      COLONOSCOPY N/A 11/9/2018    Dr Oscar; chronic active colitis; repeat in 5 years    COLONOSCOPY N/A 2/14/2020    Procedure: COLONOSCOPY;  Surgeon: Fadi Oscar MD;  Location: [facility] ENDO;  Service: Endoscopy;  Laterality: N/A;    HYSTERECTOMY      TRACEE/BSO for benign disease    JOINT REPLACEMENT Right 2016    Hip replacement    LA REMOVAL OF OVARY/TUBE(S)      ROBOT-ASSISTED LAPAROSCOPIC ABDOMINAL  SACROCOLPOPEXY USING DA FLAKITO XI N/A 2021    Procedure: XI ROBOTIC SACROCOLPOPEXY, ABDOMINAL;  Surgeon: Yannick Cabral MD;  Location: [facility] OR;  Service: OB/GYN;  Laterality: N/A;    ROBOT-ASSISTED LAPAROSCOPIC LYSIS OF ADHESIONS USING DA FLAKITO XI  2021    Procedure: XI ROBOTIC LYSIS, ADHESIONS;  Surgeon: Yannick Cabral MD;  Location: [facility] OR;  Service: OB/GYN;;    TONSILLECTOMY      TOTAL ABDOMINAL HYSTERECTOMY W/ BILATERAL SALPINGOOPHORECTOMY         GYN & OB History  No LMP recorded. Patient has had a hysterectomy.   Date of Last Pap: No result found    OB History    Para Term  AB Living   7 3 3   4     SAB IAB Ectopic Multiple Live Births   4              # Outcome Date GA Lbr Julius/2nd Weight Sex Delivery Anes PTL Lv   7 Term            6 Term            5 Term            4 SAB            3 SAB            2 SAB            1 SAB                Health Maintenance         Date Due Completion Date    Hepatitis C Screening Never done ---    COVID-19 Vaccine (4 - Booster for Pfizer series) 2022    Influenza Vaccine (1) 2022 (Declined)    Override on 2020: Declined    DEXA Scan 10/05/2024 10/5/2021    Lipid Panel 2027    TETANUS VACCINE 2030            Family History   Problem Relation Age of Onset    Heart failure Mother     Cancer Mother         Bladder    Colon cancer Mother 63    Cancer Father         Bladder    Parkinsonism Father     Diverticulitis Father     Uterine cancer Maternal Aunt     Ovarian cancer Neg Hx     Breast cancer Neg Hx     Crohn's disease Neg Hx     Ulcerative colitis Neg Hx     Glaucoma Neg Hx     Macular degeneration Neg Hx     Retinal detachment Neg Hx        Social History     Socioeconomic History    Marital status:    Tobacco Use    Smoking status: Never    Smokeless tobacco: Never   Substance and Sexual Activity    Alcohol use: No    Drug use: No    Sexual activity: Not Currently  "    Social Determinants of Health     Financial Resource Strain: Unknown    Difficulty of Paying Living Expenses: Patient refused   Food Insecurity: Unknown    Worried About Running Out of Food in the Last Year: Patient refused    Ran Out of Food in the Last Year: Never true   Transportation Needs: No Transportation Needs    Lack of Transportation (Medical): No    Lack of Transportation (Non-Medical): No   Physical Activity: Unknown    Days of Exercise per Week: Patient refused   Stress: Unknown    Feeling of Stress : Patient refused   Social Connections: Unknown    Frequency of Communication with Friends and Family: More than three times a week    Frequency of Social Gatherings with Friends and Family: More than three times a week    Active Member of Clubs or Organizations: Yes    Attends Club or Organization Meetings: More than 4 times per year    Marital Status:    Housing Stability: Low Risk     Unable to Pay for Housing in the Last Year: No    Number of Places Lived in the Last Year: 1    Unstable Housing in the Last Year: No       Review of Systems  Review of Systems     Urinary incontinence with coughing sneezing  Objective:   BP (!) 170/76   Pulse 84   Ht 5' 4" (1.626 m)   Wt 66.7 kg (147 lb 0.8 oz)   BMI 25.24 kg/m²     Physical Exam   Perfect anatomy postvoid so the residual straight catheterization less than 2 cc  Assessment:     1. ALECIA (stress urinary incontinence, female)            Plan:     1. ALECIA (stress urinary incontinence, female)      Long discussion with patient regarding my findings.  Questions all addressed were going to move forward to moving do going forward with the urodynamic were going to go forward with mid urethral sling retropubic in the near future we sneeze operative time I will consent her the day of the surgery I did diagrammed the mid urethral sling technology using the worldwide [employer] web site patient understanding appreciated time    Patient " Instructions

## 2022-09-19 ENCOUNTER — TELEPHONE (OUTPATIENT)
Dept: UROGYNECOLOGY | Facility: CLINIC | Age: 82
End: 2022-09-19
Payer: MEDICARE

## 2022-09-19 NOTE — TELEPHONE ENCOUNTER
----- Message from Yannick Cabral MD sent at 9/14/2022  2:05 PM CDT -----  Lets see if we can get a surgery suite date for her in next 2 weeks for a MUS     I sent jp a message as well

## 2022-09-21 ENCOUNTER — ANESTHESIA EVENT (OUTPATIENT)
Dept: SURGERY | Facility: AMBULARY SURGERY CENTER | Age: 82
End: 2022-09-21
Payer: MEDICARE

## 2022-09-21 NOTE — DISCHARGE INSTRUCTIONS
Stress Urinary Incontinence: Having Midurethral Sling Surgery    To help treat stress urinary incontinence (ALECIA), your surgeon may do midurethral sling surgery. A sling of tissue is placed under the urethra. The sling (tape) is made from a mesh of artificial material. When the tension of the tape is changed, urine should no longer leak. Your surgery will take about 60 minutes. You will be asked to do some things at home to get ready for surgery. Below are guidelines to help you get ready. If you have any questions, call your nurse or doctor.  How should I prepare for surgery?  The weeks before surgery  Have any tests that your doctor orders.  Tell your doctor about aspirin and other medicines, vitamins, or herbs you take. Ask if you should stop taking them before surgery.  Stop smoking to help lower your risks during surgery.  If you have been given any prescriptions to fill, do this before surgery.  The night before surgery  You may be asked to give yourself an enema. This cleans out your bowels for surgery. Youll be told how to do it.  Follow any directions you are given for taking medicines and for not eating or drinking before surgery.  The day of surgery  Arrive at the hospital a few hours before surgery as directed. Have someone drive you there who can also stay during the surgery, and drive you home. At the hospital, the healthcare staff will take your temperature and blood pressure. In some cases, you may have tests. Then the healthcare staff will put in one or more IV (intravenous) lines. These lines give you fluids and medicines before, during, and after surgery. Some of your pubic hair may be removed. The staff may put tight stockings on your legs to help prevent blood clots.  About anesthesia  To keep you pain-free during surgery, youjude get anesthesia. General anesthesia lets you sleep during the surgery. Local anesthesia numbs the area that will be operated on. Before surgery, youjude meet with the  anesthesiologist or nurse anesthetist. He or she can tell you what kind of anesthesia you will get and answer questions you may have.  What happens during the procedure?  The surgeon will make 2 small cuts (incisions) in the lower part of your belly (abdomen), near the pubic hairline or in the inner upper part of your thighs. He or she will make another small incision in the front wall of the vagina.  The surgeon will work through the incisions to place the tape like a hammock under the urethra. The two ends of the tape emerge through the incisions.  If youre given local anesthesia, your surgeon may tell you to cough so that the tension of the tape can be changed.  When the tension is changed, the ends of the tape are cut. They stay below the skin in the tissue of the abdominal wall. The healing process of the incisions holds the ends of the tape in place.  The surgeon will close the incisions in the abdomen and vagina with stitches (sutures).  What are the risks and complications?  The risks and complications of this procedure may include:  Infection  Bleeding  Risks of anesthesia  Blood clots  Damage to nerves, muscles, bladder, or nearby pelvic structures  Trouble urinating  Urinary urgency  Problems with sling (tape)   Date Last Reviewed: 1/1/2017  © 1392-3125 [employer]. [address] [place] [address], [place] [address]. All rights reserved. This information is not intended as a substitute for professional medical care. Always follow your healthcare professional's instructions.    Once youre home:  Drink plenty of fluids.  Rest for 24 hrs then slowly resume normal activities. No lifting > 10 lbs for 6 weeks, No strenuous activity for 6 weeks  You may have burning or light bleeding when you urinate--this is normal.   You may still have occasional incontinence  Medicines may be prescribed to ease any discomfort or prevent infection. Take these as directed.  Call your doctor if you have heavy bleeding or  blood clots, burning that lasts more than a day, a fever over 100°F  (38° C), or trouble urinating.  Vaginal Rest x 3 weeks  Expect Vaginal spotting and discharge for up to 6 weeks, report excessive bleeding to   You may shower in 48hrs, avoid scrubbing 2  incisions on perineum, pat dry perineum. No baths, hot tubs, swimming pools for 3 weeks  Absorbable stitches perineal incisions    After Surgery:  Always be aware that any surgery can cause these symptoms:    Pain- Medication can be prescribed for pain to decrease your pain but may not completely take your pain away.  Over the Counter pain medicine my be enough and you can always use Ice and rest to help ease pain.    Bleeding- a little bleeding after a surgery is usually within normal.  If there is a lot of blood you need to notify your MD.  Emergency treatments of bleeding are cold application, elevation of the bleeding site and compression.    Infection- Infection after surgery is NOT a normal occurrence.  Signs of infection are fever, swelling, hot to touch the incision.  If this occurs notify your MD immediately.    Nausea- this can be common after a surgery especially if you have had anesthesia medicine or are taking pain medicine.  Staying on clear liquids, bland foods, gingerale, or over the counter anti nausea medicines can help.  If you vomit more than once, notify your MD.  Anti Nausea medicines can be prescribed.

## 2022-09-22 ENCOUNTER — HOSPITAL ENCOUNTER (OUTPATIENT)
Facility: AMBULARY SURGERY CENTER | Age: 82
Discharge: HOME | End: 2022-09-22
Attending: OBSTETRICS & GYNECOLOGY | Admitting: OBSTETRICS & GYNECOLOGY
Payer: MEDICARE

## 2022-09-22 ENCOUNTER — ANESTHESIA (OUTPATIENT)
Dept: SURGERY | Facility: AMBULARY SURGERY CENTER | Age: 82
End: 2022-09-22
Payer: MEDICARE

## 2022-09-22 DIAGNOSIS — N39.3 SUI (STRESS URINARY INCONTINENCE, FEMALE): ICD-10-CM

## 2022-09-22 PROCEDURE — 57288 REPAIR BLADDER DEFECT: CPT | Performed by: OBSTETRICS & GYNECOLOGY

## 2022-09-22 PROCEDURE — D9220A PRA ANESTHESIA: ICD-10-PCS | Mod: CRNA,,, | Performed by: NURSE ANESTHETIST, CERTIFIED REGISTERED

## 2022-09-22 PROCEDURE — D9220A PRA ANESTHESIA: ICD-10-PCS | Mod: ANES,,, | Performed by: ANESTHESIOLOGY

## 2022-09-22 PROCEDURE — 57288 REPAIR BLADDER DEFECT: CPT | Mod: ,,, | Performed by: OBSTETRICS & GYNECOLOGY

## 2022-09-22 PROCEDURE — D9220A PRA ANESTHESIA: Mod: CRNA,,, | Performed by: NURSE ANESTHETIST, CERTIFIED REGISTERED

## 2022-09-22 PROCEDURE — 57288 PR SLING OPER STRES INCONTINENCE: ICD-10-PCS | Mod: ,,, | Performed by: OBSTETRICS & GYNECOLOGY

## 2022-09-22 PROCEDURE — D9220A PRA ANESTHESIA: Mod: ANES,,, | Performed by: ANESTHESIOLOGY

## 2022-09-22 DEVICE — SLING FIT ADVANTAGE: Type: IMPLANTABLE DEVICE | Site: BLADDER | Status: FUNCTIONAL

## 2022-09-22 RX ORDER — LIDOCAINE HYDROCHLORIDE AND EPINEPHRINE 10; 10 MG/ML; UG/ML
INJECTION, SOLUTION INFILTRATION; PERINEURAL
Status: DISCONTINUED | OUTPATIENT
Start: 2022-09-22 | End: 2022-09-22 | Stop reason: HOSPADM

## 2022-09-22 RX ORDER — DEXAMETHASONE SODIUM PHOSPHATE 4 MG/ML
INJECTION, SOLUTION INTRA-ARTICULAR; INTRALESIONAL; INTRAMUSCULAR; INTRAVENOUS; SOFT TISSUE
Status: DISCONTINUED | OUTPATIENT
Start: 2022-09-22 | End: 2022-09-22

## 2022-09-22 RX ORDER — ONDANSETRON 2 MG/ML
4 INJECTION INTRAMUSCULAR; INTRAVENOUS ONCE
Status: DISCONTINUED | OUTPATIENT
Start: 2022-09-22 | End: 2022-09-22 | Stop reason: HOSPADM

## 2022-09-22 RX ORDER — PROPOFOL 10 MG/ML
INJECTION, EMULSION INTRAVENOUS
Status: DISCONTINUED | OUTPATIENT
Start: 2022-09-22 | End: 2022-09-22

## 2022-09-22 RX ORDER — FENTANYL CITRATE 50 UG/ML
INJECTION, SOLUTION INTRAMUSCULAR; INTRAVENOUS
Status: DISCONTINUED | OUTPATIENT
Start: 2022-09-22 | End: 2022-09-22

## 2022-09-22 RX ORDER — OXYCODONE AND ACETAMINOPHEN 7.5; 325 MG/1; MG/1
1 TABLET ORAL EVERY 8 HOURS PRN
Qty: 21 TABLET | Refills: 0 | Status: SHIPPED | OUTPATIENT
Start: 2022-09-22 | End: 2022-09-29

## 2022-09-22 RX ORDER — ONDANSETRON 2 MG/ML
INJECTION INTRAMUSCULAR; INTRAVENOUS
Status: DISCONTINUED | OUTPATIENT
Start: 2022-09-22 | End: 2022-09-22

## 2022-09-22 RX ORDER — DIPHENHYDRAMINE HYDROCHLORIDE 50 MG/ML
25 INJECTION INTRAMUSCULAR; INTRAVENOUS EVERY 6 HOURS PRN
Status: DISCONTINUED | OUTPATIENT
Start: 2022-09-22 | End: 2022-09-22 | Stop reason: HOSPADM

## 2022-09-22 RX ORDER — HYDROMORPHONE HYDROCHLORIDE 1 MG/ML
0.2 INJECTION, SOLUTION INTRAMUSCULAR; INTRAVENOUS; SUBCUTANEOUS EVERY 5 MIN PRN
Status: DISCONTINUED | OUTPATIENT
Start: 2022-09-22 | End: 2022-09-22 | Stop reason: HOSPADM

## 2022-09-22 RX ORDER — LIDOCAINE HCL/PF 100 MG/5ML
SYRINGE (ML) INTRAVENOUS
Status: DISCONTINUED | OUTPATIENT
Start: 2022-09-22 | End: 2022-09-22

## 2022-09-22 RX ORDER — LIDOCAINE HYDROCHLORIDE 20 MG/ML
JELLY TOPICAL
Status: DISCONTINUED | OUTPATIENT
Start: 2022-09-22 | End: 2022-09-22 | Stop reason: HOSPADM

## 2022-09-22 RX ORDER — MEPERIDINE HYDROCHLORIDE 50 MG/ML
12.5 INJECTION INTRAMUSCULAR; INTRAVENOUS; SUBCUTANEOUS ONCE
Status: DISCONTINUED | OUTPATIENT
Start: 2022-09-22 | End: 2022-09-22 | Stop reason: HOSPADM

## 2022-09-22 RX ORDER — SODIUM CHLORIDE, SODIUM LACTATE, POTASSIUM CHLORIDE, CALCIUM CHLORIDE 600; 310; 30; 20 MG/100ML; MG/100ML; MG/100ML; MG/100ML
INJECTION, SOLUTION INTRAVENOUS CONTINUOUS
Status: DISCONTINUED | OUTPATIENT
Start: 2022-09-22 | End: 2022-09-22 | Stop reason: HOSPADM

## 2022-09-22 RX ORDER — OXYCODONE HYDROCHLORIDE 5 MG/1
5 TABLET ORAL
Status: DISCONTINUED | OUTPATIENT
Start: 2022-09-22 | End: 2022-09-22 | Stop reason: HOSPADM

## 2022-09-22 RX ORDER — EPHEDRINE SULFATE 50 MG/ML
INJECTION, SOLUTION INTRAVENOUS
Status: DISCONTINUED | OUTPATIENT
Start: 2022-09-22 | End: 2022-09-22

## 2022-09-22 RX ORDER — LIDOCAINE HYDROCHLORIDE 10 MG/ML
0.5 INJECTION, SOLUTION EPIDURAL; INFILTRATION; INTRACAUDAL; PERINEURAL ONCE
Status: DISCONTINUED | OUTPATIENT
Start: 2022-09-22 | End: 2022-09-22 | Stop reason: HOSPADM

## 2022-09-22 RX ADMIN — ONDANSETRON 4 MG: 2 INJECTION INTRAMUSCULAR; INTRAVENOUS at 01:09

## 2022-09-22 RX ADMIN — PROPOFOL 140 MG: 10 INJECTION, EMULSION INTRAVENOUS at 01:09

## 2022-09-22 RX ADMIN — SODIUM CHLORIDE, SODIUM LACTATE, POTASSIUM CHLORIDE, CALCIUM CHLORIDE: 600; 310; 30; 20 INJECTION, SOLUTION INTRAVENOUS at 01:09

## 2022-09-22 RX ADMIN — EPHEDRINE SULFATE 5 MG: 50 INJECTION, SOLUTION INTRAVENOUS at 02:09

## 2022-09-22 RX ADMIN — FENTANYL CITRATE 25 MCG: 50 INJECTION, SOLUTION INTRAMUSCULAR; INTRAVENOUS at 01:09

## 2022-09-22 RX ADMIN — EPHEDRINE SULFATE 5 MG: 50 INJECTION, SOLUTION INTRAVENOUS at 01:09

## 2022-09-22 RX ADMIN — SODIUM CHLORIDE, SODIUM LACTATE, POTASSIUM CHLORIDE, CALCIUM CHLORIDE: 600; 310; 30; 20 INJECTION, SOLUTION INTRAVENOUS at 12:09

## 2022-09-22 RX ADMIN — DEXAMETHASONE SODIUM PHOSPHATE 4 MG: 4 INJECTION, SOLUTION INTRA-ARTICULAR; INTRALESIONAL; INTRAMUSCULAR; INTRAVENOUS; SOFT TISSUE at 01:09

## 2022-09-22 RX ADMIN — Medication 50 MG: at 01:09

## 2022-09-22 NOTE — OP NOTE
Operative Note       Surgery Date: 9/22/2022     Surgeon(s) and Role:     * Yannick Cabral MD - Primary    Pre-op Diagnosis:  ALECIA (stress urinary incontinence, female) [N39.3]    Post-op Diagnosis: Post-Op Diagnosis Codes:     * ALECIA (stress urinary incontinence, female) [N39.3]    Procedure(s) (LRB):  SLING, MIDURETHRAL (N/A)    Anesthesia: General    Procedure in Detail/Findings:  The patient was identified in the preoperative area where informed consent was confirmed, and she was taken to the operating room where an adequate level of general anesthesia was obtained. The patient was positioned in high lithotomy position with legs in yellow fin stirrups. Care was taken to avoid joint hyperflexion or hyperextension, and all extremity surfaces were carefully padded so as to minimize risk of neurologic injury. Intravenous antibiotics were administered preoperatively. Sequential compression devices were applied to the patient's lower extremities preoperatively VTE prophylaxis. Surgical time-out was performed, where the patient was identified and procedures confirmed. An examination under anesthesia was performed with findings described as above. The patient's abdomen, perineum, and vagina were sterilely prepped and draped. A ngo catheter was placed in the bladder for drainage.   Exam under anesthesia revealed normal anatomy.      We then proceeded with placement of the Advantage Fit midurethral sling. The skin was marked just above the symphysis pubis, 2 cm laterally on either side of the midline indicating the exit sites for the trocars. A lone star retractor with blue stays was used. The Ngo catheter was palpated at the urethrovesical junction, and 2 Allis clamps were placed at the anterior vaginal wall along the midurethra. The Ngo catheter was removed from the patient's bladder. The vaginal epithelium between the two Allis clamps was injected with the 1% lidocaine with epinephrine. Metzenbaum scissors were  used to dissect the vaginal epithelium off the underlying pubocervical muscular tissue in the direction of the angle formed between the ischiopubic ramus and the pubic bone bilaterally. The rigid catheter guide was used to deviate the bladder neck and urethra to the patient's left while the right trocar was advanced to the dissected tract in the patient's right side. Once the urogenital membrane was perforated, the trocar and handle were reoriented in the sagittal plane and the tip of the trocar was advanced through the retropubic space in intimate proximity to the pubic bone. The trocar was then advanced through the skin approximately 2 cm to the right of the midline just above the pubic symphysis. The passage of the Advantage Fit trocar was repeated on the patient's left hand side in a similar fashion, using the catheter guide to deviate the bladder neck to the right. At this point, cystourethroscopy was performed. Cystoscopy was negative for injury after infusion of at least 300 mL in the bladder. The ureteral orifices were noted to have good efflux bilaterally. The bladder was then drained. With a #10 Hegar dilator placed between the sling mesh and the urethra, the arms of the sling were elevated above the pubic symphysis and the plastic sheaths were removed from the mesh arms. Excess mesh was trimmed beneath the suprapubic skin. The Hegar dilator ensured that the mesh sling was placed in a tension-free manner. The vaginal mucosa overlying the sling mesh was closed with a several mattress stitches of 2-0 Vicryl with excellent hemostasis noted. The suprapubic incisions were closed with a 4-0 monocryl and sealed with dermabond.  The patient was transferred to recovery in stable condition. The vagina was packed with guaze.  Estimated Blood Loss: * No values recorded between 9/22/2022  1:41 PM and 9/22/2022  2:18 PM *           Specimens (From admission, onward)      None          Implants:   Implant Name Type Inv.  Item Serial No.  Lot No. LRB No. Used Action   SLING FIT ADVANTAGE - [id number]  SLING FIT ADVANTAGE  obopay  N/A 1 Implanted              Disposition: PACU - hemodynamically stable.           Condition: Good    Attestation:  I was present and scrubbed for the entire procedure.           Discharge Note    Admit Date: 9/22/2022    Attending Physician: Yannick Cabral MD     Discharge Physician: Yannick Cabral MD    Final Diagnosis: Post-Op Diagnosis Codes:     * ALECIA (stress urinary incontinence, female) [N39.3]    Disposition: Home or Self Care    Patient Instructions:   Current Discharge Medication List        CONTINUE these medications which have NOT CHANGED    Details   levothyroxine (SYNTHROID) 88 MCG tablet TAKE 1 TABLET BY MOUTH EVERY DAY  Qty: 90 tablet, Refills: 3    Associated Diagnoses: Hypothyroidism, unspecified type      losartan-hydrochlorothiazide 100-25 mg (HYZAAR) 100-25 mg per tablet TAKE 1 TABLET BY MOUTH EVERY DAY  Qty: 90 tablet, Refills: 2    Associated Diagnoses: Essential hypertension      mesalamine (APRISO) 0.375 gram Cp24 Take 2 capsules (0.75 g total) by mouth once daily.  Qty: 60 capsule, Refills: 12    Associated Diagnoses: Colitis      mirabegron (MYRBETRIQ) 50 mg Tb24 Take 1 tablet (50 mg total) by mouth once daily.  Qty: 30 tablet, Refills: 11      estradioL (ESTRACE) 0.01 % (0.1 mg/gram) vaginal cream Place 1 g vaginally every Mon, Wed, Fri.  Qty: 42 g, Refills: 11    Associated Diagnoses: Rectocele; Prolapse of vaginal vault after hysterectomy      magnesium 30 mg Tab Take 1 tablet by mouth.      multivitamin/iron/folic acid (CENTRUM COMPLETE ORAL) Take by mouth.             Discharge Procedure Orders (must include Diet, Follow-up, Activity)  No discharge procedures on file.     Discharge Date: No discharge date for patient encounter.

## 2022-09-22 NOTE — ANESTHESIA POSTPROCEDURE EVALUATION
Anesthesia Post Evaluation    Patient: Dorothy Kramer    Procedure(s) Performed: Procedure(s) (LRB):  SLING, MIDURETHRAL (N/A)    Final Anesthesia Type: general      Patient location during evaluation: PACU  Patient participation: Yes- Able to Participate  Level of consciousness: awake and alert  Post-procedure vital signs: reviewed and stable  Pain management: adequate  Airway patency: patent    PONV status at discharge: No PONV  Anesthetic complications: no      Cardiovascular status: blood pressure returned to baseline  Respiratory status: unassisted  Hydration status: euvolemic  Follow-up not needed.          Vitals Value Taken Time   /67 09/22/22 1417   Temp 36.3 °C (97.4 °F) 09/22/22 1412   Pulse 71 09/22/22 1417   Resp 18 09/22/22 1413   SpO2 100 % 09/22/22 1417   Vitals shown include unvalidated device data.      No case tracking events are documented in the log.      Pain/Veena Score: Veena Score: 9 (9/22/2022  2:11 PM)

## 2022-09-22 NOTE — PLAN OF CARE
Pt given void test, given 250 ml sterile water via cath. Cárdenas cath removed. Pt voids 275 ml in bathroom. Blood tinged urine.

## 2022-09-22 NOTE — DISCHARGE SUMMARY
[facility]  Discharge Note  Short Stay    Procedure(s) (LRB):  SLING, MIDURETHRAL (N/A)    OUTCOME: Patient tolerated treatment/procedure well without complication and is now ready for discharge.    DISPOSITION: Home or Self Care    FINAL DIAGNOSIS:  <principal problem not specified>    FOLLOWUP: In clinic    DISCHARGE INSTRUCTIONS:  No discharge procedures on file.     TIME SPENT ON DISCHARGE: 20   minutes

## 2022-09-22 NOTE — ANESTHESIA PROCEDURE NOTES
Intubation    Date/Time: 9/22/2022 1:25 PM  Performed by: Jessica Hutchins CRNA  Authorized by: Ryan Ramos MD     Intubation:     Induction:  Intravenous    Intubated:  Postinduction    Mask Ventilation:  Easy mask    Attempts:  1    Attempted By:  CRNA    Method of Intubation:  Direct    Difficult Airway Encountered?: No      Complications:  None    Airway Device:  Supraglottic airway/LMA    Airway Device Size:  3.0    Style/Cuff Inflation:  Cuffed (inflated to minimal occlusive pressure)    Secured at:  The lips    Placement Verified By:  Capnometry    Complicating Factors:  None    Findings Post-Intubation:  BS equal bilateral and atraumatic/condition of teeth unchanged

## 2022-09-22 NOTE — TRANSFER OF CARE
Anesthesia Transfer of Care Note    Patient: Dorothy Kramer    Procedure(s) Performed: Procedure(s) (LRB):  SLING, MIDURETHRAL (N/A)    Patient location: PACU    Anesthesia Type: general    Transport from OR: Transported from OR on 2-3 L/min O2 by NC with adequate spontaneous ventilation    Post pain: adequate analgesia    Post assessment: no apparent anesthetic complications and tolerated procedure well    Post vital signs: stable    Level of consciousness: sedated and responds to stimulation    Nausea/Vomiting: no nausea/vomiting    Complications: none    Transfer of care protocol was followed      Last vitals:   Visit Vitals  BP (!) 148/64   Pulse 66   Temp 36.3 °C (97.4 °F) (Skin)   Resp 18   Wt 66.7 kg (147 lb 0.8 oz)   SpO2 100%   Breastfeeding No   BMI 25.24 kg/m²

## 2022-09-22 NOTE — ANESTHESIA PREPROCEDURE EVALUATION
09/22/2022  Dorothy Kramer is a 81 y.o., female.      Pre-op Assessment    I have reviewed the Patient Summary Reports.     I have reviewed the Nursing Notes. I have reviewed the NPO Status.   I have reviewed the Medications.     Review of Systems  Anesthesia Hx:  No problems with previous Anesthesia Denies Hx of Anesthetic complications    Social:  Non-Smoker    Cardiovascular:   Hypertension Denies MI.  Denies CAD.    Denies CABG/stent.   Denies Angina.    Pulmonary:   Denies COPD.  Denies Asthma.  Denies Recent URI. Sleep Apnea    Renal/:   Denies Chronic Renal Disease.     Hepatic/GI:   PUD, GERD Denies Liver Disease.    Musculoskeletal:   Arthritis     Neurological:   Denies TIA. Denies CVA. Neuromuscular Disease,  Headaches Denies Seizures.    Endocrine:   Denies Diabetes. Hypothyroidism    Psych:   Denies Psychiatric History.          Physical Exam  General: Well nourished, Cooperative, Alert and Oriented    Airway:  Mallampati: II / II  Mouth Opening: Normal  TM Distance: 4 - 6 cm  Tongue: Normal    Dental:  Intact    Chest/Lungs:  Clear to auscultation, Normal Respiratory Rate    Heart:  Rate: Normal  Rhythm: Regular Rhythm  Sounds: Normal        Anesthesia Plan  Type of Anesthesia, risks & benefits discussed:    Anesthesia Type: Gen Supraglottic Airway  Intra-op Monitoring Plan: Standard ASA Monitors  Induction:  IV  Informed Consent: Informed consent signed with the Patient and all parties understand the risks and agree with anesthesia plan.  All questions answered.   ASA Score: 3    Ready For Surgery From Anesthesia Perspective.     .

## 2022-09-23 ENCOUNTER — PATIENT MESSAGE (OUTPATIENT)
Dept: UROGYNECOLOGY | Facility: CLINIC | Age: 82
End: 2022-09-23
Payer: MEDICARE

## 2022-09-23 VITALS
TEMPERATURE: 97 F | SYSTOLIC BLOOD PRESSURE: 173 MMHG | WEIGHT: 147.06 LBS | DIASTOLIC BLOOD PRESSURE: 77 MMHG | RESPIRATION RATE: 18 BRPM | BODY MASS INDEX: 25.24 KG/M2 | HEART RATE: 76 BPM | OXYGEN SATURATION: 97 %

## 2022-09-26 ENCOUNTER — PATIENT MESSAGE (OUTPATIENT)
Dept: UROGYNECOLOGY | Facility: CLINIC | Age: 82
End: 2022-09-26
Payer: MEDICARE

## 2022-10-05 ENCOUNTER — OFFICE VISIT (OUTPATIENT)
Dept: URGENT CARE | Facility: CLINIC | Age: 82
End: 2022-10-05
Payer: MEDICARE

## 2022-10-05 ENCOUNTER — PATIENT MESSAGE (OUTPATIENT)
Dept: UROGYNECOLOGY | Facility: CLINIC | Age: 82
End: 2022-10-05
Payer: MEDICARE

## 2022-10-05 VITALS
DIASTOLIC BLOOD PRESSURE: 78 MMHG | OXYGEN SATURATION: 98 % | RESPIRATION RATE: 16 BRPM | TEMPERATURE: 98 F | HEART RATE: 83 BPM | SYSTOLIC BLOOD PRESSURE: 160 MMHG

## 2022-10-05 DIAGNOSIS — R51.9 NONINTRACTABLE HEADACHE, UNSPECIFIED CHRONICITY PATTERN, UNSPECIFIED HEADACHE TYPE: ICD-10-CM

## 2022-10-05 DIAGNOSIS — R03.0 ELEVATED BLOOD PRESSURE READING: Primary | ICD-10-CM

## 2022-10-05 PROCEDURE — 1157F PR ADVANCE CARE PLAN OR EQUIV PRESENT IN MEDICAL RECORD: ICD-10-PCS | Mod: CPTII,S$GLB,, | Performed by: NURSE PRACTITIONER

## 2022-10-05 PROCEDURE — 1157F ADVNC CARE PLAN IN RCRD: CPT | Mod: CPTII,S$GLB,, | Performed by: NURSE PRACTITIONER

## 2022-10-05 PROCEDURE — 1160F PR REVIEW ALL MEDS BY PRESCRIBER/CLIN PHARMACIST DOCUMENTED: ICD-10-PCS | Mod: CPTII,S$GLB,, | Performed by: NURSE PRACTITIONER

## 2022-10-05 PROCEDURE — 1160F RVW MEDS BY RX/DR IN RCRD: CPT | Mod: CPTII,S$GLB,, | Performed by: NURSE PRACTITIONER

## 2022-10-05 PROCEDURE — 3078F DIAST BP <80 MM HG: CPT | Mod: CPTII,S$GLB,, | Performed by: NURSE PRACTITIONER

## 2022-10-05 PROCEDURE — 1159F PR MEDICATION LIST DOCUMENTED IN MEDICAL RECORD: ICD-10-PCS | Mod: CPTII,S$GLB,, | Performed by: NURSE PRACTITIONER

## 2022-10-05 PROCEDURE — 99213 OFFICE O/P EST LOW 20 MIN: CPT | Mod: S$GLB,,, | Performed by: NURSE PRACTITIONER

## 2022-10-05 PROCEDURE — 99213 PR OFFICE/OUTPT VISIT, EST, LEVL III, 20-29 MIN: ICD-10-PCS | Mod: S$GLB,,, | Performed by: NURSE PRACTITIONER

## 2022-10-05 PROCEDURE — 1126F AMNT PAIN NOTED NONE PRSNT: CPT | Mod: CPTII,S$GLB,, | Performed by: NURSE PRACTITIONER

## 2022-10-05 PROCEDURE — 3077F SYST BP >= 140 MM HG: CPT | Mod: CPTII,S$GLB,, | Performed by: NURSE PRACTITIONER

## 2022-10-05 PROCEDURE — 1159F MED LIST DOCD IN RCRD: CPT | Mod: CPTII,S$GLB,, | Performed by: NURSE PRACTITIONER

## 2022-10-05 PROCEDURE — 1126F PR PAIN SEVERITY QUANTIFIED, NO PAIN PRESENT: ICD-10-PCS | Mod: CPTII,S$GLB,, | Performed by: NURSE PRACTITIONER

## 2022-10-05 PROCEDURE — 3078F PR MOST RECENT DIASTOLIC BLOOD PRESSURE < 80 MM HG: ICD-10-PCS | Mod: CPTII,S$GLB,, | Performed by: NURSE PRACTITIONER

## 2022-10-05 PROCEDURE — 3077F PR MOST RECENT SYSTOLIC BLOOD PRESSURE >= 140 MM HG: ICD-10-PCS | Mod: CPTII,S$GLB,, | Performed by: NURSE PRACTITIONER

## 2022-10-05 NOTE — PATIENT INSTRUCTIONS
INSTRUCTIONS:  - Rest.  - Drink plenty of fluids.  - Take Tylenol and/or Ibuprofen as directed as needed for fever/pain.  Do not take more than the recommended dose.  - follow up with your PCP within the next 1-2 weeks as needed.  - You must understand that you have received an Urgent Care treatment only and that you may be released before all of your medical problems are known or treated.   - You, the patient, will arrange for follow up care as instructed.   - If your condition worsens or fails to improve we recommend that you receive another evaluation at the ER immediately or contact your PCP to discuss your concerns.   - You can call [phone] or [phone] to help schedule an appointment with the appropriate provider.

## 2022-10-05 NOTE — PROGRESS NOTES
Subjective:       Patient ID: Dorothy Kramer is a 81 y.o. female.    Vitals:  temperature is 98.2 °F (36.8 °C). Her blood pressure is 160/78 (abnormal) and her pulse is 83. Her respiration is 16 and oxygen saturation is 98%.     Chief Complaint: Hypertension    Pt presents with concerns about BP-(Pt has HBP and is being treated with losartin)  Pt states had a bad headache last night but is better this morning .  Was given a RX post op (myrbetiq)Has a side effect of raising BP.  Was told to stop taking this morning.    Provider note begins below:  81-year-old female here today with concerns for her blood pressure being elevated yesterday.  Patient reported a blood pressure of 180/121 at home.  Patient reports that she does suffer with migraines and reports that her headache is better this morning.  Patient denies any fever, chills, cough, chest pain, shortness of breath, hemoptysis, slurred speech, extremity weakness, visual changes or nausea.  Patient is awake and alert.  Answering all questions appropriately.  Moving all extremities well.  Speech is clear.  Patient reports compliance with her losartan for her blood pressure.  Patient does report that she ate chocolate yesterday.  Patient is S/P insertion of midurethral sling on 09/22/2022.  Patient has a surgical follow-up appointment on October 20th.    Hypertension  This is a new problem. The current episode started in the past 7 days. The problem is unchanged. The problem is controlled. Associated symptoms include headaches. Pertinent negatives include no chest pain.     Constitution: Negative. Negative for chills, sweating and fatigue.   HENT: Negative.  Negative for ear pain, facial swelling, congestion and sore throat.    Neck: Negative for painful lymph nodes.   Cardiovascular: Negative.  Negative for chest trauma, chest pain and sob on exertion.   Eyes: Negative.  Negative for eye itching and eye pain.   Respiratory: Negative.  Negative for chest tightness,  cough and asthma.    Gastrointestinal: Negative.  Negative for nausea, vomiting and diarrhea.   Endocrine: negative. cold intolerance and excessive thirst.   Genitourinary: Negative.  Negative for dysuria, frequency, urgency and hematuria.   Musculoskeletal:  Negative for pain, trauma and joint pain.   Skin: Negative.  Negative for rash, wound and hives.   Allergic/Immunologic: Negative.  Negative for eczema, asthma, hives and itching.   Neurological:  Positive for headaches. Negative for disorientation and altered mental status.   Hematologic/Lymphatic: Negative.  Negative for swollen lymph nodes.   Psychiatric/Behavioral: Negative.  Negative for altered mental status, disorientation and confusion.      Objective:      Physical Exam   Constitutional: She is oriented to person, place, and time. She appears well-developed. She is cooperative.  Non-toxic appearance. She does not appear ill. No distress.   HENT:   Head: Normocephalic and atraumatic.   Ears:   Right Ear: Hearing, tympanic membrane, external ear and ear canal normal. impacted cerumen  Left Ear: Hearing, tympanic membrane, external ear and ear canal normal. impacted cerumen  Nose: Nose normal. No mucosal edema, rhinorrhea, nasal deformity or congestion. No epistaxis. Right sinus exhibits no maxillary sinus tenderness and no frontal sinus tenderness. Left sinus exhibits no maxillary sinus tenderness and no frontal sinus tenderness.   Mouth/Throat: Uvula is midline and oropharynx is clear and moist. Mucous membranes are dry. No trismus in the jaw. Normal dentition. No uvula swelling. No oropharyngeal exudate, posterior oropharyngeal edema or posterior oropharyngeal erythema.   Eyes: Conjunctivae and lids are normal. No scleral icterus.   Neck: Trachea normal and phonation normal. Neck supple. No edema present. No erythema present. No neck rigidity present.   Cardiovascular: Normal rate, regular rhythm, normal heart sounds and normal pulses.    Pulmonary/Chest: Effort normal and breath sounds normal. No stridor. No respiratory distress. She has no decreased breath sounds. She has no wheezes. She has no rhonchi. She has no rales. She exhibits no tenderness.   Abdominal: Normal appearance. Soft. flat abdomen There is no guarding, no left CVA tenderness and no right CVA tenderness.   Musculoskeletal: Normal range of motion.         General: No deformity. Normal range of motion.   Lymphadenopathy:     She has no cervical adenopathy.   Neurological: no focal deficit. She is alert, oriented to person, place, and time and at baseline. She has normal sensation. She displays no weakness, no tremor and facial symmetry. No cranial nerve deficit. She exhibits normal muscle tone. She has a normal Finger-Nose-Finger Test. Coordination: Heel to shin test normal. She shows no pronator drift. She displays no seizure activity. Gait and coordination normal. Coordination normal. GCS eye subscore is 4. GCS verbal subscore is 5. GCS motor subscore is 6.   Skin: Skin is warm, dry, intact, not diaphoretic and not pale.   Psychiatric: Her speech is normal and behavior is normal. Mood, judgment and thought content normal.   Nursing note and vitals reviewed.      Assessment:       1. Elevated blood pressure reading    2. Nonintractable headache, unspecified chronicity pattern, unspecified headache type          Plan:       Patient given strict ER precautions if her blood pressure becomes elevated and she develops other symptoms such as worsening headache.  Patient acknowledged.  FOLLOWUP  Follow up if symptoms worsen or fail to improve, for PLEASE CONTACT PCP OR CONTACT THE EMERGENCY ROOM..     PATIENT INSTRUCTIONS  Patient Instructions   INSTRUCTIONS:  - Rest.  - Drink plenty of fluids.  - Take Tylenol and/or Ibuprofen as directed as needed for fever/pain.  Do not take more than the recommended dose.  - follow up with your PCP within the next 1-2 weeks as needed.  - You must  understand that you have received an Urgent Care treatment only and that you may be released before all of your medical problems are known or treated.   - You, the patient, will arrange for follow up care as instructed.   - If your condition worsens or fails to improve we recommend that you receive another evaluation at the ER immediately or contact your PCP to discuss your concerns.   - You can call [phone] or [phone] to help schedule an appointment with the appropriate provider.          THANK YOU FOR ALLOWING ME TO PARTICIPATE IN YOUR HEALTHCARE,     Andrew Mao NP     Elevated blood pressure reading    Nonintractable headache, unspecified chronicity pattern, unspecified headache type

## 2022-10-19 ENCOUNTER — OFFICE VISIT (OUTPATIENT)
Dept: UROGYNECOLOGY | Facility: CLINIC | Age: 82
End: 2022-10-19
Payer: MEDICARE

## 2022-10-19 VITALS
WEIGHT: 147.06 LBS | DIASTOLIC BLOOD PRESSURE: 78 MMHG | HEIGHT: 64 IN | HEART RATE: 70 BPM | BODY MASS INDEX: 25.11 KG/M2 | SYSTOLIC BLOOD PRESSURE: 120 MMHG

## 2022-10-19 DIAGNOSIS — Z09 POSTOP CHECK: Primary | ICD-10-CM

## 2022-10-19 PROCEDURE — 1159F PR MEDICATION LIST DOCUMENTED IN MEDICAL RECORD: ICD-10-PCS | Mod: CPTII,S$GLB,, | Performed by: OBSTETRICS & GYNECOLOGY

## 2022-10-19 PROCEDURE — 1157F PR ADVANCE CARE PLAN OR EQUIV PRESENT IN MEDICAL RECORD: ICD-10-PCS | Mod: CPTII,S$GLB,, | Performed by: OBSTETRICS & GYNECOLOGY

## 2022-10-19 PROCEDURE — 1157F ADVNC CARE PLAN IN RCRD: CPT | Mod: CPTII,S$GLB,, | Performed by: OBSTETRICS & GYNECOLOGY

## 2022-10-19 PROCEDURE — 3288F PR FALLS RISK ASSESSMENT DOCUMENTED: ICD-10-PCS | Mod: CPTII,S$GLB,, | Performed by: OBSTETRICS & GYNECOLOGY

## 2022-10-19 PROCEDURE — 99999 PR PBB SHADOW E&M-EST. PATIENT-LVL III: CPT | Mod: PBBFAC,,, | Performed by: OBSTETRICS & GYNECOLOGY

## 2022-10-19 PROCEDURE — 1159F MED LIST DOCD IN RCRD: CPT | Mod: CPTII,S$GLB,, | Performed by: OBSTETRICS & GYNECOLOGY

## 2022-10-19 PROCEDURE — 3078F DIAST BP <80 MM HG: CPT | Mod: CPTII,S$GLB,, | Performed by: OBSTETRICS & GYNECOLOGY

## 2022-10-19 PROCEDURE — 1101F PR PT FALLS ASSESS DOC 0-1 FALLS W/OUT INJ PAST YR: ICD-10-PCS | Mod: CPTII,S$GLB,, | Performed by: OBSTETRICS & GYNECOLOGY

## 2022-10-19 PROCEDURE — 3078F PR MOST RECENT DIASTOLIC BLOOD PRESSURE < 80 MM HG: ICD-10-PCS | Mod: CPTII,S$GLB,, | Performed by: OBSTETRICS & GYNECOLOGY

## 2022-10-19 PROCEDURE — 3288F FALL RISK ASSESSMENT DOCD: CPT | Mod: CPTII,S$GLB,, | Performed by: OBSTETRICS & GYNECOLOGY

## 2022-10-19 PROCEDURE — 99024 POSTOP FOLLOW-UP VISIT: CPT | Mod: S$GLB,,, | Performed by: OBSTETRICS & GYNECOLOGY

## 2022-10-19 PROCEDURE — 3074F SYST BP LT 130 MM HG: CPT | Mod: CPTII,S$GLB,, | Performed by: OBSTETRICS & GYNECOLOGY

## 2022-10-19 PROCEDURE — 3074F PR MOST RECENT SYSTOLIC BLOOD PRESSURE < 130 MM HG: ICD-10-PCS | Mod: CPTII,S$GLB,, | Performed by: OBSTETRICS & GYNECOLOGY

## 2022-10-19 PROCEDURE — 1101F PT FALLS ASSESS-DOCD LE1/YR: CPT | Mod: CPTII,S$GLB,, | Performed by: OBSTETRICS & GYNECOLOGY

## 2022-10-19 PROCEDURE — 99999 PR PBB SHADOW E&M-EST. PATIENT-LVL III: ICD-10-PCS | Mod: PBBFAC,,, | Performed by: OBSTETRICS & GYNECOLOGY

## 2022-10-19 PROCEDURE — 99024 PR POST-OP FOLLOW-UP VISIT: ICD-10-PCS | Mod: S$GLB,,, | Performed by: OBSTETRICS & GYNECOLOGY

## 2022-10-19 NOTE — PROGRESS NOTES
Subjective:      Patient ID: Dorothy Kramer is a 81 y.o. female.    Chief Complaint:  No chief complaint on file.      History of Present Illness  Patient is 6 weeks postop from mid urethral sling doing well she does think that the estrogen it cream is leading to migraines would like to reduce          Past Medical History:   Diagnosis Date    DDD (degenerative disc disease), cervical     Diverticulitis     Diverticulosis     DJD (degenerative joint disease) of hip     Dyspepsia     GERD (gastroesophageal reflux disease)     History of melanoma 05/20/2015    HTN (hypertension)     Melanoma     Migraine headache     Rectocele     Skin cancer     melanoma on face    Sleep apnea     doesnt use cpap    Thyroid disease     hypo    Trouble in sleeping     Ulcerative colitis     on meds    Uterine prolaps     followed by GYN    Vulvar lesion 05/20/2015       Past Surgical History:   Procedure Laterality Date    APPENDECTOMY  1962    BACK SURGERY      BREAST BIOPSY      BREAST LUMPECTOMY  1989    CATARACT EXTRACTION Left 02/22/2021    ros    CATARACT EXTRACTION W/  INTRAOCULAR LENS IMPLANT Left 2/22/2021    Procedure: EXTRACTION, CATARACT, WITH IOL INSERTION;  Surgeon: Latisha Baker MD;  Location: [facility] OR;  Service: Ophthalmology;  Laterality: Left;  left    CATARACT EXTRACTION W/  INTRAOCULAR LENS IMPLANT Right 3/22/2021    Procedure: EXTRACTION, CATARACT, WITH IOL INSERTION;  Surgeon: Latisha Baker MD;  Location: [facility] OR;  Service: Ophthalmology;  Laterality: Right;  right    CERVICAL FUSION  1991    CHOLECYSTECTOMY      COLONOSCOPY      COLONOSCOPY N/A 11/9/2018    Dr Oscar; chronic active colitis; repeat in 5 years    COLONOSCOPY N/A 2/14/2020    Procedure: COLONOSCOPY;  Surgeon: Fadi Oscar MD;  Location: [facility] ENDO;  Service: Endoscopy;  Laterality: N/A;    HYSTERECTOMY      TRACEE/BSO for benign disease    INSERTION OF MIDURETHRAL SLING N/A 9/22/2022    Procedure: SLING, MIDURETHRAL;  Surgeon: Yannick  MD Misha;  Location: [facility] OR;  Service: OB/GYN;  Laterality: N/A;    JOINT REPLACEMENT Right 2016    Hip replacement    MA REMOVAL OF OVARY/TUBE(S)      ROBOT-ASSISTED LAPAROSCOPIC ABDOMINAL SACROCOLPOPEXY USING DA FLAKITO XI N/A 2021    Procedure: XI ROBOTIC SACROCOLPOPEXY, ABDOMINAL;  Surgeon: Yannick Cabral MD;  Location: [facility] OR;  Service: OB/GYN;  Laterality: N/A;    ROBOT-ASSISTED LAPAROSCOPIC LYSIS OF ADHESIONS USING DA FLAKITO XI  2021    Procedure: XI ROBOTIC LYSIS, ADHESIONS;  Surgeon: Yannick Cabral MD;  Location: [facility] OR;  Service: OB/GYN;;    TONSILLECTOMY      TOTAL ABDOMINAL HYSTERECTOMY W/ BILATERAL SALPINGOOPHORECTOMY         GYN & OB History  No LMP recorded. Patient has had a hysterectomy.   Date of Last Pap: No result found    OB History    Para Term  AB Living   7 3 3   4     SAB IAB Ectopic Multiple Live Births   4              # Outcome Date GA Lbr Julius/2nd Weight Sex Delivery Anes PTL Lv   7 Term            6 Term            5 Term            4 SAB            3 SAB            2 SAB            1 SAB                Health Maintenance         Date Due Completion Date    Hepatitis C Screening Never done ---    COVID-19 Vaccine (4 - Booster for Pfizer series) 2021    Influenza Vaccine (1) 2022 (Declined)    Override on 2020: Declined    DEXA Scan 10/05/2024 10/5/2021    Lipid Panel 2027    TETANUS VACCINE 2030            Family History   Problem Relation Age of Onset    Heart failure Mother     Cancer Mother         Bladder    Colon cancer Mother 63    Cancer Father         Bladder    Parkinsonism Father     Diverticulitis Father     Uterine cancer Maternal Aunt     Ovarian cancer Neg Hx     Breast cancer Neg Hx     Crohn's disease Neg Hx     Ulcerative colitis Neg Hx     Glaucoma Neg Hx     Macular degeneration Neg Hx     Retinal detachment Neg Hx        Social History     Socioeconomic History  "   Marital status:    Tobacco Use    Smoking status: Never    Smokeless tobacco: Never   Substance and Sexual Activity    Alcohol use: No    Drug use: No    Sexual activity: Not Currently     Social Determinants of Health     Financial Resource Strain: Unknown    Difficulty of Paying Living Expenses: Patient refused   Food Insecurity: Unknown    Worried About Running Out of Food in the Last Year: Patient refused    Ran Out of Food in the Last Year: Never true   Transportation Needs: No Transportation Needs    Lack of Transportation (Medical): No    Lack of Transportation (Non-Medical): No   Physical Activity: Unknown    Days of Exercise per Week: Patient refused   Stress: Unknown    Feeling of Stress : Patient refused   Social Connections: Unknown    Frequency of Communication with Friends and Family: More than three times a week    Frequency of Social Gatherings with Friends and Family: More than three times a week    Active Member of Clubs or Organizations: Yes    Attends Club or Organization Meetings: More than 4 times per year    Marital Status:    Housing Stability: Low Risk     Unable to Pay for Housing in the Last Year: No    Number of Places Lived in the Last Year: 1    Unstable Housing in the Last Year: No       Review of Systems  Review of Systems  Migraines possible we to the use of estrogen cream     Objective:   /78   Pulse 70   Ht 5' 4" (1.626 m)   Wt 66.7 kg (147 lb 0.8 oz)   BMI 25.24 kg/m²     Physical Exam   Can completely normal tissue anatomic axis is perfect  Assessment:     1. Postop check            Plan:     1. Postop check      Long discussion with patient regarding my findings.    Were going to move forward with of having her stop estrogen for period of 2 weeks I want to see if that affects the onset duration frequency of migraines.  If it does then we will restart Estrace at 1 g per vagina once a week and will evaluate the affect of this level medication on her " tissue its 6 week interval patient reach out to me be of the message system to me patient most appreciative time patient very happy with aspects treatment plan  There are no Patient Instructions on file for this visit.

## 2022-10-27 NOTE — PROCEDURES
Procedures        SURGEONS NARRATIVE:  A time out was performed in which the patient identity and procedure were confirmed.  Urodynamic evaluation was performed using a computerized system ([employer]  Uroflowmetry was performed on the patient in the sitting position without catheters in place.  Subsequent urodynamic testing was performed with the patient in the lithotomy position at 45 degrees. Air charged catheters were used with sterile water as the infusion medium. Vesical and abdominal (rectal) pressures were measured, and detrusor pressure was calculated. EMG activity was recorded with surface electrodes. During filling, room temperature sterile water was infused at a rate of 30 cubic centimeters per minute. The patient was asked cough after instillation of each 100cc volume. Two Valsalva leak point pressures and two cough leak point pressures were performed with the catheters in place at 300 cubic centimeters and again at maximum capacity. Valsalva leak point pressure was defined as the difference between vesical pressure at which leakage was noted (visualized at the external urethral meatus) and the baseline vesical pressure. Following urodynamic testing, a pressure flow study was performed with the patient in the sitting position. Vesical and abdominal pressures were monitored and detrusor pressures were calculated. After the pressure flow study, the catheters were then removed. The patient tolerated the procedure well.     Urine dipstick: neg.    1.  VOIDING PHASE:      a.  Uroflowmetry:  Prolapse reduction: No  Voided volume:  158 mL   Voiding time:   30.3 seconds  Max flow:  14.3 mL/s  Avg flow:   5.2 mL/s   PVR:   70 mL    The overall configuration of this uroflow study was   fluctuating continuous maybe a little intermittent at the end suggestive of some voiding dysfunction which can definitely be appreciated given the level of prolapse that the patient had and the years of this  affecting on her voiding mechanics.      b.  Pressure flow:  Prolapse reduction: No  Voided volume:   259 mL  Voiding time:   1:11 seconds  Peak flow:  13.9 mL/s   Avg flow:  3.6 mL/s  Max det pressure:  28.3  cm H20  Det pressure at max flow: 19.4 cm H20  Void initiated by  abdominal.    Urethral relaxation (EMG):  present.    PVR (calculated):  35 mL    The overall configuration of this pressure flow study was  this is completely dysfunctional voiding there is a intermittent void she is bearing down to void with combination of urethra relaxation there was very little contribution from detrusor throughout .      2.  FILLING PHASE:  1st desire: this void.111 mL  Normal desire:  170 mL  Strong desire:  208 mL  Urgency:  300 mL  Compliance (calculated)  compliant mL/cm H20  EMG activity during filling:   appropriate  Detrusor contractions observed: Yes.    One permission given a void    3.  URETHRAL FUNCTION/STORAGE PHASE:    a.  WITHOUT prolapse reduction:   Multiple cough leak point pressures as well as Valsalva leak point pressures calculated leak leak points go from .  At 151 cc of infusion we had a Valsalva leak point of 93  At 297 infusion Valsalva leak 0.53.    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    Assessment:   This is a patient with substantial voiding dysfunction this can be explained secondary to years of her anatomic active which was corrected.  She is not yet ready for a mid urethral sling but she will be.  Patient and her  had many questions which I reviewed pretty much in its entirety as patient is very inquisitive we appreciate that so we did review every aspect little bit of frustration on the patient's point however should be noted that I have high how faizan that in time with my voiding techniques and if we need to send her to physical therapy that she will be able candidate for M U.S..  This was explained  numerous times to patient.             As I go back and review this on October 27, 2020 to great pleasure to note that the patient did come back was able to decompress bladder appropriately and has had a successful mid urethral sling and she is doing absolutely wonderful she is very happy    Plan:

## 2022-11-03 ENCOUNTER — PATIENT MESSAGE (OUTPATIENT)
Dept: UROGYNECOLOGY | Facility: CLINIC | Age: 82
End: 2022-11-03
Payer: MEDICARE

## 2022-11-22 ENCOUNTER — PATIENT MESSAGE (OUTPATIENT)
Dept: GASTROENTEROLOGY | Facility: CLINIC | Age: 82
End: 2022-11-22
Payer: MEDICARE

## 2022-11-22 DIAGNOSIS — K52.9 COLITIS: ICD-10-CM

## 2022-11-22 RX ORDER — MESALAMINE 0.38 G/1
0.75 CAPSULE, EXTENDED RELEASE ORAL DAILY
Qty: 60 CAPSULE | Refills: 0 | Status: SHIPPED | OUTPATIENT
Start: 2022-11-22 | End: 2022-12-07 | Stop reason: SDUPTHER

## 2022-12-06 NOTE — PROGRESS NOTES
"Subjective:       Patient ID: Dorothy Kramer is a 81 y.o. female.    Chief Complaint: Disease Management    HPI    This is a 81-year-old woman with history of HTN, HLD, vaginal prolapse s/p surgical repair (12/2021), hypothyroidism, herniated and bulging discs s/p neck fusion, osteoarthritis of the hip s/p right NEYDA, shoulder impingement, chronic lower back pain, SCC on the nose, history of melanoma 13 years ago, DDD, recurrent diverticulitis, and non-specific colitis well-controlled on Apriso (colonoscopy with focal inflammation and chronic active colitis on biopsy in 2020; followed by Dr Oscar).  She was referred to Rheumatology for psoriasis and  arthritis.     She reports pain in her neck, back, knees, and shoulders for many years. Initially, she took Aleve, but had to stop this due to her colitis. The back pain is worse with prolonged standing and the neck is worse with movement. She has soreness and stiffness in almost all of joints. She notes occasional swelling in her knees and elbows. Her psoriasis is well-controlled at the moment.     Objective:   BP (!) 156/75   Pulse 80   Ht 5' 4.02" (1.626 m)   Wt 67.1 kg (147 lb 14.9 oz)   BMI 25.38 kg/m²      Physical Exam   Constitutional: normal appearance.   HENT:   Head: Normocephalic and atraumatic.   Mouth/Throat: Mucous membranes are moist.   Mouth/Throat: No oral ulcers  Cardiovascular: Normal rate, regular rhythm and normal heart sounds.   Pulmonary/Chest: Effort normal and breath sounds normal.   Musculoskeletal:      Comments: Degenerative changes in the hands  Cool effusions in both knees  Crepitus on range of motion of both knees   Neurological: She is alert.   Skin: Skin is warm and dry. No rash noted.      No data to display     Labs reviewed by me:  Negative SASCHA and rheumatoid factor  CBC (12/2021) HGB 11.9, WBC and platelets WNL  CMP (06/2022) WNL  CRP (07/2019) 80.1  ESR (07/2019) 38  DEXA (10/2021) normal BMD    Assessment:       1. Chronic " midline back pain, unspecified back location    2. Polyarticular arthritis    3. Psoriasis    4. Non-specific colitis        This is a 81-year-old woman with history of HTN, HLD, vaginal prolapse s/p surgical repair (12/2021), hypothyroidism, herniated and bulging discs s/p neck fusion, osteoarthritis of the hip s/p right NEYDA, shoulder impingement, chronic lower back pain, SCC on the nose, history of melanoma 13 years ago, DDD, recurrent diverticulitis, and non-specific colitis well-controlled on Apriso (colonoscopy with focal inflammation and chronic active colitis on biopsy in 2020; followed by Dr Oscar).  She was referred to Rheumatology for psoriasis and  arthritis.     She reports pain in her neck, back, knees, and shoulders for many years. Initially, she took Aleve, but had to stop this due to her colitis. The back pain is worse with prolonged standing and the neck is worse with movement. She has soreness and stiffness in almost all of joints. She notes occasional swelling in her knees and elbows. Her psoriasis is well-controlled on topical steroids, and her colitis is well controlled on Apriso.  Physical exam findings are mostly degenerative.  I suspect that this is all osteoarthritis, however given her history of psoriasis and colitis will look for evidence of SpA.     Plan:       Problem List Items Addressed This Visit    None  Visit Diagnoses       Chronic midline back pain, unspecified back location    -  Primary    Relevant Orders    CBC Auto Differential    Comprehensive Metabolic Panel    Sedimentation rate    C-Reactive Protein    X-ray Arthritis Survey    Celiac Disease Panel    X-Ray Sacroiliac Joints Complete    MRI Sacroiliac Joints Without Contrast    Polyarticular arthritis        Relevant Orders    CBC Auto Differential    Comprehensive Metabolic Panel    Sedimentation rate    C-Reactive Protein    X-ray Arthritis Survey    Celiac Disease Panel    X-Ray Sacroiliac Joints Complete    MRI  Sacroiliac Joints Without Contrast    Psoriasis        Relevant Orders    CBC Auto Differential    Comprehensive Metabolic Panel    Sedimentation rate    C-Reactive Protein    X-ray Arthritis Survey    Celiac Disease Panel    X-Ray Sacroiliac Joints Complete    MRI Sacroiliac Joints Without Contrast    Non-specific colitis        Relevant Orders    CBC Auto Differential    Comprehensive Metabolic Panel    Sedimentation rate    C-Reactive Protein    X-ray Arthritis Survey    Celiac Disease Panel    X-Ray Sacroiliac Joints Complete    MRI Sacroiliac Joints Without Contrast          - labs as above  - x-ray arthritis survey  - x-rays sacroiliac joints  - MRI sacroiliac joints  - if there is no evidence of inflammatory back disease, will refer her to pain management for possible epidural injections    Follow up in 3 months    50 minutes of total time spent on the encounter, which includes face to face time and non-face to face time preparing to see the patient (eg, review of tests), Obtaining and/or reviewing separately obtained history, Documenting clinical information in the electronic or other health record, Independently interpreting results (not separately reported) and communicating results to the patient/family/caregiver, or Care coordination (not separately reported).       Karey Rubio M.D.  Rheumatology Dept  [place], [place]

## 2022-12-07 ENCOUNTER — OFFICE VISIT (OUTPATIENT)
Dept: RHEUMATOLOGY | Facility: CLINIC | Age: 82
End: 2022-12-07
Payer: MEDICARE

## 2022-12-07 ENCOUNTER — HOSPITAL ENCOUNTER (OUTPATIENT)
Dept: RADIOLOGY | Facility: HOSPITAL | Age: 82
Discharge: HOME | End: 2022-12-07
Attending: STUDENT IN AN ORGANIZED HEALTH CARE EDUCATION/TRAINING PROGRAM
Payer: MEDICARE

## 2022-12-07 ENCOUNTER — OFFICE VISIT (OUTPATIENT)
Dept: GASTROENTEROLOGY | Facility: CLINIC | Age: 82
End: 2022-12-07
Payer: MEDICARE

## 2022-12-07 ENCOUNTER — OFFICE VISIT (OUTPATIENT)
Dept: DERMATOLOGY | Facility: CLINIC | Age: 82
End: 2022-12-07
Payer: MEDICARE

## 2022-12-07 VITALS
WEIGHT: 147.94 LBS | BODY MASS INDEX: 25.25 KG/M2 | HEART RATE: 80 BPM | DIASTOLIC BLOOD PRESSURE: 75 MMHG | SYSTOLIC BLOOD PRESSURE: 156 MMHG | HEIGHT: 64 IN

## 2022-12-07 VITALS
WEIGHT: 147 LBS | BODY MASS INDEX: 25.1 KG/M2 | SYSTOLIC BLOOD PRESSURE: 153 MMHG | DIASTOLIC BLOOD PRESSURE: 73 MMHG | HEIGHT: 64 IN | HEART RATE: 71 BPM

## 2022-12-07 VITALS — HEIGHT: 64 IN | WEIGHT: 148.56 LBS | BODY MASS INDEX: 25.36 KG/M2

## 2022-12-07 DIAGNOSIS — K52.9 NON-SPECIFIC COLITIS: ICD-10-CM

## 2022-12-07 DIAGNOSIS — C44.321 SQUAMOUS CELL CANCER OF SKIN OF NOSE: Primary | ICD-10-CM

## 2022-12-07 DIAGNOSIS — G89.29 CHRONIC MIDLINE BACK PAIN, UNSPECIFIED BACK LOCATION: ICD-10-CM

## 2022-12-07 DIAGNOSIS — M13.0 POLYARTICULAR ARTHRITIS: ICD-10-CM

## 2022-12-07 DIAGNOSIS — M54.9 CHRONIC MIDLINE BACK PAIN, UNSPECIFIED BACK LOCATION: ICD-10-CM

## 2022-12-07 DIAGNOSIS — K52.9 COLITIS: Primary | ICD-10-CM

## 2022-12-07 DIAGNOSIS — M54.9 CHRONIC MIDLINE BACK PAIN, UNSPECIFIED BACK LOCATION: Primary | ICD-10-CM

## 2022-12-07 DIAGNOSIS — G89.29 CHRONIC MIDLINE BACK PAIN, UNSPECIFIED BACK LOCATION: Primary | ICD-10-CM

## 2022-12-07 DIAGNOSIS — Z80.0 FAMILY HISTORY OF COLON CANCER IN MOTHER: ICD-10-CM

## 2022-12-07 DIAGNOSIS — L40.9 PSORIASIS: ICD-10-CM

## 2022-12-07 DIAGNOSIS — Z87.19 HISTORY OF DIVERTICULITIS OF COLON: ICD-10-CM

## 2022-12-07 DIAGNOSIS — Z90.49 S/P CHOLECYSTECTOMY: ICD-10-CM

## 2022-12-07 PROCEDURE — 1101F PR PT FALLS ASSESS DOC 0-1 FALLS W/OUT INJ PAST YR: ICD-10-PCS | Mod: CPTII,S$GLB,, | Performed by: STUDENT IN AN ORGANIZED HEALTH CARE EDUCATION/TRAINING PROGRAM

## 2022-12-07 PROCEDURE — 3288F FALL RISK ASSESSMENT DOCD: CPT | Mod: CPTII,S$GLB,, | Performed by: NURSE PRACTITIONER

## 2022-12-07 PROCEDURE — 99999 PR PBB SHADOW E&M-EST. PATIENT-LVL III: CPT | Mod: PBBFAC,,, | Performed by: NURSE PRACTITIONER

## 2022-12-07 PROCEDURE — 1126F AMNT PAIN NOTED NONE PRSNT: CPT | Mod: CPTII,S$GLB,, | Performed by: NURSE PRACTITIONER

## 2022-12-07 PROCEDURE — 99499 RISK ADDL DX/OHS AUDIT: ICD-10-PCS | Mod: HCNC,S$GLB,, | Performed by: DERMATOLOGY

## 2022-12-07 PROCEDURE — 99213 PR OFFICE/OUTPT VISIT, EST, LEVL III, 20-29 MIN: ICD-10-PCS | Mod: S$GLB,,, | Performed by: NURSE PRACTITIONER

## 2022-12-07 PROCEDURE — 3077F SYST BP >= 140 MM HG: CPT | Mod: CPTII,S$GLB,, | Performed by: DERMATOLOGY

## 2022-12-07 PROCEDURE — 99999 PR PBB SHADOW E&M-EST. PATIENT-LVL III: ICD-10-PCS | Mod: PBBFAC,,, | Performed by: DERMATOLOGY

## 2022-12-07 PROCEDURE — 99202 PR OFFICE/OUTPT VISIT, NEW, LEVL II, 15-29 MIN: ICD-10-PCS | Mod: S$GLB,,, | Performed by: DERMATOLOGY

## 2022-12-07 PROCEDURE — 3078F DIAST BP <80 MM HG: CPT | Mod: CPTII,S$GLB,, | Performed by: STUDENT IN AN ORGANIZED HEALTH CARE EDUCATION/TRAINING PROGRAM

## 2022-12-07 PROCEDURE — 3078F DIAST BP <80 MM HG: CPT | Mod: CPTII,S$GLB,, | Performed by: DERMATOLOGY

## 2022-12-07 PROCEDURE — 1101F PR PT FALLS ASSESS DOC 0-1 FALLS W/OUT INJ PAST YR: ICD-10-PCS | Mod: CPTII,S$GLB,, | Performed by: NURSE PRACTITIONER

## 2022-12-07 PROCEDURE — 77077 JOINT SURVEY SINGLE VIEW: CPT | Mod: TC,PO

## 2022-12-07 PROCEDURE — 1160F PR REVIEW ALL MEDS BY PRESCRIBER/CLIN PHARMACIST DOCUMENTED: ICD-10-PCS | Mod: CPTII,S$GLB,, | Performed by: NURSE PRACTITIONER

## 2022-12-07 PROCEDURE — 1101F PT FALLS ASSESS-DOCD LE1/YR: CPT | Mod: CPTII,S$GLB,, | Performed by: STUDENT IN AN ORGANIZED HEALTH CARE EDUCATION/TRAINING PROGRAM

## 2022-12-07 PROCEDURE — 1157F ADVNC CARE PLAN IN RCRD: CPT | Mod: CPTII,S$GLB,, | Performed by: NURSE PRACTITIONER

## 2022-12-07 PROCEDURE — 1159F PR MEDICATION LIST DOCUMENTED IN MEDICAL RECORD: ICD-10-PCS | Mod: CPTII,S$GLB,, | Performed by: DERMATOLOGY

## 2022-12-07 PROCEDURE — 1125F AMNT PAIN NOTED PAIN PRSNT: CPT | Mod: CPTII,S$GLB,, | Performed by: STUDENT IN AN ORGANIZED HEALTH CARE EDUCATION/TRAINING PROGRAM

## 2022-12-07 PROCEDURE — 3078F PR MOST RECENT DIASTOLIC BLOOD PRESSURE < 80 MM HG: ICD-10-PCS | Mod: CPTII,S$GLB,, | Performed by: DERMATOLOGY

## 2022-12-07 PROCEDURE — 1157F ADVNC CARE PLAN IN RCRD: CPT | Mod: CPTII,S$GLB,, | Performed by: STUDENT IN AN ORGANIZED HEALTH CARE EDUCATION/TRAINING PROGRAM

## 2022-12-07 PROCEDURE — 99204 OFFICE O/P NEW MOD 45 MIN: CPT | Mod: S$GLB,,, | Performed by: STUDENT IN AN ORGANIZED HEALTH CARE EDUCATION/TRAINING PROGRAM

## 2022-12-07 PROCEDURE — 1159F MED LIST DOCD IN RCRD: CPT | Mod: CPTII,S$GLB,, | Performed by: DERMATOLOGY

## 2022-12-07 PROCEDURE — 99999 PR PBB SHADOW E&M-EST. PATIENT-LVL III: CPT | Mod: PBBFAC,,, | Performed by: DERMATOLOGY

## 2022-12-07 PROCEDURE — 99213 OFFICE O/P EST LOW 20 MIN: CPT | Mod: S$GLB,,, | Performed by: NURSE PRACTITIONER

## 2022-12-07 PROCEDURE — 1126F PR PAIN SEVERITY QUANTIFIED, NO PAIN PRESENT: ICD-10-PCS | Mod: CPTII,S$GLB,, | Performed by: NURSE PRACTITIONER

## 2022-12-07 PROCEDURE — 72202 XR SACROILIAC JOINTS COMPLETE: ICD-10-PCS | Mod: 26,,, | Performed by: RADIOLOGY

## 2022-12-07 PROCEDURE — 3288F PR FALLS RISK ASSESSMENT DOCUMENTED: ICD-10-PCS | Mod: CPTII,S$GLB,, | Performed by: DERMATOLOGY

## 2022-12-07 PROCEDURE — 1126F AMNT PAIN NOTED NONE PRSNT: CPT | Mod: CPTII,S$GLB,, | Performed by: DERMATOLOGY

## 2022-12-07 PROCEDURE — 1157F PR ADVANCE CARE PLAN OR EQUIV PRESENT IN MEDICAL RECORD: ICD-10-PCS | Mod: CPTII,S$GLB,, | Performed by: DERMATOLOGY

## 2022-12-07 PROCEDURE — 99204 PR OFFICE/OUTPT VISIT, NEW, LEVL IV, 45-59 MIN: ICD-10-PCS | Mod: S$GLB,,, | Performed by: STUDENT IN AN ORGANIZED HEALTH CARE EDUCATION/TRAINING PROGRAM

## 2022-12-07 PROCEDURE — 1159F PR MEDICATION LIST DOCUMENTED IN MEDICAL RECORD: ICD-10-PCS | Mod: CPTII,S$GLB,, | Performed by: NURSE PRACTITIONER

## 2022-12-07 PROCEDURE — 1157F ADVNC CARE PLAN IN RCRD: CPT | Mod: CPTII,S$GLB,, | Performed by: DERMATOLOGY

## 2022-12-07 PROCEDURE — 3077F PR MOST RECENT SYSTOLIC BLOOD PRESSURE >= 140 MM HG: ICD-10-PCS | Mod: CPTII,S$GLB,, | Performed by: DERMATOLOGY

## 2022-12-07 PROCEDURE — 99999 PR PBB SHADOW E&M-EST. PATIENT-LVL III: ICD-10-PCS | Mod: PBBFAC,,, | Performed by: NURSE PRACTITIONER

## 2022-12-07 PROCEDURE — 3077F SYST BP >= 140 MM HG: CPT | Mod: CPTII,S$GLB,, | Performed by: STUDENT IN AN ORGANIZED HEALTH CARE EDUCATION/TRAINING PROGRAM

## 2022-12-07 PROCEDURE — 3288F PR FALLS RISK ASSESSMENT DOCUMENTED: ICD-10-PCS | Mod: CPTII,S$GLB,, | Performed by: STUDENT IN AN ORGANIZED HEALTH CARE EDUCATION/TRAINING PROGRAM

## 2022-12-07 PROCEDURE — 1157F PR ADVANCE CARE PLAN OR EQUIV PRESENT IN MEDICAL RECORD: ICD-10-PCS | Mod: CPTII,S$GLB,, | Performed by: STUDENT IN AN ORGANIZED HEALTH CARE EDUCATION/TRAINING PROGRAM

## 2022-12-07 PROCEDURE — 3078F PR MOST RECENT DIASTOLIC BLOOD PRESSURE < 80 MM HG: ICD-10-PCS | Mod: CPTII,S$GLB,, | Performed by: STUDENT IN AN ORGANIZED HEALTH CARE EDUCATION/TRAINING PROGRAM

## 2022-12-07 PROCEDURE — 72202 X-RAY EXAM SI JOINTS 3/> VWS: CPT | Mod: TC,FY,PO

## 2022-12-07 PROCEDURE — 72202 X-RAY EXAM SI JOINTS 3/> VWS: CPT | Mod: 26,,, | Performed by: RADIOLOGY

## 2022-12-07 PROCEDURE — 3077F PR MOST RECENT SYSTOLIC BLOOD PRESSURE >= 140 MM HG: ICD-10-PCS | Mod: CPTII,S$GLB,, | Performed by: STUDENT IN AN ORGANIZED HEALTH CARE EDUCATION/TRAINING PROGRAM

## 2022-12-07 PROCEDURE — 1159F MED LIST DOCD IN RCRD: CPT | Mod: CPTII,S$GLB,, | Performed by: NURSE PRACTITIONER

## 2022-12-07 PROCEDURE — 1160F RVW MEDS BY RX/DR IN RCRD: CPT | Mod: CPTII,S$GLB,, | Performed by: NURSE PRACTITIONER

## 2022-12-07 PROCEDURE — 1157F PR ADVANCE CARE PLAN OR EQUIV PRESENT IN MEDICAL RECORD: ICD-10-PCS | Mod: CPTII,S$GLB,, | Performed by: NURSE PRACTITIONER

## 2022-12-07 PROCEDURE — 1160F PR REVIEW ALL MEDS BY PRESCRIBER/CLIN PHARMACIST DOCUMENTED: ICD-10-PCS | Mod: CPTII,S$GLB,, | Performed by: DERMATOLOGY

## 2022-12-07 PROCEDURE — 3288F FALL RISK ASSESSMENT DOCD: CPT | Mod: CPTII,S$GLB,, | Performed by: DERMATOLOGY

## 2022-12-07 PROCEDURE — 99202 OFFICE O/P NEW SF 15 MIN: CPT | Mod: S$GLB,,, | Performed by: DERMATOLOGY

## 2022-12-07 PROCEDURE — 1126F PR PAIN SEVERITY QUANTIFIED, NO PAIN PRESENT: ICD-10-PCS | Mod: CPTII,S$GLB,, | Performed by: DERMATOLOGY

## 2022-12-07 PROCEDURE — 1160F RVW MEDS BY RX/DR IN RCRD: CPT | Mod: CPTII,S$GLB,, | Performed by: DERMATOLOGY

## 2022-12-07 PROCEDURE — 99999 PR PBB SHADOW E&M-EST. PATIENT-LVL IV: CPT | Mod: PBBFAC,,, | Performed by: STUDENT IN AN ORGANIZED HEALTH CARE EDUCATION/TRAINING PROGRAM

## 2022-12-07 PROCEDURE — 3288F PR FALLS RISK ASSESSMENT DOCUMENTED: ICD-10-PCS | Mod: CPTII,S$GLB,, | Performed by: NURSE PRACTITIONER

## 2022-12-07 PROCEDURE — 99499 UNLISTED E&M SERVICE: CPT | Mod: HCNC,S$GLB,, | Performed by: DERMATOLOGY

## 2022-12-07 PROCEDURE — 1101F PT FALLS ASSESS-DOCD LE1/YR: CPT | Mod: CPTII,S$GLB,, | Performed by: NURSE PRACTITIONER

## 2022-12-07 PROCEDURE — 99999 PR PBB SHADOW E&M-EST. PATIENT-LVL IV: ICD-10-PCS | Mod: PBBFAC,,, | Performed by: STUDENT IN AN ORGANIZED HEALTH CARE EDUCATION/TRAINING PROGRAM

## 2022-12-07 PROCEDURE — 77077 JOINT SURVEY SINGLE VIEW: CPT | Mod: 26,,, | Performed by: RADIOLOGY

## 2022-12-07 PROCEDURE — 1125F PR PAIN SEVERITY QUANTIFIED, PAIN PRESENT: ICD-10-PCS | Mod: CPTII,S$GLB,, | Performed by: STUDENT IN AN ORGANIZED HEALTH CARE EDUCATION/TRAINING PROGRAM

## 2022-12-07 PROCEDURE — 1101F PR PT FALLS ASSESS DOC 0-1 FALLS W/OUT INJ PAST YR: ICD-10-PCS | Mod: CPTII,S$GLB,, | Performed by: DERMATOLOGY

## 2022-12-07 PROCEDURE — 77077 XR ARTHRITIS SURVEY: ICD-10-PCS | Mod: 26,,, | Performed by: RADIOLOGY

## 2022-12-07 PROCEDURE — 1101F PT FALLS ASSESS-DOCD LE1/YR: CPT | Mod: CPTII,S$GLB,, | Performed by: DERMATOLOGY

## 2022-12-07 PROCEDURE — 3288F FALL RISK ASSESSMENT DOCD: CPT | Mod: CPTII,S$GLB,, | Performed by: STUDENT IN AN ORGANIZED HEALTH CARE EDUCATION/TRAINING PROGRAM

## 2022-12-07 RX ORDER — MESALAMINE 0.38 G/1
0.75 CAPSULE, EXTENDED RELEASE ORAL DAILY
Qty: 60 CAPSULE | Refills: 11 | Status: SHIPPED | OUTPATIENT
Start: 2022-12-07 | End: 2023-06-30 | Stop reason: SDUPTHER

## 2022-12-07 NOTE — PROGRESS NOTES
ALLERGIES:  Ciprofloxacin and Flagyl [metronidazole]    CHIEF COMPLAINT:  This 81 y.o. 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 Consultation requested by Janice Yung M.D..    HISTORY OF PRESENT ILLNESS:   Location: Nasal dorsum  Duration: 10 months  Context: status post biopsy by Janice Yung M.D..; path = squamous cell carcinoma, well-differentiated with acantholysis; pathology accession # [id number], Inform diagnostics     Defibrillator: No  Pacemaker: No  Artificial heart valves: No  Artificial joints: No          REVIEW OF SYSTEMS:   General: general health good  Skin: previous skin cancer(s) Melanoma right temple 2011    Relevant other:  No  Cardiovascular:   High Blood Pressure: Yes   Chest Pain:  No   Defibrillator: as above  Pacemaker: as above  Artificial heart valves: as above  Prior Endocarditis: No   Prior Heart Attack/MI: No     If yes, when:   Prior Cardiac Bypass or Stents:  No     Mitral Valve Prolapse: No   Relevant other:  No   Respiratory:   Shortness of breath:  No   Relevant other:  No   Endocrine:   Diabetes:  No   Relevant other:  Hypothyroid  Hem/Lymph:   Taking Prescribed Blood Thinners:  No   Easy Bleeding:  No   Relevant other:  No   Allergy/Immuno: as noted above  Relevant other:  No   GI:   Prior Hepatitis:  Gerd/ diverticulitis/ UC    If yes, details:   Relevant other:  No   Musculoskeletal:   Artificial joints: as above  Relevant other:  DDD/ DJD  Neurologic:   Prior Stroke:  No     If yes, details:   Relevant other:  No   Relevant other info:  No      PAST MEDICAL HISTORY:  Past Medical History:   Diagnosis Date    DDD (degenerative disc disease), cervical     Diverticulitis     Diverticulosis     DJD (degenerative joint disease) of hip     Dyspepsia     GERD (gastroesophageal reflux disease)     History of melanoma 05/20/2015    HTN (hypertension)      Melanoma     Migraine headache     Rectocele     Skin cancer     melanoma on face    Sleep apnea     doesnt use cpap    Thyroid disease     hypo    Trouble in sleeping     Ulcerative colitis     on meds    Uterine prolaps     followed by GYN    Vulvar lesion 05/20/2015       PAST SURGICAL HISTORY:    Past Surgical History:   Procedure Laterality Date    APPENDECTOMY  1962    BACK SURGERY      BREAST BIOPSY      BREAST LUMPECTOMY  1989    CATARACT EXTRACTION Left 02/22/2021    ros    CATARACT EXTRACTION W/  INTRAOCULAR LENS IMPLANT Left 2/22/2021    Procedure: EXTRACTION, CATARACT, WITH IOL INSERTION;  Surgeon: Latisha Baker MD;  Location: [facility] OR;  Service: Ophthalmology;  Laterality: Left;  left    CATARACT EXTRACTION W/  INTRAOCULAR LENS IMPLANT Right 3/22/2021    Procedure: EXTRACTION, CATARACT, WITH IOL INSERTION;  Surgeon: Latisha Baker MD;  Location: [facility] OR;  Service: Ophthalmology;  Laterality: Right;  right    CERVICAL FUSION  1991    CHOLECYSTECTOMY      COLONOSCOPY      COLONOSCOPY N/A 11/9/2018    Dr Oscar; chronic active colitis; repeat in 5 years    COLONOSCOPY N/A 2/14/2020    Procedure: COLONOSCOPY;  Surgeon: Fadi Oscar MD;  Location: [facility];  Service: Endoscopy;  Laterality: N/A;    HYSTERECTOMY      TRACEE/BSO for benign disease    INSERTION OF MIDURETHRAL SLING N/A 9/22/2022    Procedure: SLING, MIDURETHRAL;  Surgeon: Yannick Cabral MD;  Location: [facility] OR;  Service: OB/GYN;  Laterality: N/A;    JOINT REPLACEMENT Right 2016    Hip replacement    CA REMOVAL OF OVARY/TUBE(S)      ROBOT-ASSISTED LAPAROSCOPIC ABDOMINAL SACROCOLPOPEXY USING DA FLAKITO XI N/A 12/14/2021    Procedure: XI ROBOTIC SACROCOLPOPEXY, ABDOMINAL;  Surgeon: Yannick Cabral MD;  Location: [facility] OR;  Service: OB/GYN;  Laterality: N/A;    ROBOT-ASSISTED LAPAROSCOPIC LYSIS OF ADHESIONS USING DA FLAKITO XI  12/14/2021    Procedure: XI ROBOTIC LYSIS, ADHESIONS;  Surgeon: Yannick Cabral MD;  Location: [facility] OR;   Service: OB/GYN;;    TONSILLECTOMY      TOTAL ABDOMINAL HYSTERECTOMY W/ BILATERAL SALPINGOOPHORECTOMY  1990        SOCIAL HISTORY:  Dependencies: smoking status as noted below  Social History     Tobacco Use    Smoking status: Never    Smokeless tobacco: Never   Substance Use Topics    Alcohol use: No    Drug use: No       PERTINENT MEDICATIONS:  See medications list.    Current Outpatient Medications:     estradioL (ESTRACE) 0.01 % (0.1 mg/gram) vaginal cream, PLACE 1 G VAGINALLY EVERY MON, WED, FRI., Disp: 42.5 g, Rfl: 11    levothyroxine (SYNTHROID) 88 MCG tablet, TAKE 1 TABLET BY MOUTH EVERY DAY, Disp: 90 tablet, Rfl: 3    losartan-hydrochlorothiazide 100-25 mg (HYZAAR) 100-25 mg per tablet, TAKE 1 TABLET BY MOUTH EVERY DAY, Disp: 90 tablet, Rfl: 1    magnesium 30 mg Tab, Take 1 tablet by mouth., Disp: , Rfl:     mesalamine (APRISO) 0.375 gram Cp24, Take 2 capsules (0.75 g total) by mouth once daily., Disp: 60 capsule, Rfl: 11    multivitamin/iron/folic acid (CENTRUM COMPLETE ORAL), Take by mouth., Disp: , Rfl:     ALLERGIES:  Ciprofloxacin and Flagyl [metronidazole]    EXAM:  Constitutional  General appearance: well-developed, well-nourished, well-kempt older white female    Neurologic/Psychiatric  Alert,  normal orientation to time, place, person  Normal mood and affect with no evidence of depression, anxiety, agitation  Skin: see photo(s)  Head: background moderate solar damage to exposed areas of skin  inspection/palpation reveals an approximately 8 mm pink, irregular, slightly scaly plaque on the right nose   she confirmed this as the site of the prior biopsy    Photo(s) this visit:      ASSESSMENT: 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 In particular, the discussion included, but was not limited to, the following:    One alternative at this point would be to defer further treatment and observe the lesion. With small skin cancers of this kind, it is possible that a biopsy can be sufficient to definitively treat a small skin cancer of this kind. Alternatively, some skin cancers are slow growing and do not require immediate treatment. The potential advantage of this choice would be to avoid the need for possibly unnecessary additional surgery. Among the potential disadvantages of this would be the possibility of enlargement of the lesion, more extensive spread of the lesion or recurrence at a later date, which might necessitate a larger and more complex surgery.    Radiation treatment can be an effective treatment for this type of skin cancer. The usual course of treatment is every weekday for several weeks. Local irritation will result from treatment, although no systemic side effects are expected. The potential advantage of radiation treatment is that it avoids the need for surgery. Among the disadvantages of radiation treatment are the length of treatment, the local inflammatory response, the absence of pathologic confirmation of the removal of the skin cancer, a possible increased risk of additional skin cancer in the treated area in later years, and a somewhat increased risk of recurrence at a later date.     Excisional surgery can be an effective treatment for this type of skin cancer. This would involve excision of the lesion with margin evaluation by submitting the specimen to a pathologist for either immediate marginal assessment via frozen section processing, or delayed marginal assessment by fixed-tissue processing. The potential advantage of this technique is that it offers a way of treating the lesion with some  degree of histologic confirmation of tumor removal. Among the disadvantages of this treatment are the possible need for re-excision if marginal involvement is identified, a somewhat greater likelihood of recurrence as compared to Mohs' surgery because of the less comprehensive margin evaluation inherent in the technique, and the general potential risks of surgery, including allergic reactions to the anesthetic and other materials used, infection, injury to nerves in the area with consequent loss of sensation or muscle function, and scarring or distortion of surrounding structures.    Mohs' surgery is a very effective treatment for this type of skin cancer. The potential advantage of Mohs' surgery is that this technique offers the greatest possible certainty of knowing that the skin cancer has been completely removed, with the removal of the least amount of normal tissue. The potential disadvantages of Mohs' surgery include the duration of the surgery, the possible need for a separate surgery for reconstruction following tumor removal, and scarring as a result. In addition, general potential risks of surgery as noted above also apply to treatment via Mohs' surgery.    In light of the nature of this tumor and the location on the nose in an area of increased risk of recurrence,  Mohs' micrographic surgery was thought to be the most appropriate management choice, and this diagnosis is appropriate for treatment by Mohs' micrographic surgery.     We also discussed options for management of the wound following completion of tumor removal via the Mohs' technique. This discussion include a review of the nature of, and risks and benefits of the alternatives, including healing via secondary intention, primary linear closure, closure via adjacent tissue transfer/skin flap(s), and closure by use of a skin graft.     Sufficient time was available for questions, and all questions were answered to her satisfaction. She fully  understands the aims, risks, alternatives, and possible complications, and has elected to proceed with the surgery, and verbally consented to do so. The procedure will be scheduled in the near future.    Routine pre-op instructions were given to her.    A consultation report will be sent to Dr. Yung.  --------------------------------------  Note: Some or all of this note may have been generated using voice recognition software. There may be voice recognition errors including grammatical and/or spelling errors found in the text. Attempts were made to correct these errors prior to signature.

## 2022-12-07 NOTE — PROGRESS NOTES
Subjective:       Patient ID: Dorothy Kramer is a 81 y.o. female, Body mass index is 25.51 kg/m².    Chief Complaint: Medication Refill      Established patient of Dr. Oscar & myself.     Diarrhea   This is a chronic problem. The current episode started more than 1 year ago. The problem occurs less than 2 times per day (1-2 times per day). The problem has been resolved (denies diarrhea over the past 1.5 years). Diarrhea characteristics: describes stool as type 4 on bristol scale; denies bloody stools. The patient states that diarrhea does not awaken her from sleep. Pertinent negatives include no abdominal pain, bloating, chills, coughing, fever, increased  flatus, vomiting or weight loss. Nothing aggravates the symptoms. There are no known risk factors (denies recent antibiotics, hospitalization or foreign travel). Treatments tried: Currently: Apriso 0.375 gm 2 capsules once daily- effective; tried to stop in the past and symptoms recurred. There is no history of bowel resection, inflammatory bowel disease, irritable bowel syndrome or a recent abdominal surgery. Hx of non specific colitis and diverticulitis   Review of Systems   Constitutional:  Negative for appetite change, chills, fever, unexpected weight change and weight loss.   HENT:  Negative for trouble swallowing.    Respiratory:  Negative for cough and shortness of breath.    Cardiovascular:  Negative for chest pain.   Gastrointestinal:  Positive for diarrhea (Resolved). Negative for abdominal distention, abdominal pain, anal bleeding, bloating, blood in stool, constipation, flatus, nausea, rectal pain and vomiting.   Genitourinary:  Negative for difficulty urinating and dysuria.   Musculoskeletal:  Negative for gait problem.   Skin:  Negative for rash.   Neurological:  Negative for speech difficulty.   Psychiatric/Behavioral:  Negative for confusion.      Past Medical History:   Diagnosis Date    DDD (degenerative disc disease), cervical      Diverticulitis     Diverticulosis     DJD (degenerative joint disease) of hip     Dyspepsia     GERD (gastroesophageal reflux disease)     History of melanoma 05/20/2015    HTN (hypertension)     Melanoma     Migraine headache     Rectocele     Skin cancer     melanoma on face    Sleep apnea     doesnt use cpap    Thyroid disease     hypo    Trouble in sleeping     Ulcerative colitis     on meds    Uterine prolaps     followed by GYN    Vulvar lesion 05/20/2015      Past Surgical History:   Procedure Laterality Date    APPENDECTOMY  1962    BACK SURGERY      BREAST BIOPSY      BREAST LUMPECTOMY  1989    CATARACT EXTRACTION Left 02/22/2021    ros    CATARACT EXTRACTION W/  INTRAOCULAR LENS IMPLANT Left 2/22/2021    Procedure: EXTRACTION, CATARACT, WITH IOL INSERTION;  Surgeon: Latisha Baker MD;  Location: [facility] OR;  Service: Ophthalmology;  Laterality: Left;  left    CATARACT EXTRACTION W/  INTRAOCULAR LENS IMPLANT Right 3/22/2021    Procedure: EXTRACTION, CATARACT, WITH IOL INSERTION;  Surgeon: Latisha Baker MD;  Location: [facility] OR;  Service: Ophthalmology;  Laterality: Right;  right    CERVICAL FUSION  1991    CHOLECYSTECTOMY      COLONOSCOPY      COLONOSCOPY N/A 11/9/2018    Dr Oscar; chronic active colitis; repeat in 5 years    COLONOSCOPY N/A 2/14/2020    Procedure: COLONOSCOPY;  Surgeon: Fadi Oscar MD;  Location: [facility];  Service: Endoscopy;  Laterality: N/A;    HYSTERECTOMY      TRACEE/BSO for benign disease    INSERTION OF MIDURETHRAL SLING N/A 9/22/2022    Procedure: SLING, MIDURETHRAL;  Surgeon: Yannick Cabral MD;  Location: [facility] OR;  Service: OB/GYN;  Laterality: N/A;    JOINT REPLACEMENT Right 2016    Hip replacement    MO REMOVAL OF OVARY/TUBE(S)      ROBOT-ASSISTED LAPAROSCOPIC ABDOMINAL SACROCOLPOPEXY USING DA FLAKITO XI N/A 12/14/2021    Procedure: XI ROBOTIC SACROCOLPOPEXY, ABDOMINAL;  Surgeon: Yannick Cabral MD;  Location: [facility] OR;  Service: OB/GYN;  Laterality: N/A;     ROBOT-ASSISTED LAPAROSCOPIC LYSIS OF ADHESIONS USING DA FLAKITO XI  12/14/2021    Procedure: XI ROBOTIC LYSIS, ADHESIONS;  Surgeon: Yannick Cabral MD;  Location: [facility];  Service: OB/GYN;;    TONSILLECTOMY      TOTAL ABDOMINAL HYSTERECTOMY W/ BILATERAL SALPINGOOPHORECTOMY  1990      Family History   Problem Relation Age of Onset    Heart failure Mother     Cancer Mother         Bladder    Colon cancer Mother 63    Cancer Father         Bladder    Parkinsonism Father     Diverticulitis Father     Uterine cancer Maternal Aunt     Ovarian cancer Neg Hx     Breast cancer Neg Hx     Crohn's disease Neg Hx     Ulcerative colitis Neg Hx     Glaucoma Neg Hx     Macular degeneration Neg Hx     Retinal detachment Neg Hx       Wt Readings from Last 10 Encounters:   12/07/22 67.4 kg (148 lb 9.4 oz)   10/19/22 66.7 kg (147 lb 0.8 oz)   09/19/22 66.7 kg (147 lb 0.8 oz)   09/14/22 66.7 kg (147 lb 0.8 oz)   08/25/22 66.7 kg (147 lb)   08/17/22 67.1 kg (147 lb 14.9 oz)   06/24/22 67.1 kg (148 lb)   06/01/22 67.3 kg (148 lb 5.9 oz)   04/29/22 66.7 kg (147 lb)   04/06/22 64.9 kg (143 lb)     Lab Results   Component Value Date    WBC 8.76 12/15/2021    HGB 11.9 (L) 12/15/2021    HCT 35.4 (L) 12/15/2021    MCV 93 12/15/2021     12/15/2021     CMP  Sodium   Date Value Ref Range Status   06/02/2022 137 136 - 145 mmol/L Final     Potassium   Date Value Ref Range Status   06/02/2022 3.9 3.5 - 5.1 mmol/L Final     Chloride   Date Value Ref Range Status   06/02/2022 101 95 - 110 mmol/L Final     CO2   Date Value Ref Range Status   06/02/2022 27 23 - 29 mmol/L Final     Glucose   Date Value Ref Range Status   06/02/2022 105 70 - 110 mg/dL Final     BUN   Date Value Ref Range Status   06/02/2022 14 8 - 23 mg/dL Final     Creatinine   Date Value Ref Range Status   06/02/2022 0.8 0.5 - 1.4 mg/dL Final     Calcium   Date Value Ref Range Status   06/02/2022 9.8 8.7 - 10.5 mg/dL Final     Total Protein   Date Value Ref Range Status    06/02/2022 7.3 6.0 - 8.4 g/dL Final     Albumin   Date Value Ref Range Status   06/02/2022 4.0 3.5 - 5.2 g/dL Final     Total Bilirubin   Date Value Ref Range Status   06/02/2022 0.8 0.1 - 1.0 mg/dL Final     Comment:     For infants and newborns, interpretation of results should be based  on gestational age, weight and in agreement with clinical  observations.    Premature Infant recommended reference ranges:  Up to 24 hours.............<8.0 mg/dL  Up to 48 hours............<12.0 mg/dL  3-5 days..................<15.0 mg/dL  6-29 days.................<15.0 mg/dL       Alkaline Phosphatase   Date Value Ref Range Status   06/02/2022 60 55 - 135 U/L Final     AST   Date Value Ref Range Status   06/02/2022 21 10 - 40 U/L Final     ALT   Date Value Ref Range Status   06/02/2022 15 10 - 44 U/L Final     Anion Gap   Date Value Ref Range Status   06/02/2022 9 8 - 16 mmol/L Final     eGFR if    Date Value Ref Range Status   06/02/2022 >60.0 >60 mL/min/1.73 m^2 Final     eGFR if non    Date Value Ref Range Status   06/02/2022 >60.0 >60 mL/min/1.73 m^2 Final     Comment:     Calculation used to obtain the estimated glomerular filtration  rate (eGFR) is the CKD-EPI equation.                 Reviewed prior medical records including radiology report of CT of abdomen and pelvis 12/19/19 & endoscopy history (see surgical history).     Objective:      Physical Exam  Constitutional:       General: She is not in acute distress.     Appearance: She is well-developed.   HENT:      Head: Normocephalic.      Right Ear: Hearing normal.      Left Ear: Hearing normal.      Nose: Nose normal.      Mouth/Throat:      Mouth: No oral lesions.      Pharynx: Uvula midline.   Eyes:      General: Lids are normal.      Conjunctiva/sclera: Conjunctivae normal.      Pupils: Pupils are equal, round, and reactive to light.   Neck:      Trachea: Trachea normal.   Cardiovascular:      Rate and Rhythm: Normal rate and  regular rhythm.      Heart sounds: Normal heart sounds. No murmur heard.  Pulmonary:      Effort: Pulmonary effort is normal. No respiratory distress.      Breath sounds: Normal breath sounds. No stridor. No wheezing.   Abdominal:      General: Bowel sounds are normal. There is no distension.      Palpations: Abdomen is soft. There is no mass.      Tenderness: There is no abdominal tenderness. There is no guarding or rebound.   Musculoskeletal:         General: Normal range of motion.      Cervical back: Normal range of motion.   Skin:     General: Skin is warm and dry.      Findings: No rash.      Comments: Non jaundiced   Neurological:      Mental Status: She is alert and oriented to person, place, and time.   Psychiatric:         Speech: Speech normal.         Behavior: Behavior normal. Behavior is cooperative.         Assessment:       1. Colitis    2. Family history of colon cancer in mother    3. History of diverticulitis of colon    4. S/P cholecystectomy           Plan:   All diagnoses and orders for this visit:    Colitis  - Refill and continue: mesalamine (APRISO) 0.375 gram Cp24; Take 2 capsules (0.75 g total) by mouth once daily.  Dispense: 60 capsule; Refill: 11  - Recommended increase fiber in diet, especially soluble fiber since this can help bulk up the stool consistency and may help to slow down how fast the stool goes through the colon and can prevent diarrhea     Family history of colon cancer in mother   - No repeat surveillance colonoscopy recommended    History of diverticulitis of colon   - Recommend high fiber diet (20-30 grams of fiber daily)/OTC fiber supplements daily as directed.     S/P cholecystectomy    If no improvement in symptoms or symptoms worsen, call/follow-up at clinic or go to ER

## 2022-12-14 ENCOUNTER — OFFICE VISIT (OUTPATIENT)
Dept: UROGYNECOLOGY | Facility: CLINIC | Age: 82
End: 2022-12-14
Payer: MEDICARE

## 2022-12-14 VITALS — BODY MASS INDEX: 25.11 KG/M2 | WEIGHT: 147.06 LBS | HEIGHT: 64 IN

## 2022-12-14 DIAGNOSIS — N95.2 POSTMENOPAUSE ATROPHIC VAGINITIS: Primary | ICD-10-CM

## 2022-12-14 PROCEDURE — 1101F PT FALLS ASSESS-DOCD LE1/YR: CPT | Mod: CPTII,S$GLB,, | Performed by: OBSTETRICS & GYNECOLOGY

## 2022-12-14 PROCEDURE — 1157F ADVNC CARE PLAN IN RCRD: CPT | Mod: CPTII,S$GLB,, | Performed by: OBSTETRICS & GYNECOLOGY

## 2022-12-14 PROCEDURE — 1101F PR PT FALLS ASSESS DOC 0-1 FALLS W/OUT INJ PAST YR: ICD-10-PCS | Mod: CPTII,S$GLB,, | Performed by: OBSTETRICS & GYNECOLOGY

## 2022-12-14 PROCEDURE — 1159F MED LIST DOCD IN RCRD: CPT | Mod: CPTII,S$GLB,, | Performed by: OBSTETRICS & GYNECOLOGY

## 2022-12-14 PROCEDURE — 99213 PR OFFICE/OUTPT VISIT, EST, LEVL III, 20-29 MIN: ICD-10-PCS | Mod: 24,S$GLB,, | Performed by: OBSTETRICS & GYNECOLOGY

## 2022-12-14 PROCEDURE — 99999 PR PBB SHADOW E&M-EST. PATIENT-LVL II: CPT | Mod: PBBFAC,,, | Performed by: OBSTETRICS & GYNECOLOGY

## 2022-12-14 PROCEDURE — 1159F PR MEDICATION LIST DOCUMENTED IN MEDICAL RECORD: ICD-10-PCS | Mod: CPTII,S$GLB,, | Performed by: OBSTETRICS & GYNECOLOGY

## 2022-12-14 PROCEDURE — 99999 PR PBB SHADOW E&M-EST. PATIENT-LVL II: ICD-10-PCS | Mod: PBBFAC,,, | Performed by: OBSTETRICS & GYNECOLOGY

## 2022-12-14 PROCEDURE — 3288F FALL RISK ASSESSMENT DOCD: CPT | Mod: CPTII,S$GLB,, | Performed by: OBSTETRICS & GYNECOLOGY

## 2022-12-14 PROCEDURE — 3288F PR FALLS RISK ASSESSMENT DOCUMENTED: ICD-10-PCS | Mod: CPTII,S$GLB,, | Performed by: OBSTETRICS & GYNECOLOGY

## 2022-12-14 PROCEDURE — 99213 OFFICE O/P EST LOW 20 MIN: CPT | Mod: 24,S$GLB,, | Performed by: OBSTETRICS & GYNECOLOGY

## 2022-12-14 PROCEDURE — 1157F PR ADVANCE CARE PLAN OR EQUIV PRESENT IN MEDICAL RECORD: ICD-10-PCS | Mod: CPTII,S$GLB,, | Performed by: OBSTETRICS & GYNECOLOGY

## 2022-12-14 NOTE — PROGRESS NOTES
Subjective:      Patient ID: Dorothy Kramer is a 82 y.o. female.    Chief Complaint:  No chief complaint on file.      History of Present Illness  Patient has been on hormone supplementation reduced dosage secondary to migraines wanted to make sure that it is having efficacy          Past Medical History:   Diagnosis Date    DDD (degenerative disc disease), cervical     Diverticulitis     Diverticulosis     DJD (degenerative joint disease) of hip     Dyspepsia     GERD (gastroesophageal reflux disease)     History of melanoma 05/20/2015    HTN (hypertension)     Melanoma     Migraine headache     Rectocele     Skin cancer     melanoma on face    Sleep apnea     doesnt use cpap    Thyroid disease     hypo    Trouble in sleeping     Ulcerative colitis     on meds    Uterine prolaps     followed by GYN    Vulvar lesion 05/20/2015       Past Surgical History:   Procedure Laterality Date    APPENDECTOMY  1962    BACK SURGERY      BREAST BIOPSY      BREAST LUMPECTOMY  1989    CATARACT EXTRACTION Left 02/22/2021    ros    CATARACT EXTRACTION W/  INTRAOCULAR LENS IMPLANT Left 2/22/2021    Procedure: EXTRACTION, CATARACT, WITH IOL INSERTION;  Surgeon: Latisha Baker MD;  Location: [facility] OR;  Service: Ophthalmology;  Laterality: Left;  left    CATARACT EXTRACTION W/  INTRAOCULAR LENS IMPLANT Right 3/22/2021    Procedure: EXTRACTION, CATARACT, WITH IOL INSERTION;  Surgeon: Latisha Baker MD;  Location: [facility] OR;  Service: Ophthalmology;  Laterality: Right;  right    CERVICAL FUSION  1991    CHOLECYSTECTOMY      COLONOSCOPY      COLONOSCOPY N/A 11/9/2018    Dr Oscar; chronic active colitis; repeat in 5 years    COLONOSCOPY N/A 2/14/2020    Procedure: COLONOSCOPY;  Surgeon: Fadi Oscar MD;  Location: [facility] ENDO;  Service: Endoscopy;  Laterality: N/A;    HYSTERECTOMY      TRACEE/BSO for benign disease    INSERTION OF MIDURETHRAL SLING N/A 9/22/2022    Procedure: SLING, MIDURETHRAL;  Surgeon: Yannick Cabral MD;   Location: [facility] OR;  Service: OB/GYN;  Laterality: N/A;    JOINT REPLACEMENT Right 2016    Hip replacement    DE REMOVAL OF OVARY/TUBE(S)      ROBOT-ASSISTED LAPAROSCOPIC ABDOMINAL SACROCOLPOPEXY USING DA FLAKITO XI N/A 2021    Procedure: XI ROBOTIC SACROCOLPOPEXY, ABDOMINAL;  Surgeon: Yannick Cabral MD;  Location: [facility] OR;  Service: OB/GYN;  Laterality: N/A;    ROBOT-ASSISTED LAPAROSCOPIC LYSIS OF ADHESIONS USING DA FLAKITO XI  2021    Procedure: XI ROBOTIC LYSIS, ADHESIONS;  Surgeon: Yannick Cabral MD;  Location: [facility] OR;  Service: OB/GYN;;    TONSILLECTOMY      TOTAL ABDOMINAL HYSTERECTOMY W/ BILATERAL SALPINGOOPHORECTOMY         GYN & OB History  No LMP recorded. Patient has had a hysterectomy.   Date of Last Pap: No result found    OB History    Para Term  AB Living   7 3 3   4     SAB IAB Ectopic Multiple Live Births   4              # Outcome Date GA Lbr Julius/2nd Weight Sex Delivery Anes PTL Lv   7 Term            6 Term            5 Term            4 SAB            3 SAB            2 SAB            1 SAB                Health Maintenance         Date Due Completion Date    COVID-19 Vaccine (4 - Booster for Pfizer series) 2021    DEXA Scan 10/05/2024 10/5/2021    Lipid Panel 2027    TETANUS VACCINE 2030            Family History   Problem Relation Age of Onset    Heart failure Mother     Cancer Mother         Bladder    Colon cancer Mother 63    Cancer Father         Bladder    Parkinsonism Father     Diverticulitis Father     Uterine cancer Maternal Aunt     Ovarian cancer Neg Hx     Breast cancer Neg Hx     Crohn's disease Neg Hx     Ulcerative colitis Neg Hx     Glaucoma Neg Hx     Macular degeneration Neg Hx     Retinal detachment Neg Hx        Social History     Socioeconomic History    Marital status:    Tobacco Use    Smoking status: Never    Smokeless tobacco: Never   Substance and Sexual Activity    Alcohol use: No     "Drug use: No    Sexual activity: Not Currently     Social Determinants of Health     Financial Resource Strain: Unknown    Difficulty of Paying Living Expenses: Patient refused   Food Insecurity: Unknown    Worried About Running Out of Food in the Last Year: Patient refused    Ran Out of Food in the Last Year: Never true   Transportation Needs: No Transportation Needs    Lack of Transportation (Medical): No    Lack of Transportation (Non-Medical): No   Physical Activity: Unknown    Days of Exercise per Week: Patient refused   Stress: Unknown    Feeling of Stress : Patient refused   Social Connections: Unknown    Frequency of Communication with Friends and Family: More than three times a week    Frequency of Social Gatherings with Friends and Family: More than three times a week    Active Member of Clubs or Organizations: Yes    Attends Club or Organization Meetings: More than 4 times per year    Marital Status:    Housing Stability: Low Risk     Unable to Pay for Housing in the Last Year: No    Number of Places Lived in the Last Year: 1    Unstable Housing in the Last Year: No       Review of Systems  Review of Systems  Check for tissue consistency     Objective:   Ht 5' 4" (1.626 m)   Wt 66.7 kg (147 lb 0.8 oz)   BMI 25.24 kg/m²     Physical Exam   Excellent anatomy excellent tissue health  Assessment:     1. Postmenopause atrophic vaginitis            Plan:     1. Postmenopause atrophic vaginitis    Excellent anatomy excellent reconstruction patient discharge from clinic patient very happy with all aspects of care    There are no Patient Instructions on file for this visit.        "

## 2022-12-17 ENCOUNTER — HOSPITAL ENCOUNTER (OUTPATIENT)
Dept: RADIOLOGY | Facility: HOSPITAL | Age: 82
Discharge: HOME | End: 2022-12-17
Attending: STUDENT IN AN ORGANIZED HEALTH CARE EDUCATION/TRAINING PROGRAM
Payer: MEDICARE

## 2022-12-17 DIAGNOSIS — K52.9 NON-SPECIFIC COLITIS: ICD-10-CM

## 2022-12-17 DIAGNOSIS — M13.0 POLYARTICULAR ARTHRITIS: ICD-10-CM

## 2022-12-17 DIAGNOSIS — L40.9 PSORIASIS: ICD-10-CM

## 2022-12-17 DIAGNOSIS — M54.9 CHRONIC MIDLINE BACK PAIN, UNSPECIFIED BACK LOCATION: ICD-10-CM

## 2022-12-17 DIAGNOSIS — G89.29 CHRONIC MIDLINE BACK PAIN, UNSPECIFIED BACK LOCATION: ICD-10-CM

## 2022-12-17 PROCEDURE — 72195 MRI PELVIS W/O DYE: CPT | Mod: TC,PO

## 2022-12-17 PROCEDURE — 72195 MRI PELVIS W/O DYE: CPT | Mod: 26,,, | Performed by: RADIOLOGY

## 2022-12-17 PROCEDURE — 72195 MRI SACROILIAC JOINTS WITHOUT CONTRAST: ICD-10-PCS | Mod: 26,,, | Performed by: RADIOLOGY

## 2023-01-01 NOTE — TELEPHONE ENCOUNTER
----- Message from Valery Dexter sent at 11/7/2018  8:20 AM CST -----  Contact: Patient  Patient is calling back regarding her message that she left a few days ago regarding scheduling a colonoscopy.  Please call patient to discuss.  Call Back#[phone] or   Thanks    No

## 2023-01-27 ENCOUNTER — OFFICE VISIT (OUTPATIENT)
Dept: URGENT CARE | Facility: CLINIC | Age: 83
End: 2023-01-27
Payer: MEDICARE

## 2023-01-27 VITALS
OXYGEN SATURATION: 97 % | RESPIRATION RATE: 16 BRPM | BODY MASS INDEX: 25.1 KG/M2 | HEART RATE: 93 BPM | WEIGHT: 147 LBS | DIASTOLIC BLOOD PRESSURE: 77 MMHG | HEIGHT: 64 IN | TEMPERATURE: 101 F | SYSTOLIC BLOOD PRESSURE: 162 MMHG

## 2023-01-27 DIAGNOSIS — Z20.822 EXPOSURE TO COVID-19 VIRUS: ICD-10-CM

## 2023-01-27 DIAGNOSIS — U07.1 COVID-19: Primary | ICD-10-CM

## 2023-01-27 DIAGNOSIS — U07.1 COVID-19 VIRUS DETECTED: ICD-10-CM

## 2023-01-27 LAB
CTP QC/QA: YES
SARS-COV-2 AG RESP QL IA.RAPID: POSITIVE

## 2023-01-27 PROCEDURE — 1125F AMNT PAIN NOTED PAIN PRSNT: CPT | Mod: CPTII,S$GLB,, | Performed by: PHYSICIAN ASSISTANT

## 2023-01-27 PROCEDURE — 99214 OFFICE O/P EST MOD 30 MIN: CPT | Mod: S$GLB,CS,, | Performed by: PHYSICIAN ASSISTANT

## 2023-01-27 PROCEDURE — 3078F PR MOST RECENT DIASTOLIC BLOOD PRESSURE < 80 MM HG: ICD-10-PCS | Mod: CPTII,S$GLB,, | Performed by: PHYSICIAN ASSISTANT

## 2023-01-27 PROCEDURE — 1157F ADVNC CARE PLAN IN RCRD: CPT | Mod: CPTII,S$GLB,, | Performed by: PHYSICIAN ASSISTANT

## 2023-01-27 PROCEDURE — 1157F PR ADVANCE CARE PLAN OR EQUIV PRESENT IN MEDICAL RECORD: ICD-10-PCS | Mod: CPTII,S$GLB,, | Performed by: PHYSICIAN ASSISTANT

## 2023-01-27 PROCEDURE — 1125F PR PAIN SEVERITY QUANTIFIED, PAIN PRESENT: ICD-10-PCS | Mod: CPTII,S$GLB,, | Performed by: PHYSICIAN ASSISTANT

## 2023-01-27 PROCEDURE — 1159F PR MEDICATION LIST DOCUMENTED IN MEDICAL RECORD: ICD-10-PCS | Mod: CPTII,S$GLB,, | Performed by: PHYSICIAN ASSISTANT

## 2023-01-27 PROCEDURE — 3077F PR MOST RECENT SYSTOLIC BLOOD PRESSURE >= 140 MM HG: ICD-10-PCS | Mod: CPTII,S$GLB,, | Performed by: PHYSICIAN ASSISTANT

## 2023-01-27 PROCEDURE — 1160F PR REVIEW ALL MEDS BY PRESCRIBER/CLIN PHARMACIST DOCUMENTED: ICD-10-PCS | Mod: CPTII,S$GLB,, | Performed by: PHYSICIAN ASSISTANT

## 2023-01-27 PROCEDURE — 99214 PR OFFICE/OUTPT VISIT, EST, LEVL IV, 30-39 MIN: ICD-10-PCS | Mod: S$GLB,CS,, | Performed by: PHYSICIAN ASSISTANT

## 2023-01-27 PROCEDURE — 3078F DIAST BP <80 MM HG: CPT | Mod: CPTII,S$GLB,, | Performed by: PHYSICIAN ASSISTANT

## 2023-01-27 PROCEDURE — 87811 SARS CORONAVIRUS 2 ANTIGEN POCT, MANUAL READ: ICD-10-PCS | Mod: QW,S$GLB,, | Performed by: PHYSICIAN ASSISTANT

## 2023-01-27 PROCEDURE — 87811 SARS-COV-2 COVID19 W/OPTIC: CPT | Mod: QW,S$GLB,, | Performed by: PHYSICIAN ASSISTANT

## 2023-01-27 PROCEDURE — 3077F SYST BP >= 140 MM HG: CPT | Mod: CPTII,S$GLB,, | Performed by: PHYSICIAN ASSISTANT

## 2023-01-27 PROCEDURE — 1159F MED LIST DOCD IN RCRD: CPT | Mod: CPTII,S$GLB,, | Performed by: PHYSICIAN ASSISTANT

## 2023-01-27 PROCEDURE — 1160F RVW MEDS BY RX/DR IN RCRD: CPT | Mod: CPTII,S$GLB,, | Performed by: PHYSICIAN ASSISTANT

## 2023-01-27 NOTE — PROGRESS NOTES
"Subjective:       Patient ID: Dorothy Kramer is a 82 y.o. female.    Vitals:  height is 5' 4" (1.626 m) and weight is 66.7 kg (147 lb). Her oral temperature is 100.9 °F (38.3 °C) (abnormal). Her blood pressure is 162/77 (abnormal) and her pulse is 93. Her respiration is 16 and oxygen saturation is 97%.     Chief Complaint: COVID-19 Concerns    Pt presents with sore throat, body aches, fatigue and feels "off balance" x 1 day. Pt is Covid and flu vacs with known exposure. OTC not taken and pain scale is 2/10    Sore Throat   This is a new problem. The current episode started today. The problem has been gradually worsening. The maximum temperature recorded prior to her arrival was 100.4 - 100.9 F. The fever has been present for Less than 1 day. The pain is at a severity of 2/10. The pain is mild. Associated symptoms include congestion. Pertinent negatives include no coughing or shortness of breath. She has had no exposure to strep or mono. She has tried nothing for the symptoms. The treatment provided no relief.     Constitution: Positive for fatigue and fever. Negative for chills and sweating.   HENT:  Positive for congestion, postnasal drip and sore throat. Negative for sinus pain and sinus pressure.    Neck: neck negative.   Cardiovascular: Negative.  Negative for chest pain, palpitations and sob on exertion.   Eyes: Negative.    Respiratory:  Positive for chest tightness. Negative for cough, shortness of breath, wheezing and asthma.    Gastrointestinal: Negative.    Endocrine: negative.   Genitourinary: Negative.    Musculoskeletal: Negative.  Negative for pain.   Skin: Negative.  Negative for color change and hives.   Allergic/Immunologic: Negative.  Negative for seasonal allergies, asthma, hives and itching.   Neurological: Negative.  Negative for dizziness, light-headedness, disorientation, altered mental status, loss of consciousness, numbness and tingling.   Hematologic/Lymphatic: Negative.  "   Psychiatric/Behavioral: Negative.  Negative for altered mental status, disorientation, confusion, agitation and nervous/anxious. The patient is not nervous/anxious.      Objective:      Physical Exam   Constitutional: She is oriented to person, place, and time. She appears well-developed. She is cooperative.  Non-toxic appearance. She does not appear ill. No distress. normalawake  HENT:   Head: Normocephalic and atraumatic.   Ears:   Right Ear: Hearing, tympanic membrane, external ear and ear canal normal. impacted cerumen  Left Ear: Hearing, tympanic membrane, external ear and ear canal normal. impacted cerumen  Nose: Rhinorrhea and congestion present. No mucosal edema or nasal deformity. No epistaxis. Right sinus exhibits no maxillary sinus tenderness and no frontal sinus tenderness. Left sinus exhibits no maxillary sinus tenderness and no frontal sinus tenderness.   Mouth/Throat: Uvula is midline and mucous membranes are normal. Mucous membranes are moist. No trismus in the jaw. Normal dentition. No uvula swelling. Posterior oropharyngeal erythema and cobblestoning present. No oropharyngeal exudate or posterior oropharyngeal edema. Tonsils are 1+ on the right. Tonsils are 1+ on the left. No tonsillar exudate.   Eyes: Conjunctivae and lids are normal. Pupils are equal, round, and reactive to light. Right eye exhibits no discharge. Left eye exhibits no discharge. No scleral icterus. Extraocular movement intact   Neck: Trachea normal and phonation normal. Neck supple. No edema present. No erythema present. No neck rigidity present.   Cardiovascular: Normal rate, regular rhythm, normal heart sounds and normal pulses.   Pulmonary/Chest: Effort normal and breath sounds normal. No stridor. No respiratory distress. She has no decreased breath sounds. She has no wheezes. She has no rhonchi. She has no rales.   Abdominal: Normal appearance.   Musculoskeletal: Normal range of motion.         General: No deformity. Normal  range of motion.   Lymphadenopathy:        Head (right side): No submental, no submandibular, no tonsillar, no preauricular, no posterior auricular and no occipital adenopathy present.        Head (left side): No submental, no submandibular, no tonsillar, no preauricular, no posterior auricular and no occipital adenopathy present.   Neurological: She is alert and oriented to person, place, and time. She exhibits normal muscle tone. Coordination normal.   Skin: Skin is warm, dry, intact, not diaphoretic and not pale.   Psychiatric: Her speech is normal and behavior is normal. Judgment and thought content normal.   Nursing note and vitals reviewed.      Results for orders placed or performed in visit on 01/27/23   SARS Coronavirus 2 Antigen, POCT Manual Read   Result Value Ref Range    SARS Coronavirus 2 Antigen Positive (A) Negative     Acceptable Yes     No results found.   Assessment:       1. COVID-19    2. Exposure to COVID-19 virus            Plan:         COVID-19  -     molnupiravir 200 mg capsule (EUA); Take 4 capsules (800 mg total) by mouth every 12 (twelve) hours. for 5 days  Dispense: 40 capsule; Refill: 0    Exposure to COVID-19 virus  -     SARS Coronavirus 2 Antigen, POCT Manual Read           Medical Decision Making:   Initial Assessment:   82-year-old female with symptoms of nasal congestion, fever, cough and body aches/fatigue x1 day.  Started after she returned home from visiting her grandkids in [place].  They all had cold-like symptoms but never got tested for COVID.  Denies nausea, vomiting, diarrhea, has been vaccinated against COVID with booster.  Differential Diagnosis:   URI, sinusitis, influenza  Clinical Tests:   Lab Tests: Ordered and Reviewed       <> Summary of Lab: COVID   Urgent Care Management:  Due to patient's history of GI issues, elevated blood pressure, and significant past medical history will start on Molnupiravir.  Cross checked his medication list against  Molnupiravir with no contraindications.  Discuss patient contacting his PCP immediately to let them know that she is positive for COVID that way if there are any further issues or worsening of symptoms she can get in with his primary care asap.  Patient verbalized understanding of medication, education and treatment plan.       You must understand that you've received an Urgent Care treatment only and that you may be released before all your medical problems are known or treated. You, the patient, will arrange for follow up care as instructed.  Follow up with your PCP or specialty clinic as directed in the next 1-2 weeks if not improved or as needed.  You can call to schedule an appointment with the appropriate provider.  If your condition worsens we recommend that you receive another evaluation at the emergency room immediately or contact your primary medical clinics after hours call service to discuss your concerns.  Please return here or go to the Emergency Department for any concerns or worsening of condition.    If you were prescribed a narcotic or controlled medication, do not drive or operate heavy equipment or machinery while taking these medications.  There are no Patient Instructions on file for this visit.      LALA Raymond

## 2023-01-30 ENCOUNTER — PATIENT MESSAGE (OUTPATIENT)
Dept: FAMILY MEDICINE | Facility: CLINIC | Age: 83
End: 2023-01-30
Payer: MEDICARE

## 2023-02-09 DIAGNOSIS — Z00.00 ENCOUNTER FOR MEDICARE ANNUAL WELLNESS EXAM: ICD-10-CM

## 2023-02-11 ENCOUNTER — PATIENT MESSAGE (OUTPATIENT)
Dept: FAMILY MEDICINE | Facility: CLINIC | Age: 83
End: 2023-02-11
Payer: MEDICARE

## 2023-02-13 ENCOUNTER — PROCEDURE VISIT (OUTPATIENT)
Dept: DERMATOLOGY | Facility: CLINIC | Age: 83
End: 2023-02-13
Payer: MEDICARE

## 2023-02-13 VITALS — SYSTOLIC BLOOD PRESSURE: 168 MMHG | HEART RATE: 72 BPM | DIASTOLIC BLOOD PRESSURE: 83 MMHG

## 2023-02-13 DIAGNOSIS — C44.321 SQUAMOUS CELL CARCINOMA OF NOSE: Primary | ICD-10-CM

## 2023-02-13 PROCEDURE — 99499 UNLISTED E&M SERVICE: CPT | Mod: HCNC,S$GLB,, | Performed by: DERMATOLOGY

## 2023-02-13 PROCEDURE — 17311 MOHS 1 STAGE H/N/HF/G: CPT | Mod: HCNC,S$GLB,, | Performed by: DERMATOLOGY

## 2023-02-13 PROCEDURE — 17311: ICD-10-PCS | Mod: HCNC,S$GLB,, | Performed by: DERMATOLOGY

## 2023-02-13 PROCEDURE — 99499 NO LOS: ICD-10-PCS | Mod: HCNC,S$GLB,, | Performed by: DERMATOLOGY

## 2023-02-13 NOTE — PROGRESS NOTES
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, well-differentiated with acantholysis; pathology accession # [id number], [facility]     Defibrillator: No  Pacemaker: No  Artificial heart valves: No  Artificial joints: No    ALLERGIES:   Ciprofloxacin and Flagyl [metronidazole]      Current Outpatient Medications:     estradioL (ESTRACE) 0.01 % (0.1 mg/gram) vaginal cream, PLACE 1 G VAGINALLY EVERY MON, WED, FRI., Disp: 42.5 g, Rfl: 11    levothyroxine (SYNTHROID) 88 MCG tablet, TAKE 1 TABLET BY MOUTH EVERY DAY, Disp: 90 tablet, Rfl: 3    losartan-hydrochlorothiazide 100-25 mg (HYZAAR) 100-25 mg per tablet, TAKE 1 TABLET BY MOUTH EVERY DAY, Disp: 90 tablet, Rfl: 1    magnesium 30 mg Tab, Take 1 tablet by mouth., Disp: , Rfl:     mesalamine (APRISO) 0.375 gram Cp24, Take 2 capsules (0.75 g total) by mouth once daily., Disp: 60 capsule, Rfl: 11    multivitamin/iron/folic acid (CENTRUM COMPLETE ORAL), Take by mouth., Disp: , Rfl:   -------------------------------------------------------------  PROCEDURE: Mohs' Micrographic Surgery    SITE: right nasal dorsum    INDICATION: squamous cell carcinoma in an area at increased risk of recurrence    CASE NUMBER: [id number]      ANESTHETIC: 1.5 mL 2% Lidocaine with Epinephrine 1:100,000    SURGICAL PREP: Ethanol and ophthalmic Betadine     SURGEON: Henry Gallego MD    ASSISTANTS: Andrew Gentile CST     PREOPERATIVE DIAGNOSIS: squamous cell carcinoma     POSTOPERATIVE DIAGNOSIS: squamous cell carcinoma     PATHOLOGIC DIAGNOSIS: squamous cell carcinoma, well-differentiated with acantholysis    STAGES OF MOHS' SURGERY PERFORMED: one    TUMOR-FREE PLANE ACHIEVED: yes    HEMOSTASIS: Hyfrecation    SPECIMENS: one (one in stage A)    INITIAL LESION SIZE: 0.9 x 1.0 cm    FINAL DEFECT SIZE: 0.9 x 1.0 cm    WOUND REPAIR/DISPOSITION: see below    NARRATIVE:    The patient is a 82 y.o.female referred by Janice  MD Dilshad with a history of cancer on the right nose which was biopsied - pathology accession #[id number], [facility]. Findings revealed acantholytic squamous cell carcinoma . Examination revealed a pink scar on the right nose at the site of prior biopsy, which was confirmed by reference to the photograph taken at the previous patient visit, as attached above. In light of the nature of this tumor and the location on the nose, Mohs' micrographic surgery was thought to be the most appropriate management choice, and this diagnosis is appropriate for treatment by Mohs' micrographic surgery.  I discussed it with the patient and she fully understands the aims, risks, alternatives, and possible complications, and elects to proceed.  There are no medical or surgical contraindications to the procedure.     A signed informed consent was obtained.    PROCEDURE:  The patient was placed in the left lateral decubitus position on the operating table in the Mohs' Surgery Suite. The area in question was thoroughly prepped with ethanol and ophthalmic Betadine. A sterile surgical marker was used to outline the clinically apparent margins of the involved area, and a narrow margin of normal-appearing skin. Reference marks were made at the periphery of the outlined area with the surgical marker. The proposed area of excision was measured and photographed. Local anesthesia as noted above was administered.  The total volume of anesthetic used throughout this portion of the procedure was as documented above. The area was prepared and draped in the standard manner. All of the grossly identifiable area of clinically abnormal tissue and an underlying/peripheral layer was taken and processed by the Mohs' technique.  Hemostasis was obtained with the hyfrecator. Tissue was taken from any areas of residual marginal involvement (if present) and processed by the Mohs' technique in as many stages as needed until a tumor-free  "plane was achieved.    Colors of inks used in the reference nicks at epidermal margins (if present) and/or inking of non-epithelial edges, if applicable, is represented on the Mohs map as follows: solid lines represent red ink, dots represent blue ink, jagged lines represent black ink, curlicues represent green ink, "xxx" represents yellow ink.    The first Mohs' layer consisted of one section(s) with 7 slide(s) evaluated. No residual tumor was noted at the margins of the first Mohs' layer. Histology of the specimen(s) showed changes consistent with chronic solar damage.     A total of one section(s) and 7 slide(s) were examined under the microscope via the Mohs technique.  A cancer free plane was reached after layer number one. Defect final size was as noted above.      The wound was covered with a nonadherent dressing between stages, and the patient allowed to wait in the waiting area during these periods. The final defect was photographed at the completion of the Mohs' procedure.    The patient was returned to the procedure room following completion of the Mohs' procedure and final slide review. After reviewing the risk and benefits of the alternatives for management of the defect, she and I have decided to allow the site to heal by secondary intention.    Final dressing consisted of petrolatum, Telfa and tape.    Estimated blood loss for the total procedure was less than 5 mL.    Total operative time including tissue processing in the Mohs' laboratory and microscopic Mohs' frozen section slide review was 1 hour(s). Verbal and written wound care instructions were given to the patient, and she expressed understanding of these instructions. The patient tolerated the procedure well and left the operating room in good condition; she is to return in 14 days for followup.     Dr. Gallego's cell phone number was given to the patient with instructions to call prn with any problems.    "

## 2023-02-27 ENCOUNTER — OFFICE VISIT (OUTPATIENT)
Dept: DERMATOLOGY | Facility: CLINIC | Age: 83
End: 2023-02-27
Payer: MEDICARE

## 2023-02-27 DIAGNOSIS — Z85.828 HISTORY OF MOHS MICROGRAPHIC SURGERY FOR SKIN CANCER: Primary | ICD-10-CM

## 2023-02-27 DIAGNOSIS — Z98.890 HISTORY OF MOHS MICROGRAPHIC SURGERY FOR SKIN CANCER: Primary | ICD-10-CM

## 2023-02-27 PROCEDURE — 1160F RVW MEDS BY RX/DR IN RCRD: CPT | Mod: HCNC,CPTII,S$GLB, | Performed by: DERMATOLOGY

## 2023-02-27 PROCEDURE — 99499 UNLISTED E&M SERVICE: CPT | Mod: HCNC,S$GLB,, | Performed by: DERMATOLOGY

## 2023-02-27 PROCEDURE — 1159F MED LIST DOCD IN RCRD: CPT | Mod: HCNC,CPTII,S$GLB, | Performed by: DERMATOLOGY

## 2023-02-27 PROCEDURE — 1101F PR PT FALLS ASSESS DOC 0-1 FALLS W/OUT INJ PAST YR: ICD-10-PCS | Mod: HCNC,CPTII,S$GLB, | Performed by: DERMATOLOGY

## 2023-02-27 PROCEDURE — 3288F FALL RISK ASSESSMENT DOCD: CPT | Mod: HCNC,CPTII,S$GLB, | Performed by: DERMATOLOGY

## 2023-02-27 PROCEDURE — 1157F PR ADVANCE CARE PLAN OR EQUIV PRESENT IN MEDICAL RECORD: ICD-10-PCS | Mod: HCNC,CPTII,S$GLB, | Performed by: DERMATOLOGY

## 2023-02-27 PROCEDURE — 1157F ADVNC CARE PLAN IN RCRD: CPT | Mod: HCNC,CPTII,S$GLB, | Performed by: DERMATOLOGY

## 2023-02-27 PROCEDURE — 1159F PR MEDICATION LIST DOCUMENTED IN MEDICAL RECORD: ICD-10-PCS | Mod: HCNC,CPTII,S$GLB, | Performed by: DERMATOLOGY

## 2023-02-27 PROCEDURE — 1126F AMNT PAIN NOTED NONE PRSNT: CPT | Mod: HCNC,CPTII,S$GLB, | Performed by: DERMATOLOGY

## 2023-02-27 PROCEDURE — 1101F PT FALLS ASSESS-DOCD LE1/YR: CPT | Mod: HCNC,CPTII,S$GLB, | Performed by: DERMATOLOGY

## 2023-02-27 PROCEDURE — 99499 NO LOS: ICD-10-PCS | Mod: HCNC,S$GLB,, | Performed by: DERMATOLOGY

## 2023-02-27 PROCEDURE — 1126F PR PAIN SEVERITY QUANTIFIED, NO PAIN PRESENT: ICD-10-PCS | Mod: HCNC,CPTII,S$GLB, | Performed by: DERMATOLOGY

## 2023-02-27 PROCEDURE — 1160F PR REVIEW ALL MEDS BY PRESCRIBER/CLIN PHARMACIST DOCUMENTED: ICD-10-PCS | Mod: HCNC,CPTII,S$GLB, | Performed by: DERMATOLOGY

## 2023-02-27 PROCEDURE — 99999 PR PBB SHADOW E&M-EST. PATIENT-LVL II: CPT | Mod: PBBFAC,HCNC,, | Performed by: DERMATOLOGY

## 2023-02-27 PROCEDURE — 99999 PR PBB SHADOW E&M-EST. PATIENT-LVL II: ICD-10-PCS | Mod: PBBFAC,HCNC,, | Performed by: DERMATOLOGY

## 2023-02-27 PROCEDURE — 3288F PR FALLS RISK ASSESSMENT DOCUMENTED: ICD-10-PCS | Mod: HCNC,CPTII,S$GLB, | Performed by: DERMATOLOGY

## 2023-02-27 NOTE — PROGRESS NOTES
CC: 82 y.o.female patient is here for followup of second intention healing     HPI: Patient is 2 week(s) s/p Mohs' micrographic surgery, fresh tissue technique, of a squamous cell carcinoma on the right nose; with healing via secondary intention  Patient reports no notable pain or redness    EXAM: Wound appears to be healing well. Base shows good granulation.  No undue erythema to surrounding skin or signs or symptoms of infection.    IMPRESSION:  Healing well post Mohs' micrographic surgery via secondary intention    PLAN:  Site cleaned with peroxide  Dressed with petrolatum and Telfa and tape  Continue current care  Reviewed anticipated course  Followup 4 weeks

## 2023-03-02 ENCOUNTER — OFFICE VISIT (OUTPATIENT)
Dept: OPTOMETRY | Facility: CLINIC | Age: 83
End: 2023-03-02
Payer: MEDICARE

## 2023-03-02 DIAGNOSIS — Z96.1 BILATERAL PSEUDOPHAKIA: ICD-10-CM

## 2023-03-02 DIAGNOSIS — Z13.5 GLAUCOMA SCREENING: ICD-10-CM

## 2023-03-02 DIAGNOSIS — H43.813 POSTERIOR VITREOUS DETACHMENT, BILATERAL: Primary | ICD-10-CM

## 2023-03-02 DIAGNOSIS — Z85.820 H/O MALIGNANT MELANOMA OF SKIN: ICD-10-CM

## 2023-03-02 PROCEDURE — 3288F FALL RISK ASSESSMENT DOCD: CPT | Mod: HCNC,CPTII,S$GLB, | Performed by: OPTOMETRIST

## 2023-03-02 PROCEDURE — 1157F PR ADVANCE CARE PLAN OR EQUIV PRESENT IN MEDICAL RECORD: ICD-10-PCS | Mod: HCNC,CPTII,S$GLB, | Performed by: OPTOMETRIST

## 2023-03-02 PROCEDURE — 1157F ADVNC CARE PLAN IN RCRD: CPT | Mod: HCNC,CPTII,S$GLB, | Performed by: OPTOMETRIST

## 2023-03-02 PROCEDURE — 1159F MED LIST DOCD IN RCRD: CPT | Mod: HCNC,CPTII,S$GLB, | Performed by: OPTOMETRIST

## 2023-03-02 PROCEDURE — 1126F AMNT PAIN NOTED NONE PRSNT: CPT | Mod: HCNC,CPTII,S$GLB, | Performed by: OPTOMETRIST

## 2023-03-02 PROCEDURE — 1159F PR MEDICATION LIST DOCUMENTED IN MEDICAL RECORD: ICD-10-PCS | Mod: HCNC,CPTII,S$GLB, | Performed by: OPTOMETRIST

## 2023-03-02 PROCEDURE — 99999 PR PBB SHADOW E&M-EST. PATIENT-LVL III: CPT | Mod: PBBFAC,HCNC,, | Performed by: OPTOMETRIST

## 2023-03-02 PROCEDURE — 92014 COMPRE OPH EXAM EST PT 1/>: CPT | Mod: HCNC,S$GLB,, | Performed by: OPTOMETRIST

## 2023-03-02 PROCEDURE — 1101F PT FALLS ASSESS-DOCD LE1/YR: CPT | Mod: HCNC,CPTII,S$GLB, | Performed by: OPTOMETRIST

## 2023-03-02 PROCEDURE — 92015 PR REFRACTION: ICD-10-PCS | Mod: HCNC,S$GLB,, | Performed by: OPTOMETRIST

## 2023-03-02 PROCEDURE — 92014 PR EYE EXAM, EST PATIENT,COMPREHESV: ICD-10-PCS | Mod: HCNC,S$GLB,, | Performed by: OPTOMETRIST

## 2023-03-02 PROCEDURE — 3288F PR FALLS RISK ASSESSMENT DOCUMENTED: ICD-10-PCS | Mod: HCNC,CPTII,S$GLB, | Performed by: OPTOMETRIST

## 2023-03-02 PROCEDURE — 1126F PR PAIN SEVERITY QUANTIFIED, NO PAIN PRESENT: ICD-10-PCS | Mod: HCNC,CPTII,S$GLB, | Performed by: OPTOMETRIST

## 2023-03-02 PROCEDURE — 1160F PR REVIEW ALL MEDS BY PRESCRIBER/CLIN PHARMACIST DOCUMENTED: ICD-10-PCS | Mod: HCNC,CPTII,S$GLB, | Performed by: OPTOMETRIST

## 2023-03-02 PROCEDURE — 99999 PR PBB SHADOW E&M-EST. PATIENT-LVL III: ICD-10-PCS | Mod: PBBFAC,HCNC,, | Performed by: OPTOMETRIST

## 2023-03-02 PROCEDURE — 1160F RVW MEDS BY RX/DR IN RCRD: CPT | Mod: HCNC,CPTII,S$GLB, | Performed by: OPTOMETRIST

## 2023-03-02 PROCEDURE — 1101F PR PT FALLS ASSESS DOC 0-1 FALLS W/OUT INJ PAST YR: ICD-10-PCS | Mod: HCNC,CPTII,S$GLB, | Performed by: OPTOMETRIST

## 2023-03-02 PROCEDURE — 92015 DETERMINE REFRACTIVE STATE: CPT | Mod: HCNC,S$GLB,, | Performed by: OPTOMETRIST

## 2023-03-02 RX ORDER — INFLUENZA A VIRUS A/VICTORIA/2570/2019 IVR-215 (H1N1) ANTIGEN (FORMALDEHYDE INACTIVATED), INFLUENZA A VIRUS A/DARWIN/9/2021 SAN-010 (H3N2) ANTIGEN (FORMALDEHYDE INACTIVATED), INFLUENZA B VIRUS B/PHUKET/3073/2013 ANTIGEN (FORMALDEHYDE INACTIVATED), AND INFLUENZA B VIRUS B/MICHIGAN/01/2021 ANTIGEN (FORMALDEHYDE INACTIVATED) 60; 60; 60; 60 UG/.7ML; UG/.7ML; UG/.7ML; UG/.7ML
INJECTION, SUSPENSION INTRAMUSCULAR
COMMUNITY
Start: 2022-11-22 | End: 2023-06-30 | Stop reason: ALTCHOICE

## 2023-03-02 NOTE — PATIENT INSTRUCTIONS
"DRY EYES -- BURNING OR MAXIMO SYMPTOMS:  Use Over The Counter artificial tears as needed for dry eye symptoms.   Some common brands include:  Systane, Optive, Refresh, and Thera-Tears.  These drops can be used as frequently as desired, but may be most helpful use during long periods of concentrated work.  For example, reading / working at the computer. Start with 3-4x per day.     Nighttime Ophthalmic gel or ointments are available: Refresh PM, Genteal, and Lacrilube.    Avoid drops that "get redness out" (Visine, Murine, Clear Eyes), as these may contain medication that could further irritate the eyes, especially with chronic use.    ALLERGY EYES -- ITCHING SYMPTOMS:  Over the counter medications include--Pataday, Zaditor, and Alaway.  Use as directed 1-2 drops daily for symptoms of itching / watering eyes.  These drops will not help for dry eye or exposure symptoms.    REDNESS RELIEF:  Lumify---is a good redness reliever that will not cause irritation if used chronically.        FLASHES / FLOATERS / POSTERIOR VITREOUS DETACHMENT    Call the clinic if you have any further changes in symptoms.  Including:  Increased numbers of floaters or flashing lights, dimness or darkness that moves through or stays constant in your vision, or any pain in the eye (s).    You may sometimes see small specks or clouds moving in your field of vision.  They are called FLOATERS.  You can often see them when looking at a plain background, like a blank wall or blue dex.  Floaters are actually tiny clumps of gel or cells inside the VITREOUS, the clear jelly-like fluid that fills the inside of your eye.    While these objects look like they are in front of your eye, they are actually floating inside.  What you see are the shadows they cast on the RETINA, the nerve layer at the back of the eye that senses light and allows you to see.      POSTERIOR VITREOUS DETACHMENT    The appearance of new floaters may be alarming.  If you suddenly " develop new floaters, you should contact your eye care professional  right away.    The retina can tear if the shrinking vitreous pulls away from the wall of the eye.  This sometimes causes a small amount of bleeding in the eye that may appear as new floaters.    A torn retina is always a serious problem, since it can lead to a retinal detachment.  You should see your eye care professional as soon as possible if:    even one new floater appears suddenly;  you see sudden flashes of light;  you notice other symptoms, like the loss of side vision, or a curtain closes down in your vision        POSTERIOR VITREOUS DETACHMENT is more common for people who:    are nearsighted;  have had cataract surgery;  have had YAG laser surgery of the eye;  have had inflammation inside the eye;  are over age 60.      While some floaters may remain visible, many of them will fade over time and become less noticeable.  Even if you've had some floaters for years, you should have your eyes checked as soon as possible if you notice new ones.    FLASHING LIGHTS    When the vitreous gel rubs or pulls on the retina, you may see what look like flashing lights or lightning streaks.  These flashes can appear off and on for several weeks or months.      Some people experience flashes of light that appear as jagged lines or heat waves in both eyes, lasting 10-20 minutes.  These flashes are caused by a spasm of blood vessels in the brain, which is called a migraine.    If a headache follows these flashes, it's called a migraine headache.  If   no headache occurs, these flashes are called Ophthalmic or Ocular Migraine.           Using the Amsler Grid  If you are at risk for vision loss, you may be told to check your eyesight regularly using the Amsler grid. Below is the grid and instructions for using it.         The Amsler grid helps you track changes in your vision.    How to Use the Amsler Grid  Use the grid in a well-lighted area.  Wear  glasses or contact lenses if you usually wear them.  Hold the grid at your normal reading distance (about 16 inches).  Cover your left eye.  With your right eye, look at the dot in the center of the grid.  While looking at the dot, notice if any of the lines look wavy, if any lines disappear, or if the boxes change shape.  Write down on a piece of paper any vision changes from the last time you used the grid.  Now repeat the exercise, this time covering your right eye.  Call your doctor right away if you notice any vision changes.  How Often Should I Check My Vision?  Use the Amsler grid as often as your eye doctor suggests. Keep the grid where youll remember to use it. Call your eye doctor right away if you notice any changes with your eyesight. This includes if your vision improves.  © 1364-4406 [employer], [address] [place] [address], [place] [address]. All rights reserved. This information is not intended as a substitute for professional medical care. Always follow your healthcare professional's instructions.

## 2023-03-02 NOTE — PROGRESS NOTES
"HPI    Last DFE 11/1/21  + Phaco  + HTN    C/o straining to read at near x's a couple years since cataract surgery   OU.  C/o blurry VA at a distance after reading for any length of time that can   last for 5 seconds to 5 minutes off and on.  Pt. States she is OD dominant and normally sees better OD>OS, but states   sometimes she feels like it is the opposite and would like to know why.  Pt. States distance VA is good, but would like to get a pair of RX reading   glasses.  + floaters x's "since I was sabina my late teens" with no change  Denies flashes, but states she had some about 10+ yrs ago.  Denies pain or use of gtts  Last edited by Katherin Ward on 3/2/2023  2:52 PM.        ROS    Positive for: Eyes  Negative for: Constitutional, Gastrointestinal, Neurological, Skin,   Genitourinary, Musculoskeletal, HENT, Endocrine, Cardiovascular,   Respiratory, Psychiatric, Allergic/Imm, Heme/Lymph  Last edited by SONDRA Long, OD on 3/2/2023  3:22 PM.        Assessment /Plan     For exam results, see Encounter Report.    Posterior vitreous detachment, bilateral    H/O malignant melanoma of skin    Glaucoma screening    Bilateral pseudophakia         1. RD precautions given and reviewed. Patient knows to call/ message if any further changes in symptoms occur.  2. Left side upper cheek near lids --removed successfully  No ocular manifestations on DFE noted   Annual DFE recommended  3. Not suspect  4. Stable OU     Some mild residual cylinder is present, probably contributing to blur/ shadow complaint when switching from near to far  No other pathology noted  Good macular health for age       Pt opted for new refraction and Rx for near only --demo distance improvement noted      Discussed and educated patient on current findings /plan.  RTC 1 year, prn if any changes / issues                                 "

## 2023-03-17 ENCOUNTER — PATIENT MESSAGE (OUTPATIENT)
Dept: RESEARCH | Facility: HOSPITAL | Age: 83
End: 2023-03-17
Payer: MEDICARE

## 2023-03-22 NOTE — PROGRESS NOTES
"Subjective:       Patient ID: Dorothy Kramer is a 82 y.o. female.    Chief Complaint: Disease Management    HPI    This is a 82-year-old woman with history of HTN, HLD, vaginal prolapse s/p surgical repair (12/2021), hypothyroidism, herniated and bulging discs s/p neck fusion, osteoarthritis of the hip s/p right NEYDA, shoulder impingement, chronic lower back pain, SCC on the nose, history of melanoma 13 years ago, DDD, recurrent diverticulitis, non-specific colitis well-controlled on Apriso (colonoscopy with focal inflammation and chronic active colitis on biopsy in 2020; followed by Dr Oscar), psoriasis and  arthritis.  She was last seen in 12/2022, and at that time physical exam findings were suggestive of osteoarthritis.  MRI and Xray of the SI joints were unremarkable. Currently, she complains of neck pain > lower back pain.     Objective:   BP (!) 156/80   Ht 5' 4" (1.626 m)   Wt 68.3 kg (150 lb 11 oz)   BMI 25.86 kg/m²      Physical Exam   Constitutional: normal appearance.   HENT:   Head: Normocephalic and atraumatic.   Musculoskeletal:      Comments: No synovitis, dactylitis, enthesitis, effusions     Neurological: She is alert.   Skin: Skin is warm and dry.   No skin thickening, telangiectasias, calcinosis, psoriasiform lesions, lupoid lesions        No data to display     Assessment:       1. Psoriasis    2. Chronic midline back pain, unspecified back location    3. Primary osteoarthritis involving multiple joints    4. Degenerative cervical disc        This is a 82-year-old woman with history of HTN, HLD, vaginal prolapse s/p surgical repair (12/2021), hypothyroidism, herniated and bulging discs s/p neck fusion, osteoarthritis of the hip s/p right NEYDA, shoulder impingement, chronic lower back pain, SCC on the nose, history of melanoma 13 years ago, DDD, recurrent diverticulitis, non-specific colitis well-controlled on Apriso (colonoscopy with focal inflammation and chronic active colitis on biopsy in " 2020; followed by Dr Oscar), psoriasis and  arthritis.  She was last seen in 12/2022, and at that time physical exam findings were suggestive of osteoarthritis.  MRI and Xray of the SI joints were unremarkable. Currently, she complains of neck pain > lower back pain.  I suspect that her pain is primarily related to degenerative disc disease and will refer her to Pain Management.    Plan:       Problem List Items Addressed This Visit    None  Visit Diagnoses       Psoriasis    -  Primary    Chronic midline back pain, unspecified back location        Relevant Orders    Ambulatory referral/consult to Pain Clinic    Primary osteoarthritis involving multiple joints        Degenerative cervical disc        Relevant Orders    Ambulatory referral/consult to Pain Clinic          Follow up PRN    20 minutes of total time spent on the encounter, which includes face to face time and non-face to face time preparing to see the patient (eg, review of tests), Obtaining and/or reviewing separately obtained history, Documenting clinical information in the electronic or other health record, Independently interpreting results (not separately reported) and communicating results to the patient/family/caregiver, or Care coordination (not separately reported).       Karey Rubio M.D.  Rheumatology Dept  [place] [place]

## 2023-03-23 ENCOUNTER — OFFICE VISIT (OUTPATIENT)
Dept: RHEUMATOLOGY | Facility: CLINIC | Age: 83
End: 2023-03-23
Payer: MEDICARE

## 2023-03-23 VITALS
BODY MASS INDEX: 25.73 KG/M2 | WEIGHT: 150.69 LBS | DIASTOLIC BLOOD PRESSURE: 80 MMHG | SYSTOLIC BLOOD PRESSURE: 156 MMHG | HEIGHT: 64 IN

## 2023-03-23 DIAGNOSIS — M50.30 DEGENERATIVE CERVICAL DISC: ICD-10-CM

## 2023-03-23 DIAGNOSIS — L40.9 PSORIASIS: Primary | ICD-10-CM

## 2023-03-23 DIAGNOSIS — M54.9 CHRONIC MIDLINE BACK PAIN, UNSPECIFIED BACK LOCATION: ICD-10-CM

## 2023-03-23 DIAGNOSIS — G89.29 CHRONIC MIDLINE BACK PAIN, UNSPECIFIED BACK LOCATION: ICD-10-CM

## 2023-03-23 DIAGNOSIS — M15.9 PRIMARY OSTEOARTHRITIS INVOLVING MULTIPLE JOINTS: ICD-10-CM

## 2023-03-23 PROCEDURE — 99213 OFFICE O/P EST LOW 20 MIN: CPT | Mod: HCNC,S$GLB,, | Performed by: STUDENT IN AN ORGANIZED HEALTH CARE EDUCATION/TRAINING PROGRAM

## 2023-03-23 PROCEDURE — 1157F PR ADVANCE CARE PLAN OR EQUIV PRESENT IN MEDICAL RECORD: ICD-10-PCS | Mod: HCNC,CPTII,S$GLB, | Performed by: STUDENT IN AN ORGANIZED HEALTH CARE EDUCATION/TRAINING PROGRAM

## 2023-03-23 PROCEDURE — 1125F PR PAIN SEVERITY QUANTIFIED, PAIN PRESENT: ICD-10-PCS | Mod: HCNC,CPTII,S$GLB, | Performed by: STUDENT IN AN ORGANIZED HEALTH CARE EDUCATION/TRAINING PROGRAM

## 2023-03-23 PROCEDURE — 99999 PR PBB SHADOW E&M-EST. PATIENT-LVL III: ICD-10-PCS | Mod: PBBFAC,HCNC,, | Performed by: STUDENT IN AN ORGANIZED HEALTH CARE EDUCATION/TRAINING PROGRAM

## 2023-03-23 PROCEDURE — 1125F AMNT PAIN NOTED PAIN PRSNT: CPT | Mod: HCNC,CPTII,S$GLB, | Performed by: STUDENT IN AN ORGANIZED HEALTH CARE EDUCATION/TRAINING PROGRAM

## 2023-03-23 PROCEDURE — 3077F SYST BP >= 140 MM HG: CPT | Mod: HCNC,CPTII,S$GLB, | Performed by: STUDENT IN AN ORGANIZED HEALTH CARE EDUCATION/TRAINING PROGRAM

## 2023-03-23 PROCEDURE — 1160F PR REVIEW ALL MEDS BY PRESCRIBER/CLIN PHARMACIST DOCUMENTED: ICD-10-PCS | Mod: HCNC,CPTII,S$GLB, | Performed by: STUDENT IN AN ORGANIZED HEALTH CARE EDUCATION/TRAINING PROGRAM

## 2023-03-23 PROCEDURE — 1160F RVW MEDS BY RX/DR IN RCRD: CPT | Mod: HCNC,CPTII,S$GLB, | Performed by: STUDENT IN AN ORGANIZED HEALTH CARE EDUCATION/TRAINING PROGRAM

## 2023-03-23 PROCEDURE — 99999 PR PBB SHADOW E&M-EST. PATIENT-LVL III: CPT | Mod: PBBFAC,HCNC,, | Performed by: STUDENT IN AN ORGANIZED HEALTH CARE EDUCATION/TRAINING PROGRAM

## 2023-03-23 PROCEDURE — 99213 PR OFFICE/OUTPT VISIT, EST, LEVL III, 20-29 MIN: ICD-10-PCS | Mod: HCNC,S$GLB,, | Performed by: STUDENT IN AN ORGANIZED HEALTH CARE EDUCATION/TRAINING PROGRAM

## 2023-03-23 PROCEDURE — 3079F DIAST BP 80-89 MM HG: CPT | Mod: HCNC,CPTII,S$GLB, | Performed by: STUDENT IN AN ORGANIZED HEALTH CARE EDUCATION/TRAINING PROGRAM

## 2023-03-23 PROCEDURE — 3079F PR MOST RECENT DIASTOLIC BLOOD PRESSURE 80-89 MM HG: ICD-10-PCS | Mod: HCNC,CPTII,S$GLB, | Performed by: STUDENT IN AN ORGANIZED HEALTH CARE EDUCATION/TRAINING PROGRAM

## 2023-03-23 PROCEDURE — 1159F PR MEDICATION LIST DOCUMENTED IN MEDICAL RECORD: ICD-10-PCS | Mod: HCNC,CPTII,S$GLB, | Performed by: STUDENT IN AN ORGANIZED HEALTH CARE EDUCATION/TRAINING PROGRAM

## 2023-03-23 PROCEDURE — 1159F MED LIST DOCD IN RCRD: CPT | Mod: HCNC,CPTII,S$GLB, | Performed by: STUDENT IN AN ORGANIZED HEALTH CARE EDUCATION/TRAINING PROGRAM

## 2023-03-23 PROCEDURE — 1157F ADVNC CARE PLAN IN RCRD: CPT | Mod: HCNC,CPTII,S$GLB, | Performed by: STUDENT IN AN ORGANIZED HEALTH CARE EDUCATION/TRAINING PROGRAM

## 2023-03-23 PROCEDURE — 3077F PR MOST RECENT SYSTOLIC BLOOD PRESSURE >= 140 MM HG: ICD-10-PCS | Mod: HCNC,CPTII,S$GLB, | Performed by: STUDENT IN AN ORGANIZED HEALTH CARE EDUCATION/TRAINING PROGRAM

## 2023-03-23 ASSESSMENT — ROUTINE ASSESSMENT OF PATIENT INDEX DATA (RAPID3)
MDHAQ FUNCTION SCORE: 1.2
PSYCHOLOGICAL DISTRESS SCORE: 2.2
TOTAL RAPID3 SCORE: 5.17
PATIENT GLOBAL ASSESSMENT SCORE: 5
PAIN SCORE: 6.5

## 2023-03-27 ENCOUNTER — OFFICE VISIT (OUTPATIENT)
Dept: DERMATOLOGY | Facility: CLINIC | Age: 83
End: 2023-03-27
Payer: MEDICARE

## 2023-03-27 DIAGNOSIS — Z85.828 HISTORY OF MOHS MICROGRAPHIC SURGERY FOR SKIN CANCER: Primary | ICD-10-CM

## 2023-03-27 DIAGNOSIS — Z98.890 HISTORY OF MOHS MICROGRAPHIC SURGERY FOR SKIN CANCER: Primary | ICD-10-CM

## 2023-03-27 PROCEDURE — 99999 PR PBB SHADOW E&M-EST. PATIENT-LVL I: CPT | Mod: PBBFAC,HCNC,, | Performed by: DERMATOLOGY

## 2023-03-27 PROCEDURE — 1159F PR MEDICATION LIST DOCUMENTED IN MEDICAL RECORD: ICD-10-PCS | Mod: HCNC,CPTII,S$GLB, | Performed by: DERMATOLOGY

## 2023-03-27 PROCEDURE — 1157F ADVNC CARE PLAN IN RCRD: CPT | Mod: HCNC,CPTII,S$GLB, | Performed by: DERMATOLOGY

## 2023-03-27 PROCEDURE — 1159F MED LIST DOCD IN RCRD: CPT | Mod: HCNC,CPTII,S$GLB, | Performed by: DERMATOLOGY

## 2023-03-27 PROCEDURE — 1160F PR REVIEW ALL MEDS BY PRESCRIBER/CLIN PHARMACIST DOCUMENTED: ICD-10-PCS | Mod: HCNC,CPTII,S$GLB, | Performed by: DERMATOLOGY

## 2023-03-27 PROCEDURE — 99999 PR PBB SHADOW E&M-EST. PATIENT-LVL I: ICD-10-PCS | Mod: PBBFAC,HCNC,, | Performed by: DERMATOLOGY

## 2023-03-27 PROCEDURE — 1157F PR ADVANCE CARE PLAN OR EQUIV PRESENT IN MEDICAL RECORD: ICD-10-PCS | Mod: HCNC,CPTII,S$GLB, | Performed by: DERMATOLOGY

## 2023-03-27 PROCEDURE — 99024 POSTOP FOLLOW-UP VISIT: CPT | Mod: HCNC,S$GLB,, | Performed by: DERMATOLOGY

## 2023-03-27 PROCEDURE — 99024 PR POST-OP FOLLOW-UP VISIT: ICD-10-PCS | Mod: HCNC,S$GLB,, | Performed by: DERMATOLOGY

## 2023-03-27 PROCEDURE — 1160F RVW MEDS BY RX/DR IN RCRD: CPT | Mod: HCNC,CPTII,S$GLB, | Performed by: DERMATOLOGY

## 2023-03-27 NOTE — PROGRESS NOTES
CC: 82 y.o.female patient is here for followup of second intention healing     EXAM: Site well healed; still somewhat discolored            IMPRESSION:  Well healed post Mohs' micrographic surgery via secondary intention    PLAN:  Discussed current status  Reviewed anticipated course  Followup to derm in 3-4 months; PRN to me

## 2023-05-02 ENCOUNTER — OFFICE VISIT (OUTPATIENT)
Dept: PAIN MEDICINE | Facility: CLINIC | Age: 83
End: 2023-05-02
Payer: MEDICARE

## 2023-05-02 VITALS
SYSTOLIC BLOOD PRESSURE: 174 MMHG | DIASTOLIC BLOOD PRESSURE: 74 MMHG | HEART RATE: 90 BPM | HEIGHT: 64 IN | WEIGHT: 147.69 LBS | BODY MASS INDEX: 25.21 KG/M2

## 2023-05-02 DIAGNOSIS — M70.61 TROCHANTERIC BURSITIS OF RIGHT HIP: ICD-10-CM

## 2023-05-02 DIAGNOSIS — M51.36 DDD (DEGENERATIVE DISC DISEASE), LUMBAR: ICD-10-CM

## 2023-05-02 DIAGNOSIS — M54.12 CERVICAL RADICULOPATHY: Primary | ICD-10-CM

## 2023-05-02 DIAGNOSIS — M43.16 SPONDYLOLISTHESIS OF LUMBAR REGION: ICD-10-CM

## 2023-05-02 DIAGNOSIS — M48.02 CERVICAL SPINAL STENOSIS: ICD-10-CM

## 2023-05-02 DIAGNOSIS — M54.16 BILATERAL LUMBAR RADICULOPATHY: ICD-10-CM

## 2023-05-02 PROCEDURE — 3078F DIAST BP <80 MM HG: CPT | Mod: HCNC,CPTII,S$GLB, | Performed by: ANESTHESIOLOGY

## 2023-05-02 PROCEDURE — 3077F SYST BP >= 140 MM HG: CPT | Mod: HCNC,CPTII,S$GLB, | Performed by: ANESTHESIOLOGY

## 2023-05-02 PROCEDURE — 3288F FALL RISK ASSESSMENT DOCD: CPT | Mod: HCNC,CPTII,S$GLB, | Performed by: ANESTHESIOLOGY

## 2023-05-02 PROCEDURE — 99205 PR OFFICE/OUTPT VISIT, NEW, LEVL V, 60-74 MIN: ICD-10-PCS | Mod: HCNC,S$GLB,, | Performed by: ANESTHESIOLOGY

## 2023-05-02 PROCEDURE — 1125F AMNT PAIN NOTED PAIN PRSNT: CPT | Mod: HCNC,CPTII,S$GLB, | Performed by: ANESTHESIOLOGY

## 2023-05-02 PROCEDURE — 99205 OFFICE O/P NEW HI 60 MIN: CPT | Mod: HCNC,S$GLB,, | Performed by: ANESTHESIOLOGY

## 2023-05-02 PROCEDURE — 99999 PR PBB SHADOW E&M-EST. PATIENT-LVL III: ICD-10-PCS | Mod: PBBFAC,HCNC,, | Performed by: ANESTHESIOLOGY

## 2023-05-02 PROCEDURE — 3078F PR MOST RECENT DIASTOLIC BLOOD PRESSURE < 80 MM HG: ICD-10-PCS | Mod: HCNC,CPTII,S$GLB, | Performed by: ANESTHESIOLOGY

## 2023-05-02 PROCEDURE — 1101F PR PT FALLS ASSESS DOC 0-1 FALLS W/OUT INJ PAST YR: ICD-10-PCS | Mod: HCNC,CPTII,S$GLB, | Performed by: ANESTHESIOLOGY

## 2023-05-02 PROCEDURE — 1159F MED LIST DOCD IN RCRD: CPT | Mod: HCNC,CPTII,S$GLB, | Performed by: ANESTHESIOLOGY

## 2023-05-02 PROCEDURE — 3077F PR MOST RECENT SYSTOLIC BLOOD PRESSURE >= 140 MM HG: ICD-10-PCS | Mod: HCNC,CPTII,S$GLB, | Performed by: ANESTHESIOLOGY

## 2023-05-02 PROCEDURE — 1157F PR ADVANCE CARE PLAN OR EQUIV PRESENT IN MEDICAL RECORD: ICD-10-PCS | Mod: HCNC,CPTII,S$GLB, | Performed by: ANESTHESIOLOGY

## 2023-05-02 PROCEDURE — 1101F PT FALLS ASSESS-DOCD LE1/YR: CPT | Mod: HCNC,CPTII,S$GLB, | Performed by: ANESTHESIOLOGY

## 2023-05-02 PROCEDURE — 1157F ADVNC CARE PLAN IN RCRD: CPT | Mod: HCNC,CPTII,S$GLB, | Performed by: ANESTHESIOLOGY

## 2023-05-02 PROCEDURE — 1125F PR PAIN SEVERITY QUANTIFIED, PAIN PRESENT: ICD-10-PCS | Mod: HCNC,CPTII,S$GLB, | Performed by: ANESTHESIOLOGY

## 2023-05-02 PROCEDURE — 99999 PR PBB SHADOW E&M-EST. PATIENT-LVL III: CPT | Mod: PBBFAC,HCNC,, | Performed by: ANESTHESIOLOGY

## 2023-05-02 PROCEDURE — 1159F PR MEDICATION LIST DOCUMENTED IN MEDICAL RECORD: ICD-10-PCS | Mod: HCNC,CPTII,S$GLB, | Performed by: ANESTHESIOLOGY

## 2023-05-02 PROCEDURE — 3288F PR FALLS RISK ASSESSMENT DOCUMENTED: ICD-10-PCS | Mod: HCNC,CPTII,S$GLB, | Performed by: ANESTHESIOLOGY

## 2023-05-02 RX ORDER — SODIUM CHLORIDE, SODIUM LACTATE, POTASSIUM CHLORIDE, CALCIUM CHLORIDE 600; 310; 30; 20 MG/100ML; MG/100ML; MG/100ML; MG/100ML
INJECTION, SOLUTION INTRAVENOUS CONTINUOUS
Status: CANCELLED | OUTPATIENT
Start: 2023-05-02

## 2023-05-02 NOTE — H&P (VIEW-ONLY)
This note was completed with dictation software and grammatical errors may exist.    Chief Complaint   Patient presents with    Shoulder Pain     Right shoulder    Neck Pain     Pt's biggest concern         HPI: Dorothy Kramer is a 82 y.o. year old female patient who has a past medical history of DDD (degenerative disc disease), cervical, Diverticulitis, Diverticulosis, DJD (degenerative joint disease) of hip, Dyspepsia, GERD (gastroesophageal reflux disease), History of melanoma, HTN (hypertension), Melanoma, Migraine headache, Rectocele, Skin cancer, Sleep apnea, Thyroid disease, Trouble in sleeping, Ulcerative colitis, Uterine prolaps, and Vulvar lesion. She presents in referral from Dr. Kamila Gilbert for back and neck pain.  The patient states that over 30 years ago she had neck pain radiating into the right arm, developed weakness, eventually underwent a C5/6 fusion.  She did well with this and has no longer had severe neck pain.  However in the last several years she began having more pain in the neck radiating into the upper back, right rhomboid region, right shoulder.  She denies any weakness in the arm.  She describes it as aching, burning, sharp and deep.  Her pain is worse with turning her head side-to-side, extension.  She also has pain in the low back, left buttock that she calls sciatica that radiates further down her leg.  She also has pain worse with standing, walking, bending getting out of a bed or a chair.  She has some pain over the right trochanter, states if she lays on that side at night, it will wake her up and she only gets several hours of sleep.  Because her neck hurts so much she tries to lay on her right side to immobilize her arm but then starts having worsening right lateral hip pain.  She had a GTB injection with Dr. Carmona after she tried physical therapy for the pain but this was not successful.  The injection did seem to provide several days of relief but returned.  She states  that the right GTB is tender to palpation, but also has some right low back pain as well.        Pain intervention history:  Right GTB injection gave 3 days of relief.    Spine surgeries:  ACDF C5-6 30 years ago    Antineuropathics:  NSAIDs:  Physical therapy: She had done physical therapy several months ago for neck pain, back pain, trochanteric bursitis without much benefit she states that she does not want to return to physical therapy.  Antidepressants:  Muscle relaxers:  Opioids:  Antiplatelets/Anticoagulants:        ROS:  She reports rash, joint stiffness, joint swelling, back pain, difficulty sleeping possible dizziness and loss of balance.  Balance of review of systems is negative.    No results found for: LABA1C, HGBA1C    Lab Results   Component Value Date    WBC 7.30 12/07/2022    HGB 14.1 12/07/2022    HCT 43.4 12/07/2022    MCV 96 12/07/2022     (L) 12/07/2022             Past Medical History:   Diagnosis Date    DDD (degenerative disc disease), cervical     Diverticulitis     Diverticulosis     DJD (degenerative joint disease) of hip     Dyspepsia     GERD (gastroesophageal reflux disease)     History of melanoma 05/20/2015    HTN (hypertension)     Melanoma     Migraine headache     Rectocele     Skin cancer     melanoma on face    Sleep apnea     doesnt use cpap    Thyroid disease     hypo    Trouble in sleeping     Ulcerative colitis     on meds    Uterine prolaps     followed by GYN    Vulvar lesion 05/20/2015       Past Surgical History:   Procedure Laterality Date    APPENDECTOMY  1962    BACK SURGERY      BREAST BIOPSY      BREAST LUMPECTOMY  1989    CATARACT EXTRACTION Left 02/22/2021    ros    CATARACT EXTRACTION W/  INTRAOCULAR LENS IMPLANT Left 2/22/2021    Procedure: EXTRACTION, CATARACT, WITH IOL INSERTION;  Surgeon: Latisha Baker MD;  Location: [facility];  Service: Ophthalmology;  Laterality: Left;  left    CATARACT EXTRACTION W/  INTRAOCULAR LENS IMPLANT Right 3/22/2021     Procedure: EXTRACTION, CATARACT, WITH IOL INSERTION;  Surgeon: Latisha Baker MD;  Location: [facility] OR;  Service: Ophthalmology;  Laterality: Right;  right    CERVICAL FUSION  1991    CHOLECYSTECTOMY      COLONOSCOPY      COLONOSCOPY N/A 11/9/2018    Dr Oscar; chronic active colitis; repeat in 5 years    COLONOSCOPY N/A 2/14/2020    Procedure: COLONOSCOPY;  Surgeon: Fadi Oscar MD;  Location: [facility];  Service: Endoscopy;  Laterality: N/A;    HYSTERECTOMY      TRACEE/BSO for benign disease    INSERTION OF MIDURETHRAL SLING N/A 9/22/2022    Procedure: SLING, MIDURETHRAL;  Surgeon: Yannick Cabral MD;  Location: [facility] OR;  Service: OB/GYN;  Laterality: N/A;    JOINT REPLACEMENT Right 2016    Hip replacement    MT REMOVAL OF OVARY/TUBE(S)      ROBOT-ASSISTED LAPAROSCOPIC ABDOMINAL SACROCOLPOPEXY USING DA FLAKITO XI N/A 12/14/2021    Procedure: XI ROBOTIC SACROCOLPOPEXY, ABDOMINAL;  Surgeon: Yannick Cabral MD;  Location: [facility] OR;  Service: OB/GYN;  Laterality: N/A;    ROBOT-ASSISTED LAPAROSCOPIC LYSIS OF ADHESIONS USING DA FLAKITO XI  12/14/2021    Procedure: XI ROBOTIC LYSIS, ADHESIONS;  Surgeon: Yannick Cabral MD;  Location: [facility] OR;  Service: OB/GYN;;    TONSILLECTOMY      TOTAL ABDOMINAL HYSTERECTOMY W/ BILATERAL SALPINGOOPHORECTOMY  1990       Social History     Socioeconomic History    Marital status:    Tobacco Use    Smoking status: Never    Smokeless tobacco: Never   Substance and Sexual Activity    Alcohol use: No    Drug use: No    Sexual activity: Not Currently     Social Determinants of Health     Financial Resource Strain: Unknown    Difficulty of Paying Living Expenses: Patient refused   Food Insecurity: Unknown    Worried About Running Out of Food in the Last Year: Patient refused    Ran Out of Food in the Last Year: Never true   Transportation Needs: No Transportation Needs    Lack of Transportation (Medical): No    Lack of Transportation (Non-Medical): No   Physical Activity: Unknown  "   Days of Exercise per Week: Patient refused   Stress: Unknown    Feeling of Stress : Patient refused   Social Connections: Unknown    Frequency of Communication with Friends and Family: More than three times a week    Frequency of Social Gatherings with Friends and Family: More than three times a week    Active Member of Clubs or Organizations: Yes    Attends Club or Organization Meetings: More than 4 times per year    Marital Status:    Housing Stability: Low Risk     Unable to Pay for Housing in the Last Year: No    Number of Places Lived in the Last Year: 1    Unstable Housing in the Last Year: No         Medications/Allergies: See med card    Vitals:    05/02/23 0927 05/02/23 0934   BP: (!) 196/86 (!) 174/74   Pulse: 101 90   Weight: 67 kg (147 lb 11.3 oz)    Height: 5' 4" (1.626 m)    PainSc:   7      Body mass index is 25.35 kg/m².    Physical exam:  Gen: A and O x3, pleasant, well-groomed  Skin: No rashes or obvious lesions  HEENT: PERRLA, no obvious deformities on ears or in canals.Trachea midline.  CVS: Regular rate and rhythm, normal palpable pulses.  Resp: Clear to auscultation bilaterally, no wheezes or rales.  Abdomen: Soft, NT/ND.  Musculoskeletal: No antalgic gait.     Neuro:  Motor:    Right Left   C4 Shoulder Abduction  5  5   C5 Elbow Flexion    5  5   C6 Wrist Extension  5  5   C7 Elbow Extension   5  5   C8/T1 Hand Intrinsics   5  5   C8 First Dorsal Interosseus  5  5   C8 Abductor Pollicus Brevis  5  5       Iliopsoas Quadriceps Knee  Flexion Tibialis  anterior Gastro- cnemius EHL   Lower: R 5/5 5/5 5/5 5/5 5/5 5/5    L 5/5 5/5 5/5 5/5 5/5 5/5        Left  Right    Triceps DTR 2+ 2+   Biceps DTR 2+ 2+   Brachioradialis DTR 2+ 2+   Patellar DTR 2+ 2+   Achilles DTR 2+ 2+   Castro Absent  Absent   Clonus Absent Absent   Babinski Absent Absent     Sensory: Intact and symmetrical to light touch and pinprick in C2-T1 dermatomes bilaterally. Intact and symmetrical to light touch and pinprick " in L1-S1 dermatomes bilaterally.  Cervical spine: ROM is full in flexion, moderately reduced with extension and lateral rotation with increased pain on right lateral rotation causing neck pain and periscapular pain on the right side.    Spurling's maneuver causes right neck pain.  Myofascial exam: No Tenderness to palpation across cervical paraspinous region bilaterally.    Lumbar spine:  Lumbar spine: ROM is moderately reduced with both flexion extension and oblique extension with no increased pain.    Alek's test causes no increased pain on either side.    Supine straight leg raise is negative bilaterally.    Internal and external rotation of the hip causes no increased pain on either side.  Myofascial exam: No tenderness to palpation across lumbar paraspinous muscles.  There is exquisite tenderness to palpation over the right GTB    Imagin19 Xray L-spine:  Bone density is normal.  There is a gentle levocurvature of the lumbar spine.  In her AP a the vertebral bodies maintain normal height and alignment.  There is no fracture.  There is marked disc space narrowing at the L4-5 level.  There is mild endplate sclerosis and osteophyte formation.  There is mild-to-moderate disc space narrowing at the L2-3, L3-4 levels and moderate disc space narrowing at the L5-S1 level.  There is multilevel facet joint arthropathy.  There is mild-to-moderate atherosclerosis.  There are surgical clips in the right upper abdomen from prior cholecystectomy.    19 MRI C-spine:  Vertebral column: There is no prior MRI or CT for comparison.  As seen on plain films, there is mild anterolisthesis of C3 on C4, C4 on C5.  There are changes of prior bony fusion of C5 and C6.  There is moderate to marked disc space narrowing at the C6-7 level.  There is no fracture.  The odontoid process is intact.  The discs are desiccated.  There is endplate osteophyte formation most apparent at the C6-7 level.  Baseline marrow signal is  normal.   Spinal canal, cord, epidural space: The spinal canal is developmentally normal.  There is no abnormal epidural soft tissue mass or fluid collection.  There is minimal flattening of the right ventral cord surface at the C4-5 level where there is a large disc extrusion.  There is no cord edema or myelomalacia.  C2-3: There is no spinal canal or significant foraminal stenosis.   C3-4: There is moderate-to-marked left foraminal stenosis due to uncovertebral spurring and facet joint arthropathy.  There is a mild disc osteophyte complex.  There is no spinal stenosis or cord compression.  The right foramen is patent.   C4-5: There is a large right paracentral disc extrusion measuring approximately 6 x 10 x 13 mm (AP, transverse, craniocaudad).  This does result in flattening of the right ventral cord surface but there is no obvious cord edema or myelomalacia.  There is moderate spinal stenosis at this level.  There is also moderate left foraminal narrowing due to uncovertebral spurring and facet joint arthropathy.   C5-6: There are changes of previous bony fusion.  There is no spinal canal or significant foraminal stenosis.   C6-7: There is moderate to marked disc space narrowing.  There is left greater than right uncovertebral spurring with bilateral facet joint arthropathy.  There is a mild disc osteophyte complex.  There is no spinal canal or significant foraminal stenosis.   C7-T1: There is left facet joint arthropathy.  There is at least mild left foraminal stenosis due to these changes.    12/17/22 MRI SI joints:  The SI joints are normal in appearance with no evidence of erosions, fusion, or other imaging findings of acute sacroiliitis/inflammatory arthritis.  No sacroiliac joint effusion.   There is degenerative disc disease in the lower lumbar spine resulting in degenerative disc desiccation, disc space narrowing, and degenerative endplate change, most pronounced at L4-L5 and L5-S1..  There is a grade I  anterolisthesis of L4 on L5.  There is fluid within the intervertebral disc at L5-S1.  There is bilateral facet arthropathy and uncovering of the intervertebral disc with a superimposed mild broad disc bulge at L4-L5.  There is mild right lateral recess stenosis.  There is, at most, mild overall central canal stenosis at L4-L5.   The visualized bony structures of the pelvis show no evidence of acute fracture or pathologic marrow replacement process.  There is incidentally observed postoperative change of total right hip arthroplasty.  This results in metal artifact within the adjacent osseous and soft tissue structures resulting in poor fat suppression.         Assessment:  Dorothy Kramer is a 82 y.o. year old female patient who has a past medical history of DDD (degenerative disc disease), cervical, Diverticulitis, Diverticulosis, DJD (degenerative joint disease) of hip, Dyspepsia, GERD (gastroesophageal reflux disease), History of melanoma, HTN (hypertension), Melanoma, Migraine headache, Rectocele, Skin cancer, Sleep apnea, Thyroid disease, Trouble in sleeping, Ulcerative colitis, Uterine prolaps, and Vulvar lesion. She presents in referral from Dr. Kamila Gilbert for back and neck pain.  1. Cervical radiculopathy        2. Cervical spinal stenosis        3. DDD (degenerative disc disease), lumbar        4. Spondylolisthesis of lumbar region        5. Bilateral lumbar radiculopathy        6. Trochanteric bursitis of right hip            Plan:  1.  We reviewed her cervical spine MRI that shows significant canal stenosis at C4/5 likely causing her neck pain, right upper back pain symptoms and the pain that radiates across both sides.  She also has facet degenerative changes at that level and below her fusion as well.  We discussed the role of epidural steroid injections and she would like to proceed, I am going to set her up for a cervical KATHY entering at C7/T1 with spread up to the C4-5 level.  If she has relief  with this, we can attend to her low back issues.  2. In terms of her back, we reviewed her sacral MRI and it does show some canal narrowing at L4-5 and foraminal narrowing bilaterally.  I think that this could contribute to some of her low back pain, left leg radicular symptoms, unclear if her right back and hip pain is lumbar related.  We could always try an epidural steroid injection in the lumbar region and see if it helps her right lateral hip pain.  If it does not, I would send her back to Orthopedics to treat her GTB      The total time spent for evaluation and management today including reviewing separately obtained history, performing a medically appropriate exam and evaluation, documenting clinical information in the health record, independently interpreting results and communicating them to the patient/family/caregiver, and ordering medications/tests/procedures was between 60-74 minutes.      Thank you for referring this interesting patient, and I look forward to continuing to collaborate in her care.

## 2023-05-02 NOTE — PROGRESS NOTES
This note was completed with dictation software and grammatical errors may exist.    Chief Complaint   Patient presents with    Shoulder Pain     Right shoulder    Neck Pain     Pt's biggest concern         HPI: Dorothy Kramer is a 82 y.o. year old female patient who has a past medical history of DDD (degenerative disc disease), cervical, Diverticulitis, Diverticulosis, DJD (degenerative joint disease) of hip, Dyspepsia, GERD (gastroesophageal reflux disease), History of melanoma, HTN (hypertension), Melanoma, Migraine headache, Rectocele, Skin cancer, Sleep apnea, Thyroid disease, Trouble in sleeping, Ulcerative colitis, Uterine prolaps, and Vulvar lesion. She presents in referral from Dr. Kamila Gilbert for back and neck pain.  The patient states that over 30 years ago she had neck pain radiating into the right arm, developed weakness, eventually underwent a C5/6 fusion.  She did well with this and has no longer had severe neck pain.  However in the last several years she began having more pain in the neck radiating into the upper back, right rhomboid region, right shoulder.  She denies any weakness in the arm.  She describes it as aching, burning, sharp and deep.  Her pain is worse with turning her head side-to-side, extension.  She also has pain in the low back, left buttock that she calls sciatica that radiates further down her leg.  She also has pain worse with standing, walking, bending getting out of a bed or a chair.  She has some pain over the right trochanter, states if she lays on that side at night, it will wake her up and she only gets several hours of sleep.  Because her neck hurts so much she tries to lay on her right side to immobilize her arm but then starts having worsening right lateral hip pain.  She had a GTB injection with Dr. Carmona after she tried physical therapy for the pain but this was not successful.  The injection did seem to provide several days of relief but returned.  She states  that the right GTB is tender to palpation, but also has some right low back pain as well.        Pain intervention history:  Right GTB injection gave 3 days of relief.    Spine surgeries:  ACDF C5-6 30 years ago    Antineuropathics:  NSAIDs:  Physical therapy: She had done physical therapy several months ago for neck pain, back pain, trochanteric bursitis without much benefit she states that she does not want to return to physical therapy.  Antidepressants:  Muscle relaxers:  Opioids:  Antiplatelets/Anticoagulants:        ROS:  She reports rash, joint stiffness, joint swelling, back pain, difficulty sleeping possible dizziness and loss of balance.  Balance of review of systems is negative.    No results found for: LABA1C, HGBA1C    Lab Results   Component Value Date    WBC 7.30 12/07/2022    HGB 14.1 12/07/2022    HCT 43.4 12/07/2022    MCV 96 12/07/2022     (L) 12/07/2022             Past Medical History:   Diagnosis Date    DDD (degenerative disc disease), cervical     Diverticulitis     Diverticulosis     DJD (degenerative joint disease) of hip     Dyspepsia     GERD (gastroesophageal reflux disease)     History of melanoma 05/20/2015    HTN (hypertension)     Melanoma     Migraine headache     Rectocele     Skin cancer     melanoma on face    Sleep apnea     doesnt use cpap    Thyroid disease     hypo    Trouble in sleeping     Ulcerative colitis     on meds    Uterine prolaps     followed by GYN    Vulvar lesion 05/20/2015       Past Surgical History:   Procedure Laterality Date    APPENDECTOMY  1962    BACK SURGERY      BREAST BIOPSY      BREAST LUMPECTOMY  1989    CATARACT EXTRACTION Left 02/22/2021    ros    CATARACT EXTRACTION W/  INTRAOCULAR LENS IMPLANT Left 2/22/2021    Procedure: EXTRACTION, CATARACT, WITH IOL INSERTION;  Surgeon: Latisha Baker MD;  Location: [facility];  Service: Ophthalmology;  Laterality: Left;  left    CATARACT EXTRACTION W/  INTRAOCULAR LENS IMPLANT Right 3/22/2021     Procedure: EXTRACTION, CATARACT, WITH IOL INSERTION;  Surgeon: Latisha Baker MD;  Location: [facility] OR;  Service: Ophthalmology;  Laterality: Right;  right    CERVICAL FUSION  1991    CHOLECYSTECTOMY      COLONOSCOPY      COLONOSCOPY N/A 11/9/2018    Dr Oscar; chronic active colitis; repeat in 5 years    COLONOSCOPY N/A 2/14/2020    Procedure: COLONOSCOPY;  Surgeon: Fadi Oscar MD;  Location: [facility] ENDO;  Service: Endoscopy;  Laterality: N/A;    HYSTERECTOMY      TRACEE/BSO for benign disease    INSERTION OF MIDURETHRAL SLING N/A 9/22/2022    Procedure: SLING, MIDURETHRAL;  Surgeon: Yannick Cabral MD;  Location: [facility] OR;  Service: OB/GYN;  Laterality: N/A;    JOINT REPLACEMENT Right 2016    Hip replacement    IL REMOVAL OF OVARY/TUBE(S)      ROBOT-ASSISTED LAPAROSCOPIC ABDOMINAL SACROCOLPOPEXY USING DA FLAKITO XI N/A 12/14/2021    Procedure: XI ROBOTIC SACROCOLPOPEXY, ABDOMINAL;  Surgeon: Yannick Cabral MD;  Location: [facility] OR;  Service: OB/GYN;  Laterality: N/A;    ROBOT-ASSISTED LAPAROSCOPIC LYSIS OF ADHESIONS USING DA FLAKITO XI  12/14/2021    Procedure: XI ROBOTIC LYSIS, ADHESIONS;  Surgeon: Yannick Cabral MD;  Location: [facility] OR;  Service: OB/GYN;;    TONSILLECTOMY      TOTAL ABDOMINAL HYSTERECTOMY W/ BILATERAL SALPINGOOPHORECTOMY  1990       Social History     Socioeconomic History    Marital status:    Tobacco Use    Smoking status: Never    Smokeless tobacco: Never   Substance and Sexual Activity    Alcohol use: No    Drug use: No    Sexual activity: Not Currently     Social Determinants of Health     Financial Resource Strain: Unknown    Difficulty of Paying Living Expenses: Patient refused   Food Insecurity: Unknown    Worried About Running Out of Food in the Last Year: Patient refused    Ran Out of Food in the Last Year: Never true   Transportation Needs: No Transportation Needs    Lack of Transportation (Medical): No    Lack of Transportation (Non-Medical): No   Physical Activity: Unknown  "   Days of Exercise per Week: Patient refused   Stress: Unknown    Feeling of Stress : Patient refused   Social Connections: Unknown    Frequency of Communication with Friends and Family: More than three times a week    Frequency of Social Gatherings with Friends and Family: More than three times a week    Active Member of Clubs or Organizations: Yes    Attends Club or Organization Meetings: More than 4 times per year    Marital Status:    Housing Stability: Low Risk     Unable to Pay for Housing in the Last Year: No    Number of Places Lived in the Last Year: 1    Unstable Housing in the Last Year: No         Medications/Allergies: See med card    Vitals:    05/02/23 0927 05/02/23 0934   BP: (!) 196/86 (!) 174/74   Pulse: 101 90   Weight: 67 kg (147 lb 11.3 oz)    Height: 5' 4" (1.626 m)    PainSc:   7      Body mass index is 25.35 kg/m².    Physical exam:  Gen: A and O x3, pleasant, well-groomed  Skin: No rashes or obvious lesions  HEENT: PERRLA, no obvious deformities on ears or in canals.Trachea midline.  CVS: Regular rate and rhythm, normal palpable pulses.  Resp: Clear to auscultation bilaterally, no wheezes or rales.  Abdomen: Soft, NT/ND.  Musculoskeletal: No antalgic gait.     Neuro:  Motor:    Right Left   C4 Shoulder Abduction  5  5   C5 Elbow Flexion    5  5   C6 Wrist Extension  5  5   C7 Elbow Extension   5  5   C8/T1 Hand Intrinsics   5  5   C8 First Dorsal Interosseus  5  5   C8 Abductor Pollicus Brevis  5  5       Iliopsoas Quadriceps Knee  Flexion Tibialis  anterior Gastro- cnemius EHL   Lower: R 5/5 5/5 5/5 5/5 5/5 5/5    L 5/5 5/5 5/5 5/5 5/5 5/5        Left  Right    Triceps DTR 2+ 2+   Biceps DTR 2+ 2+   Brachioradialis DTR 2+ 2+   Patellar DTR 2+ 2+   Achilles DTR 2+ 2+   Castro Absent  Absent   Clonus Absent Absent   Babinski Absent Absent     Sensory: Intact and symmetrical to light touch and pinprick in C2-T1 dermatomes bilaterally. Intact and symmetrical to light touch and pinprick " in L1-S1 dermatomes bilaterally.  Cervical spine: ROM is full in flexion, moderately reduced with extension and lateral rotation with increased pain on right lateral rotation causing neck pain and periscapular pain on the right side.    Spurling's maneuver causes right neck pain.  Myofascial exam: No Tenderness to palpation across cervical paraspinous region bilaterally.    Lumbar spine:  Lumbar spine: ROM is moderately reduced with both flexion extension and oblique extension with no increased pain.    Laek's test causes no increased pain on either side.    Supine straight leg raise is negative bilaterally.    Internal and external rotation of the hip causes no increased pain on either side.  Myofascial exam: No tenderness to palpation across lumbar paraspinous muscles.  There is exquisite tenderness to palpation over the right GTB    Imagin19 Xray L-spine:  Bone density is normal.  There is a gentle levocurvature of the lumbar spine.  In her AP a the vertebral bodies maintain normal height and alignment.  There is no fracture.  There is marked disc space narrowing at the L4-5 level.  There is mild endplate sclerosis and osteophyte formation.  There is mild-to-moderate disc space narrowing at the L2-3, L3-4 levels and moderate disc space narrowing at the L5-S1 level.  There is multilevel facet joint arthropathy.  There is mild-to-moderate atherosclerosis.  There are surgical clips in the right upper abdomen from prior cholecystectomy.    19 MRI C-spine:  Vertebral column: There is no prior MRI or CT for comparison.  As seen on plain films, there is mild anterolisthesis of C3 on C4, C4 on C5.  There are changes of prior bony fusion of C5 and C6.  There is moderate to marked disc space narrowing at the C6-7 level.  There is no fracture.  The odontoid process is intact.  The discs are desiccated.  There is endplate osteophyte formation most apparent at the C6-7 level.  Baseline marrow signal is  normal.   Spinal canal, cord, epidural space: The spinal canal is developmentally normal.  There is no abnormal epidural soft tissue mass or fluid collection.  There is minimal flattening of the right ventral cord surface at the C4-5 level where there is a large disc extrusion.  There is no cord edema or myelomalacia.  C2-3: There is no spinal canal or significant foraminal stenosis.   C3-4: There is moderate-to-marked left foraminal stenosis due to uncovertebral spurring and facet joint arthropathy.  There is a mild disc osteophyte complex.  There is no spinal stenosis or cord compression.  The right foramen is patent.   C4-5: There is a large right paracentral disc extrusion measuring approximately 6 x 10 x 13 mm (AP, transverse, craniocaudad).  This does result in flattening of the right ventral cord surface but there is no obvious cord edema or myelomalacia.  There is moderate spinal stenosis at this level.  There is also moderate left foraminal narrowing due to uncovertebral spurring and facet joint arthropathy.   C5-6: There are changes of previous bony fusion.  There is no spinal canal or significant foraminal stenosis.   C6-7: There is moderate to marked disc space narrowing.  There is left greater than right uncovertebral spurring with bilateral facet joint arthropathy.  There is a mild disc osteophyte complex.  There is no spinal canal or significant foraminal stenosis.   C7-T1: There is left facet joint arthropathy.  There is at least mild left foraminal stenosis due to these changes.    12/17/22 MRI SI joints:  The SI joints are normal in appearance with no evidence of erosions, fusion, or other imaging findings of acute sacroiliitis/inflammatory arthritis.  No sacroiliac joint effusion.   There is degenerative disc disease in the lower lumbar spine resulting in degenerative disc desiccation, disc space narrowing, and degenerative endplate change, most pronounced at L4-L5 and L5-S1..  There is a grade I  anterolisthesis of L4 on L5.  There is fluid within the intervertebral disc at L5-S1.  There is bilateral facet arthropathy and uncovering of the intervertebral disc with a superimposed mild broad disc bulge at L4-L5.  There is mild right lateral recess stenosis.  There is, at most, mild overall central canal stenosis at L4-L5.   The visualized bony structures of the pelvis show no evidence of acute fracture or pathologic marrow replacement process.  There is incidentally observed postoperative change of total right hip arthroplasty.  This results in metal artifact within the adjacent osseous and soft tissue structures resulting in poor fat suppression.         Assessment:  Dorothy Kramer is a 82 y.o. year old female patient who has a past medical history of DDD (degenerative disc disease), cervical, Diverticulitis, Diverticulosis, DJD (degenerative joint disease) of hip, Dyspepsia, GERD (gastroesophageal reflux disease), History of melanoma, HTN (hypertension), Melanoma, Migraine headache, Rectocele, Skin cancer, Sleep apnea, Thyroid disease, Trouble in sleeping, Ulcerative colitis, Uterine prolaps, and Vulvar lesion. She presents in referral from Dr. Kamila Gilbert for back and neck pain.  1. Cervical radiculopathy        2. Cervical spinal stenosis        3. DDD (degenerative disc disease), lumbar        4. Spondylolisthesis of lumbar region        5. Bilateral lumbar radiculopathy        6. Trochanteric bursitis of right hip            Plan:  1.  We reviewed her cervical spine MRI that shows significant canal stenosis at C4/5 likely causing her neck pain, right upper back pain symptoms and the pain that radiates across both sides.  She also has facet degenerative changes at that level and below her fusion as well.  We discussed the role of epidural steroid injections and she would like to proceed, I am going to set her up for a cervical KATHY entering at C7/T1 with spread up to the C4-5 level.  If she has relief  with this, we can attend to her low back issues.  2. In terms of her back, we reviewed her sacral MRI and it does show some canal narrowing at L4-5 and foraminal narrowing bilaterally.  I think that this could contribute to some of her low back pain, left leg radicular symptoms, unclear if her right back and hip pain is lumbar related.  We could always try an epidural steroid injection in the lumbar region and see if it helps her right lateral hip pain.  If it does not, I would send her back to Orthopedics to treat her GTB      The total time spent for evaluation and management today including reviewing separately obtained history, performing a medically appropriate exam and evaluation, documenting clinical information in the health record, independently interpreting results and communicating them to the patient/family/caregiver, and ordering medications/tests/procedures was between 60-74 minutes.      Thank you for referring this interesting patient, and I look forward to continuing to collaborate in her care.

## 2023-05-18 ENCOUNTER — HOSPITAL ENCOUNTER (OUTPATIENT)
Dept: RADIOLOGY | Facility: HOSPITAL | Age: 83
Discharge: HOME | End: 2023-05-18
Attending: ANESTHESIOLOGY | Admitting: ANESTHESIOLOGY
Payer: MEDICARE

## 2023-05-18 ENCOUNTER — HOSPITAL ENCOUNTER (OUTPATIENT)
Facility: HOSPITAL | Age: 83
Discharge: HOME | End: 2023-05-18
Attending: ANESTHESIOLOGY | Admitting: ANESTHESIOLOGY
Payer: MEDICARE

## 2023-05-18 DIAGNOSIS — M54.12 CERVICAL RADICULOPATHY: ICD-10-CM

## 2023-05-18 DIAGNOSIS — M54.2 NECK PAIN: ICD-10-CM

## 2023-05-18 PROCEDURE — 25500020 PHARM REV CODE 255: Mod: PO | Performed by: ANESTHESIOLOGY

## 2023-05-18 PROCEDURE — 62321 NJX INTERLAMINAR CRV/THRC: CPT | Mod: ,,, | Performed by: ANESTHESIOLOGY

## 2023-05-18 PROCEDURE — A4216 STERILE WATER/SALINE, 10 ML: HCPCS | Mod: PO | Performed by: ANESTHESIOLOGY

## 2023-05-18 PROCEDURE — 63600175 PHARM REV CODE 636 W HCPCS: Mod: PO | Performed by: ANESTHESIOLOGY

## 2023-05-18 PROCEDURE — 25000003 PHARM REV CODE 250: Mod: PO | Performed by: ANESTHESIOLOGY

## 2023-05-18 PROCEDURE — 62321 NJX INTERLAMINAR CRV/THRC: CPT

## 2023-05-18 PROCEDURE — 62321 PR INJ CERV/THORAC, W/GUIDANCE: ICD-10-PCS | Mod: ,,, | Performed by: ANESTHESIOLOGY

## 2023-05-18 RX ORDER — MIDAZOLAM HYDROCHLORIDE 2 MG/2ML
INJECTION, SOLUTION INTRAMUSCULAR; INTRAVENOUS
Status: DISCONTINUED | OUTPATIENT
Start: 2023-05-18 | End: 2023-05-18 | Stop reason: HOSPADM

## 2023-05-18 RX ORDER — METHYLPREDNISOLONE ACETATE 80 MG/ML
INJECTION, SUSPENSION INTRA-ARTICULAR; INTRALESIONAL; INTRAMUSCULAR; SOFT TISSUE
Status: DISCONTINUED | OUTPATIENT
Start: 2023-05-18 | End: 2023-05-18 | Stop reason: HOSPADM

## 2023-05-18 RX ORDER — LIDOCAINE HYDROCHLORIDE 10 MG/ML
INJECTION, SOLUTION EPIDURAL; INFILTRATION; INTRACAUDAL; PERINEURAL
Status: DISCONTINUED | OUTPATIENT
Start: 2023-05-18 | End: 2023-05-18 | Stop reason: HOSPADM

## 2023-05-18 RX ORDER — SODIUM CHLORIDE 9 MG/ML
INJECTION, SOLUTION INTRAMUSCULAR; INTRAVENOUS; SUBCUTANEOUS
Status: DISCONTINUED | OUTPATIENT
Start: 2023-05-18 | End: 2023-05-18 | Stop reason: HOSPADM

## 2023-05-18 RX ORDER — SODIUM CHLORIDE, SODIUM LACTATE, POTASSIUM CHLORIDE, CALCIUM CHLORIDE 600; 310; 30; 20 MG/100ML; MG/100ML; MG/100ML; MG/100ML
INJECTION, SOLUTION INTRAVENOUS CONTINUOUS
Status: DISCONTINUED | OUTPATIENT
Start: 2023-05-18 | End: 2023-05-18 | Stop reason: HOSPADM

## 2023-05-18 RX ADMIN — SODIUM CHLORIDE, POTASSIUM CHLORIDE, SODIUM LACTATE AND CALCIUM CHLORIDE: 600; 310; 30; 20 INJECTION, SOLUTION INTRAVENOUS at 01:05

## 2023-05-18 NOTE — DISCHARGE SUMMARY
[facility] - Surgery  Discharge Note  Short Stay    Procedure(s) (LRB):  Injection-steroid-epidural-cervical C7/T1 (N/A)      OUTCOME: Patient tolerated treatment/procedure well without complication and is now ready for discharge.    DISPOSITION: Home or Self Care    FINAL DIAGNOSIS:  Cervical radiculopathy    FOLLOWUP: In clinic    DISCHARGE INSTRUCTIONS:    Discharge Procedure Orders   Diet Adult Regular     No dressing needed     Notify your health care provider if you experience any of the following:  temperature >100.4     Activity as tolerated

## 2023-05-18 NOTE — OP NOTE
PROCEDURE DATE: 5/18/2023    Procedure: C7-T1 cervical interlaminar epidural steroid injection under utilizing fluoroscopy.    Diagnosis: Cervical Radiculopathy    POSTOP DIAGNOSIS: SAME    Physician: Chintan Damon MD    Medications injected:  Methylprednisone 80mg followed by a slow injection of 4 mL sterile, preservative-free normal saline.    Local anesthetic used: Lidocaine 1%, 4 ml.    Sedation Medications: 2mg versed    Complications:  none    Estimated blood loss: none    Technique:  A time-out was taken to identify patient and procedure prior to starting the procedure.  With the patient laying in a prone position with the neck in a mid-flexed forward position, the area was prepped and draped in the usual sterile fashion using ChloraPrep and a fenestrated drape.  The area was determined under AP fluoroscopic guidance.  Local anesthetic was given using a 25-gauge 1.5 inch needle by raising a wheal and then infiltrating ventrally.  A 3.5 inch 20-gauge Touhy needle was introduced under fluoroscopic guidance to meet the lamina of C7.  The needle was then hinged under the lamina then advanced using loss of resistance technique.  Once the tip of the needle was in the desired position, the contrast dye Omnipaque was injected to determine placement and no uptake.  The steroid was then injected slowly followed by a slow injection of 4 mL of the sterile preservative-free normal saline.  The patient tolerated the procedure well.    The patient was monitored after the procedure and was given post-procedure and discharge instructions to follow at home. The patient was discharged in a stable condition.

## 2023-05-19 VITALS
RESPIRATION RATE: 16 BRPM | HEIGHT: 64 IN | TEMPERATURE: 98 F | DIASTOLIC BLOOD PRESSURE: 62 MMHG | SYSTOLIC BLOOD PRESSURE: 136 MMHG | OXYGEN SATURATION: 97 % | BODY MASS INDEX: 24.92 KG/M2 | WEIGHT: 146 LBS | HEART RATE: 79 BPM

## 2023-06-15 ENCOUNTER — OFFICE VISIT (OUTPATIENT)
Dept: PAIN MEDICINE | Facility: CLINIC | Age: 83
End: 2023-06-15
Payer: MEDICARE

## 2023-06-15 VITALS
HEIGHT: 64 IN | HEART RATE: 85 BPM | WEIGHT: 145.94 LBS | BODY MASS INDEX: 24.92 KG/M2 | DIASTOLIC BLOOD PRESSURE: 69 MMHG | SYSTOLIC BLOOD PRESSURE: 157 MMHG

## 2023-06-15 DIAGNOSIS — M43.16 SPONDYLOLISTHESIS OF LUMBAR REGION: ICD-10-CM

## 2023-06-15 DIAGNOSIS — M54.16 LUMBAR RADICULOPATHY: Primary | ICD-10-CM

## 2023-06-15 DIAGNOSIS — M54.16 BILATERAL LUMBAR RADICULOPATHY: ICD-10-CM

## 2023-06-15 DIAGNOSIS — M50.30 DDD (DEGENERATIVE DISC DISEASE), CERVICAL: ICD-10-CM

## 2023-06-15 DIAGNOSIS — M48.02 CERVICAL SPINAL STENOSIS: ICD-10-CM

## 2023-06-15 DIAGNOSIS — M51.36 DDD (DEGENERATIVE DISC DISEASE), LUMBAR: Primary | ICD-10-CM

## 2023-06-15 PROCEDURE — 3077F SYST BP >= 140 MM HG: CPT | Mod: CPTII,S$GLB,, | Performed by: PHYSICIAN ASSISTANT

## 2023-06-15 PROCEDURE — 1125F AMNT PAIN NOTED PAIN PRSNT: CPT | Mod: CPTII,S$GLB,, | Performed by: PHYSICIAN ASSISTANT

## 2023-06-15 PROCEDURE — 99999 PR PBB SHADOW E&M-EST. PATIENT-LVL III: ICD-10-PCS | Mod: PBBFAC,,, | Performed by: PHYSICIAN ASSISTANT

## 2023-06-15 PROCEDURE — 99999 PR PBB SHADOW E&M-EST. PATIENT-LVL III: CPT | Mod: PBBFAC,,, | Performed by: PHYSICIAN ASSISTANT

## 2023-06-15 PROCEDURE — 1159F PR MEDICATION LIST DOCUMENTED IN MEDICAL RECORD: ICD-10-PCS | Mod: CPTII,S$GLB,, | Performed by: PHYSICIAN ASSISTANT

## 2023-06-15 PROCEDURE — 1125F PR PAIN SEVERITY QUANTIFIED, PAIN PRESENT: ICD-10-PCS | Mod: CPTII,S$GLB,, | Performed by: PHYSICIAN ASSISTANT

## 2023-06-15 PROCEDURE — 1101F PT FALLS ASSESS-DOCD LE1/YR: CPT | Mod: CPTII,S$GLB,, | Performed by: PHYSICIAN ASSISTANT

## 2023-06-15 PROCEDURE — 99214 PR OFFICE/OUTPT VISIT, EST, LEVL IV, 30-39 MIN: ICD-10-PCS | Mod: S$GLB,,, | Performed by: PHYSICIAN ASSISTANT

## 2023-06-15 PROCEDURE — 3078F PR MOST RECENT DIASTOLIC BLOOD PRESSURE < 80 MM HG: ICD-10-PCS | Mod: CPTII,S$GLB,, | Performed by: PHYSICIAN ASSISTANT

## 2023-06-15 PROCEDURE — 1157F ADVNC CARE PLAN IN RCRD: CPT | Mod: CPTII,S$GLB,, | Performed by: PHYSICIAN ASSISTANT

## 2023-06-15 PROCEDURE — 1157F PR ADVANCE CARE PLAN OR EQUIV PRESENT IN MEDICAL RECORD: ICD-10-PCS | Mod: CPTII,S$GLB,, | Performed by: PHYSICIAN ASSISTANT

## 2023-06-15 PROCEDURE — 1160F PR REVIEW ALL MEDS BY PRESCRIBER/CLIN PHARMACIST DOCUMENTED: ICD-10-PCS | Mod: CPTII,S$GLB,, | Performed by: PHYSICIAN ASSISTANT

## 2023-06-15 PROCEDURE — 1159F MED LIST DOCD IN RCRD: CPT | Mod: CPTII,S$GLB,, | Performed by: PHYSICIAN ASSISTANT

## 2023-06-15 PROCEDURE — 3078F DIAST BP <80 MM HG: CPT | Mod: CPTII,S$GLB,, | Performed by: PHYSICIAN ASSISTANT

## 2023-06-15 PROCEDURE — 3288F FALL RISK ASSESSMENT DOCD: CPT | Mod: CPTII,S$GLB,, | Performed by: PHYSICIAN ASSISTANT

## 2023-06-15 PROCEDURE — 3288F PR FALLS RISK ASSESSMENT DOCUMENTED: ICD-10-PCS | Mod: CPTII,S$GLB,, | Performed by: PHYSICIAN ASSISTANT

## 2023-06-15 PROCEDURE — 99214 OFFICE O/P EST MOD 30 MIN: CPT | Mod: S$GLB,,, | Performed by: PHYSICIAN ASSISTANT

## 2023-06-15 PROCEDURE — 1160F RVW MEDS BY RX/DR IN RCRD: CPT | Mod: CPTII,S$GLB,, | Performed by: PHYSICIAN ASSISTANT

## 2023-06-15 PROCEDURE — 1101F PR PT FALLS ASSESS DOC 0-1 FALLS W/OUT INJ PAST YR: ICD-10-PCS | Mod: CPTII,S$GLB,, | Performed by: PHYSICIAN ASSISTANT

## 2023-06-15 PROCEDURE — 3077F PR MOST RECENT SYSTOLIC BLOOD PRESSURE >= 140 MM HG: ICD-10-PCS | Mod: CPTII,S$GLB,, | Performed by: PHYSICIAN ASSISTANT

## 2023-06-15 RX ORDER — ALPRAZOLAM 0.5 MG/1
1 TABLET, ORALLY DISINTEGRATING ORAL ONCE AS NEEDED
Status: CANCELLED | OUTPATIENT
Start: 2023-06-15 | End: 2034-11-11

## 2023-06-15 RX ORDER — GABAPENTIN 100 MG/1
100 CAPSULE ORAL NIGHTLY
Qty: 30 CAPSULE | Refills: 2 | Status: SHIPPED | OUTPATIENT
Start: 2023-06-15 | End: 2023-07-03

## 2023-06-19 ENCOUNTER — OFFICE VISIT (OUTPATIENT)
Dept: DERMATOLOGY | Facility: CLINIC | Age: 83
End: 2023-06-19
Payer: MEDICARE

## 2023-06-19 VITALS — HEIGHT: 64 IN | WEIGHT: 145.94 LBS | BODY MASS INDEX: 24.92 KG/M2 | RESPIRATION RATE: 18 BRPM

## 2023-06-19 DIAGNOSIS — Z12.83 SCREENING FOR SKIN CANCER: ICD-10-CM

## 2023-06-19 DIAGNOSIS — D18.01 CHERRY ANGIOMA: ICD-10-CM

## 2023-06-19 DIAGNOSIS — D22.9 MULTIPLE BENIGN NEVI: Primary | ICD-10-CM

## 2023-06-19 DIAGNOSIS — L81.4 LENTIGINES: ICD-10-CM

## 2023-06-19 DIAGNOSIS — Z85.820 HISTORY OF MALIGNANT MELANOMA: ICD-10-CM

## 2023-06-19 DIAGNOSIS — L82.1 SK (SEBORRHEIC KERATOSIS): ICD-10-CM

## 2023-06-19 DIAGNOSIS — Z85.828 HISTORY OF NONMELANOMA SKIN CANCER: ICD-10-CM

## 2023-06-19 PROCEDURE — 99999 PR PBB SHADOW E&M-EST. PATIENT-LVL III: CPT | Mod: PBBFAC,,, | Performed by: DERMATOLOGY

## 2023-06-19 PROCEDURE — 1157F ADVNC CARE PLAN IN RCRD: CPT | Mod: CPTII,S$GLB,, | Performed by: DERMATOLOGY

## 2023-06-19 PROCEDURE — 99213 OFFICE O/P EST LOW 20 MIN: CPT | Mod: S$GLB,,, | Performed by: DERMATOLOGY

## 2023-06-19 PROCEDURE — 1157F PR ADVANCE CARE PLAN OR EQUIV PRESENT IN MEDICAL RECORD: ICD-10-PCS | Mod: CPTII,S$GLB,, | Performed by: DERMATOLOGY

## 2023-06-19 PROCEDURE — 1159F PR MEDICATION LIST DOCUMENTED IN MEDICAL RECORD: ICD-10-PCS | Mod: CPTII,S$GLB,, | Performed by: DERMATOLOGY

## 2023-06-19 PROCEDURE — 1159F MED LIST DOCD IN RCRD: CPT | Mod: CPTII,S$GLB,, | Performed by: DERMATOLOGY

## 2023-06-19 PROCEDURE — 99213 PR OFFICE/OUTPT VISIT, EST, LEVL III, 20-29 MIN: ICD-10-PCS | Mod: S$GLB,,, | Performed by: DERMATOLOGY

## 2023-06-19 PROCEDURE — 1126F PR PAIN SEVERITY QUANTIFIED, NO PAIN PRESENT: ICD-10-PCS | Mod: CPTII,S$GLB,, | Performed by: DERMATOLOGY

## 2023-06-19 PROCEDURE — 1101F PT FALLS ASSESS-DOCD LE1/YR: CPT | Mod: CPTII,S$GLB,, | Performed by: DERMATOLOGY

## 2023-06-19 PROCEDURE — 1101F PR PT FALLS ASSESS DOC 0-1 FALLS W/OUT INJ PAST YR: ICD-10-PCS | Mod: CPTII,S$GLB,, | Performed by: DERMATOLOGY

## 2023-06-19 PROCEDURE — 99999 PR PBB SHADOW E&M-EST. PATIENT-LVL III: ICD-10-PCS | Mod: PBBFAC,,, | Performed by: DERMATOLOGY

## 2023-06-19 PROCEDURE — 3288F FALL RISK ASSESSMENT DOCD: CPT | Mod: CPTII,S$GLB,, | Performed by: DERMATOLOGY

## 2023-06-19 PROCEDURE — 3288F PR FALLS RISK ASSESSMENT DOCUMENTED: ICD-10-PCS | Mod: CPTII,S$GLB,, | Performed by: DERMATOLOGY

## 2023-06-19 PROCEDURE — 1126F AMNT PAIN NOTED NONE PRSNT: CPT | Mod: CPTII,S$GLB,, | Performed by: DERMATOLOGY

## 2023-06-19 NOTE — PATIENT INSTRUCTIONS
What Are the Symptoms of Skin Cancer?  A change in your skin is the most common sign of skin cancer. This could be a new growth, a sore that doesnt heal, or a change in a mole. Not all skin cancers look the same.    For melanoma specifically, a simple way to remember the warning signs is to remember the A-B-C-D-Es of melanoma--    A stands for asymmetrical. Does the mole or spot have an irregular shape with two parts that look very different?  B stands for border. Is the border irregular or jagged?  C is for color. Is the color uneven?  D is for diameter. Is the mole or spot larger than the size of a pea?  E is for evolving. Has the mole or spot changed during the past few weeks or months?    Talk to your doctor if you notice changes in your skin such as a new growth, a sore that doesnt heal, a change in an old growth, or any of the A-B-C-D-Es of melanoma    What Can I Do to Reduce My Risk of Skin Cancer?  Protection from ultraviolet (UV) radiation is important all year, not just during the summer or at the beach. UV rays from the sun can reach you on cloudy and hazy days, not just on bright and christian days. UV rays also reflect off of surfaces like water, cement, sand, and snow. Indoor tanning (using a tanning bed, cr, or sunlamp to get tan) exposes users to UV radiation.    The hours between 10 a.m. and 4 p.m. Daylight Saving Time (9 a.m. to 3 p.m. standard time) are the most hazardous for UV exposure outdoors in the continental [place]. UV rays from sunlight are the greatest during the late spring and early summer in [place].    CDC recommends easy options for protection from UV radiation--    Stay in the shade or indoors, especially during midday hours.  Wear clothing that covers your arms and legs.  Wear a hat with a wide brim to shade your face, head, ears, and neck.  Wear sunglasses that wrap around and block both UVA and UVB rays.  Use sunscreen with a sun protection factor (SPF) of  30 or higher, and both UVA and UVB (broad spectrum) protection.  Avoid indoor tanning.    Adapted from https://www.cdc.gov/cancer/skin/basic_info/

## 2023-06-19 NOTE — PROGRESS NOTES
"  Subjective:      Patient ID:  Dorothy Kramer is a 82 y.o. female who presents for   Chief Complaint   Patient presents with    Skin Check     HPI  New patient.  Here today for total body skin exam.  Hx of MM, NMSC as below.   No lesions of concern today.     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        Past Medical History:   Diagnosis Date    DDD (degenerative disc disease), cervical     Diverticulitis     Diverticulosis     DJD (degenerative joint disease) of hip     Dyspepsia     GERD (gastroesophageal reflux disease)     History of melanoma 05/20/2015    HTN (hypertension)     Melanoma     Migraine headache     Rectocele     Skin cancer     melanoma on face    Sleep apnea     doesnt use cpap    Thyroid disease     hypo    Trouble in sleeping     Ulcerative colitis     on meds    Uterine prolaps     followed by GYN    Vulvar lesion 05/20/2015       Review of Systems   Skin:  Positive for itching, rash, dry skin and dry lips. Negative for daily sunscreen use, activity-related sunscreen use, recent sunburn and wears hat.   Hematologic/Lymphatic: Bruises/bleeds easily.     Objective:   Physical Exam   Constitutional: She appears well-developed and well-nourished. No distress.   Musculoskeletal: Lymphadenopathy:      Cervical: No cervical adenopathy.      Upper Body:   No axillary adenopathy present.No supraclavicular adenopathy is present.     Lymphadenopathy: No supraclavicular adenopathy is present.     She has no cervical adenopathy.     She has no axillary adenopathy.   Neurological: She is alert and oriented to person, place, and time. She is not disoriented.   Psychiatric: She has a normal mood and affect.   Skin:   Areas Examined (abnormalities noted in diagram):   Scalp / Hair Palpated and Inspected  Head / Face Inspection Performed  Neck Inspection Performed  Chest / Axilla " Inspection Performed  Abdomen Inspection Performed  Genitals / Buttocks / Groin Inspection Performed  Back Inspection Performed  RUE Inspected  LUE Inspection Performed  RLE Inspected  LLE Inspection Performed  Nails and Digits Inspection Performed                   Diagram Legend     Erythematous scaling macule/papule c/w actinic keratosis       Vascular papule c/w angioma      Pigmented verrucoid papule/plaque c/w seborrheic keratosis      Yellow umbilicated papule c/w sebaceous hyperplasia      Irregularly shaped tan macule c/w lentigo     1-2 mm smooth white papules consistent with Milia      Movable subcutaneous cyst with punctum c/w epidermal inclusion cyst      Subcutaneous movable cyst c/w pilar cyst      Firm pink to brown papule c/w dermatofibroma      Pedunculated fleshy papule(s) c/w skin tag(s)      Evenly pigmented macule c/w junctional nevus     Mildly variegated pigmented, slightly irregular-bordered macule c/w mildly atypical nevus      Flesh colored to evenly pigmented papule c/w intradermal nevus       Pink pearly papule/plaque c/w basal cell carcinoma      Erythematous hyperkeratotic cursted plaque c/w SCC      Surgical scar with no sign of skin cancer recurrence      Open and closed comedones      Inflammatory papules and pustules      Verrucoid papule consistent consistent with wart     Erythematous eczematous patches and plaques     Dystrophic onycholytic nail with subungual debris c/w onychomycosis     Umbilicated papule    Erythematous-base heme-crusted tan verrucoid plaque consistent with inflamed seborrheic keratosis     Erythematous Silvery Scaling Plaque c/w Psoriasis     See annotation      Assessment / Plan:        Multiple benign nevi  - Discussed diagnosis, etiology, and benign-nature of condition.  - Reassured; no lesions suspicious for malignancy noted on exam today.   - Recommended routine self examination of skin. Discussed the ABCDEs of melanoma and ugly duckling sign.   -  Recommended daily sun protection, including the use of OTC broad-spectrum sunscreen (SPF 30 or greater) and sun-protective clothing.      Lentigines  - Benign; reassured treatment not necessary.   - Recommended daily sun protection, including the use of OTC broad-spectrum sunscreen (SPF 30 or greater) and sun-protective clothing.       SK (seborrheic keratosis)  Cherry angioma  - Benign; reassured treatment not necessary.      History of malignant melanoma  History of nonmelanoma skin cancer  Screening for skin cancer  - Total body skin examination performed today.  - Findings listed above.   - Sites of prior malignancy, regional LN examined - no concern for recurrence today.    - Recommended routine self examination of skin.    - Recommended daily sun protection, including the use of OTC broad-spectrum sunscreen (SPF 30 or greater) and sun-protective clothing.             Follow up in about 6 months (around 12/19/2023) for skin check.

## 2023-06-25 NOTE — PROGRESS NOTES
This note was completed with dictation software and grammatical errors may exist.    Chief Complaint   Patient presents with    Follow-up        HPI: Dorothy Kramer is a 82 y.o. year old female patient who has a past medical history of DDD (degenerative disc disease), cervical, Diverticulitis, Diverticulosis, DJD (degenerative joint disease) of hip, Dyspepsia, GERD (gastroesophageal reflux disease), History of melanoma, HTN (hypertension), Melanoma, Migraine headache, Rectocele, Skin cancer, Sleep apnea, Thyroid disease, Trouble in sleeping, Ulcerative colitis, Uterine prolaps, and Vulvar lesion. She presents in referral from No ref. provider found for back and neck pain.  She is status post C7-T1 interlaminar epidural steroid injection on 05/18/2023 with minimal relief.  The patient is new to me.  She describes left-sided neck pain without current radiation.  Her main complaint today is bilateral low back pain radiating to the bilateral buttocks, posterior thighs and right calf.  She describes it as sharp and dull.  She reports occasional numbness in her extremities.  She reports chronic weakness in her right arm.  She is not sure if she has any new weakness.  She denies incontinence.    Initial history:  The patient states that over 30 years ago she had neck pain radiating into the right arm, developed weakness, eventually underwent a C5/6 fusion.  She did well with this and has no longer had severe neck pain.  However in the last several years she began having more pain in the neck radiating into the upper back, right rhomboid region, right shoulder.  She denies any weakness in the arm.  She describes it as aching, burning, sharp and deep.  Her pain is worse with turning her head side-to-side, extension.  She also has pain in the low back, left buttock that she calls sciatica that radiates further down her leg.  She also has pain worse with standing, walking, bending getting out of a bed or a chair.  She has some  pain over the right trochanter, states if she lays on that side at night, it will wake her up and she only gets several hours of sleep.  Because her neck hurts so much she tries to lay on her right side to immobilize her arm but then starts having worsening right lateral hip pain.  She had a GTB injection with Dr. Carmona after she tried physical therapy for the pain but this was not successful.  The injection did seem to provide several days of relief but returned.  She states that the right GTB is tender to palpation, but also has some right low back pain as well.        Pain intervention history:  Right GTB injection gave 3 days of relief.  She is status post C7-T1 interlaminar epidural steroid injection on 05/18/2023 with minimal relief.     Spine surgeries:  ACDF C5-6 30 years ago    Antineuropathics:  NSAIDs:  Physical therapy: She had done physical therapy several months ago for neck pain, back pain, trochanteric bursitis without much benefit she states that she does not want to return to physical therapy.  Antidepressants:  Muscle relaxers:  Opioids:  Antiplatelets/Anticoagulants:        ROS:  She reports rash, joint stiffness, joint swelling, back pain, difficulty sleeping possible dizziness and loss of balance.  Balance of review of systems is negative.    No results found for: LABA1C, HGBA1C    Lab Results   Component Value Date    WBC 7.30 12/07/2022    HGB 14.1 12/07/2022    HCT 43.4 12/07/2022    MCV 96 12/07/2022     (L) 12/07/2022             Past Medical History:   Diagnosis Date    DDD (degenerative disc disease), cervical     Diverticulitis     Diverticulosis     DJD (degenerative joint disease) of hip     Dyspepsia     GERD (gastroesophageal reflux disease)     History of melanoma 05/20/2015    HTN (hypertension)     Melanoma     Migraine headache     Rectocele     Skin cancer     melanoma on face    Sleep apnea     doesnt use cpap    Thyroid disease     hypo    Trouble in sleeping      Ulcerative colitis     on meds    Uterine prolaps     followed by GYN    Vulvar lesion 05/20/2015       Past Surgical History:   Procedure Laterality Date    APPENDECTOMY  1962    BACK SURGERY      BREAST BIOPSY      BREAST LUMPECTOMY  1989    CATARACT EXTRACTION Left 02/22/2021    ros    CATARACT EXTRACTION W/  INTRAOCULAR LENS IMPLANT Left 2/22/2021    Procedure: EXTRACTION, CATARACT, WITH IOL INSERTION;  Surgeon: Latisha Baker MD;  Location: [facility] OR;  Service: Ophthalmology;  Laterality: Left;  left    CATARACT EXTRACTION W/  INTRAOCULAR LENS IMPLANT Right 3/22/2021    Procedure: EXTRACTION, CATARACT, WITH IOL INSERTION;  Surgeon: Latisha Baker MD;  Location: [facility] OR;  Service: Ophthalmology;  Laterality: Right;  right    CERVICAL FUSION  1991    CHOLECYSTECTOMY      COLONOSCOPY      COLONOSCOPY N/A 11/9/2018    Dr Oscar; chronic active colitis; repeat in 5 years    COLONOSCOPY N/A 2/14/2020    Procedure: COLONOSCOPY;  Surgeon: Fadi Oscar MD;  Location: [facility];  Service: Endoscopy;  Laterality: N/A;    EPIDURAL STEROID INJECTION INTO CERVICAL SPINE N/A 5/18/2023    Procedure: Injection-steroid-epidural-cervical C7/T1;  Surgeon: Chintan Damon MD;  Location: [facility] OR;  Service: Pain Management;  Laterality: N/A;  cervical    HYSTERECTOMY      TRACEE/BSO for benign disease    INSERTION OF MIDURETHRAL SLING N/A 9/22/2022    Procedure: SLING, MIDURETHRAL;  Surgeon: Yannick Cabral MD;  Location: [facility] OR;  Service: OB/GYN;  Laterality: N/A;    JOINT REPLACEMENT Right 2016    Hip replacement    SC REMOVAL OF OVARY/TUBE(S)      ROBOT-ASSISTED LAPAROSCOPIC ABDOMINAL SACROCOLPOPEXY USING DA FLAKITO XI N/A 12/14/2021    Procedure: XI ROBOTIC SACROCOLPOPEXY, ABDOMINAL;  Surgeon: Yannick Cabral MD;  Location: [facility] OR;  Service: OB/GYN;  Laterality: N/A;    ROBOT-ASSISTED LAPAROSCOPIC LYSIS OF ADHESIONS USING DA FLAKITO XI  12/14/2021    Procedure: XI ROBOTIC LYSIS, ADHESIONS;  Surgeon: Yannick  "MD Misha;  Location: [facility];  Service: OB/GYN;;    TONSILLECTOMY      TOTAL ABDOMINAL HYSTERECTOMY W/ BILATERAL SALPINGOOPHORECTOMY  1990       Social History     Socioeconomic History    Marital status:    Tobacco Use    Smoking status: Never    Smokeless tobacco: Never   Substance and Sexual Activity    Alcohol use: No    Drug use: No    Sexual activity: Not Currently         Medications/Allergies: See med card    Vitals:    06/15/23 1437   BP: (!) 157/69   Pulse: 85   Weight: 66.2 kg (145 lb 15.1 oz)   Height: 5' 4" (1.626 m)   PainSc:   5   PainLoc: Back     Body mass index is 25.05 kg/m².    Physical exam:  Gen: A and O x3, pleasant, well-groomed  Skin: No rashes or obvious lesions  HEENT: PERRLA, no obvious deformities on ears or in canals.Trachea midline.  CVS: Regular rate and rhythm, normal palpable pulses.  Resp: Clear to auscultation bilaterally, no wheezes or rales.  Abdomen: Soft, NT/ND.  Musculoskeletal: No antalgic gait.     Neuro:  Motor:    Right Left   C4 Shoulder Abduction  5  5   C5 Elbow Flexion    5  5   C6 Wrist Extension  5  5   C7 Elbow Extension   5  5   C8/T1 Hand Intrinsics   5  5   C8 First Dorsal Interosseus  5  5   C8 Abductor Pollicus Brevis  5  5       Iliopsoas Quadriceps Knee  Flexion Tibialis  anterior Gastro- cnemius EHL   Lower: R 5/5 5/5 5/5 5/5 5/5 5/5    L 5/5 5/5 5/5 5/5 5/5 5/5        Left  Right    Triceps DTR 2+ 2+   Biceps DTR 2+ 2+   Brachioradialis DTR 2+ 2+   Patellar DTR 2+ 2+   Achilles DTR 2+ 2+   Castro Absent  Absent   Clonus Absent Absent   Babinski Absent Absent     Sensory: Intact and symmetrical to light touch and pinprick in C2-T1 dermatomes bilaterally. Intact and symmetrical to light touch and pinprick in L1-S1 dermatomes bilaterally.  Cervical spine: ROM is full in flexion, moderately reduced with extension and lateral rotation with increased pain on right lateral rotation causing neck pain and periscapular pain on the right side.  "   Spurling's maneuver causes right neck pain.  Myofascial exam: No Tenderness to palpation across cervical paraspinous region bilaterally.    Lumbar spine:  Lumbar spine: ROM is moderately reduced with both flexion extension and oblique extension with no increased pain.    Alek's test causes no increased pain on either side.    Supine straight leg raise is negative bilaterally.    Internal and external rotation of the hip causes no increased pain on either side.  Myofascial exam: No tenderness to palpation across lumbar paraspinous muscles.  There is exquisite tenderness to palpation over the right GTB    Imagin19 Xray L-spine:  Bone density is normal.  There is a gentle levocurvature of the lumbar spine.  In her AP a the vertebral bodies maintain normal height and alignment.  There is no fracture.  There is marked disc space narrowing at the L4-5 level.  There is mild endplate sclerosis and osteophyte formation.  There is mild-to-moderate disc space narrowing at the L2-3, L3-4 levels and moderate disc space narrowing at the L5-S1 level.  There is multilevel facet joint arthropathy.  There is mild-to-moderate atherosclerosis.  There are surgical clips in the right upper abdomen from prior cholecystectomy.    19 MRI C-spine:  Vertebral column: There is no prior MRI or CT for comparison.  As seen on plain films, there is mild anterolisthesis of C3 on C4, C4 on C5.  There are changes of prior bony fusion of C5 and C6.  There is moderate to marked disc space narrowing at the C6-7 level.  There is no fracture.  The odontoid process is intact.  The discs are desiccated.  There is endplate osteophyte formation most apparent at the C6-7 level.  Baseline marrow signal is normal.   Spinal canal, cord, epidural space: The spinal canal is developmentally normal.  There is no abnormal epidural soft tissue mass or fluid collection.  There is minimal flattening of the right ventral cord surface at the C4-5 level  where there is a large disc extrusion.  There is no cord edema or myelomalacia.  C2-3: There is no spinal canal or significant foraminal stenosis.   C3-4: There is moderate-to-marked left foraminal stenosis due to uncovertebral spurring and facet joint arthropathy.  There is a mild disc osteophyte complex.  There is no spinal stenosis or cord compression.  The right foramen is patent.   C4-5: There is a large right paracentral disc extrusion measuring approximately 6 x 10 x 13 mm (AP, transverse, craniocaudad).  This does result in flattening of the right ventral cord surface but there is no obvious cord edema or myelomalacia.  There is moderate spinal stenosis at this level.  There is also moderate left foraminal narrowing due to uncovertebral spurring and facet joint arthropathy.   C5-6: There are changes of previous bony fusion.  There is no spinal canal or significant foraminal stenosis.   C6-7: There is moderate to marked disc space narrowing.  There is left greater than right uncovertebral spurring with bilateral facet joint arthropathy.  There is a mild disc osteophyte complex.  There is no spinal canal or significant foraminal stenosis.   C7-T1: There is left facet joint arthropathy.  There is at least mild left foraminal stenosis due to these changes.    12/17/22 MRI SI joints:  The SI joints are normal in appearance with no evidence of erosions, fusion, or other imaging findings of acute sacroiliitis/inflammatory arthritis.  No sacroiliac joint effusion.   There is degenerative disc disease in the lower lumbar spine resulting in degenerative disc desiccation, disc space narrowing, and degenerative endplate change, most pronounced at L4-L5 and L5-S1..  There is a grade I anterolisthesis of L4 on L5.  There is fluid within the intervertebral disc at L5-S1.  There is bilateral facet arthropathy and uncovering of the intervertebral disc with a superimposed mild broad disc bulge at L4-L5.  There is mild right  lateral recess stenosis.  There is, at most, mild overall central canal stenosis at L4-L5.   The visualized bony structures of the pelvis show no evidence of acute fracture or pathologic marrow replacement process.  There is incidentally observed postoperative change of total right hip arthroplasty.  This results in metal artifact within the adjacent osseous and soft tissue structures resulting in poor fat suppression.         Assessment:  Dorothy Kramer is a 82 y.o. year old female patient who has a past medical history of DDD (degenerative disc disease), cervical, Diverticulitis, Diverticulosis, DJD (degenerative joint disease) of hip, Dyspepsia, GERD (gastroesophageal reflux disease), History of melanoma, HTN (hypertension), Melanoma, Migraine headache, Rectocele, Skin cancer, Sleep apnea, Thyroid disease, Trouble in sleeping, Ulcerative colitis, Uterine prolaps, and Vulvar lesion. She presents in referral from Dr. Kamila Gilbert for back and neck pain.  1. DDD (degenerative disc disease), lumbar        2. Bilateral lumbar radiculopathy        3. Spondylolisthesis of lumbar region        4. DDD (degenerative disc disease), cervical  MRI Cervical Spine Without Contrast      5. Cervical spinal stenosis            Plan:  1. She had minimal relief following the cervical epidural steroid injections so I am going to update her cervical spine MRI for further evaluation.    2. We discussed her low back and radicular leg pain.  She does have some canal and foraminal narrowing at L4/5.  I am going to schedule her for an L5/S1 interlaminar epidural steroid injection.    3. I provided a prescription for gabapentin 100 mg nightly.    4. Follow-up in 4 weeks postprocedure or sooner as needed.

## 2023-06-25 NOTE — H&P (VIEW-ONLY)
This note was completed with dictation software and grammatical errors may exist.    Chief Complaint   Patient presents with    Follow-up        HPI: Dorothy Kramer is a 82 y.o. year old female patient who has a past medical history of DDD (degenerative disc disease), cervical, Diverticulitis, Diverticulosis, DJD (degenerative joint disease) of hip, Dyspepsia, GERD (gastroesophageal reflux disease), History of melanoma, HTN (hypertension), Melanoma, Migraine headache, Rectocele, Skin cancer, Sleep apnea, Thyroid disease, Trouble in sleeping, Ulcerative colitis, Uterine prolaps, and Vulvar lesion. She presents in referral from No ref. provider found for back and neck pain.  She is status post C7-T1 interlaminar epidural steroid injection on 05/18/2023 with minimal relief.  The patient is new to me.  She describes left-sided neck pain without current radiation.  Her main complaint today is bilateral low back pain radiating to the bilateral buttocks, posterior thighs and right calf.  She describes it as sharp and dull.  She reports occasional numbness in her extremities.  She reports chronic weakness in her right arm.  She is not sure if she has any new weakness.  She denies incontinence.    Initial history:  The patient states that over 30 years ago she had neck pain radiating into the right arm, developed weakness, eventually underwent a C5/6 fusion.  She did well with this and has no longer had severe neck pain.  However in the last several years she began having more pain in the neck radiating into the upper back, right rhomboid region, right shoulder.  She denies any weakness in the arm.  She describes it as aching, burning, sharp and deep.  Her pain is worse with turning her head side-to-side, extension.  She also has pain in the low back, left buttock that she calls sciatica that radiates further down her leg.  She also has pain worse with standing, walking, bending getting out of a bed or a chair.  She has some  pain over the right trochanter, states if she lays on that side at night, it will wake her up and she only gets several hours of sleep.  Because her neck hurts so much she tries to lay on her right side to immobilize her arm but then starts having worsening right lateral hip pain.  She had a GTB injection with Dr. Carmona after she tried physical therapy for the pain but this was not successful.  The injection did seem to provide several days of relief but returned.  She states that the right GTB is tender to palpation, but also has some right low back pain as well.        Pain intervention history:  Right GTB injection gave 3 days of relief.  She is status post C7-T1 interlaminar epidural steroid injection on 05/18/2023 with minimal relief.     Spine surgeries:  ACDF C5-6 30 years ago    Antineuropathics:  NSAIDs:  Physical therapy: She had done physical therapy several months ago for neck pain, back pain, trochanteric bursitis without much benefit she states that she does not want to return to physical therapy.  Antidepressants:  Muscle relaxers:  Opioids:  Antiplatelets/Anticoagulants:        ROS:  She reports rash, joint stiffness, joint swelling, back pain, difficulty sleeping possible dizziness and loss of balance.  Balance of review of systems is negative.    No results found for: LABA1C, HGBA1C    Lab Results   Component Value Date    WBC 7.30 12/07/2022    HGB 14.1 12/07/2022    HCT 43.4 12/07/2022    MCV 96 12/07/2022     (L) 12/07/2022             Past Medical History:   Diagnosis Date    DDD (degenerative disc disease), cervical     Diverticulitis     Diverticulosis     DJD (degenerative joint disease) of hip     Dyspepsia     GERD (gastroesophageal reflux disease)     History of melanoma 05/20/2015    HTN (hypertension)     Melanoma     Migraine headache     Rectocele     Skin cancer     melanoma on face    Sleep apnea     doesnt use cpap    Thyroid disease     hypo    Trouble in sleeping      Ulcerative colitis     on meds    Uterine prolaps     followed by GYN    Vulvar lesion 05/20/2015       Past Surgical History:   Procedure Laterality Date    APPENDECTOMY  1962    BACK SURGERY      BREAST BIOPSY      BREAST LUMPECTOMY  1989    CATARACT EXTRACTION Left 02/22/2021    ros    CATARACT EXTRACTION W/  INTRAOCULAR LENS IMPLANT Left 2/22/2021    Procedure: EXTRACTION, CATARACT, WITH IOL INSERTION;  Surgeon: Latisha Baker MD;  Location: [facility] OR;  Service: Ophthalmology;  Laterality: Left;  left    CATARACT EXTRACTION W/  INTRAOCULAR LENS IMPLANT Right 3/22/2021    Procedure: EXTRACTION, CATARACT, WITH IOL INSERTION;  Surgeon: Latisha Baker MD;  Location: [facility] OR;  Service: Ophthalmology;  Laterality: Right;  right    CERVICAL FUSION  1991    CHOLECYSTECTOMY      COLONOSCOPY      COLONOSCOPY N/A 11/9/2018    Dr Oscar; chronic active colitis; repeat in 5 years    COLONOSCOPY N/A 2/14/2020    Procedure: COLONOSCOPY;  Surgeon: Fadi Oscar MD;  Location: [facility] ENDO;  Service: Endoscopy;  Laterality: N/A;    EPIDURAL STEROID INJECTION INTO CERVICAL SPINE N/A 5/18/2023    Procedure: Injection-steroid-epidural-cervical C7/T1;  Surgeon: Chintan Damon MD;  Location: [facility] OR;  Service: Pain Management;  Laterality: N/A;  cervical    HYSTERECTOMY      TRACEE/BSO for benign disease    INSERTION OF MIDURETHRAL SLING N/A 9/22/2022    Procedure: SLING, MIDURETHRAL;  Surgeon: Yannick Cabral MD;  Location: [facility] OR;  Service: OB/GYN;  Laterality: N/A;    JOINT REPLACEMENT Right 2016    Hip replacement    DC REMOVAL OF OVARY/TUBE(S)      ROBOT-ASSISTED LAPAROSCOPIC ABDOMINAL SACROCOLPOPEXY USING DA FLAKITO XI N/A 12/14/2021    Procedure: XI ROBOTIC SACROCOLPOPEXY, ABDOMINAL;  Surgeon: Yannick Cabral MD;  Location: [facility] OR;  Service: OB/GYN;  Laterality: N/A;    ROBOT-ASSISTED LAPAROSCOPIC LYSIS OF ADHESIONS USING DA FLAKITO XI  12/14/2021    Procedure: XI ROBOTIC LYSIS, ADHESIONS;  Surgeon: Yannick  "MD Misha;  Location: [facility];  Service: OB/GYN;;    TONSILLECTOMY      TOTAL ABDOMINAL HYSTERECTOMY W/ BILATERAL SALPINGOOPHORECTOMY  1990       Social History     Socioeconomic History    Marital status:    Tobacco Use    Smoking status: Never    Smokeless tobacco: Never   Substance and Sexual Activity    Alcohol use: No    Drug use: No    Sexual activity: Not Currently         Medications/Allergies: See med card    Vitals:    06/15/23 1437   BP: (!) 157/69   Pulse: 85   Weight: 66.2 kg (145 lb 15.1 oz)   Height: 5' 4" (1.626 m)   PainSc:   5   PainLoc: Back     Body mass index is 25.05 kg/m².    Physical exam:  Gen: A and O x3, pleasant, well-groomed  Skin: No rashes or obvious lesions  HEENT: PERRLA, no obvious deformities on ears or in canals.Trachea midline.  CVS: Regular rate and rhythm, normal palpable pulses.  Resp: Clear to auscultation bilaterally, no wheezes or rales.  Abdomen: Soft, NT/ND.  Musculoskeletal: No antalgic gait.     Neuro:  Motor:    Right Left   C4 Shoulder Abduction  5  5   C5 Elbow Flexion    5  5   C6 Wrist Extension  5  5   C7 Elbow Extension   5  5   C8/T1 Hand Intrinsics   5  5   C8 First Dorsal Interosseus  5  5   C8 Abductor Pollicus Brevis  5  5       Iliopsoas Quadriceps Knee  Flexion Tibialis  anterior Gastro- cnemius EHL   Lower: R 5/5 5/5 5/5 5/5 5/5 5/5    L 5/5 5/5 5/5 5/5 5/5 5/5        Left  Right    Triceps DTR 2+ 2+   Biceps DTR 2+ 2+   Brachioradialis DTR 2+ 2+   Patellar DTR 2+ 2+   Achilles DTR 2+ 2+   Castro Absent  Absent   Clonus Absent Absent   Babinski Absent Absent     Sensory: Intact and symmetrical to light touch and pinprick in C2-T1 dermatomes bilaterally. Intact and symmetrical to light touch and pinprick in L1-S1 dermatomes bilaterally.  Cervical spine: ROM is full in flexion, moderately reduced with extension and lateral rotation with increased pain on right lateral rotation causing neck pain and periscapular pain on the right side.  "   Spurling's maneuver causes right neck pain.  Myofascial exam: No Tenderness to palpation across cervical paraspinous region bilaterally.    Lumbar spine:  Lumbar spine: ROM is moderately reduced with both flexion extension and oblique extension with no increased pain.    Alek's test causes no increased pain on either side.    Supine straight leg raise is negative bilaterally.    Internal and external rotation of the hip causes no increased pain on either side.  Myofascial exam: No tenderness to palpation across lumbar paraspinous muscles.  There is exquisite tenderness to palpation over the right GTB    Imagin19 Xray L-spine:  Bone density is normal.  There is a gentle levocurvature of the lumbar spine.  In her AP a the vertebral bodies maintain normal height and alignment.  There is no fracture.  There is marked disc space narrowing at the L4-5 level.  There is mild endplate sclerosis and osteophyte formation.  There is mild-to-moderate disc space narrowing at the L2-3, L3-4 levels and moderate disc space narrowing at the L5-S1 level.  There is multilevel facet joint arthropathy.  There is mild-to-moderate atherosclerosis.  There are surgical clips in the right upper abdomen from prior cholecystectomy.    19 MRI C-spine:  Vertebral column: There is no prior MRI or CT for comparison.  As seen on plain films, there is mild anterolisthesis of C3 on C4, C4 on C5.  There are changes of prior bony fusion of C5 and C6.  There is moderate to marked disc space narrowing at the C6-7 level.  There is no fracture.  The odontoid process is intact.  The discs are desiccated.  There is endplate osteophyte formation most apparent at the C6-7 level.  Baseline marrow signal is normal.   Spinal canal, cord, epidural space: The spinal canal is developmentally normal.  There is no abnormal epidural soft tissue mass or fluid collection.  There is minimal flattening of the right ventral cord surface at the C4-5 level  where there is a large disc extrusion.  There is no cord edema or myelomalacia.  C2-3: There is no spinal canal or significant foraminal stenosis.   C3-4: There is moderate-to-marked left foraminal stenosis due to uncovertebral spurring and facet joint arthropathy.  There is a mild disc osteophyte complex.  There is no spinal stenosis or cord compression.  The right foramen is patent.   C4-5: There is a large right paracentral disc extrusion measuring approximately 6 x 10 x 13 mm (AP, transverse, craniocaudad).  This does result in flattening of the right ventral cord surface but there is no obvious cord edema or myelomalacia.  There is moderate spinal stenosis at this level.  There is also moderate left foraminal narrowing due to uncovertebral spurring and facet joint arthropathy.   C5-6: There are changes of previous bony fusion.  There is no spinal canal or significant foraminal stenosis.   C6-7: There is moderate to marked disc space narrowing.  There is left greater than right uncovertebral spurring with bilateral facet joint arthropathy.  There is a mild disc osteophyte complex.  There is no spinal canal or significant foraminal stenosis.   C7-T1: There is left facet joint arthropathy.  There is at least mild left foraminal stenosis due to these changes.    12/17/22 MRI SI joints:  The SI joints are normal in appearance with no evidence of erosions, fusion, or other imaging findings of acute sacroiliitis/inflammatory arthritis.  No sacroiliac joint effusion.   There is degenerative disc disease in the lower lumbar spine resulting in degenerative disc desiccation, disc space narrowing, and degenerative endplate change, most pronounced at L4-L5 and L5-S1..  There is a grade I anterolisthesis of L4 on L5.  There is fluid within the intervertebral disc at L5-S1.  There is bilateral facet arthropathy and uncovering of the intervertebral disc with a superimposed mild broad disc bulge at L4-L5.  There is mild right  lateral recess stenosis.  There is, at most, mild overall central canal stenosis at L4-L5.   The visualized bony structures of the pelvis show no evidence of acute fracture or pathologic marrow replacement process.  There is incidentally observed postoperative change of total right hip arthroplasty.  This results in metal artifact within the adjacent osseous and soft tissue structures resulting in poor fat suppression.         Assessment:  Dorothy Kramer is a 82 y.o. year old female patient who has a past medical history of DDD (degenerative disc disease), cervical, Diverticulitis, Diverticulosis, DJD (degenerative joint disease) of hip, Dyspepsia, GERD (gastroesophageal reflux disease), History of melanoma, HTN (hypertension), Melanoma, Migraine headache, Rectocele, Skin cancer, Sleep apnea, Thyroid disease, Trouble in sleeping, Ulcerative colitis, Uterine prolaps, and Vulvar lesion. She presents in referral from Dr. Kamila Gilbert for back and neck pain.  1. DDD (degenerative disc disease), lumbar        2. Bilateral lumbar radiculopathy        3. Spondylolisthesis of lumbar region        4. DDD (degenerative disc disease), cervical  MRI Cervical Spine Without Contrast      5. Cervical spinal stenosis            Plan:  1. She had minimal relief following the cervical epidural steroid injections so I am going to update her cervical spine MRI for further evaluation.    2. We discussed her low back and radicular leg pain.  She does have some canal and foraminal narrowing at L4/5.  I am going to schedule her for an L5/S1 interlaminar epidural steroid injection.    3. I provided a prescription for gabapentin 100 mg nightly.    4. Follow-up in 4 weeks postprocedure or sooner as needed.

## 2023-06-27 ENCOUNTER — HOSPITAL ENCOUNTER (OUTPATIENT)
Dept: RADIOLOGY | Facility: HOSPITAL | Age: 83
Discharge: HOME | End: 2023-06-27
Attending: PHYSICIAN ASSISTANT
Payer: MEDICARE

## 2023-06-27 DIAGNOSIS — M50.30 DDD (DEGENERATIVE DISC DISEASE), CERVICAL: ICD-10-CM

## 2023-06-27 PROCEDURE — 72141 MRI NECK SPINE W/O DYE: CPT | Mod: 26,,, | Performed by: RADIOLOGY

## 2023-06-27 PROCEDURE — 72141 MRI NECK SPINE W/O DYE: CPT | Mod: TC,PO

## 2023-06-27 PROCEDURE — 72141 MRI CERVICAL SPINE WITHOUT CONTRAST: ICD-10-PCS | Mod: 26,,, | Performed by: RADIOLOGY

## 2023-06-30 ENCOUNTER — PATIENT MESSAGE (OUTPATIENT)
Dept: PAIN MEDICINE | Facility: CLINIC | Age: 83
End: 2023-06-30
Payer: MEDICARE

## 2023-06-30 ENCOUNTER — OFFICE VISIT (OUTPATIENT)
Dept: FAMILY MEDICINE | Facility: CLINIC | Age: 83
End: 2023-06-30
Payer: MEDICARE

## 2023-06-30 VITALS
HEART RATE: 84 BPM | HEIGHT: 64 IN | SYSTOLIC BLOOD PRESSURE: 128 MMHG | WEIGHT: 149.81 LBS | DIASTOLIC BLOOD PRESSURE: 60 MMHG | BODY MASS INDEX: 25.57 KG/M2

## 2023-06-30 DIAGNOSIS — N99.3 PROLAPSE OF VAGINAL VAULT AFTER HYSTERECTOMY: ICD-10-CM

## 2023-06-30 DIAGNOSIS — K52.9 COLITIS: ICD-10-CM

## 2023-06-30 DIAGNOSIS — I70.0 AORTIC ATHEROSCLEROSIS: ICD-10-CM

## 2023-06-30 DIAGNOSIS — E03.9 HYPOTHYROIDISM, UNSPECIFIED TYPE: ICD-10-CM

## 2023-06-30 DIAGNOSIS — I10 ESSENTIAL HYPERTENSION: Primary | ICD-10-CM

## 2023-06-30 DIAGNOSIS — N81.6 RECTOCELE: ICD-10-CM

## 2023-06-30 DIAGNOSIS — M54.16 LUMBAR RADICULOPATHY: Primary | ICD-10-CM

## 2023-06-30 PROCEDURE — 3074F PR MOST RECENT SYSTOLIC BLOOD PRESSURE < 130 MM HG: ICD-10-PCS | Mod: CPTII,S$GLB,, | Performed by: INTERNAL MEDICINE

## 2023-06-30 PROCEDURE — 99999 PR PBB SHADOW E&M-EST. PATIENT-LVL III: CPT | Mod: PBBFAC,,, | Performed by: INTERNAL MEDICINE

## 2023-06-30 PROCEDURE — 1125F AMNT PAIN NOTED PAIN PRSNT: CPT | Mod: CPTII,S$GLB,, | Performed by: INTERNAL MEDICINE

## 2023-06-30 PROCEDURE — 1157F PR ADVANCE CARE PLAN OR EQUIV PRESENT IN MEDICAL RECORD: ICD-10-PCS | Mod: CPTII,S$GLB,, | Performed by: INTERNAL MEDICINE

## 2023-06-30 PROCEDURE — 1157F ADVNC CARE PLAN IN RCRD: CPT | Mod: CPTII,S$GLB,, | Performed by: INTERNAL MEDICINE

## 2023-06-30 PROCEDURE — 1101F PT FALLS ASSESS-DOCD LE1/YR: CPT | Mod: CPTII,S$GLB,, | Performed by: INTERNAL MEDICINE

## 2023-06-30 PROCEDURE — 3288F PR FALLS RISK ASSESSMENT DOCUMENTED: ICD-10-PCS | Mod: CPTII,S$GLB,, | Performed by: INTERNAL MEDICINE

## 2023-06-30 PROCEDURE — 99999 PR PBB SHADOW E&M-EST. PATIENT-LVL III: ICD-10-PCS | Mod: PBBFAC,,, | Performed by: INTERNAL MEDICINE

## 2023-06-30 PROCEDURE — 3074F SYST BP LT 130 MM HG: CPT | Mod: CPTII,S$GLB,, | Performed by: INTERNAL MEDICINE

## 2023-06-30 PROCEDURE — 3288F FALL RISK ASSESSMENT DOCD: CPT | Mod: CPTII,S$GLB,, | Performed by: INTERNAL MEDICINE

## 2023-06-30 PROCEDURE — 1159F MED LIST DOCD IN RCRD: CPT | Mod: CPTII,S$GLB,, | Performed by: INTERNAL MEDICINE

## 2023-06-30 PROCEDURE — 3078F DIAST BP <80 MM HG: CPT | Mod: CPTII,S$GLB,, | Performed by: INTERNAL MEDICINE

## 2023-06-30 PROCEDURE — 1101F PR PT FALLS ASSESS DOC 0-1 FALLS W/OUT INJ PAST YR: ICD-10-PCS | Mod: CPTII,S$GLB,, | Performed by: INTERNAL MEDICINE

## 2023-06-30 PROCEDURE — 99214 PR OFFICE/OUTPT VISIT, EST, LEVL IV, 30-39 MIN: ICD-10-PCS | Mod: S$GLB,,, | Performed by: INTERNAL MEDICINE

## 2023-06-30 PROCEDURE — 1125F PR PAIN SEVERITY QUANTIFIED, PAIN PRESENT: ICD-10-PCS | Mod: CPTII,S$GLB,, | Performed by: INTERNAL MEDICINE

## 2023-06-30 PROCEDURE — 1160F RVW MEDS BY RX/DR IN RCRD: CPT | Mod: CPTII,S$GLB,, | Performed by: INTERNAL MEDICINE

## 2023-06-30 PROCEDURE — 3078F PR MOST RECENT DIASTOLIC BLOOD PRESSURE < 80 MM HG: ICD-10-PCS | Mod: CPTII,S$GLB,, | Performed by: INTERNAL MEDICINE

## 2023-06-30 PROCEDURE — 99214 OFFICE O/P EST MOD 30 MIN: CPT | Mod: S$GLB,,, | Performed by: INTERNAL MEDICINE

## 2023-06-30 PROCEDURE — 1159F PR MEDICATION LIST DOCUMENTED IN MEDICAL RECORD: ICD-10-PCS | Mod: CPTII,S$GLB,, | Performed by: INTERNAL MEDICINE

## 2023-06-30 PROCEDURE — 1160F PR REVIEW ALL MEDS BY PRESCRIBER/CLIN PHARMACIST DOCUMENTED: ICD-10-PCS | Mod: CPTII,S$GLB,, | Performed by: INTERNAL MEDICINE

## 2023-06-30 RX ORDER — MESALAMINE 0.38 G/1
0.75 CAPSULE, EXTENDED RELEASE ORAL DAILY
Qty: 60 CAPSULE | Refills: 11 | Status: SHIPPED | OUTPATIENT
Start: 2023-06-30 | End: 2023-11-17

## 2023-06-30 RX ORDER — MAGNESIUM 250 MG
250 TABLET ORAL 2 TIMES DAILY
COMMUNITY

## 2023-06-30 RX ORDER — LEVOTHYROXINE SODIUM 88 UG/1
88 TABLET ORAL DAILY
Qty: 90 TABLET | Refills: 3 | Status: SHIPPED | OUTPATIENT
Start: 2023-06-30

## 2023-06-30 RX ORDER — LOSARTAN POTASSIUM AND HYDROCHLOROTHIAZIDE 25; 100 MG/1; MG/1
1 TABLET ORAL DAILY
Qty: 90 TABLET | Refills: 1 | Status: SHIPPED | OUTPATIENT
Start: 2023-06-30 | End: 2023-12-03

## 2023-06-30 RX ORDER — ESTRADIOL 0.1 MG/G
1 CREAM VAGINAL WEEKLY
Qty: 42.5 G | Refills: 2 | Status: SHIPPED | OUTPATIENT
Start: 2023-06-30 | End: 2024-06-29

## 2023-06-30 NOTE — PROGRESS NOTES
Assessment and Plan:    1. Essential hypertension  Controlled at home. Has element of white coat HTN. Continue current medications.  - losartan-hydrochlorothiazide 100-25 mg (HYZAAR) 100-25 mg per tablet; Take 1 tablet by mouth once daily.  Dispense: 90 tablet; Refill: 1  - Comprehensive Metabolic Panel; Future  - Lipid Panel; Future    2. Hypothyroidism, unspecified type  - TSH; Future  - levothyroxine (SYNTHROID) 88 MCG tablet; Take 1 tablet (88 mcg total) by mouth once daily.  Dispense: 90 tablet; Refill: 3    3. Rectocele  - estradioL (ESTRACE) 0.01 % (0.1 mg/gram) vaginal cream; Place 1 g vaginally once a week.  Dispense: 42.5 g; Refill: 2    4. Prolapse of vaginal vault after hysterectomy  - estradioL (ESTRACE) 0.01 % (0.1 mg/gram) vaginal cream; Place 1 g vaginally once a week.  Dispense: 42.5 g; Refill: 2    5. Colitis  - mesalamine (APRISO) 0.375 gram Cp24; Take 2 capsules (0.75 g total) by mouth once daily.  Dispense: 60 capsule; Refill: 11    6. Aortic atherosclerosis  Will check lipids. Not on statin.      ______________________________________________________________________  Subjective:    Chief Complaint:  Follow up chronic medical conditions.    HPI:  Dorothy is a 82 y.o. year old female here to follow up chronic medical conditions.      She takes levothyroxine 88 mcg daily for hypothyroidism. No symptoms currently.      She takes losartan 100 mg daily and HCTZ 25 mg daily for HTN. She does check her BP at home, about every other day. Typically in the 120s-130s/50s-60s. Usually higher at the doctor.      Had been seeing Urogyn for mixed urge and stress incontinence. Using estrace cream. She has been discharged by Dr. Cabral, told to take the estrace once weekly.    Remote history of melanoma. Sees Dr. Yung for derm.     She had been seeing Dr. Oscar for nonspecific colitis and recurrent diverticulitis. She is taking apriso, culturelle, and loperamide PRN. She wants me to continue prescribing  the apriso so that she does not need to keep seeing GI.     She is seeing pain management for back and neck pain. Recently started gabapentin 100 mg nightly. Has been minimally effective    Medications:  Current Outpatient Medications on File Prior to Visit   Medication Sig Dispense Refill    gabapentin (NEURONTIN) 100 MG capsule Take 1 capsule (100 mg total) by mouth every evening. 30 capsule 2    magnesium 250 mg Tab Take 250 mg by mouth 2 (two) times a day.      multivitamin/iron/folic acid (CENTRUM COMPLETE ORAL) Take by mouth.      [DISCONTINUED] estradioL (ESTRACE) 0.01 % (0.1 mg/gram) vaginal cream PLACE 1 G VAGINALLY EVERY MON, WED, FRI. (Patient taking differently: Place 1 g vaginally once a week.) 42.5 g 11    [DISCONTINUED] levothyroxine (SYNTHROID) 88 MCG tablet TAKE 1 TABLET BY MOUTH EVERY DAY 90 tablet 3    [DISCONTINUED] losartan-hydrochlorothiazide 100-25 mg (HYZAAR) 100-25 mg per tablet TAKE 1 TABLET BY MOUTH EVERY DAY 90 tablet 1    [DISCONTINUED] mesalamine (APRISO) 0.375 gram Cp24 Take 2 capsules (0.75 g total) by mouth once daily. 60 capsule 11    [DISCONTINUED] FLUZONE HIGHDOSE QUAD 22-23  mcg/0.7 mL Syrg       [DISCONTINUED] magnesium 30 mg Tab Take 1 tablet by mouth.       No current facility-administered medications on file prior to visit.       Review of Systems:  Review of Systems   Respiratory:  Negative for shortness of breath and wheezing.    Cardiovascular:  Negative for chest pain and leg swelling.   Gastrointestinal:  Negative for nausea and vomiting.   Neurological:  Negative for dizziness, syncope and light-headedness.     Past Medical History:  Past Medical History:   Diagnosis Date    DDD (degenerative disc disease), cervical     Diverticulitis     Diverticulosis     DJD (degenerative joint disease) of hip     Dyspepsia     GERD (gastroesophageal reflux disease)     History of melanoma 05/20/2015    HTN (hypertension)     Melanoma     Migraine headache     Rectocele      "Skin cancer     melanoma on face    Sleep apnea     doesnt use cpap    Thyroid disease     hypo    Trouble in sleeping     Ulcerative colitis     on meds    Uterine prolaps     followed by GYN    Vulvar lesion 05/20/2015       Objective:    Vitals:  Vitals:    06/30/23 1257 06/30/23 1342 06/30/23 1343   BP: (!) 170/68 (!) 152/68 128/60   Pulse: 84     Weight: 67.9 kg (149 lb 12.8 oz)     Height: 5' 4" (1.626 m)     PainSc:   4         Physical Exam  Vitals reviewed.   Constitutional:       General: She is not in acute distress.     Appearance: She is well-developed.   Eyes:      General:         Right eye: No discharge.         Left eye: No discharge.      Conjunctiva/sclera: Conjunctivae normal.   Cardiovascular:      Rate and Rhythm: Normal rate and regular rhythm.      Heart sounds: Murmur heard.   Pulmonary:      Effort: Pulmonary effort is normal. No respiratory distress.      Breath sounds: Normal breath sounds. No wheezing or rales.   Musculoskeletal:      Right lower leg: No edema.      Left lower leg: No edema.   Skin:     General: Skin is warm and dry.   Neurological:      Mental Status: She is alert and oriented to person, place, and time.   Psychiatric:         Behavior: Behavior normal.         Thought Content: Thought content normal.         Judgment: Judgment normal.       Data:  TSH normal last year      Kamila Gilbert MD  Internal Medicine    "

## 2023-07-03 RX ORDER — GABAPENTIN 100 MG/1
100 CAPSULE ORAL 2 TIMES DAILY
Qty: 60 CAPSULE | Refills: 2 | Status: SHIPPED | OUTPATIENT
Start: 2023-07-03 | End: 2023-10-16

## 2023-07-03 NOTE — TELEPHONE ENCOUNTER
I have placed a refill for her to take 200 mg nightly.  If she has any ill side effects with this returned to the 100 mg nightly.

## 2023-07-07 ENCOUNTER — LAB VISIT (OUTPATIENT)
Dept: LAB | Facility: HOSPITAL | Age: 83
End: 2023-07-07
Attending: INTERNAL MEDICINE
Payer: MEDICARE

## 2023-07-07 DIAGNOSIS — I10 ESSENTIAL HYPERTENSION: ICD-10-CM

## 2023-07-07 DIAGNOSIS — E03.9 HYPOTHYROIDISM, UNSPECIFIED TYPE: ICD-10-CM

## 2023-07-07 LAB
ALBUMIN SERPL BCP-MCNC: 4 G/DL (ref 3.5–5.2)
ALP SERPL-CCNC: 63 U/L (ref 55–135)
ALT SERPL W/O P-5'-P-CCNC: 20 U/L (ref 10–44)
ANION GAP SERPL CALC-SCNC: 9 MMOL/L (ref 8–16)
AST SERPL-CCNC: 18 U/L (ref 10–40)
BILIRUB SERPL-MCNC: 1 MG/DL (ref 0.1–1)
BUN SERPL-MCNC: 14 MG/DL (ref 8–23)
CALCIUM SERPL-MCNC: 10.1 MG/DL (ref 8.7–10.5)
CHLORIDE SERPL-SCNC: 105 MMOL/L (ref 95–110)
CHOLEST SERPL-MCNC: 171 MG/DL (ref 120–199)
CHOLEST/HDLC SERPL: 3.4 {RATIO} (ref 2–5)
CO2 SERPL-SCNC: 28 MMOL/L (ref 23–29)
CREAT SERPL-MCNC: 0.8 MG/DL (ref 0.5–1.4)
EST. GFR  (NO RACE VARIABLE): >60 ML/MIN/1.73 M^2
GLUCOSE SERPL-MCNC: 98 MG/DL (ref 70–110)
HDLC SERPL-MCNC: 50 MG/DL (ref 40–75)
HDLC SERPL: 29.2 % (ref 20–50)
LDLC SERPL CALC-MCNC: 89.4 MG/DL (ref 63–159)
NONHDLC SERPL-MCNC: 121 MG/DL
POTASSIUM SERPL-SCNC: 3.8 MMOL/L (ref 3.5–5.1)
PROT SERPL-MCNC: 7.1 G/DL (ref 6–8.4)
SODIUM SERPL-SCNC: 142 MMOL/L (ref 136–145)
T4 FREE SERPL-MCNC: 1.17 NG/DL (ref 0.71–1.51)
TRIGL SERPL-MCNC: 158 MG/DL (ref 30–150)
TSH SERPL DL<=0.005 MIU/L-ACNC: 0.25 UIU/ML (ref 0.4–4)

## 2023-07-07 PROCEDURE — 84439 ASSAY OF FREE THYROXINE: CPT | Performed by: INTERNAL MEDICINE

## 2023-07-07 PROCEDURE — 80061 LIPID PANEL: CPT | Performed by: INTERNAL MEDICINE

## 2023-07-07 PROCEDURE — 84443 ASSAY THYROID STIM HORMONE: CPT | Performed by: INTERNAL MEDICINE

## 2023-07-07 PROCEDURE — 36415 COLL VENOUS BLD VENIPUNCTURE: CPT | Mod: PN | Performed by: INTERNAL MEDICINE

## 2023-07-07 PROCEDURE — 80053 COMPREHEN METABOLIC PANEL: CPT | Performed by: INTERNAL MEDICINE

## 2023-07-10 ENCOUNTER — PES CALL (OUTPATIENT)
Dept: ADMINISTRATIVE | Facility: CLINIC | Age: 83
End: 2023-07-10
Payer: MEDICARE

## 2023-07-12 ENCOUNTER — HOSPITAL ENCOUNTER (OUTPATIENT)
Dept: RADIOLOGY | Facility: HOSPITAL | Age: 83
Discharge: HOME | End: 2023-07-12
Attending: ANESTHESIOLOGY | Admitting: ANESTHESIOLOGY
Payer: MEDICARE

## 2023-07-12 ENCOUNTER — HOSPITAL ENCOUNTER (OUTPATIENT)
Facility: HOSPITAL | Age: 83
Discharge: HOME | End: 2023-07-12
Attending: ANESTHESIOLOGY | Admitting: ANESTHESIOLOGY
Payer: MEDICARE

## 2023-07-12 DIAGNOSIS — M54.16 LUMBAR RADICULOPATHY: ICD-10-CM

## 2023-07-12 DIAGNOSIS — M54.50 LOWER BACK PAIN: ICD-10-CM

## 2023-07-12 PROCEDURE — 76000 FLUOROSCOPY <1 HR PHYS/QHP: CPT | Mod: TC,PO

## 2023-07-12 PROCEDURE — 25500020 PHARM REV CODE 255: Mod: PO | Performed by: ANESTHESIOLOGY

## 2023-07-12 PROCEDURE — 62323 NJX INTERLAMINAR LMBR/SAC: CPT | Mod: HCNC,PO | Performed by: ANESTHESIOLOGY

## 2023-07-12 PROCEDURE — A4216 STERILE WATER/SALINE, 10 ML: HCPCS | Mod: PO | Performed by: ANESTHESIOLOGY

## 2023-07-12 PROCEDURE — 62323 NJX INTERLAMINAR LMBR/SAC: CPT | Mod: HCNC,,, | Performed by: ANESTHESIOLOGY

## 2023-07-12 PROCEDURE — 25000003 PHARM REV CODE 250: Mod: PO | Performed by: ANESTHESIOLOGY

## 2023-07-12 PROCEDURE — 63600175 PHARM REV CODE 636 W HCPCS: Mod: PO | Performed by: ANESTHESIOLOGY

## 2023-07-12 PROCEDURE — 62323 PR INJ LUMBAR/SACRAL, W/IMAGING GUIDANCE: ICD-10-PCS | Mod: HCNC,,, | Performed by: ANESTHESIOLOGY

## 2023-07-12 RX ORDER — ALPRAZOLAM 0.5 MG/1
1 TABLET, ORALLY DISINTEGRATING ORAL ONCE AS NEEDED
Status: COMPLETED | OUTPATIENT
Start: 2023-07-12 | End: 2023-07-12

## 2023-07-12 RX ORDER — METHYLPREDNISOLONE ACETATE 80 MG/ML
INJECTION, SUSPENSION INTRA-ARTICULAR; INTRALESIONAL; INTRAMUSCULAR; SOFT TISSUE
Status: DISCONTINUED | OUTPATIENT
Start: 2023-07-12 | End: 2023-07-12 | Stop reason: HOSPADM

## 2023-07-12 RX ORDER — LIDOCAINE HYDROCHLORIDE 10 MG/ML
INJECTION, SOLUTION EPIDURAL; INFILTRATION; INTRACAUDAL; PERINEURAL
Status: DISCONTINUED | OUTPATIENT
Start: 2023-07-12 | End: 2023-07-12 | Stop reason: HOSPADM

## 2023-07-12 RX ORDER — SODIUM CHLORIDE 9 MG/ML
INJECTION, SOLUTION INTRAMUSCULAR; INTRAVENOUS; SUBCUTANEOUS
Status: DISCONTINUED | OUTPATIENT
Start: 2023-07-12 | End: 2023-07-12 | Stop reason: HOSPADM

## 2023-07-12 RX ADMIN — ALPRAZOLAM 1 MG: 0.5 TABLET, ORALLY DISINTEGRATING ORAL at 12:07

## 2023-07-12 NOTE — DISCHARGE SUMMARY
[facility] - Surgery  Discharge Note  Short Stay    Procedure(s) (LRB):  Injection-steroid-epidural-lumbar L5/S1 (N/A)      OUTCOME: Patient tolerated treatment/procedure well without complication and is now ready for discharge.    DISPOSITION: Home or Self Care    FINAL DIAGNOSIS:  Lumbar radiculopathy    FOLLOWUP: In clinic    DISCHARGE INSTRUCTIONS:    Discharge Procedure Orders   Diet Adult Regular     No dressing needed     Notify your health care provider if you experience any of the following:  temperature >100.4     Activity as tolerated

## 2023-07-12 NOTE — INTERVAL H&P NOTE
The patient has been examined and the H&P has been reviewed:    I concur with the findings and no changes have occurred since H&P was written.    Procedure risks, benefits and alternative options discussed and understood by patient/family.        There are no hospital problems to display for this patient.

## 2023-07-12 NOTE — OP NOTE

## 2023-07-13 VITALS
DIASTOLIC BLOOD PRESSURE: 57 MMHG | WEIGHT: 149 LBS | RESPIRATION RATE: 16 BRPM | BODY MASS INDEX: 25.44 KG/M2 | HEIGHT: 64 IN | HEART RATE: 79 BPM | TEMPERATURE: 98 F | SYSTOLIC BLOOD PRESSURE: 121 MMHG | OXYGEN SATURATION: 97 %

## 2023-08-02 ENCOUNTER — OFFICE VISIT (OUTPATIENT)
Dept: PAIN MEDICINE | Facility: CLINIC | Age: 83
End: 2023-08-02
Payer: MEDICARE

## 2023-08-02 VITALS
HEART RATE: 87 BPM | HEIGHT: 64 IN | SYSTOLIC BLOOD PRESSURE: 158 MMHG | WEIGHT: 149.69 LBS | BODY MASS INDEX: 25.56 KG/M2 | DIASTOLIC BLOOD PRESSURE: 69 MMHG

## 2023-08-02 DIAGNOSIS — M47.812 CERVICAL SPONDYLOSIS: Primary | ICD-10-CM

## 2023-08-02 DIAGNOSIS — M50.30 DDD (DEGENERATIVE DISC DISEASE), CERVICAL: ICD-10-CM

## 2023-08-02 DIAGNOSIS — M47.816 LUMBAR SPONDYLOSIS: ICD-10-CM

## 2023-08-02 DIAGNOSIS — M51.36 DDD (DEGENERATIVE DISC DISEASE), LUMBAR: ICD-10-CM

## 2023-08-02 PROCEDURE — 3077F SYST BP >= 140 MM HG: CPT | Mod: HCNC,CPTII,S$GLB,

## 2023-08-02 PROCEDURE — 3288F FALL RISK ASSESSMENT DOCD: CPT | Mod: HCNC,CPTII,S$GLB,

## 2023-08-02 PROCEDURE — 1159F MED LIST DOCD IN RCRD: CPT | Mod: HCNC,CPTII,S$GLB,

## 2023-08-02 PROCEDURE — 1159F PR MEDICATION LIST DOCUMENTED IN MEDICAL RECORD: ICD-10-PCS | Mod: HCNC,CPTII,S$GLB,

## 2023-08-02 PROCEDURE — 1101F PR PT FALLS ASSESS DOC 0-1 FALLS W/OUT INJ PAST YR: ICD-10-PCS | Mod: HCNC,CPTII,S$GLB,

## 2023-08-02 PROCEDURE — 1125F AMNT PAIN NOTED PAIN PRSNT: CPT | Mod: HCNC,CPTII,S$GLB,

## 2023-08-02 PROCEDURE — 3078F DIAST BP <80 MM HG: CPT | Mod: HCNC,CPTII,S$GLB,

## 2023-08-02 PROCEDURE — 1157F ADVNC CARE PLAN IN RCRD: CPT | Mod: HCNC,CPTII,S$GLB,

## 2023-08-02 PROCEDURE — 3077F PR MOST RECENT SYSTOLIC BLOOD PRESSURE >= 140 MM HG: ICD-10-PCS | Mod: HCNC,CPTII,S$GLB,

## 2023-08-02 PROCEDURE — 1101F PT FALLS ASSESS-DOCD LE1/YR: CPT | Mod: HCNC,CPTII,S$GLB,

## 2023-08-02 PROCEDURE — 99999 PR PBB SHADOW E&M-EST. PATIENT-LVL III: ICD-10-PCS | Mod: PBBFAC,HCNC,,

## 2023-08-02 PROCEDURE — 3078F PR MOST RECENT DIASTOLIC BLOOD PRESSURE < 80 MM HG: ICD-10-PCS | Mod: HCNC,CPTII,S$GLB,

## 2023-08-02 PROCEDURE — 99215 PR OFFICE/OUTPT VISIT, EST, LEVL V, 40-54 MIN: ICD-10-PCS | Mod: HCNC,S$GLB,,

## 2023-08-02 PROCEDURE — 99999 PR PBB SHADOW E&M-EST. PATIENT-LVL III: CPT | Mod: PBBFAC,HCNC,,

## 2023-08-02 PROCEDURE — 1157F PR ADVANCE CARE PLAN OR EQUIV PRESENT IN MEDICAL RECORD: ICD-10-PCS | Mod: HCNC,CPTII,S$GLB,

## 2023-08-02 PROCEDURE — 1125F PR PAIN SEVERITY QUANTIFIED, PAIN PRESENT: ICD-10-PCS | Mod: HCNC,CPTII,S$GLB,

## 2023-08-02 PROCEDURE — 3288F PR FALLS RISK ASSESSMENT DOCUMENTED: ICD-10-PCS | Mod: HCNC,CPTII,S$GLB,

## 2023-08-02 PROCEDURE — 99215 OFFICE O/P EST HI 40 MIN: CPT | Mod: HCNC,S$GLB,,

## 2023-08-02 NOTE — H&P (VIEW-ONLY)
This note was completed with dictation software and grammatical errors may exist.    Chief Complaint   Patient presents with    Neck Pain    Low-back Pain        HPI: Dorothy Kramer is a 82 y.o. year old female patient who has a past medical history of DDD (degenerative disc disease), cervical, Diverticulitis, Diverticulosis, DJD (degenerative joint disease) of hip, Dyspepsia, GERD (gastroesophageal reflux disease), History of melanoma, HTN (hypertension), Melanoma, Migraine headache, Rectocele, Skin cancer, Sleep apnea, Thyroid disease, Trouble in sleeping, Ulcerative colitis, Uterine prolaps, and Vulvar lesion. She presents in referral from No ref. provider found for back and neck pain.  She is status post L5/S1 KATHY on 07/12/2023 with 50% relief lasting for 1 day.  During the stay she felt improvement with pain down her left leg but not with pain across her lower back.  Today she is reporting continued neck pain and lower back pain, 6/10, neck pain is mainly located in her neck without significant radiation down her arms.  Her back pain is mainly located across her lower back and is worsened with standing and walking for any extended periods of time.  She reports some intermittent numbness in left leg but not consistent.  She denies any significant pain radiating into her legs.  She denies any new other numbness, weakness or any changes to her bowel or bladder function.  She is unable to sleep at night due to the severity of the pain.      Initial history:  The patient states that over 30 years ago she had neck pain radiating into the right arm, developed weakness, eventually underwent a C5/6 fusion.  She did well with this and has no longer had severe neck pain.  However in the last several years she began having more pain in the neck radiating into the upper back, right rhomboid region, right shoulder.  She denies any weakness in the arm.  She describes it as aching, burning, sharp and deep.  Her pain is worse  "with turning her head side-to-side, extension.  She also has pain in the low back, left buttock that she calls sciatica that radiates further down her leg.  She also has pain worse with standing, walking, bending getting out of a bed or a chair.  She has some pain over the right trochanter, states if she lays on that side at night, it will wake her up and she only gets several hours of sleep.  Because her neck hurts so much she tries to lay on her right side to immobilize her arm but then starts having worsening right lateral hip pain.  She had a GTB injection with Dr. Carmona after she tried physical therapy for the pain but this was not successful.  The injection did seem to provide several days of relief but returned.  She states that the right GTB is tender to palpation, but also has some right low back pain as well.        Pain intervention history:  Right GTB injection gave 3 days of relief.  She is status post C7-T1 interlaminar epidural steroid injection on 05/18/2023 with minimal relief. She is status post L5/S1 KATHY on 07/12/2023 with 50% relief lasting for 1 day.    Spine surgeries:  ACDF C5-6 30 years ago    Antineuropathics:  NSAIDs:  Physical therapy: She had done physical therapy several months ago for neck pain, back pain, trochanteric bursitis without much benefit she states that she does not want to return to physical therapy.  Antidepressants:  Muscle relaxers:  Opioids:  Antiplatelets/Anticoagulants:        ROS:  She reports rash, joint stiffness, joint swelling, back pain, difficulty sleeping possible dizziness and loss of balance.  Balance of review of systems is negative.    No results found for: "LABA1C", "HGBA1C"    Lab Results   Component Value Date    WBC 7.30 12/07/2022    HGB 14.1 12/07/2022    HCT 43.4 12/07/2022    MCV 96 12/07/2022     (L) 12/07/2022             Past Medical History:   Diagnosis Date    DDD (degenerative disc disease), cervical     Diverticulitis     " Diverticulosis     DJD (degenerative joint disease) of hip     Dyspepsia     GERD (gastroesophageal reflux disease)     History of melanoma 05/20/2015    HTN (hypertension)     Melanoma     Migraine headache     Rectocele     Skin cancer     melanoma on face    Sleep apnea     doesnt use cpap    Thyroid disease     hypo    Trouble in sleeping     Ulcerative colitis     on meds    Uterine prolaps     followed by GYN    Vulvar lesion 05/20/2015       Past Surgical History:   Procedure Laterality Date    APPENDECTOMY  1962    BACK SURGERY      BREAST BIOPSY      BREAST LUMPECTOMY  1989    CATARACT EXTRACTION Left 02/22/2021    ros    CATARACT EXTRACTION W/  INTRAOCULAR LENS IMPLANT Left 2/22/2021    Procedure: EXTRACTION, CATARACT, WITH IOL INSERTION;  Surgeon: Latisha Baker MD;  Location: [facility] OR;  Service: Ophthalmology;  Laterality: Left;  left    CATARACT EXTRACTION W/  INTRAOCULAR LENS IMPLANT Right 3/22/2021    Procedure: EXTRACTION, CATARACT, WITH IOL INSERTION;  Surgeon: Latisha Baker MD;  Location: [facility] OR;  Service: Ophthalmology;  Laterality: Right;  right    CERVICAL FUSION  1991    CHOLECYSTECTOMY      COLONOSCOPY      COLONOSCOPY N/A 11/9/2018    Dr Oscar; chronic active colitis; repeat in 5 years    COLONOSCOPY N/A 2/14/2020    Procedure: COLONOSCOPY;  Surgeon: Fadi Oscar MD;  Location: [facility] ENDO;  Service: Endoscopy;  Laterality: N/A;    EPIDURAL STEROID INJECTION INTO CERVICAL SPINE N/A 5/18/2023    Procedure: Injection-steroid-epidural-cervical C7/T1;  Surgeon: Chintan Damon MD;  Location: [facility] OR;  Service: Pain Management;  Laterality: N/A;  cervical    EPIDURAL STEROID INJECTION INTO LUMBAR SPINE N/A 7/12/2023    Procedure: Injection-steroid-epidural-lumbar L5/S1;  Surgeon: Chintan Damon MD;  Location: [facility] OR;  Service: Pain Management;  Laterality: N/A;    HYSTERECTOMY      TRACEE/BSO for benign disease    INSERTION OF MIDURETHRAL SLING N/A 9/22/2022    Procedure:  SLING, MIDURETHRAL;  Surgeon: Yannick Cabral MD;  Location: [facility] OR;  Service: OB/GYN;  Laterality: N/A;    JOINT REPLACEMENT Right 2016    Hip replacement    NM REMOVAL OF OVARY/TUBE(S)      ROBOT-ASSISTED LAPAROSCOPIC ABDOMINAL SACROCOLPOPEXY USING DA FLAKITO XI N/A 12/14/2021    Procedure: XI ROBOTIC SACROCOLPOPEXY, ABDOMINAL;  Surgeon: Yannick Cabral MD;  Location: [facility] OR;  Service: OB/GYN;  Laterality: N/A;    ROBOT-ASSISTED LAPAROSCOPIC LYSIS OF ADHESIONS USING DA FLAKITO XI  12/14/2021    Procedure: XI ROBOTIC LYSIS, ADHESIONS;  Surgeon: Yannick Cabral MD;  Location: [facility] OR;  Service: OB/GYN;;    TONSILLECTOMY      TOTAL ABDOMINAL HYSTERECTOMY W/ BILATERAL SALPINGOOPHORECTOMY  1990       Social History     Socioeconomic History    Marital status:    Tobacco Use    Smoking status: Never    Smokeless tobacco: Never   Substance and Sexual Activity    Alcohol use: No    Drug use: No    Sexual activity: Not Currently     Social Determinants of Health     Financial Resource Strain: Unknown (6/1/2022)    Overall Financial Resource Strain (CARDIA)     Difficulty of Paying Living Expenses: Patient refused   Food Insecurity: Unknown (6/1/2022)    Hunger Vital Sign     Worried About Running Out of Food in the Last Year: Patient refused     Ran Out of Food in the Last Year: Never true   Transportation Needs: No Transportation Needs (6/1/2022)    PRAPARE - Transportation     Lack of Transportation (Medical): No     Lack of Transportation (Non-Medical): No   Physical Activity: Unknown (6/1/2022)    Exercise Vital Sign     Days of Exercise per Week: Patient refused   Stress: Unknown (6/1/2022)    Citizen of [place] of Occupational Health - Occupational Stress Questionnaire     Feeling of Stress : Patient refused   Social Connections: Unknown (6/1/2022)    Social Connection and Isolation Panel [NHANES]     Frequency of Communication with Friends and Family: More than three times a week     Frequency of Social  "Gatherings with Friends and Family: More than three times a week     Active Member of Clubs or Organizations: Yes     Attends Club or Organization Meetings: More than 4 times per year     Marital Status:    Housing Stability: Low Risk  (6/1/2022)    Housing Stability Vital Sign     Unable to Pay for Housing in the Last Year: No     Number of Places Lived in the Last Year: 1     Unstable Housing in the Last Year: No         Medications/Allergies: See med card    Vitals:    08/02/23 1327   BP: (!) 158/69   Pulse: 87   Weight: 67.9 kg (149 lb 11.1 oz)   Height: 5' 4" (1.626 m)   PainSc:   6   PainLoc: Back     Body mass index is 25.69 kg/m².    Physical exam:  Gen: A and O x3, pleasant, well-groomed  Skin: No rashes or obvious lesions  HEENT: PERRLA, no obvious deformities on ears or in canals.Trachea midline.  CVS: Regular rate and rhythm, normal palpable pulses.  Resp: Clear to auscultation bilaterally, no wheezes or rales.  Abdomen: Soft, NT/ND.  Musculoskeletal: No antalgic gait.     Neuro:  Motor:    Right Left   C4 Shoulder Abduction  5  5   C5 Elbow Flexion    5  5   C6 Wrist Extension  5  5   C7 Elbow Extension   5  5   C8/T1 Hand Intrinsics   5  5   C8 First Dorsal Interosseus  5  5   C8 Abductor Pollicus Brevis  5  5       Iliopsoas Quadriceps Knee  Flexion Tibialis  anterior Gastro- cnemius EHL   Lower: R 5/5 5/5 5/5 5/5 5/5 5/5    L 5/5 5/5 5/5 5/5 5/5 5/5        Left  Right    Triceps DTR 2+ 2+   Biceps DTR 2+ 2+   Brachioradialis DTR 2+ 2+   Patellar DTR 2+ 2+   Achilles DTR 2+ 2+   Castro Absent  Absent   Clonus Absent Absent   Babinski Absent Absent     Sensory: Intact and symmetrical to light touch and pinprick in C2-T1 dermatomes bilaterally. Intact and symmetrical to light touch and pinprick in L1-S1 dermatomes bilaterally.  Cervical spine: ROM is full in flexion, moderately reduced with extension and lateral rotation with increased pain on right lateral rotation causing neck pain and " periscapular pain on the right side.    Spurling's maneuver causes right neck pain.  Myofascial exam: No Tenderness to palpation across cervical paraspinous region bilaterally.    Lumbar spine:  Lumbar spine: ROM is moderately reduced with both flexion extension and oblique extension with no increased pain.    Alek's test causes no increased pain on either side.    Supine straight leg raise is negative bilaterally.    Internal and external rotation of the hip causes no increased pain on either side.  Myofascial exam: No tenderness to palpation across lumbar paraspinous muscles.  There is exquisite tenderness to palpation over the right GTB    Imagin19 Xray L-spine:  Bone density is normal.  There is a gentle levocurvature of the lumbar spine.  In her AP a the vertebral bodies maintain normal height and alignment.  There is no fracture.  There is marked disc space narrowing at the L4-5 level.  There is mild endplate sclerosis and osteophyte formation.  There is mild-to-moderate disc space narrowing at the L2-3, L3-4 levels and moderate disc space narrowing at the L5-S1 level.  There is multilevel facet joint arthropathy.  There is mild-to-moderate atherosclerosis.  There are surgical clips in the right upper abdomen from prior cholecystectomy.    19 MRI C-spine:  Vertebral column: There is no prior MRI or CT for comparison.  As seen on plain films, there is mild anterolisthesis of C3 on C4, C4 on C5.  There are changes of prior bony fusion of C5 and C6.  There is moderate to marked disc space narrowing at the C6-7 level.  There is no fracture.  The odontoid process is intact.  The discs are desiccated.  There is endplate osteophyte formation most apparent at the C6-7 level.  Baseline marrow signal is normal.   Spinal canal, cord, epidural space: The spinal canal is developmentally normal.  There is no abnormal epidural soft tissue mass or fluid collection.  There is minimal flattening of the right  ventral cord surface at the C4-5 level where there is a large disc extrusion.  There is no cord edema or myelomalacia.  C2-3: There is no spinal canal or significant foraminal stenosis.   C3-4: There is moderate-to-marked left foraminal stenosis due to uncovertebral spurring and facet joint arthropathy.  There is a mild disc osteophyte complex.  There is no spinal stenosis or cord compression.  The right foramen is patent.   C4-5: There is a large right paracentral disc extrusion measuring approximately 6 x 10 x 13 mm (AP, transverse, craniocaudad).  This does result in flattening of the right ventral cord surface but there is no obvious cord edema or myelomalacia.  There is moderate spinal stenosis at this level.  There is also moderate left foraminal narrowing due to uncovertebral spurring and facet joint arthropathy.   C5-6: There are changes of previous bony fusion.  There is no spinal canal or significant foraminal stenosis.   C6-7: There is moderate to marked disc space narrowing.  There is left greater than right uncovertebral spurring with bilateral facet joint arthropathy.  There is a mild disc osteophyte complex.  There is no spinal canal or significant foraminal stenosis.   C7-T1: There is left facet joint arthropathy.  There is at least mild left foraminal stenosis due to these changes.    12/17/22 MRI SI joints:  The SI joints are normal in appearance with no evidence of erosions, fusion, or other imaging findings of acute sacroiliitis/inflammatory arthritis.  No sacroiliac joint effusion.   There is degenerative disc disease in the lower lumbar spine resulting in degenerative disc desiccation, disc space narrowing, and degenerative endplate change, most pronounced at L4-L5 and L5-S1..  There is a grade I anterolisthesis of L4 on L5.  There is fluid within the intervertebral disc at L5-S1.  There is bilateral facet arthropathy and uncovering of the intervertebral disc with a superimposed mild broad disc  bulge at L4-L5.  There is mild right lateral recess stenosis.  There is, at most, mild overall central canal stenosis at L4-L5.   The visualized bony structures of the pelvis show no evidence of acute fracture or pathologic marrow replacement process.  There is incidentally observed postoperative change of total right hip arthroplasty.  This results in metal artifact within the adjacent osseous and soft tissue structures resulting in poor fat suppression.         Assessment:  Dorothy Kramer is a 82 y.o. year old female patient who has a past medical history of DDD (degenerative disc disease), cervical, Diverticulitis, Diverticulosis, DJD (degenerative joint disease) of hip, Dyspepsia, GERD (gastroesophageal reflux disease), History of melanoma, HTN (hypertension), Melanoma, Migraine headache, Rectocele, Skin cancer, Sleep apnea, Thyroid disease, Trouble in sleeping, Ulcerative colitis, Uterine prolaps, and Vulvar lesion. She presents in referral from Dr. Kamila Gilbert for back and neck pain.  1. Cervical spondylosis        2. DDD (degenerative disc disease), lumbar        3. DDD (degenerative disc disease), cervical        4. Lumbar spondylosis              Plan:  1. We reviewed all of her imaging in clinic today including cervical MRI and SI joint MRI.  We reviewed these in great detail.  2. Unfortunately she is not found significant relief with cervical KATHY and lumbar KATHY.  I believe her pain in both her neck and her back is likely axial facet mediated pain.  After great discussion she felt like her neck pain was more problematic to her at this time than her lower back pain.  3. For her continued neck pain I would like to schedule her for bilateral C3/4 and C4/5 diagnostic medial branch blocks.  If successful we will repeat the blocks prior to proceeding with radiofrequency ablation.  4. Follow-up 4 weeks post procedure or sooner if needed.  5. Based on relief of neck pain can consider treating her back pain in the  future.  She does not have MRI of lumbar spine and can consider getting 1 in the future.  She does not describe neurogenic claudication and does not seem to having narrowing at L4/5 and L5/S1 I do not believe she is having any neurogenic claudication.    The total time spent for evaluation and management on 08/02/2023 including reviewing separately obtained history, performing a medically appropriate exam and evaluation, documenting clinical information in the health record, independently interpreting results and communicating them to the patient/family/caregiver, and ordering medications/tests/procedures was between 40-54 minutes.

## 2023-08-02 NOTE — PROGRESS NOTES
This note was completed with dictation software and grammatical errors may exist.    Chief Complaint   Patient presents with    Neck Pain    Low-back Pain        HPI: Dorothy Kramer is a 82 y.o. year old female patient who has a past medical history of DDD (degenerative disc disease), cervical, Diverticulitis, Diverticulosis, DJD (degenerative joint disease) of hip, Dyspepsia, GERD (gastroesophageal reflux disease), History of melanoma, HTN (hypertension), Melanoma, Migraine headache, Rectocele, Skin cancer, Sleep apnea, Thyroid disease, Trouble in sleeping, Ulcerative colitis, Uterine prolaps, and Vulvar lesion. She presents in referral from No ref. provider found for back and neck pain.  She is status post L5/S1 KATHY on 07/12/2023 with 50% relief lasting for 1 day.  During the stay she felt improvement with pain down her left leg but not with pain across her lower back.  Today she is reporting continued neck pain and lower back pain, 6/10, neck pain is mainly located in her neck without significant radiation down her arms.  Her back pain is mainly located across her lower back and is worsened with standing and walking for any extended periods of time.  She reports some intermittent numbness in left leg but not consistent.  She denies any significant pain radiating into her legs.  She denies any new other numbness, weakness or any changes to her bowel or bladder function.  She is unable to sleep at night due to the severity of the pain.      Initial history:  The patient states that over 30 years ago she had neck pain radiating into the right arm, developed weakness, eventually underwent a C5/6 fusion.  She did well with this and has no longer had severe neck pain.  However in the last several years she began having more pain in the neck radiating into the upper back, right rhomboid region, right shoulder.  She denies any weakness in the arm.  She describes it as aching, burning, sharp and deep.  Her pain is worse  "with turning her head side-to-side, extension.  She also has pain in the low back, left buttock that she calls sciatica that radiates further down her leg.  She also has pain worse with standing, walking, bending getting out of a bed or a chair.  She has some pain over the right trochanter, states if she lays on that side at night, it will wake her up and she only gets several hours of sleep.  Because her neck hurts so much she tries to lay on her right side to immobilize her arm but then starts having worsening right lateral hip pain.  She had a GTB injection with Dr. Carmona after she tried physical therapy for the pain but this was not successful.  The injection did seem to provide several days of relief but returned.  She states that the right GTB is tender to palpation, but also has some right low back pain as well.        Pain intervention history:  Right GTB injection gave 3 days of relief.  She is status post C7-T1 interlaminar epidural steroid injection on 05/18/2023 with minimal relief. She is status post L5/S1 KATHY on 07/12/2023 with 50% relief lasting for 1 day.    Spine surgeries:  ACDF C5-6 30 years ago    Antineuropathics:  NSAIDs:  Physical therapy: She had done physical therapy several months ago for neck pain, back pain, trochanteric bursitis without much benefit she states that she does not want to return to physical therapy.  Antidepressants:  Muscle relaxers:  Opioids:  Antiplatelets/Anticoagulants:        ROS:  She reports rash, joint stiffness, joint swelling, back pain, difficulty sleeping possible dizziness and loss of balance.  Balance of review of systems is negative.    No results found for: "LABA1C", "HGBA1C"    Lab Results   Component Value Date    WBC 7.30 12/07/2022    HGB 14.1 12/07/2022    HCT 43.4 12/07/2022    MCV 96 12/07/2022     (L) 12/07/2022             Past Medical History:   Diagnosis Date    DDD (degenerative disc disease), cervical     Diverticulitis     " Diverticulosis     DJD (degenerative joint disease) of hip     Dyspepsia     GERD (gastroesophageal reflux disease)     History of melanoma 05/20/2015    HTN (hypertension)     Melanoma     Migraine headache     Rectocele     Skin cancer     melanoma on face    Sleep apnea     doesnt use cpap    Thyroid disease     hypo    Trouble in sleeping     Ulcerative colitis     on meds    Uterine prolaps     followed by GYN    Vulvar lesion 05/20/2015       Past Surgical History:   Procedure Laterality Date    APPENDECTOMY  1962    BACK SURGERY      BREAST BIOPSY      BREAST LUMPECTOMY  1989    CATARACT EXTRACTION Left 02/22/2021    ros    CATARACT EXTRACTION W/  INTRAOCULAR LENS IMPLANT Left 2/22/2021    Procedure: EXTRACTION, CATARACT, WITH IOL INSERTION;  Surgeon: Latisha Baker MD;  Location: [facility] OR;  Service: Ophthalmology;  Laterality: Left;  left    CATARACT EXTRACTION W/  INTRAOCULAR LENS IMPLANT Right 3/22/2021    Procedure: EXTRACTION, CATARACT, WITH IOL INSERTION;  Surgeon: Latisha Baker MD;  Location: [facility] OR;  Service: Ophthalmology;  Laterality: Right;  right    CERVICAL FUSION  1991    CHOLECYSTECTOMY      COLONOSCOPY      COLONOSCOPY N/A 11/9/2018    Dr Oscar; chronic active colitis; repeat in 5 years    COLONOSCOPY N/A 2/14/2020    Procedure: COLONOSCOPY;  Surgeon: Fadi Oscar MD;  Location: [facility] ENDO;  Service: Endoscopy;  Laterality: N/A;    EPIDURAL STEROID INJECTION INTO CERVICAL SPINE N/A 5/18/2023    Procedure: Injection-steroid-epidural-cervical C7/T1;  Surgeon: Chintan Damon MD;  Location: [facility] OR;  Service: Pain Management;  Laterality: N/A;  cervical    EPIDURAL STEROID INJECTION INTO LUMBAR SPINE N/A 7/12/2023    Procedure: Injection-steroid-epidural-lumbar L5/S1;  Surgeon: Chintan Damon MD;  Location: [facility] OR;  Service: Pain Management;  Laterality: N/A;    HYSTERECTOMY      TRACEE/BSO for benign disease    INSERTION OF MIDURETHRAL SLING N/A 9/22/2022    Procedure:  SLING, MIDURETHRAL;  Surgeon: Yannick Cabral MD;  Location: [facility] OR;  Service: OB/GYN;  Laterality: N/A;    JOINT REPLACEMENT Right 2016    Hip replacement    MI REMOVAL OF OVARY/TUBE(S)      ROBOT-ASSISTED LAPAROSCOPIC ABDOMINAL SACROCOLPOPEXY USING DA FLAKITO XI N/A 12/14/2021    Procedure: XI ROBOTIC SACROCOLPOPEXY, ABDOMINAL;  Surgeon: Yannick Cabral MD;  Location: [facility] OR;  Service: OB/GYN;  Laterality: N/A;    ROBOT-ASSISTED LAPAROSCOPIC LYSIS OF ADHESIONS USING DA FLAKITO XI  12/14/2021    Procedure: XI ROBOTIC LYSIS, ADHESIONS;  Surgeon: Yannick Cabral MD;  Location: [facility] OR;  Service: OB/GYN;;    TONSILLECTOMY      TOTAL ABDOMINAL HYSTERECTOMY W/ BILATERAL SALPINGOOPHORECTOMY  1990       Social History     Socioeconomic History    Marital status:    Tobacco Use    Smoking status: Never    Smokeless tobacco: Never   Substance and Sexual Activity    Alcohol use: No    Drug use: No    Sexual activity: Not Currently     Social Determinants of Health     Financial Resource Strain: Unknown (6/1/2022)    Overall Financial Resource Strain (CARDIA)     Difficulty of Paying Living Expenses: Patient refused   Food Insecurity: Unknown (6/1/2022)    Hunger Vital Sign     Worried About Running Out of Food in the Last Year: Patient refused     Ran Out of Food in the Last Year: Never true   Transportation Needs: No Transportation Needs (6/1/2022)    PRAPARE - Transportation     Lack of Transportation (Medical): No     Lack of Transportation (Non-Medical): No   Physical Activity: Unknown (6/1/2022)    Exercise Vital Sign     Days of Exercise per Week: Patient refused   Stress: Unknown (6/1/2022)    [employer] - Occupational Stress Questionnaire     Feeling of Stress : Patient refused   Social Connections: Unknown (6/1/2022)    Social Connection and Isolation Panel [NHANES]     Frequency of Communication with Friends and Family: More than three times a week     Frequency of Social  "Gatherings with Friends and Family: More than three times a week     Active Member of Clubs or Organizations: Yes     Attends Club or Organization Meetings: More than 4 times per year     Marital Status:    Housing Stability: Low Risk  (6/1/2022)    Housing Stability Vital Sign     Unable to Pay for Housing in the Last Year: No     Number of Places Lived in the Last Year: 1     Unstable Housing in the Last Year: No         Medications/Allergies: See med card    Vitals:    08/02/23 1327   BP: (!) 158/69   Pulse: 87   Weight: 67.9 kg (149 lb 11.1 oz)   Height: 5' 4" (1.626 m)   PainSc:   6   PainLoc: Back     Body mass index is 25.69 kg/m².    Physical exam:  Gen: A and O x3, pleasant, well-groomed  Skin: No rashes or obvious lesions  HEENT: PERRLA, no obvious deformities on ears or in canals.Trachea midline.  CVS: Regular rate and rhythm, normal palpable pulses.  Resp: Clear to auscultation bilaterally, no wheezes or rales.  Abdomen: Soft, NT/ND.  Musculoskeletal: No antalgic gait.     Neuro:  Motor:    Right Left   C4 Shoulder Abduction  5  5   C5 Elbow Flexion    5  5   C6 Wrist Extension  5  5   C7 Elbow Extension   5  5   C8/T1 Hand Intrinsics   5  5   C8 First Dorsal Interosseus  5  5   C8 Abductor Pollicus Brevis  5  5       Iliopsoas Quadriceps Knee  Flexion Tibialis  anterior Gastro- cnemius EHL   Lower: R 5/5 5/5 5/5 5/5 5/5 5/5    L 5/5 5/5 5/5 5/5 5/5 5/5        Left  Right    Triceps DTR 2+ 2+   Biceps DTR 2+ 2+   Brachioradialis DTR 2+ 2+   Patellar DTR 2+ 2+   Achilles DTR 2+ 2+   Castro Absent  Absent   Clonus Absent Absent   Babinski Absent Absent     Sensory: Intact and symmetrical to light touch and pinprick in C2-T1 dermatomes bilaterally. Intact and symmetrical to light touch and pinprick in L1-S1 dermatomes bilaterally.  Cervical spine: ROM is full in flexion, moderately reduced with extension and lateral rotation with increased pain on right lateral rotation causing neck pain and " periscapular pain on the right side.    Spurling's maneuver causes right neck pain.  Myofascial exam: No Tenderness to palpation across cervical paraspinous region bilaterally.    Lumbar spine:  Lumbar spine: ROM is moderately reduced with both flexion extension and oblique extension with no increased pain.    Alek's test causes no increased pain on either side.    Supine straight leg raise is negative bilaterally.    Internal and external rotation of the hip causes no increased pain on either side.  Myofascial exam: No tenderness to palpation across lumbar paraspinous muscles.  There is exquisite tenderness to palpation over the right GTB    Imagin19 Xray L-spine:  Bone density is normal.  There is a gentle levocurvature of the lumbar spine.  In her AP a the vertebral bodies maintain normal height and alignment.  There is no fracture.  There is marked disc space narrowing at the L4-5 level.  There is mild endplate sclerosis and osteophyte formation.  There is mild-to-moderate disc space narrowing at the L2-3, L3-4 levels and moderate disc space narrowing at the L5-S1 level.  There is multilevel facet joint arthropathy.  There is mild-to-moderate atherosclerosis.  There are surgical clips in the right upper abdomen from prior cholecystectomy.    19 MRI C-spine:  Vertebral column: There is no prior MRI or CT for comparison.  As seen on plain films, there is mild anterolisthesis of C3 on C4, C4 on C5.  There are changes of prior bony fusion of C5 and C6.  There is moderate to marked disc space narrowing at the C6-7 level.  There is no fracture.  The odontoid process is intact.  The discs are desiccated.  There is endplate osteophyte formation most apparent at the C6-7 level.  Baseline marrow signal is normal.   Spinal canal, cord, epidural space: The spinal canal is developmentally normal.  There is no abnormal epidural soft tissue mass or fluid collection.  There is minimal flattening of the right  ventral cord surface at the C4-5 level where there is a large disc extrusion.  There is no cord edema or myelomalacia.  C2-3: There is no spinal canal or significant foraminal stenosis.   C3-4: There is moderate-to-marked left foraminal stenosis due to uncovertebral spurring and facet joint arthropathy.  There is a mild disc osteophyte complex.  There is no spinal stenosis or cord compression.  The right foramen is patent.   C4-5: There is a large right paracentral disc extrusion measuring approximately 6 x 10 x 13 mm (AP, transverse, craniocaudad).  This does result in flattening of the right ventral cord surface but there is no obvious cord edema or myelomalacia.  There is moderate spinal stenosis at this level.  There is also moderate left foraminal narrowing due to uncovertebral spurring and facet joint arthropathy.   C5-6: There are changes of previous bony fusion.  There is no spinal canal or significant foraminal stenosis.   C6-7: There is moderate to marked disc space narrowing.  There is left greater than right uncovertebral spurring with bilateral facet joint arthropathy.  There is a mild disc osteophyte complex.  There is no spinal canal or significant foraminal stenosis.   C7-T1: There is left facet joint arthropathy.  There is at least mild left foraminal stenosis due to these changes.    12/17/22 MRI SI joints:  The SI joints are normal in appearance with no evidence of erosions, fusion, or other imaging findings of acute sacroiliitis/inflammatory arthritis.  No sacroiliac joint effusion.   There is degenerative disc disease in the lower lumbar spine resulting in degenerative disc desiccation, disc space narrowing, and degenerative endplate change, most pronounced at L4-L5 and L5-S1..  There is a grade I anterolisthesis of L4 on L5.  There is fluid within the intervertebral disc at L5-S1.  There is bilateral facet arthropathy and uncovering of the intervertebral disc with a superimposed mild broad disc  bulge at L4-L5.  There is mild right lateral recess stenosis.  There is, at most, mild overall central canal stenosis at L4-L5.   The visualized bony structures of the pelvis show no evidence of acute fracture or pathologic marrow replacement process.  There is incidentally observed postoperative change of total right hip arthroplasty.  This results in metal artifact within the adjacent osseous and soft tissue structures resulting in poor fat suppression.         Assessment:  Dorothy Kramer is a 82 y.o. year old female patient who has a past medical history of DDD (degenerative disc disease), cervical, Diverticulitis, Diverticulosis, DJD (degenerative joint disease) of hip, Dyspepsia, GERD (gastroesophageal reflux disease), History of melanoma, HTN (hypertension), Melanoma, Migraine headache, Rectocele, Skin cancer, Sleep apnea, Thyroid disease, Trouble in sleeping, Ulcerative colitis, Uterine prolaps, and Vulvar lesion. She presents in referral from Dr. Kamila Gilbert for back and neck pain.  1. Cervical spondylosis        2. DDD (degenerative disc disease), lumbar        3. DDD (degenerative disc disease), cervical        4. Lumbar spondylosis              Plan:  1. We reviewed all of her imaging in clinic today including cervical MRI and SI joint MRI.  We reviewed these in great detail.  2. Unfortunately she is not found significant relief with cervical KATHY and lumbar KATHY.  I believe her pain in both her neck and her back is likely axial facet mediated pain.  After great discussion she felt like her neck pain was more problematic to her at this time than her lower back pain.  3. For her continued neck pain I would like to schedule her for bilateral C3/4 and C4/5 diagnostic medial branch blocks.  If successful we will repeat the blocks prior to proceeding with radiofrequency ablation.  4. Follow-up 4 weeks post procedure or sooner if needed.  5. Based on relief of neck pain can consider treating her back pain in the  future.  She does not have MRI of lumbar spine and can consider getting 1 in the future.  She does not describe neurogenic claudication and does not seem to having narrowing at L4/5 and L5/S1 I do not believe she is having any neurogenic claudication.    The total time spent for evaluation and management on 08/02/2023 including reviewing separately obtained history, performing a medically appropriate exam and evaluation, documenting clinical information in the health record, independently interpreting results and communicating them to the patient/family/caregiver, and ordering medications/tests/procedures was between 40-54 minutes.

## 2023-08-03 RX ORDER — SODIUM CHLORIDE, SODIUM LACTATE, POTASSIUM CHLORIDE, CALCIUM CHLORIDE 600; 310; 30; 20 MG/100ML; MG/100ML; MG/100ML; MG/100ML
INJECTION, SOLUTION INTRAVENOUS CONTINUOUS
Status: CANCELLED | OUTPATIENT
Start: 2023-08-03

## 2023-08-28 ENCOUNTER — HOSPITAL ENCOUNTER (OUTPATIENT)
Dept: RADIOLOGY | Facility: HOSPITAL | Age: 83
Discharge: HOME | End: 2023-08-28
Attending: ANESTHESIOLOGY | Admitting: ANESTHESIOLOGY
Payer: MEDICARE

## 2023-08-28 ENCOUNTER — HOSPITAL ENCOUNTER (OUTPATIENT)
Facility: HOSPITAL | Age: 83
Discharge: HOME | End: 2023-08-28
Attending: ANESTHESIOLOGY | Admitting: ANESTHESIOLOGY
Payer: MEDICARE

## 2023-08-28 VITALS
WEIGHT: 149 LBS | SYSTOLIC BLOOD PRESSURE: 146 MMHG | RESPIRATION RATE: 20 BRPM | OXYGEN SATURATION: 95 % | HEART RATE: 71 BPM | HEIGHT: 64 IN | TEMPERATURE: 98 F | DIASTOLIC BLOOD PRESSURE: 76 MMHG | BODY MASS INDEX: 25.44 KG/M2

## 2023-08-28 DIAGNOSIS — M54.2 NECK PAIN: ICD-10-CM

## 2023-08-28 DIAGNOSIS — M47.812 CERVICAL SPONDYLOSIS: ICD-10-CM

## 2023-08-28 PROCEDURE — 64491 INJ PARAVERT F JNT C/T 2 LEV: CPT | Mod: 50,HCNC,, | Performed by: ANESTHESIOLOGY

## 2023-08-28 PROCEDURE — 64491 INJ PARAVERT F JNT C/T 2 LEV: CPT | Mod: 50,HCNC,PO | Performed by: ANESTHESIOLOGY

## 2023-08-28 PROCEDURE — 76000 FLUOROSCOPY <1 HR PHYS/QHP: CPT | Mod: TC,HCNC,PO

## 2023-08-28 PROCEDURE — 64490 INJ PARAVERT F JNT C/T 1 LEV: CPT | Mod: 50,HCNC,, | Performed by: ANESTHESIOLOGY

## 2023-08-28 PROCEDURE — 64491 PR INJ DX/THER AGNT PARAVERT FACET JOINT,IMG GUIDE,CERV/THORAC, 2ND LEVEL: ICD-10-PCS | Mod: 50,HCNC,, | Performed by: ANESTHESIOLOGY

## 2023-08-28 PROCEDURE — 25000003 PHARM REV CODE 250: Mod: HCNC,PO | Performed by: ANESTHESIOLOGY

## 2023-08-28 PROCEDURE — 64490 PR INJ DX/THER AGNT PARAVERT FACET JOINT,IMG GUIDE,CERV/THORAC, 1ST LEVEL: ICD-10-PCS | Mod: 50,HCNC,, | Performed by: ANESTHESIOLOGY

## 2023-08-28 PROCEDURE — 63600175 PHARM REV CODE 636 W HCPCS: Mod: HCNC,PO | Performed by: ANESTHESIOLOGY

## 2023-08-28 PROCEDURE — 64490 INJ PARAVERT F JNT C/T 1 LEV: CPT | Mod: 50,HCNC,PO | Performed by: ANESTHESIOLOGY

## 2023-08-28 RX ORDER — BUPIVACAINE HYDROCHLORIDE 2.5 MG/ML
INJECTION, SOLUTION EPIDURAL; INFILTRATION; INTRACAUDAL
Status: DISCONTINUED | OUTPATIENT
Start: 2023-08-28 | End: 2023-08-28 | Stop reason: HOSPADM

## 2023-08-28 RX ORDER — MIDAZOLAM HYDROCHLORIDE 2 MG/2ML
INJECTION, SOLUTION INTRAMUSCULAR; INTRAVENOUS
Status: DISCONTINUED | OUTPATIENT
Start: 2023-08-28 | End: 2023-08-28 | Stop reason: HOSPADM

## 2023-08-28 RX ORDER — LIDOCAINE HYDROCHLORIDE 10 MG/ML
INJECTION, SOLUTION EPIDURAL; INFILTRATION; INTRACAUDAL; PERINEURAL
Status: DISCONTINUED | OUTPATIENT
Start: 2023-08-28 | End: 2023-08-28 | Stop reason: HOSPADM

## 2023-08-28 RX ORDER — SODIUM CHLORIDE, SODIUM LACTATE, POTASSIUM CHLORIDE, CALCIUM CHLORIDE 600; 310; 30; 20 MG/100ML; MG/100ML; MG/100ML; MG/100ML
INJECTION, SOLUTION INTRAVENOUS CONTINUOUS
Status: DISCONTINUED | OUTPATIENT
Start: 2023-08-28 | End: 2023-08-28 | Stop reason: HOSPADM

## 2023-08-28 RX ORDER — LIDOCAINE HYDROCHLORIDE 10 MG/ML
1 INJECTION, SOLUTION EPIDURAL; INFILTRATION; INTRACAUDAL; PERINEURAL ONCE
Status: COMPLETED | OUTPATIENT
Start: 2023-08-28 | End: 2023-08-28

## 2023-08-28 RX ADMIN — LIDOCAINE HYDROCHLORIDE 10 MG: 10 INJECTION, SOLUTION EPIDURAL; INFILTRATION; INTRACAUDAL; PERINEURAL at 12:08

## 2023-08-28 RX ADMIN — SODIUM CHLORIDE, POTASSIUM CHLORIDE, SODIUM LACTATE AND CALCIUM CHLORIDE: 600; 310; 30; 20 INJECTION, SOLUTION INTRAVENOUS at 12:08

## 2023-08-28 NOTE — DISCHARGE INSTRUCTIONS
PAIN MANAGEMENT    HOME CARE INSTRUCTIONS   Do not use heat (such as a heating pad) for 24 hours.  You may apply an ice pack to the injection site for 20 minutes at a time for the first 24 hours for soreness/discomfort at injection site   Keep site clean and dry for 24 hours. If bandaid is present, remove when desired.   Do not drive until tomorrow.  Take care when walking after a lumbar injection.   Resume home medication as prescribed today.  Resume Aspirin, Plavix, or Coumadin the day after the procedure unless other wise instructed.        BLOCKS  Resume regular activities today.  Pain office will call in next 2 days.      CALL PHYSICIAN FOR:  Severe increase in your usual pain or the appearance of new pain.  Prolonged or increasing weakness or numbness in the legs or arms.  Fever greater than 100 degrees F.  Drainage, redness, active bleeding, or increased swelling at the injection site.  Headache that increases when your head is upright and decreases when you lie flat.    FOR EMERGENCIES:   Go directly to the emergency department for any shortness of breath, chest pain, or problems breathing.

## 2023-08-28 NOTE — OP NOTE
PROCEDURE DATE: 8/28/2023    PROCEDURE:  Diagnostic Cervical medial branch block of the bilateral C3/4 and C4/5 medial branch nerves on the bilateral-side utilizing fluoroscopy    DIAGNOSIS:  Cervical spondylosis    PHYSICIAN: Chintan Damon MD    MEDICATIONS INJECTED:  0.25% bupivicaine, 0.5ml at each level.    LOCAL ANESTHETIC USED:   1% lidocaine, 1ml at each level.    SEDATION MEDICATIONS: 2mg versed    ESTIMATED BLOOD LOSS:  none    COMPLICATIONS:  none    TECHNIQUE : A time-out was taken to identify patient and procedure side prior to starting the procedure.  The patient was positioned prone with the site of interest side up. The patient was prepped and draped in the usual sterile fashion using ChloraPrep and sterile towels.  The level was determined under fluoroscopic guidance using a slightly posteriorly oblique view.   Local anesthetic was infiltrated superficially at the skin level.  Then, a 25 gauge 3.5 inch needle was inserted to the anatomic location of the midsection of the lateral masses of C3,4,5. A cross table view was then taken to ensure that needles did not cross into neural foramina.  The above noted medication was then injected. The patient tolerated the procedure well.     The patient was monitored after the procedure. The patient will be contacted tomorrow to determine the extent of relief. The patient was given post procedure and discharge instructions to follow at home. The patient was discharged in a stable condition

## 2023-08-28 NOTE — DISCHARGE SUMMARY
[facility] - Surgery  Discharge Note  Short Stay    Procedure(s) (LRB):  Block-nerve-medial branch-cervical C3/4. C4/5 (Bilateral)      OUTCOME: Patient tolerated treatment/procedure well without complication and is now ready for discharge.    DISPOSITION: Home or Self Care    FINAL DIAGNOSIS:  Cervical spondylosis    FOLLOWUP: In clinic    DISCHARGE INSTRUCTIONS:    Discharge Procedure Orders   Diet Adult Regular     No dressing needed     Notify your health care provider if you experience any of the following:  temperature >100.4     Activity as tolerated

## 2023-08-30 ENCOUNTER — PATIENT MESSAGE (OUTPATIENT)
Dept: PAIN MEDICINE | Facility: CLINIC | Age: 83
End: 2023-08-30
Payer: MEDICARE

## 2023-09-01 ENCOUNTER — PATIENT MESSAGE (OUTPATIENT)
Dept: PAIN MEDICINE | Facility: CLINIC | Age: 83
End: 2023-09-01
Payer: MEDICARE

## 2023-09-01 ENCOUNTER — TELEPHONE (OUTPATIENT)
Dept: PAIN MEDICINE | Facility: CLINIC | Age: 83
End: 2023-09-01
Payer: MEDICARE

## 2023-09-01 DIAGNOSIS — M47.812 CERVICAL SPONDYLOSIS: Primary | ICD-10-CM

## 2023-09-01 RX ORDER — SODIUM CHLORIDE, SODIUM LACTATE, POTASSIUM CHLORIDE, CALCIUM CHLORIDE 600; 310; 30; 20 MG/100ML; MG/100ML; MG/100ML; MG/100ML
INJECTION, SOLUTION INTRAVENOUS CONTINUOUS
Status: CANCELLED | OUTPATIENT
Start: 2023-09-01

## 2023-09-01 NOTE — TELEPHONE ENCOUNTER
----- Message from Becky Ibarra sent at 8/31/2023 11:49 AM CDT -----  Type:  Needs Medical Advice    Who Called: pt  Best Call Back Number: [phone]    Additional Information:  Requesting call back  wants to dx sx that was on Monday     please advise thank you

## 2023-09-13 ENCOUNTER — TELEPHONE (OUTPATIENT)
Dept: SURGERY | Facility: HOSPITAL | Age: 83
End: 2023-09-13
Payer: MEDICARE

## 2023-09-13 NOTE — TELEPHONE ENCOUNTER
Good Morning,    Just spoke to Ms. Alejandrejorge for her pre-op call and she mentioned that she has a dental procedure tomorrow. She will need to take a prophylactic dose of Amoxicillin for this procedure. Is she OK to do this or should she reschedule her dental procedure?     Thank you!

## 2023-09-15 ENCOUNTER — HOSPITAL ENCOUNTER (OUTPATIENT)
Facility: HOSPITAL | Age: 83
Discharge: HOME | End: 2023-09-15
Attending: ANESTHESIOLOGY | Admitting: ANESTHESIOLOGY
Payer: MEDICARE

## 2023-09-15 ENCOUNTER — HOSPITAL ENCOUNTER (OUTPATIENT)
Dept: RADIOLOGY | Facility: HOSPITAL | Age: 83
Discharge: HOME | End: 2023-09-15
Attending: ANESTHESIOLOGY | Admitting: ANESTHESIOLOGY
Payer: MEDICARE

## 2023-09-15 VITALS
WEIGHT: 149 LBS | HEART RATE: 65 BPM | DIASTOLIC BLOOD PRESSURE: 67 MMHG | RESPIRATION RATE: 16 BRPM | BODY MASS INDEX: 25.44 KG/M2 | TEMPERATURE: 98 F | SYSTOLIC BLOOD PRESSURE: 145 MMHG | HEIGHT: 64 IN | OXYGEN SATURATION: 98 %

## 2023-09-15 DIAGNOSIS — M54.2 NECK PAIN: ICD-10-CM

## 2023-09-15 DIAGNOSIS — M47.812 CERVICAL SPONDYLOSIS: ICD-10-CM

## 2023-09-15 PROCEDURE — 64490 PR INJ DX/THER AGNT PARAVERT FACET JOINT,IMG GUIDE,CERV/THORAC, 1ST LEVEL: ICD-10-PCS | Mod: 50,HCNC,, | Performed by: ANESTHESIOLOGY

## 2023-09-15 PROCEDURE — 76000 FLUOROSCOPY <1 HR PHYS/QHP: CPT | Mod: TC,HCNC,PO

## 2023-09-15 PROCEDURE — 64491 PR INJ DX/THER AGNT PARAVERT FACET JOINT,IMG GUIDE,CERV/THORAC, 2ND LEVEL: ICD-10-PCS | Mod: 50,HCNC,, | Performed by: ANESTHESIOLOGY

## 2023-09-15 PROCEDURE — 63600175 PHARM REV CODE 636 W HCPCS: Mod: HCNC,PO | Performed by: ANESTHESIOLOGY

## 2023-09-15 PROCEDURE — 64491 INJ PARAVERT F JNT C/T 2 LEV: CPT | Mod: 50,HCNC,PO | Performed by: ANESTHESIOLOGY

## 2023-09-15 PROCEDURE — 64490 INJ PARAVERT F JNT C/T 1 LEV: CPT | Mod: 50,HCNC,, | Performed by: ANESTHESIOLOGY

## 2023-09-15 PROCEDURE — 25000003 PHARM REV CODE 250: Mod: HCNC,PO | Performed by: ANESTHESIOLOGY

## 2023-09-15 PROCEDURE — 64491 INJ PARAVERT F JNT C/T 2 LEV: CPT | Mod: 50,HCNC,, | Performed by: ANESTHESIOLOGY

## 2023-09-15 PROCEDURE — 64490 INJ PARAVERT F JNT C/T 1 LEV: CPT | Mod: 50,HCNC,PO | Performed by: ANESTHESIOLOGY

## 2023-09-15 RX ORDER — BUPIVACAINE HYDROCHLORIDE 2.5 MG/ML
INJECTION, SOLUTION EPIDURAL; INFILTRATION; INTRACAUDAL
Status: DISCONTINUED | OUTPATIENT
Start: 2023-09-15 | End: 2023-09-15 | Stop reason: HOSPADM

## 2023-09-15 RX ORDER — LIDOCAINE HYDROCHLORIDE 10 MG/ML
INJECTION, SOLUTION EPIDURAL; INFILTRATION; INTRACAUDAL; PERINEURAL
Status: DISCONTINUED | OUTPATIENT
Start: 2023-09-15 | End: 2023-09-15 | Stop reason: HOSPADM

## 2023-09-15 RX ORDER — MIDAZOLAM HYDROCHLORIDE 2 MG/2ML
INJECTION, SOLUTION INTRAMUSCULAR; INTRAVENOUS
Status: DISCONTINUED | OUTPATIENT
Start: 2023-09-15 | End: 2023-09-15 | Stop reason: HOSPADM

## 2023-09-15 RX ORDER — SODIUM CHLORIDE, SODIUM LACTATE, POTASSIUM CHLORIDE, CALCIUM CHLORIDE 600; 310; 30; 20 MG/100ML; MG/100ML; MG/100ML; MG/100ML
INJECTION, SOLUTION INTRAVENOUS CONTINUOUS
Status: DISCONTINUED | OUTPATIENT
Start: 2023-09-15 | End: 2023-09-15 | Stop reason: HOSPADM

## 2023-09-15 RX ORDER — LIDOCAINE HYDROCHLORIDE 10 MG/ML
1 INJECTION, SOLUTION EPIDURAL; INFILTRATION; INTRACAUDAL; PERINEURAL ONCE
Status: COMPLETED | OUTPATIENT
Start: 2023-09-15 | End: 2023-09-15

## 2023-09-15 RX ADMIN — LIDOCAINE HYDROCHLORIDE 10 MG: 10 INJECTION, SOLUTION EPIDURAL; INFILTRATION; INTRACAUDAL; PERINEURAL at 01:09

## 2023-09-15 RX ADMIN — SODIUM CHLORIDE, POTASSIUM CHLORIDE, SODIUM LACTATE AND CALCIUM CHLORIDE: 600; 310; 30; 20 INJECTION, SOLUTION INTRAVENOUS at 01:09

## 2023-09-15 NOTE — OP NOTE
PROCEDURE DATE: 9/15/2023    PROCEDURE:  Diagnostic Cervical medial branch block of the bilateral C3,4,5 medial branch nerves on the bilateral-side utilizing fluoroscopy    DIAGNOSIS:  Cervical spondylosis    PHYSICIAN: Chintan Damon MD    MEDICATIONS INJECTED:  0.25% bupivicaine, 0.5ml at each level.    LOCAL ANESTHETIC USED:   1% lidocaine, 1ml at each level.    SEDATION MEDICATIONS: 2mg versed    ESTIMATED BLOOD LOSS:  none    COMPLICATIONS:  none    TECHNIQUE : A time-out was taken to identify patient and procedure side prior to starting the procedure.  The patient was positioned prone with the site of interest side up. The patient was prepped and draped in the usual sterile fashion using ChloraPrep and sterile towels.  The level was determined under fluoroscopic guidance using a slightly posteriorly oblique view.   Local anesthetic was infiltrated superficially at the skin level.  Then, a 25 gauge 3.5 inch needle was inserted to the anatomic location of the midsection of the lateral masses of the bilateral C3,4,5. A cross table view was then taken to ensure that needles did not cross into neural foramina.  The above noted medication was then injected. The patient tolerated the procedure well.     The patient was monitored after the procedure. The patient will be contacted tomorrow to determine the extent of relief. The patient was given post procedure and discharge instructions to follow at home. The patient was discharged in a stable condition

## 2023-09-15 NOTE — H&P
CC: Neck pain    HPI: The patient is a 83yo woman with a history of cervical spondylosis here for bilateral C3/4 and C4/5 medial branch block. There are no major changes in history and physical from 8/28/23.    Past Medical History:   Diagnosis Date    DDD (degenerative disc disease), cervical     Diverticulitis     Diverticulosis     DJD (degenerative joint disease) of hip     Dyspepsia     GERD (gastroesophageal reflux disease)     History of melanoma 05/20/2015    HTN (hypertension)     Melanoma     Migraine headache     Rectocele     Skin cancer     melanoma on face    Sleep apnea     doesnt use cpap    Thyroid disease     hypo    Trouble in sleeping     Ulcerative colitis     on meds    Uterine prolaps     followed by GYN    Vulvar lesion 05/20/2015       Past Surgical History:   Procedure Laterality Date    APPENDECTOMY  1962    BACK SURGERY      BREAST BIOPSY      BREAST LUMPECTOMY  1989    CATARACT EXTRACTION Left 02/22/2021    ros    CATARACT EXTRACTION W/  INTRAOCULAR LENS IMPLANT Left 2/22/2021    Procedure: EXTRACTION, CATARACT, WITH IOL INSERTION;  Surgeon: Latisha Baker MD;  Location: [facility] OR;  Service: Ophthalmology;  Laterality: Left;  left    CATARACT EXTRACTION W/  INTRAOCULAR LENS IMPLANT Right 3/22/2021    Procedure: EXTRACTION, CATARACT, WITH IOL INSERTION;  Surgeon: Latisha Baker MD;  Location: [facility] OR;  Service: Ophthalmology;  Laterality: Right;  right    CERVICAL FUSION  1991    CHOLECYSTECTOMY      COLONOSCOPY      COLONOSCOPY N/A 11/9/2018    Dr Oscar; chronic active colitis; repeat in 5 years    COLONOSCOPY N/A 2/14/2020    Procedure: COLONOSCOPY;  Surgeon: Fadi Oscar MD;  Location: [facility] ENDO;  Service: Endoscopy;  Laterality: N/A;    EPIDURAL STEROID INJECTION INTO CERVICAL SPINE N/A 5/18/2023    Procedure: Injection-steroid-epidural-cervical C7/T1;  Surgeon: Chintan Damon MD;  Location: [facility] OR;  Service: Pain Management;  Laterality: N/A;  cervical     EPIDURAL STEROID INJECTION INTO LUMBAR SPINE N/A 7/12/2023    Procedure: Injection-steroid-epidural-lumbar L5/S1;  Surgeon: Chintan Damon MD;  Location: [facility] OR;  Service: Pain Management;  Laterality: N/A;    HYSTERECTOMY      TRACEE/BSO for benign disease    INJECTION OF ANESTHETIC AGENT AROUND MEDIAL BRANCH NERVES INNERVATING CERVICAL FACET JOINT Bilateral 8/28/2023    Procedure: Block-nerve-medial branch-cervical C3/4. C4/5;  Surgeon: Chintan Damon MD;  Location: [facility] OR;  Service: Pain Management;  Laterality: Bilateral;    INSERTION OF MIDURETHRAL SLING N/A 9/22/2022    Procedure: SLING, MIDURETHRAL;  Surgeon: Yannick Cabral MD;  Location: [facility] OR;  Service: OB/GYN;  Laterality: N/A;    JOINT REPLACEMENT Right 2016    Hip replacement    CT REMOVAL OF OVARY/TUBE(S)      ROBOT-ASSISTED LAPAROSCOPIC ABDOMINAL SACROCOLPOPEXY USING DA FLAKITO XI N/A 12/14/2021    Procedure: XI ROBOTIC SACROCOLPOPEXY, ABDOMINAL;  Surgeon: Yannick Cabral MD;  Location: [facility] OR;  Service: OB/GYN;  Laterality: N/A;    ROBOT-ASSISTED LAPAROSCOPIC LYSIS OF ADHESIONS USING DA FLAKITO XI  12/14/2021    Procedure: XI ROBOTIC LYSIS, ADHESIONS;  Surgeon: Yannick Cabral MD;  Location: [facility] OR;  Service: OB/GYN;;    TONSILLECTOMY      TOTAL ABDOMINAL HYSTERECTOMY W/ BILATERAL SALPINGOOPHORECTOMY  1990       Family History   Problem Relation Age of Onset    Heart failure Mother     Cancer Mother         Bladder    Colon cancer Mother 63    Cancer Father         Bladder    Parkinsonism Father     Diverticulitis Father     Uterine cancer Maternal Aunt     Ovarian cancer Neg Hx     Breast cancer Neg Hx     Crohn's disease Neg Hx     Ulcerative colitis Neg Hx     Glaucoma Neg Hx     Macular degeneration Neg Hx     Retinal detachment Neg Hx        Social History     Socioeconomic History    Marital status:    Tobacco Use    Smoking status: Never    Smokeless tobacco: Never   Substance and Sexual Activity    Alcohol use: No    Drug  use: No    Sexual activity: Not Currently     Social Determinants of Health     Financial Resource Strain: Unknown (6/1/2022)    Overall Financial Resource Strain (CARDIA)     Difficulty of Paying Living Expenses: Patient refused   Food Insecurity: Unknown (6/1/2022)    Hunger Vital Sign     Worried About Running Out of Food in the Last Year: Patient refused     Ran Out of Food in the Last Year: Never true   Transportation Needs: No Transportation Needs (6/1/2022)    PRAPARE - Transportation     Lack of Transportation (Medical): No     Lack of Transportation (Non-Medical): No   Physical Activity: Unknown (6/1/2022)    Exercise Vital Sign     Days of Exercise per Week: Patient refused   Stress: Unknown (6/1/2022)    [place] [employer] - Occupational Stress Questionnaire     Feeling of Stress : Patient refused   Social Connections: Unknown (6/1/2022)    Social Connection and Isolation Panel [NHANES]     Frequency of Communication with Friends and Family: More than three times a week     Frequency of Social Gatherings with Friends and Family: More than three times a week     Active Member of Clubs or Organizations: Yes     Attends Club or Organization Meetings: More than 4 times per year     Marital Status:    Housing Stability: Low Risk  (6/1/2022)    Housing Stability Vital Sign     Unable to Pay for Housing in the Last Year: No     Number of Places Lived in the Last Year: 1     Unstable Housing in the Last Year: No       Current Facility-Administered Medications   Medication Dose Route Frequency Provider Last Rate Last Admin    lactated ringers infusion   Intravenous Continuous Chintan Damon MD        lactated ringers infusion   Intravenous Continuous Chintan Damon MD           Review of patient's allergies indicates:   Allergen Reactions    Ciprofloxacin Palpitations    Flagyl [metronidazole] Palpitations       Vitals:    09/13/23 0949 09/15/23 1301   BP:  (!) 179/75  "  Pulse:  71   Resp:  17   Temp:  97.7 °F (36.5 °C)   TempSrc:  Temporal   SpO2:  98%   Weight: 67.6 kg (149 lb)    Height: 5' 4" (1.626 m)        ASA 2, Mallampati 2    REVIEW OF SYSTEMS:     GENERAL: No weight loss, malaise or fevers.  HEENT:  No recent changes in vision or hearing  NECK: Negative for lumps, no difficulty with swallowing.  RESPIRATORY: Negative for cough, wheezing or shortness of breath, patient denies any recent URI.  CARDIOVASCULAR: Negative for chest pain, leg swelling or palpitations.  GI: Negative for abdominal discomfort, blood in stools or black stools or change in bowel habits.  MUSCULOSKELETAL: See HPI.  SKIN: Negative for lesions, rash, and itching.  PSYCH: No suicidal or homicidal ideations, no current mood disturbances.  HEMATOLOGY/LYMPHOLOGY: Negative for prolonged bleeding, bruising easily or swollen nodes. Patient is not currently taking any anti-coagulants  ENDO: No history of diabetes or thyroid dysfunction  NEURO: No history of syncope, paralysis, seizures or tremors.All other reviewed and negative other than HPI.    Physical exam:  Gen: A and O x3, pleasant, well-groomed  Skin: No rashes or obvious lesions  HEENT: PERRLA, no obvious deformities on ears or in canals. No thyroid masses, trachea midline, no palpable lymph nodes in neck, axilla.  CVS: Regular rate and rhythm, normal S1 and S2, no murmurs.  Resp: Clear to auscultation bilaterally.  Abdomen: Soft, NT/ND, normal bowel sounds present.  Musculoskeletal/Neuro: Moving all extremities    Assessment:  Cervical spondylosis  -     Case Request Operating Room: Block-nerve-medial branch-cervical C3/4 and C4/5  -     Place in Outpatient; Standing  -     Vital signs; Standing  -     Place 18-22 [facility] IV ; Standing  -     Verify informed consent; Standing  -     Notify physician ; Standing  -     Notify physician ; Standing  -     Notify physician (specify); Standing  -     Diet NPO; Standing  -     lactated ringers " infusion    Other orders  -     IP VTE HIGH RISK PATIENT; Standing  -     lactated ringers infusion

## 2023-09-15 NOTE — DISCHARGE SUMMARY
[facility] - Surgery  Discharge Note  Short Stay    Procedure(s) (LRB):  Block-nerve-medial branch-cervical C3/4 and C4/5 (Bilateral)      OUTCOME: Patient tolerated treatment/procedure well without complication and is now ready for discharge.    DISPOSITION: Home or Self Care    FINAL DIAGNOSIS:  Cervical spondylosis    FOLLOWUP: In clinic    DISCHARGE INSTRUCTIONS:    Discharge Procedure Orders   Diet Adult Regular     No dressing needed     Notify your health care provider if you experience any of the following:  temperature >100.4     Activity as tolerated

## 2023-09-19 ENCOUNTER — PATIENT MESSAGE (OUTPATIENT)
Dept: PAIN MEDICINE | Facility: CLINIC | Age: 83
End: 2023-09-19
Payer: MEDICARE

## 2023-09-19 ENCOUNTER — PATIENT MESSAGE (OUTPATIENT)
Dept: ADMINISTRATIVE | Facility: HOSPITAL | Age: 83
End: 2023-09-19
Payer: MEDICARE

## 2023-09-19 ENCOUNTER — TELEPHONE (OUTPATIENT)
Dept: FAMILY MEDICINE | Facility: CLINIC | Age: 83
End: 2023-09-19
Payer: MEDICARE

## 2023-09-19 VITALS — SYSTOLIC BLOOD PRESSURE: 123 MMHG | DIASTOLIC BLOOD PRESSURE: 51 MMHG

## 2023-09-20 DIAGNOSIS — M47.812 CERVICAL SPONDYLOSIS: Primary | ICD-10-CM

## 2023-09-20 RX ORDER — SODIUM CHLORIDE, SODIUM LACTATE, POTASSIUM CHLORIDE, CALCIUM CHLORIDE 600; 310; 30; 20 MG/100ML; MG/100ML; MG/100ML; MG/100ML
INJECTION, SOLUTION INTRAVENOUS CONTINUOUS
Status: CANCELLED | OUTPATIENT
Start: 2023-09-20

## 2023-09-28 ENCOUNTER — PATIENT MESSAGE (OUTPATIENT)
Dept: PAIN MEDICINE | Facility: CLINIC | Age: 83
End: 2023-09-28
Payer: MEDICARE

## 2023-10-04 ENCOUNTER — HOSPITAL ENCOUNTER (OUTPATIENT)
Dept: RADIOLOGY | Facility: HOSPITAL | Age: 83
Discharge: HOME | End: 2023-10-04
Attending: ANESTHESIOLOGY | Admitting: ANESTHESIOLOGY
Payer: MEDICARE

## 2023-10-04 ENCOUNTER — HOSPITAL ENCOUNTER (OUTPATIENT)
Facility: HOSPITAL | Age: 83
Discharge: HOME | End: 2023-10-04
Attending: ANESTHESIOLOGY | Admitting: ANESTHESIOLOGY
Payer: MEDICARE

## 2023-10-04 VITALS
OXYGEN SATURATION: 99 % | DIASTOLIC BLOOD PRESSURE: 69 MMHG | TEMPERATURE: 97 F | WEIGHT: 148 LBS | HEIGHT: 64 IN | BODY MASS INDEX: 25.27 KG/M2 | HEART RATE: 68 BPM | SYSTOLIC BLOOD PRESSURE: 149 MMHG | RESPIRATION RATE: 14 BRPM

## 2023-10-04 DIAGNOSIS — M47.812 CERVICAL SPONDYLOSIS: ICD-10-CM

## 2023-10-04 DIAGNOSIS — M54.2 NECK PAIN: ICD-10-CM

## 2023-10-04 PROCEDURE — 64633 DESTROY CERV/THOR FACET JNT: CPT | Mod: 50,HCNC,, | Performed by: ANESTHESIOLOGY

## 2023-10-04 PROCEDURE — A4216 STERILE WATER/SALINE, 10 ML: HCPCS | Mod: HCNC,PO | Performed by: ANESTHESIOLOGY

## 2023-10-04 PROCEDURE — 64634 DESTROY C/TH FACET JNT ADDL: CPT | Mod: 50,HCNC,PO | Performed by: ANESTHESIOLOGY

## 2023-10-04 PROCEDURE — 25000003 PHARM REV CODE 250: Mod: HCNC,PO | Performed by: ANESTHESIOLOGY

## 2023-10-04 PROCEDURE — 64634 PR DESTROY C/TH FACET JNT ADDL: ICD-10-PCS | Mod: 50,HCNC,, | Performed by: ANESTHESIOLOGY

## 2023-10-04 PROCEDURE — 63600175 PHARM REV CODE 636 W HCPCS: Mod: HCNC,PO | Performed by: ANESTHESIOLOGY

## 2023-10-04 PROCEDURE — 76000 FLUOROSCOPY <1 HR PHYS/QHP: CPT | Mod: TC,HCNC,PO

## 2023-10-04 PROCEDURE — 64633 DESTROY CERV/THOR FACET JNT: CPT | Mod: 50,HCNC,PO | Performed by: ANESTHESIOLOGY

## 2023-10-04 PROCEDURE — 64634 DESTROY C/TH FACET JNT ADDL: CPT | Mod: 50,HCNC,, | Performed by: ANESTHESIOLOGY

## 2023-10-04 PROCEDURE — 64633 PR DESTROY CERV/THOR FACET JNT: ICD-10-PCS | Mod: 50,HCNC,, | Performed by: ANESTHESIOLOGY

## 2023-10-04 RX ORDER — METHYLPREDNISOLONE ACETATE 40 MG/ML
INJECTION, SUSPENSION INTRA-ARTICULAR; INTRALESIONAL; INTRAMUSCULAR; SOFT TISSUE
Status: DISCONTINUED | OUTPATIENT
Start: 2023-10-04 | End: 2023-10-04 | Stop reason: HOSPADM

## 2023-10-04 RX ORDER — LIDOCAINE HYDROCHLORIDE 20 MG/ML
INJECTION, SOLUTION EPIDURAL; INFILTRATION; INTRACAUDAL; PERINEURAL
Status: DISCONTINUED | OUTPATIENT
Start: 2023-10-04 | End: 2023-10-04 | Stop reason: HOSPADM

## 2023-10-04 RX ORDER — SODIUM CHLORIDE 9 MG/ML
INJECTION, SOLUTION INTRAMUSCULAR; INTRAVENOUS; SUBCUTANEOUS
Status: DISCONTINUED | OUTPATIENT
Start: 2023-10-04 | End: 2023-10-04 | Stop reason: HOSPADM

## 2023-10-04 RX ORDER — SODIUM CHLORIDE, SODIUM LACTATE, POTASSIUM CHLORIDE, CALCIUM CHLORIDE 600; 310; 30; 20 MG/100ML; MG/100ML; MG/100ML; MG/100ML
INJECTION, SOLUTION INTRAVENOUS CONTINUOUS
Status: DISCONTINUED | OUTPATIENT
Start: 2023-10-04 | End: 2023-10-04 | Stop reason: HOSPADM

## 2023-10-04 RX ORDER — LIDOCAINE HYDROCHLORIDE 10 MG/ML
1 INJECTION INFILTRATION; PERINEURAL ONCE
Status: COMPLETED | OUTPATIENT
Start: 2023-10-04 | End: 2023-10-04

## 2023-10-04 RX ORDER — LIDOCAINE HYDROCHLORIDE 10 MG/ML
INJECTION, SOLUTION EPIDURAL; INFILTRATION; INTRACAUDAL; PERINEURAL
Status: DISCONTINUED | OUTPATIENT
Start: 2023-10-04 | End: 2023-10-04 | Stop reason: HOSPADM

## 2023-10-04 RX ORDER — MIDAZOLAM HYDROCHLORIDE 2 MG/2ML
INJECTION, SOLUTION INTRAMUSCULAR; INTRAVENOUS
Status: DISCONTINUED | OUTPATIENT
Start: 2023-10-04 | End: 2023-10-04 | Stop reason: HOSPADM

## 2023-10-04 RX ORDER — FENTANYL CITRATE 50 UG/ML
INJECTION, SOLUTION INTRAMUSCULAR; INTRAVENOUS
Status: DISCONTINUED | OUTPATIENT
Start: 2023-10-04 | End: 2023-10-04 | Stop reason: HOSPADM

## 2023-10-04 RX ADMIN — SODIUM CHLORIDE, POTASSIUM CHLORIDE, SODIUM LACTATE AND CALCIUM CHLORIDE: 600; 310; 30; 20 INJECTION, SOLUTION INTRAVENOUS at 01:10

## 2023-10-04 RX ADMIN — LIDOCAINE HYDROCHLORIDE 1 ML: 10 INJECTION, SOLUTION EPIDURAL; INFILTRATION; INTRACAUDAL; PERINEURAL at 01:10

## 2023-10-04 NOTE — H&P
CC: Neck pain    HPI: The patient is a 81yo woman with a history of cervical spondylosis here for bilateral C3/4 and C4/5 RFA. There are no major changes in history and physical from 8/28/23.    Past Medical History:   Diagnosis Date    DDD (degenerative disc disease), cervical     Diverticulitis     Diverticulosis     DJD (degenerative joint disease) of hip     Dyspepsia     GERD (gastroesophageal reflux disease)     History of melanoma 05/20/2015    HTN (hypertension)     Melanoma     Migraine headache     Rectocele     Skin cancer     melanoma on face    Sleep apnea     doesnt use cpap    Thyroid disease     hypo    Trouble in sleeping     Ulcerative colitis     on meds    Uterine prolaps     followed by GYN    Vulvar lesion 05/20/2015       Past Surgical History:   Procedure Laterality Date    APPENDECTOMY  1962    BACK SURGERY      BREAST BIOPSY      BREAST LUMPECTOMY  1989    CATARACT EXTRACTION Left 02/22/2021    ros    CATARACT EXTRACTION W/  INTRAOCULAR LENS IMPLANT Left 2/22/2021    Procedure: EXTRACTION, CATARACT, WITH IOL INSERTION;  Surgeon: Latisha Baker MD;  Location: [facility] OR;  Service: Ophthalmology;  Laterality: Left;  left    CATARACT EXTRACTION W/  INTRAOCULAR LENS IMPLANT Right 3/22/2021    Procedure: EXTRACTION, CATARACT, WITH IOL INSERTION;  Surgeon: Latisha Bakre MD;  Location: [facility] OR;  Service: Ophthalmology;  Laterality: Right;  right    CERVICAL FUSION  1991    CHOLECYSTECTOMY      COLONOSCOPY      COLONOSCOPY N/A 11/9/2018    Dr sOcar; chronic active colitis; repeat in 5 years    COLONOSCOPY N/A 2/14/2020    Procedure: COLONOSCOPY;  Surgeon: Fadi Oscar MD;  Location: [facility] ENDO;  Service: Endoscopy;  Laterality: N/A;    EPIDURAL STEROID INJECTION INTO CERVICAL SPINE N/A 5/18/2023    Procedure: Injection-steroid-epidural-cervical C7/T1;  Surgeon: Chintan Damon MD;  Location: [facility] OR;  Service: Pain Management;  Laterality: N/A;  cervical    EPIDURAL STEROID  INJECTION INTO LUMBAR SPINE N/A 7/12/2023    Procedure: Injection-steroid-epidural-lumbar L5/S1;  Surgeon: Chintan Damon MD;  Location: [facility] OR;  Service: Pain Management;  Laterality: N/A;    HYSTERECTOMY      TRACEE/BSO for benign disease    INJECTION OF ANESTHETIC AGENT AROUND MEDIAL BRANCH NERVES INNERVATING CERVICAL FACET JOINT Bilateral 8/28/2023    Procedure: Block-nerve-medial branch-cervical C3/4. C4/5;  Surgeon: Chintan Damon MD;  Location: [facility] OR;  Service: Pain Management;  Laterality: Bilateral;    INJECTION OF ANESTHETIC AGENT AROUND MEDIAL BRANCH NERVES INNERVATING CERVICAL FACET JOINT Bilateral 9/15/2023    Procedure: Block-nerve-medial branch-cervical C3/4 and C4/5;  Surgeon: Chintan Damon MD;  Location: [facility] OR;  Service: Pain Management;  Laterality: Bilateral;    INSERTION OF MIDURETHRAL SLING N/A 9/22/2022    Procedure: SLING, MIDURETHRAL;  Surgeon: Yannick Cabral MD;  Location: [facility] OR;  Service: OB/GYN;  Laterality: N/A;    JOINT REPLACEMENT Right 2016    Hip replacement    [place] REMOVAL OF OVARY/TUBE(S)      ROBOT-ASSISTED LAPAROSCOPIC ABDOMINAL SACROCOLPOPEXY USING DA FLAKITO XI N/A 12/14/2021    Procedure: XI ROBOTIC SACROCOLPOPEXY, ABDOMINAL;  Surgeon: Yannick Cabral MD;  Location: [facility] OR;  Service: OB/GYN;  Laterality: N/A;    ROBOT-ASSISTED LAPAROSCOPIC LYSIS OF ADHESIONS USING DA FLAKITO XI  12/14/2021    Procedure: XI ROBOTIC LYSIS, ADHESIONS;  Surgeon: Yannick Cabral MD;  Location: [facility] OR;  Service: OB/GYN;;    TONSILLECTOMY      TOTAL ABDOMINAL HYSTERECTOMY W/ BILATERAL SALPINGOOPHORECTOMY  1990       Family History   Problem Relation Age of Onset    Heart failure Mother     Cancer Mother         Bladder    Colon cancer Mother 63    Cancer Father         Bladder    Parkinsonism Father     Diverticulitis Father     Uterine cancer Maternal Aunt     Ovarian cancer Neg Hx     Breast cancer Neg Hx     Crohn's disease Neg Hx     Ulcerative colitis Neg Hx     Glaucoma Neg  Hx     Macular degeneration Neg Hx     Retinal detachment Neg Hx        Social History     Socioeconomic History    Marital status:    Tobacco Use    Smoking status: Never    Smokeless tobacco: Never   Substance and Sexual Activity    Alcohol use: No    Drug use: No    Sexual activity: Not Currently     Social Determinants of Health     Financial Resource Strain: Unknown (6/1/2022)    Overall Financial Resource Strain (CARDIA)     Difficulty of Paying Living Expenses: Patient refused   Food Insecurity: Unknown (6/1/2022)    Hunger Vital Sign     Worried About Running Out of Food in the Last Year: Patient refused     Ran Out of Food in the Last Year: Never true   Transportation Needs: No Transportation Needs (6/1/2022)    PRAPARE - Transportation     Lack of Transportation (Medical): No     Lack of Transportation (Non-Medical): No   Physical Activity: Unknown (6/1/2022)    Exercise Vital Sign     Days of Exercise per Week: Patient refused   Stress: Unknown (6/1/2022)    [place] [employer] - Occupational Stress Questionnaire     Feeling of Stress : Patient refused   Social Connections: Unknown (6/1/2022)    Social Connection and Isolation Panel [NHANES]     Frequency of Communication with Friends and Family: More than three times a week     Frequency of Social Gatherings with Friends and Family: More than three times a week     Active Member of Clubs or Organizations: Yes     Attends Club or Organization Meetings: More than 4 times per year     Marital Status:    Housing Stability: Low Risk  (6/1/2022)    Housing Stability Vital Sign     Unable to Pay for Housing in the Last Year: No     Number of Places Lived in the Last Year: 1     Unstable Housing in the Last Year: No       No current facility-administered medications for this encounter.       Review of patient's allergies indicates:   Allergen Reactions    Ciprofloxacin Palpitations    Flagyl [metronidazole] Palpitations  "      Vitals:    09/29/23 1425 10/04/23 1248   BP:  (!) 179/75   Pulse:  69   Resp:  12   Temp:  97.1 °F (36.2 °C)   TempSrc:  Skin   SpO2:  100%   Weight: 67.1 kg (148 lb)    Height: 5' 4" (1.626 m)        ASA 2, Mallampati 2    REVIEW OF SYSTEMS:     GENERAL: No weight loss, malaise or fevers.  HEENT:  No recent changes in vision or hearing  NECK: Negative for lumps, no difficulty with swallowing.  RESPIRATORY: Negative for cough, wheezing or shortness of breath, patient denies any recent URI.  CARDIOVASCULAR: Negative for chest pain, leg swelling or palpitations.  GI: Negative for abdominal discomfort, blood in stools or black stools or change in bowel habits.  MUSCULOSKELETAL: See HPI.  SKIN: Negative for lesions, rash, and itching.  PSYCH: No suicidal or homicidal ideations, no current mood disturbances.  HEMATOLOGY/LYMPHOLOGY: Negative for prolonged bleeding, bruising easily or swollen nodes. Patient is not currently taking any anti-coagulants  ENDO: No history of diabetes or thyroid dysfunction  NEURO: No history of syncope, paralysis, seizures or tremors.All other reviewed and negative other than HPI.    Physical exam:  Gen: A and O x3, pleasant, well-groomed  Skin: No rashes or obvious lesions  HEENT: PERRLA, no obvious deformities on ears or in canals. No thyroid masses, trachea midline, no palpable lymph nodes in neck, axilla.  CVS: Regular rate and rhythm, normal S1 and S2, no murmurs.  Resp: Clear to auscultation bilaterally.  Abdomen: Soft, NT/ND, normal bowel sounds present.  Musculoskeletal/Neuro: Moving all extremities    Assessment:  Cervical spondylosis  -     Case Request Operating Room: ABLATION, NERVE, FACET JOINT, CERVICAL, MEDIAL BRANCH C3/4 anD c4/5  -     Place in Outpatient; Standing  -     Vital signs; Standing  -     Place 18-22 [facility] IV ; Standing  -     Verify informed consent; Standing  -     Notify physician ; Standing  -     Notify physician ; Standing  -     Notify " physician (specify); Standing  -     Diet NPO; Standing  -     lactated ringers infusion    Other orders  -     IP VTE HIGH RISK PATIENT; Standing

## 2023-10-04 NOTE — OP NOTE
PROCEDURE DATE: 10/4/2023    PROCEDURE:  Radiofrequency ablation of the right and then left C3,4,5 medial branch nerves on the bilateral-side under fluoroscopy    DIAGNOSIS:  Cervical spondylosis    Post Op Diagnosis: Same    PHYSICIAN: Chintan Damon MD    MEDICATIONS INJECTED:  From a mixture of 4ml of 2% lidocaine and 40mg of methylprednisone, 1ml of this solution was injected at each level.    LOCAL ANESTHETIC USED:   1ml of lidocaine 1% at each level    SEDATION MEDICATIONS: 2mg versed, 25mcg fentanyl    ESTIMATED BLOOD LOSS:  none    COMPLICATIONS:  none    TECHNIQUE:   A time-out taken to identify patient and procedure side prior to starting the procedure.  The patient was positioned prone with the site of interest side up. The patient was prepped and draped in the usual sterile fashion using ChloraPrep and sterile towels.  The levels were determined under fluoroscopic guidance using a slightly posteriorly oblique view.   Local anesthetic was infiltrated superficially at the skin.  Then a 100 mm 22g bent tip Rick RF needle was inserted to the anatomic location of the midsection of the lateral masses of the right and then left C3,4,5. Impedance was less than 800 ohms at each level. Motor stimulation up to 2 volts confirmed there was no nerve root involvement at each level. Medication was then injected slowly.  Ablation was done per level utilizing Rick radiofrequency generator at 80°C for 90 seconds. The patient tolerated the procedure well.     The patient was monitored after the procedure.  Patient was given post procedure and discharge instructions to follow at home.  The patient was discharged in a stable condition

## 2023-10-04 NOTE — DISCHARGE SUMMARY
[facility] - Surgery  Discharge Note  Short Stay    Procedure(s) (LRB):  ABLATION, NERVE, FACET JOINT, CERVICAL, MEDIAL BRANCH C3/4 anD c4/5 (Bilateral)      OUTCOME: Patient tolerated treatment/procedure well without complication and is now ready for discharge.    DISPOSITION: Home or Self Care    FINAL DIAGNOSIS:  Cervical spondylosis    FOLLOWUP: In clinic    DISCHARGE INSTRUCTIONS:    Discharge Procedure Orders   Diet Adult Regular     No dressing needed     Notify your health care provider if you experience any of the following:  temperature >100.4     Activity as tolerated

## 2023-10-05 ENCOUNTER — PATIENT MESSAGE (OUTPATIENT)
Dept: FAMILY MEDICINE | Facility: CLINIC | Age: 83
End: 2023-10-05
Payer: MEDICARE

## 2023-10-16 ENCOUNTER — PATIENT MESSAGE (OUTPATIENT)
Dept: PAIN MEDICINE | Facility: CLINIC | Age: 83
End: 2023-10-16
Payer: MEDICARE

## 2023-10-16 RX ORDER — GABAPENTIN 300 MG/1
300 CAPSULE ORAL 2 TIMES DAILY
Qty: 60 CAPSULE | Refills: 2 | Status: SHIPPED | OUTPATIENT
Start: 2023-10-16 | End: 2024-02-16 | Stop reason: SDUPTHER

## 2023-10-23 NOTE — TELEPHONE ENCOUNTER
----- Message from Fadi Oscar MD sent at 2/5/2019 12:02 PM CST -----  Suggest trial of Apriso (for focal inflammation noted at recent C-scope, initially felt secondary to diverticulitis, but may be focal colitis). Script sent to pharmacy.   ----- Message -----  From: CRISTAL Hunt  Sent: 2/4/2019   2:35 PM  To: Fadi Oscar MD    Hey Dr. Oscar,  I saw one of your established patients today. Just wanted to forward the progress note for your review and to see if you have any further recommendations.  Thanks,  Susi     Alert-The patient is alert, awake and responds to voice. The patient is oriented to time, place, and person. The triage nurse is able to obtain subjective information.

## 2023-10-31 ENCOUNTER — HOSPITAL ENCOUNTER (OUTPATIENT)
Dept: RADIOLOGY | Facility: HOSPITAL | Age: 83
Discharge: HOME | End: 2023-10-31
Payer: MEDICARE

## 2023-10-31 ENCOUNTER — OFFICE VISIT (OUTPATIENT)
Dept: PAIN MEDICINE | Facility: CLINIC | Age: 83
End: 2023-10-31
Payer: MEDICARE

## 2023-10-31 VITALS
WEIGHT: 149.06 LBS | SYSTOLIC BLOOD PRESSURE: 185 MMHG | HEIGHT: 64 IN | BODY MASS INDEX: 25.45 KG/M2 | HEART RATE: 83 BPM | DIASTOLIC BLOOD PRESSURE: 81 MMHG

## 2023-10-31 DIAGNOSIS — M54.16 LUMBAR RADICULOPATHY, CHRONIC: ICD-10-CM

## 2023-10-31 DIAGNOSIS — M54.16 LUMBAR RADICULOPATHY: Primary | ICD-10-CM

## 2023-10-31 DIAGNOSIS — M47.812 CERVICAL SPONDYLOSIS: ICD-10-CM

## 2023-10-31 DIAGNOSIS — M54.9 DORSALGIA, UNSPECIFIED: ICD-10-CM

## 2023-10-31 DIAGNOSIS — M54.16 LUMBAR RADICULOPATHY: ICD-10-CM

## 2023-10-31 PROCEDURE — 99213 OFFICE O/P EST LOW 20 MIN: CPT | Mod: HCNC,S$GLB,,

## 2023-10-31 PROCEDURE — 99213 PR OFFICE/OUTPT VISIT, EST, LEVL III, 20-29 MIN: ICD-10-PCS | Mod: HCNC,S$GLB,,

## 2023-10-31 PROCEDURE — 1157F PR ADVANCE CARE PLAN OR EQUIV PRESENT IN MEDICAL RECORD: ICD-10-PCS | Mod: HCNC,CPTII,S$GLB,

## 2023-10-31 PROCEDURE — 99999 PR PBB SHADOW E&M-EST. PATIENT-LVL III: ICD-10-PCS | Mod: PBBFAC,HCNC,,

## 2023-10-31 PROCEDURE — 99999 PR PBB SHADOW E&M-EST. PATIENT-LVL III: CPT | Mod: PBBFAC,HCNC,,

## 2023-10-31 PROCEDURE — 1157F ADVNC CARE PLAN IN RCRD: CPT | Mod: HCNC,CPTII,S$GLB,

## 2023-10-31 PROCEDURE — 3288F FALL RISK ASSESSMENT DOCD: CPT | Mod: HCNC,CPTII,S$GLB,

## 2023-10-31 PROCEDURE — 72114 XR LUMBAR SPINE 5 VIEW WITH FLEX AND EXT: ICD-10-PCS | Mod: 26,HCNC,, | Performed by: RADIOLOGY

## 2023-10-31 PROCEDURE — 1125F PR PAIN SEVERITY QUANTIFIED, PAIN PRESENT: ICD-10-PCS | Mod: HCNC,CPTII,S$GLB,

## 2023-10-31 PROCEDURE — 1159F MED LIST DOCD IN RCRD: CPT | Mod: HCNC,CPTII,S$GLB,

## 2023-10-31 PROCEDURE — 72114 X-RAY EXAM L-S SPINE BENDING: CPT | Mod: 26,HCNC,, | Performed by: RADIOLOGY

## 2023-10-31 PROCEDURE — 3077F SYST BP >= 140 MM HG: CPT | Mod: HCNC,CPTII,S$GLB,

## 2023-10-31 PROCEDURE — 1101F PT FALLS ASSESS-DOCD LE1/YR: CPT | Mod: HCNC,CPTII,S$GLB,

## 2023-10-31 PROCEDURE — 3079F PR MOST RECENT DIASTOLIC BLOOD PRESSURE 80-89 MM HG: ICD-10-PCS | Mod: HCNC,CPTII,S$GLB,

## 2023-10-31 PROCEDURE — 3288F PR FALLS RISK ASSESSMENT DOCUMENTED: ICD-10-PCS | Mod: HCNC,CPTII,S$GLB,

## 2023-10-31 PROCEDURE — 3077F PR MOST RECENT SYSTOLIC BLOOD PRESSURE >= 140 MM HG: ICD-10-PCS | Mod: HCNC,CPTII,S$GLB,

## 2023-10-31 PROCEDURE — 1159F PR MEDICATION LIST DOCUMENTED IN MEDICAL RECORD: ICD-10-PCS | Mod: HCNC,CPTII,S$GLB,

## 2023-10-31 PROCEDURE — 3079F DIAST BP 80-89 MM HG: CPT | Mod: HCNC,CPTII,S$GLB,

## 2023-10-31 PROCEDURE — 1101F PR PT FALLS ASSESS DOC 0-1 FALLS W/OUT INJ PAST YR: ICD-10-PCS | Mod: HCNC,CPTII,S$GLB,

## 2023-10-31 PROCEDURE — 72114 X-RAY EXAM L-S SPINE BENDING: CPT | Mod: TC,HCNC,PO

## 2023-10-31 PROCEDURE — 1125F AMNT PAIN NOTED PAIN PRSNT: CPT | Mod: HCNC,CPTII,S$GLB,

## 2023-10-31 NOTE — PROGRESS NOTES
This note was completed with dictation software and grammatical errors may exist.    Chief Complaint   Patient presents with    Neck Pain     Improved some    Low-back Pain        HPI: Dorothy Kramer is a 82 y.o. year old female patient who has a past medical history of DDD (degenerative disc disease), cervical, Diverticulitis, Diverticulosis, DJD (degenerative joint disease) of hip, Dyspepsia, GERD (gastroesophageal reflux disease), History of melanoma, HTN (hypertension), Melanoma, Migraine headache, Rectocele, Skin cancer, Sleep apnea, Thyroid disease, Trouble in sleeping, Ulcerative colitis, Uterine prolaps, and Vulvar lesion. She presents in referral from No ref. provider found for back and neck pain.  She is status post bilateral C3/4 C4/5 radiofrequency ablation on 10/04/2023 with 90% relief.  She reports significant improvement in her neck pain and mobility following the ablation.  Overall her neck pain is improved and tolerable.  She is reporting continued lower back pain, 5/10, located across her lower back with radiation down left leg into her foot, sharp, shooting, stabbing.  Pain in her lower back is significantly worsened with standing or walking for any longer than 5 minutes.  She denies any numbness, weakness or any new changes to her bowel bladder function.      Initial history:  The patient states that over 30 years ago she had neck pain radiating into the right arm, developed weakness, eventually underwent a C5/6 fusion.  She did well with this and has no longer had severe neck pain.  However in the last several years she began having more pain in the neck radiating into the upper back, right rhomboid region, right shoulder.  She denies any weakness in the arm.  She describes it as aching, burning, sharp and deep.  Her pain is worse with turning her head side-to-side, extension.  She also has pain in the low back, left buttock that she calls sciatica that radiates further down her leg.  She also  "has pain worse with standing, walking, bending getting out of a bed or a chair.  She has some pain over the right trochanter, states if she lays on that side at night, it will wake her up and she only gets several hours of sleep.  Because her neck hurts so much she tries to lay on her right side to immobilize her arm but then starts having worsening right lateral hip pain.  She had a GTB injection with Dr. Carmona after she tried physical therapy for the pain but this was not successful.  The injection did seem to provide several days of relief but returned.  She states that the right GTB is tender to palpation, but also has some right low back pain as well.        Pain intervention history:  Right GTB injection gave 3 days of relief.  She is status post C7-T1 interlaminar epidural steroid injection on 05/18/2023 with minimal relief. She is status post L5/S1 KATHY on 07/12/2023 with 50% relief lasting for 1 day. She is status post bilateral C3/4 C4/5 radiofrequency ablation on 10/04/2023 with 90% relief.  She is status post bilateral C3/4 C4/5 radiofrequency ablation on 10/04/2023 with 90% relief    Spine surgeries:  ACDF C5-6 30 years ago    Antineuropathics:  NSAIDs:  Physical therapy: She had done physical therapy several months ago for neck pain, back pain, trochanteric bursitis without much benefit she states that she does not want to return to physical therapy.  Antidepressants:  Muscle relaxers:  Opioids:  Antiplatelets/Anticoagulants:        ROS:  She reports rash, joint stiffness, joint swelling, back pain, difficulty sleeping possible dizziness and loss of balance.  Balance of review of systems is negative.    No results found for: "LABA1C", "HGBA1C"    Lab Results   Component Value Date    WBC 7.30 12/07/2022    HGB 14.1 12/07/2022    HCT 43.4 12/07/2022    MCV 96 12/07/2022     (L) 12/07/2022             Past Medical History:   Diagnosis Date    DDD (degenerative disc disease), cervical     " Diverticulitis     Diverticulosis     DJD (degenerative joint disease) of hip     Dyspepsia     GERD (gastroesophageal reflux disease)     History of melanoma 05/20/2015    HTN (hypertension)     Melanoma     Migraine headache     Rectocele     Skin cancer     melanoma on face    Sleep apnea     doesnt use cpap    Thyroid disease     hypo    Trouble in sleeping     Ulcerative colitis     on meds    Uterine prolaps     followed by GYN    Vulvar lesion 05/20/2015       Past Surgical History:   Procedure Laterality Date    ABLATION OF MEDIAL BRANCH NERVE OF CERVICAL SPINE FACET JOINT Bilateral 10/4/2023    Procedure: ABLATION, NERVE, FACET JOINT, CERVICAL, MEDIAL BRANCH C3/4 anD c4/5;  Surgeon: Chintan Damon MD;  Location: [facility] OR;  Service: Pain Management;  Laterality: Bilateral;    APPENDECTOMY  1962    BACK SURGERY      BREAST BIOPSY      BREAST LUMPECTOMY  1989    CATARACT EXTRACTION Left 02/22/2021    ros    CATARACT EXTRACTION W/  INTRAOCULAR LENS IMPLANT Left 2/22/2021    Procedure: EXTRACTION, CATARACT, WITH IOL INSERTION;  Surgeon: Latisha Baker MD;  Location: [facility] OR;  Service: Ophthalmology;  Laterality: Left;  left    CATARACT EXTRACTION W/  INTRAOCULAR LENS IMPLANT Right 3/22/2021    Procedure: EXTRACTION, CATARACT, WITH IOL INSERTION;  Surgeon: Latisha Baker MD;  Location: [facility] OR;  Service: Ophthalmology;  Laterality: Right;  right    CERVICAL FUSION  1991    CHOLECYSTECTOMY      COLONOSCOPY      COLONOSCOPY N/A 11/9/2018    Dr Oscar; chronic active colitis; repeat in 5 years    COLONOSCOPY N/A 2/14/2020    Procedure: COLONOSCOPY;  Surgeon: Fadi Oscar MD;  Location: [facility] ENDO;  Service: Endoscopy;  Laterality: N/A;    EPIDURAL STEROID INJECTION INTO CERVICAL SPINE N/A 5/18/2023    Procedure: Injection-steroid-epidural-cervical C7/T1;  Surgeon: Chintan Damon MD;  Location: [facility] OR;  Service: Pain Management;  Laterality: N/A;  cervical    EPIDURAL STEROID INJECTION  INTO LUMBAR SPINE N/A 7/12/2023    Procedure: Injection-steroid-epidural-lumbar L5/S1;  Surgeon: Chintan Damon MD;  Location: [facility] OR;  Service: Pain Management;  Laterality: N/A;    HYSTERECTOMY      TRACEE/BSO for benign disease    INJECTION OF ANESTHETIC AGENT AROUND MEDIAL BRANCH NERVES INNERVATING CERVICAL FACET JOINT Bilateral 8/28/2023    Procedure: Block-nerve-medial branch-cervical C3/4. C4/5;  Surgeon: Chintan Damon MD;  Location: [facility] OR;  Service: Pain Management;  Laterality: Bilateral;    INJECTION OF ANESTHETIC AGENT AROUND MEDIAL BRANCH NERVES INNERVATING CERVICAL FACET JOINT Bilateral 9/15/2023    Procedure: Block-nerve-medial branch-cervical C3/4 and C4/5;  Surgeon: Chintan Damon MD;  Location: [facility] OR;  Service: Pain Management;  Laterality: Bilateral;    INSERTION OF MIDURETHRAL SLING N/A 9/22/2022    Procedure: SLING, MIDURETHRAL;  Surgeon: Yannick Cabral MD;  Location: [facility] OR;  Service: OB/GYN;  Laterality: N/A;    JOINT REPLACEMENT Right 2016    Hip replacement    OH REMOVAL OF OVARY/TUBE(S)      ROBOT-ASSISTED LAPAROSCOPIC ABDOMINAL SACROCOLPOPEXY USING DA FLAKITO XI N/A 12/14/2021    Procedure: XI ROBOTIC SACROCOLPOPEXY, ABDOMINAL;  Surgeon: Yannick Cabral MD;  Location: [facility] OR;  Service: OB/GYN;  Laterality: N/A;    ROBOT-ASSISTED LAPAROSCOPIC LYSIS OF ADHESIONS USING DA FLAKITO XI  12/14/2021    Procedure: XI ROBOTIC LYSIS, ADHESIONS;  Surgeon: Yannick Cabral MD;  Location: [facility] OR;  Service: OB/GYN;;    TONSILLECTOMY      TOTAL ABDOMINAL HYSTERECTOMY W/ BILATERAL SALPINGOOPHORECTOMY  1990       Social History     Socioeconomic History    Marital status:    Tobacco Use    Smoking status: Never    Smokeless tobacco: Never   Substance and Sexual Activity    Alcohol use: No    Drug use: No    Sexual activity: Not Currently     Social Determinants of Health     Financial Resource Strain: Unknown (6/1/2022)    Overall Financial Resource Strain (CARDIA)     Difficulty  "of Paying Living Expenses: Patient refused   Food Insecurity: Unknown (6/1/2022)    Hunger Vital Sign     Worried About Running Out of Food in the Last Year: Patient refused     Ran Out of Food in the Last Year: Never true   Transportation Needs: No Transportation Needs (6/1/2022)    PRAPARE - Transportation     Lack of Transportation (Medical): No     Lack of Transportation (Non-Medical): No   Physical Activity: Unknown (6/1/2022)    Exercise Vital Sign     Days of Exercise per Week: Patient refused   Stress: Unknown (6/1/2022)    [employer] - Occupational Stress Questionnaire     Feeling of Stress : Patient refused   Social Connections: Unknown (6/1/2022)    Social Connection and Isolation Panel [NHANES]     Frequency of Communication with Friends and Family: More than three times a week     Frequency of Social Gatherings with Friends and Family: More than three times a week     Active Member of Clubs or Organizations: Yes     Attends Club or Organization Meetings: More than 4 times per year     Marital Status:    Housing Stability: Low Risk  (6/1/2022)    Housing Stability Vital Sign     Unable to Pay for Housing in the Last Year: No     Number of Places Lived in the Last Year: 1     Unstable Housing in the Last Year: No         Medications/Allergies: See med card    Vitals:    10/31/23 1055   BP: (!) 185/81   Pulse: 83   Weight: 67.6 kg (149 lb 0.5 oz)   Height: 5' 4" (1.626 m)   PainSc:   5   PainLoc: Neck       Body mass index is 25.58 kg/m².    Physical exam:  Gen: A and O x3, pleasant, well-groomed  Skin: No rashes or obvious lesions  HEENT: PERRLA, no obvious deformities on ears or in canals.Trachea midline.  CVS: Regular rate and rhythm, normal palpable pulses.  Resp: Clear to auscultation bilaterally, no wheezes or rales.  Abdomen: Soft, NT/ND.  Musculoskeletal: No antalgic gait.     Neuro:  Motor:    Right Left   C4 Shoulder Abduction  5  5   C5 Elbow Flexion    5  5 "   C6 Wrist Extension  5  5   C7 Elbow Extension   5  5   C8/T1 Hand Intrinsics   5  5   C8 First Dorsal Interosseus  5  5   C8 Abductor Pollicus Brevis  5  5       Iliopsoas Quadriceps Knee  Flexion Tibialis  anterior Gastro- cnemius EHL   Lower: R  5    L         Left  Right    Triceps DTR 2+ 2+   Biceps DTR 2+ 2+   Brachioradialis DTR 2+ 2+   Patellar DTR 2+ 2+   Achilles DTR 2+ 2+   Castro Absent  Absent   Clonus Absent Absent   Babinski Absent Absent     Sensory: Intact and symmetrical to light touch and pinprick in C2-T1 dermatomes bilaterally. Intact and symmetrical to light touch and pinprick in L1-S1 dermatomes bilaterally.  Cervical spine: ROM is full in flexion, moderately reduced with extension and lateral rotation with increased pain on right lateral rotation causing neck pain and periscapular pain on the right side.    Spurling's maneuver causes right neck pain.  Myofascial exam: No Tenderness to palpation across cervical paraspinous region bilaterally.    Lumbar spine:  Lumbar spine: ROM is moderately reduced with both flexion extension and oblique extension with no increased pain.    Alek's test causes no increased pain on either side.    Supine straight leg raise is negative bilaterally.    Internal and external rotation of the hip causes no increased pain on either side.  Myofascial exam: No tenderness to palpation across lumbar paraspinous muscles.  There is exquisite tenderness to palpation over the right GTB    Imagin19 Xray L-spine:  Bone density is normal.  There is a gentle levocurvature of the lumbar spine.  In her AP a the vertebral bodies maintain normal height and alignment.  There is no fracture.  There is marked disc space narrowing at the L4-5 level.  There is mild endplate sclerosis and osteophyte formation.  There is mild-to-moderate disc space narrowing at the L2-3, L3-4 levels and moderate disc space narrowing at the L5-S1  level.  There is multilevel facet joint arthropathy.  There is mild-to-moderate atherosclerosis.  There are surgical clips in the right upper abdomen from prior cholecystectomy.    7/18/19 MRI C-spine:  Vertebral column: There is no prior MRI or CT for comparison.  As seen on plain films, there is mild anterolisthesis of C3 on C4, C4 on C5.  There are changes of prior bony fusion of C5 and C6.  There is moderate to marked disc space narrowing at the C6-7 level.  There is no fracture.  The odontoid process is intact.  The discs are desiccated.  There is endplate osteophyte formation most apparent at the C6-7 level.  Baseline marrow signal is normal.   Spinal canal, cord, epidural space: The spinal canal is developmentally normal.  There is no abnormal epidural soft tissue mass or fluid collection.  There is minimal flattening of the right ventral cord surface at the C4-5 level where there is a large disc extrusion.  There is no cord edema or myelomalacia.  C2-3: There is no spinal canal or significant foraminal stenosis.   C3-4: There is moderate-to-marked left foraminal stenosis due to uncovertebral spurring and facet joint arthropathy.  There is a mild disc osteophyte complex.  There is no spinal stenosis or cord compression.  The right foramen is patent.   C4-5: There is a large right paracentral disc extrusion measuring approximately 6 x 10 x 13 mm (AP, transverse, craniocaudad).  This does result in flattening of the right ventral cord surface but there is no obvious cord edema or myelomalacia.  There is moderate spinal stenosis at this level.  There is also moderate left foraminal narrowing due to uncovertebral spurring and facet joint arthropathy.   C5-6: There are changes of previous bony fusion.  There is no spinal canal or significant foraminal stenosis.   C6-7: There is moderate to marked disc space narrowing.  There is left greater than right uncovertebral spurring with bilateral facet joint arthropathy.   There is a mild disc osteophyte complex.  There is no spinal canal or significant foraminal stenosis.   C7-T1: There is left facet joint arthropathy.  There is at least mild left foraminal stenosis due to these changes.    12/17/22 MRI SI joints:  The SI joints are normal in appearance with no evidence of erosions, fusion, or other imaging findings of acute sacroiliitis/inflammatory arthritis.  No sacroiliac joint effusion.   There is degenerative disc disease in the lower lumbar spine resulting in degenerative disc desiccation, disc space narrowing, and degenerative endplate change, most pronounced at L4-L5 and L5-S1..  There is a grade I anterolisthesis of L4 on L5.  There is fluid within the intervertebral disc at L5-S1.  There is bilateral facet arthropathy and uncovering of the intervertebral disc with a superimposed mild broad disc bulge at L4-L5.  There is mild right lateral recess stenosis.  There is, at most, mild overall central canal stenosis at L4-L5.   The visualized bony structures of the pelvis show no evidence of acute fracture or pathologic marrow replacement process.  There is incidentally observed postoperative change of total right hip arthroplasty.  This results in metal artifact within the adjacent osseous and soft tissue structures resulting in poor fat suppression.         Assessment:  Dorothy Kramer is a 82 y.o. year old female patient who has a past medical history of DDD (degenerative disc disease), cervical, Diverticulitis, Diverticulosis, DJD (degenerative joint disease) of hip, Dyspepsia, GERD (gastroesophageal reflux disease), History of melanoma, HTN (hypertension), Melanoma, Migraine headache, Rectocele, Skin cancer, Sleep apnea, Thyroid disease, Trouble in sleeping, Ulcerative colitis, Uterine prolaps, and Vulvar lesion. She presents in referral from Dr. Kamila Gilbert for back and neck pain.  1. Lumbar radiculopathy  X-Ray Lumbar Complete Including Flex And Ext      2. Dorsalgia,  unspecified  MRI Lumbar Spine Without Contrast      3. Lumbar radiculopathy, chronic  MRI Lumbar Spine Without Contrast      4. Cervical spondylosis                Plan:  She responded well to the cervical RFA.  She is satisfied with relief.  Remaining neck pain is tolerable.  Her back pain is becoming more problematic for.  This is interfering with her ADLs and quality of life.  She is completed formal physical therapy in the past without significant relief of her pain.  Pain is too severe right now to restart formal PT.  For her continued lower back pain I would like to order an updated lumbar x-ray and MRI of her lumbar spine for further evaluation.  I think she is likely having a combination of lumbar radicular pain and axial facet mediated pain.  At this time the pain in her lower back is most problematic for.  We will call her with results of imaging and future treatment plan.

## 2023-10-31 NOTE — H&P (VIEW-ONLY)
This note was completed with dictation software and grammatical errors may exist.    Chief Complaint   Patient presents with    Neck Pain     Improved some    Low-back Pain        HPI: Dorothy Kramer is a 82 y.o. year old female patient who has a past medical history of DDD (degenerative disc disease), cervical, Diverticulitis, Diverticulosis, DJD (degenerative joint disease) of hip, Dyspepsia, GERD (gastroesophageal reflux disease), History of melanoma, HTN (hypertension), Melanoma, Migraine headache, Rectocele, Skin cancer, Sleep apnea, Thyroid disease, Trouble in sleeping, Ulcerative colitis, Uterine prolaps, and Vulvar lesion. She presents in referral from No ref. provider found for back and neck pain.  She is status post bilateral C3/4 C4/5 radiofrequency ablation on 10/04/2023 with 90% relief.  She reports significant improvement in her neck pain and mobility following the ablation.  Overall her neck pain is improved and tolerable.  She is reporting continued lower back pain, 5/10, located across her lower back with radiation down left leg into her foot, sharp, shooting, stabbing.  Pain in her lower back is significantly worsened with standing or walking for any longer than 5 minutes.  She denies any numbness, weakness or any new changes to her bowel bladder function.      Initial history:  The patient states that over 30 years ago she had neck pain radiating into the right arm, developed weakness, eventually underwent a C5/6 fusion.  She did well with this and has no longer had severe neck pain.  However in the last several years she began having more pain in the neck radiating into the upper back, right rhomboid region, right shoulder.  She denies any weakness in the arm.  She describes it as aching, burning, sharp and deep.  Her pain is worse with turning her head side-to-side, extension.  She also has pain in the low back, left buttock that she calls sciatica that radiates further down her leg.  She also  "has pain worse with standing, walking, bending getting out of a bed or a chair.  She has some pain over the right trochanter, states if she lays on that side at night, it will wake her up and she only gets several hours of sleep.  Because her neck hurts so much she tries to lay on her right side to immobilize her arm but then starts having worsening right lateral hip pain.  She had a GTB injection with Dr. Carmona after she tried physical therapy for the pain but this was not successful.  The injection did seem to provide several days of relief but returned.  She states that the right GTB is tender to palpation, but also has some right low back pain as well.        Pain intervention history:  Right GTB injection gave 3 days of relief.  She is status post C7-T1 interlaminar epidural steroid injection on 05/18/2023 with minimal relief. She is status post L5/S1 KATHY on 07/12/2023 with 50% relief lasting for 1 day. She is status post bilateral C3/4 C4/5 radiofrequency ablation on 10/04/2023 with 90% relief.  She is status post bilateral C3/4 C4/5 radiofrequency ablation on 10/04/2023 with 90% relief    Spine surgeries:  ACDF C5-6 30 years ago    Antineuropathics:  NSAIDs:  Physical therapy: She had done physical therapy several months ago for neck pain, back pain, trochanteric bursitis without much benefit she states that she does not want to return to physical therapy.  Antidepressants:  Muscle relaxers:  Opioids:  Antiplatelets/Anticoagulants:        ROS:  She reports rash, joint stiffness, joint swelling, back pain, difficulty sleeping possible dizziness and loss of balance.  Balance of review of systems is negative.    No results found for: "LABA1C", "HGBA1C"    Lab Results   Component Value Date    WBC 7.30 12/07/2022    HGB 14.1 12/07/2022    HCT 43.4 12/07/2022    MCV 96 12/07/2022     (L) 12/07/2022             Past Medical History:   Diagnosis Date    DDD (degenerative disc disease), cervical     " Diverticulitis     Diverticulosis     DJD (degenerative joint disease) of hip     Dyspepsia     GERD (gastroesophageal reflux disease)     History of melanoma 05/20/2015    HTN (hypertension)     Melanoma     Migraine headache     Rectocele     Skin cancer     melanoma on face    Sleep apnea     doesnt use cpap    Thyroid disease     hypo    Trouble in sleeping     Ulcerative colitis     on meds    Uterine prolaps     followed by GYN    Vulvar lesion 05/20/2015       Past Surgical History:   Procedure Laterality Date    ABLATION OF MEDIAL BRANCH NERVE OF CERVICAL SPINE FACET JOINT Bilateral 10/4/2023    Procedure: ABLATION, NERVE, FACET JOINT, CERVICAL, MEDIAL BRANCH C3/4 anD c4/5;  Surgeon: Chintan Damon MD;  Location: [facility] OR;  Service: Pain Management;  Laterality: Bilateral;    APPENDECTOMY  1962    BACK SURGERY      BREAST BIOPSY      BREAST LUMPECTOMY  1989    CATARACT EXTRACTION Left 02/22/2021    ros    CATARACT EXTRACTION W/  INTRAOCULAR LENS IMPLANT Left 2/22/2021    Procedure: EXTRACTION, CATARACT, WITH IOL INSERTION;  Surgeon: Latisha Baker MD;  Location: [facility] OR;  Service: Ophthalmology;  Laterality: Left;  left    CATARACT EXTRACTION W/  INTRAOCULAR LENS IMPLANT Right 3/22/2021    Procedure: EXTRACTION, CATARACT, WITH IOL INSERTION;  Surgeon: Latisha Baker MD;  Location: [facility] OR;  Service: Ophthalmology;  Laterality: Right;  right    CERVICAL FUSION  1991    CHOLECYSTECTOMY      COLONOSCOPY      COLONOSCOPY N/A 11/9/2018    Dr Oscar; chronic active colitis; repeat in 5 years    COLONOSCOPY N/A 2/14/2020    Procedure: COLONOSCOPY;  Surgeon: Fadi Oscar MD;  Location: [facility] ENDO;  Service: Endoscopy;  Laterality: N/A;    EPIDURAL STEROID INJECTION INTO CERVICAL SPINE N/A 5/18/2023    Procedure: Injection-steroid-epidural-cervical C7/T1;  Surgeon: Chintan Damon MD;  Location: [facility] OR;  Service: Pain Management;  Laterality: N/A;  cervical    EPIDURAL STEROID INJECTION  INTO LUMBAR SPINE N/A 7/12/2023    Procedure: Injection-steroid-epidural-lumbar L5/S1;  Surgeon: Chintan Damon MD;  Location: [facility] OR;  Service: Pain Management;  Laterality: N/A;    HYSTERECTOMY      TRACEE/BSO for benign disease    INJECTION OF ANESTHETIC AGENT AROUND MEDIAL BRANCH NERVES INNERVATING CERVICAL FACET JOINT Bilateral 8/28/2023    Procedure: Block-nerve-medial branch-cervical C3/4. C4/5;  Surgeon: Chintan Damon MD;  Location: [facility] OR;  Service: Pain Management;  Laterality: Bilateral;    INJECTION OF ANESTHETIC AGENT AROUND MEDIAL BRANCH NERVES INNERVATING CERVICAL FACET JOINT Bilateral 9/15/2023    Procedure: Block-nerve-medial branch-cervical C3/4 and C4/5;  Surgeon: Chintan Damon MD;  Location: [facility] OR;  Service: Pain Management;  Laterality: Bilateral;    INSERTION OF MIDURETHRAL SLING N/A 9/22/2022    Procedure: SLING, MIDURETHRAL;  Surgeon: Yannick Cabral MD;  Location: [facility] OR;  Service: OB/GYN;  Laterality: N/A;    JOINT REPLACEMENT Right 2016    Hip replacement    MI REMOVAL OF OVARY/TUBE(S)      ROBOT-ASSISTED LAPAROSCOPIC ABDOMINAL SACROCOLPOPEXY USING DA FLAKITO XI N/A 12/14/2021    Procedure: XI ROBOTIC SACROCOLPOPEXY, ABDOMINAL;  Surgeon: Yannick Cabral MD;  Location: [facility] OR;  Service: OB/GYN;  Laterality: N/A;    ROBOT-ASSISTED LAPAROSCOPIC LYSIS OF ADHESIONS USING DA FLAKITO XI  12/14/2021    Procedure: XI ROBOTIC LYSIS, ADHESIONS;  Surgeon: Yannick Cabral MD;  Location: [facility] OR;  Service: OB/GYN;;    TONSILLECTOMY      TOTAL ABDOMINAL HYSTERECTOMY W/ BILATERAL SALPINGOOPHORECTOMY  1990       Social History     Socioeconomic History    Marital status:    Tobacco Use    Smoking status: Never    Smokeless tobacco: Never   Substance and Sexual Activity    Alcohol use: No    Drug use: No    Sexual activity: Not Currently     Social Determinants of Health     Financial Resource Strain: Unknown (6/1/2022)    Overall Financial Resource Strain (CARDIA)     Difficulty  "of Paying Living Expenses: Patient refused   Food Insecurity: Unknown (6/1/2022)    Hunger Vital Sign     Worried About Running Out of Food in the Last Year: Patient refused     Ran Out of Food in the Last Year: Never true   Transportation Needs: No Transportation Needs (6/1/2022)    PRAPARE - Transportation     Lack of Transportation (Medical): No     Lack of Transportation (Non-Medical): No   Physical Activity: Unknown (6/1/2022)    Exercise Vital Sign     Days of Exercise per Week: Patient refused   Stress: Unknown (6/1/2022)    [employer] - Occupational Stress Questionnaire     Feeling of Stress : Patient refused   Social Connections: Unknown (6/1/2022)    Social Connection and Isolation Panel [NHANES]     Frequency of Communication with Friends and Family: More than three times a week     Frequency of Social Gatherings with Friends and Family: More than three times a week     Active Member of Clubs or Organizations: Yes     Attends Club or Organization Meetings: More than 4 times per year     Marital Status:    Housing Stability: Low Risk  (6/1/2022)    Housing Stability Vital Sign     Unable to Pay for Housing in the Last Year: No     Number of Places Lived in the Last Year: 1     Unstable Housing in the Last Year: No         Medications/Allergies: See med card    Vitals:    10/31/23 1055   BP: (!) 185/81   Pulse: 83   Weight: 67.6 kg (149 lb 0.5 oz)   Height: 5' 4" (1.626 m)   PainSc:   5   PainLoc: Neck       Body mass index is 25.58 kg/m².    Physical exam:  Gen: A and O x3, pleasant, well-groomed  Skin: No rashes or obvious lesions  HEENT: PERRLA, no obvious deformities on ears or in canals.Trachea midline.  CVS: Regular rate and rhythm, normal palpable pulses.  Resp: Clear to auscultation bilaterally, no wheezes or rales.  Abdomen: Soft, NT/ND.  Musculoskeletal: No antalgic gait.     Neuro:  Motor:    Right Left   C4 Shoulder Abduction  5  5   C5 Elbow Flexion    5  5 "   C6 Wrist Extension  5  5   C7 Elbow Extension   5  5   C8/T1 Hand Intrinsics   5  5   C8 First Dorsal Interosseus  5  5   C8 Abductor Pollicus Brevis  5  5       Iliopsoas Quadriceps Knee  Flexion Tibialis  anterior Gastro- cnemius EHL   Lower: R  5    L         Left  Right    Triceps DTR 2+ 2+   Biceps DTR 2+ 2+   Brachioradialis DTR 2+ 2+   Patellar DTR 2+ 2+   Achilles DTR 2+ 2+   Castro Absent  Absent   Clonus Absent Absent   Babinski Absent Absent     Sensory: Intact and symmetrical to light touch and pinprick in C2-T1 dermatomes bilaterally. Intact and symmetrical to light touch and pinprick in L1-S1 dermatomes bilaterally.  Cervical spine: ROM is full in flexion, moderately reduced with extension and lateral rotation with increased pain on right lateral rotation causing neck pain and periscapular pain on the right side.    Spurling's maneuver causes right neck pain.  Myofascial exam: No Tenderness to palpation across cervical paraspinous region bilaterally.    Lumbar spine:  Lumbar spine: ROM is moderately reduced with both flexion extension and oblique extension with no increased pain.    Alek's test causes no increased pain on either side.    Supine straight leg raise is negative bilaterally.    Internal and external rotation of the hip causes no increased pain on either side.  Myofascial exam: No tenderness to palpation across lumbar paraspinous muscles.  There is exquisite tenderness to palpation over the right GTB    Imagin19 Xray L-spine:  Bone density is normal.  There is a gentle levocurvature of the lumbar spine.  In her AP a the vertebral bodies maintain normal height and alignment.  There is no fracture.  There is marked disc space narrowing at the L4-5 level.  There is mild endplate sclerosis and osteophyte formation.  There is mild-to-moderate disc space narrowing at the L2-3, L3-4 levels and moderate disc space narrowing at the L5-S1  level.  There is multilevel facet joint arthropathy.  There is mild-to-moderate atherosclerosis.  There are surgical clips in the right upper abdomen from prior cholecystectomy.    7/18/19 MRI C-spine:  Vertebral column: There is no prior MRI or CT for comparison.  As seen on plain films, there is mild anterolisthesis of C3 on C4, C4 on C5.  There are changes of prior bony fusion of C5 and C6.  There is moderate to marked disc space narrowing at the C6-7 level.  There is no fracture.  The odontoid process is intact.  The discs are desiccated.  There is endplate osteophyte formation most apparent at the C6-7 level.  Baseline marrow signal is normal.   Spinal canal, cord, epidural space: The spinal canal is developmentally normal.  There is no abnormal epidural soft tissue mass or fluid collection.  There is minimal flattening of the right ventral cord surface at the C4-5 level where there is a large disc extrusion.  There is no cord edema or myelomalacia.  C2-3: There is no spinal canal or significant foraminal stenosis.   C3-4: There is moderate-to-marked left foraminal stenosis due to uncovertebral spurring and facet joint arthropathy.  There is a mild disc osteophyte complex.  There is no spinal stenosis or cord compression.  The right foramen is patent.   C4-5: There is a large right paracentral disc extrusion measuring approximately 6 x 10 x 13 mm (AP, transverse, craniocaudad).  This does result in flattening of the right ventral cord surface but there is no obvious cord edema or myelomalacia.  There is moderate spinal stenosis at this level.  There is also moderate left foraminal narrowing due to uncovertebral spurring and facet joint arthropathy.   C5-6: There are changes of previous bony fusion.  There is no spinal canal or significant foraminal stenosis.   C6-7: There is moderate to marked disc space narrowing.  There is left greater than right uncovertebral spurring with bilateral facet joint arthropathy.   There is a mild disc osteophyte complex.  There is no spinal canal or significant foraminal stenosis.   C7-T1: There is left facet joint arthropathy.  There is at least mild left foraminal stenosis due to these changes.    12/17/22 MRI SI joints:  The SI joints are normal in appearance with no evidence of erosions, fusion, or other imaging findings of acute sacroiliitis/inflammatory arthritis.  No sacroiliac joint effusion.   There is degenerative disc disease in the lower lumbar spine resulting in degenerative disc desiccation, disc space narrowing, and degenerative endplate change, most pronounced at L4-L5 and L5-S1..  There is a grade I anterolisthesis of L4 on L5.  There is fluid within the intervertebral disc at L5-S1.  There is bilateral facet arthropathy and uncovering of the intervertebral disc with a superimposed mild broad disc bulge at L4-L5.  There is mild right lateral recess stenosis.  There is, at most, mild overall central canal stenosis at L4-L5.   The visualized bony structures of the pelvis show no evidence of acute fracture or pathologic marrow replacement process.  There is incidentally observed postoperative change of total right hip arthroplasty.  This results in metal artifact within the adjacent osseous and soft tissue structures resulting in poor fat suppression.         Assessment:  Dorothy Kramer is a 82 y.o. year old female patient who has a past medical history of DDD (degenerative disc disease), cervical, Diverticulitis, Diverticulosis, DJD (degenerative joint disease) of hip, Dyspepsia, GERD (gastroesophageal reflux disease), History of melanoma, HTN (hypertension), Melanoma, Migraine headache, Rectocele, Skin cancer, Sleep apnea, Thyroid disease, Trouble in sleeping, Ulcerative colitis, Uterine prolaps, and Vulvar lesion. She presents in referral from Dr. Kamila Gilbert for back and neck pain.  1. Lumbar radiculopathy  X-Ray Lumbar Complete Including Flex And Ext      2. Dorsalgia,  unspecified  MRI Lumbar Spine Without Contrast      3. Lumbar radiculopathy, chronic  MRI Lumbar Spine Without Contrast      4. Cervical spondylosis                Plan:  She responded well to the cervical RFA.  She is satisfied with relief.  Remaining neck pain is tolerable.  Her back pain is becoming more problematic for.  This is interfering with her ADLs and quality of life.  She is completed formal physical therapy in the past without significant relief of her pain.  Pain is too severe right now to restart formal PT.  For her continued lower back pain I would like to order an updated lumbar x-ray and MRI of her lumbar spine for further evaluation.  I think she is likely having a combination of lumbar radicular pain and axial facet mediated pain.  At this time the pain in her lower back is most problematic for.  We will call her with results of imaging and future treatment plan.

## 2023-11-11 ENCOUNTER — HOSPITAL ENCOUNTER (OUTPATIENT)
Dept: RADIOLOGY | Facility: HOSPITAL | Age: 83
Discharge: HOME | End: 2023-11-11
Payer: MEDICARE

## 2023-11-11 DIAGNOSIS — M54.16 LUMBAR RADICULOPATHY, CHRONIC: ICD-10-CM

## 2023-11-11 DIAGNOSIS — M54.9 DORSALGIA, UNSPECIFIED: ICD-10-CM

## 2023-11-11 PROCEDURE — 72148 MRI LUMBAR SPINE WITHOUT CONTRAST: ICD-10-PCS | Mod: 26,HCNC,, | Performed by: RADIOLOGY

## 2023-11-11 PROCEDURE — 72148 MRI LUMBAR SPINE W/O DYE: CPT | Mod: 26,HCNC,, | Performed by: RADIOLOGY

## 2023-11-11 PROCEDURE — 72148 MRI LUMBAR SPINE W/O DYE: CPT | Mod: TC,HCNC,PO

## 2023-11-13 ENCOUNTER — PATIENT MESSAGE (OUTPATIENT)
Dept: PAIN MEDICINE | Facility: CLINIC | Age: 83
End: 2023-11-13
Payer: MEDICARE

## 2023-11-14 ENCOUNTER — TELEPHONE (OUTPATIENT)
Dept: PAIN MEDICINE | Facility: CLINIC | Age: 83
End: 2023-11-14
Payer: MEDICARE

## 2023-11-14 NOTE — TELEPHONE ENCOUNTER
Please let patient know I have reviewed her lumbar MRI and she has some continue narrowing at L4/5 and L5/S1 to left that could be contributing to her left leg pain.  Please schedule her for the following procedure:    Physician - Dr Damon    Type of Procedure/Injection - Lumbar Transforaminal Epidural  L4/5 and L5/S1           Laterality - Left      Anxiolysis- Local      Need to hold medication - Yes      NSAIDs for 2 days      Clearance needed - No      Follow up - 4 week

## 2023-11-15 DIAGNOSIS — M54.16 LUMBAR RADICULOPATHY: Primary | ICD-10-CM

## 2023-11-15 RX ORDER — ALPRAZOLAM 0.5 MG/1
1 TABLET, ORALLY DISINTEGRATING ORAL ONCE AS NEEDED
Status: CANCELLED | OUTPATIENT
Start: 2023-11-15 | End: 2035-04-13

## 2023-11-15 NOTE — TELEPHONE ENCOUNTER
Sent message to patient that Amanda will be calling her to review her results and discuss the next steps.

## 2023-11-16 ENCOUNTER — PATIENT MESSAGE (OUTPATIENT)
Dept: PAIN MEDICINE | Facility: CLINIC | Age: 83
End: 2023-11-16
Payer: MEDICARE

## 2023-11-16 ENCOUNTER — TELEPHONE (OUTPATIENT)
Dept: PAIN MEDICINE | Facility: CLINIC | Age: 83
End: 2023-11-16
Payer: MEDICARE

## 2023-11-16 NOTE — TELEPHONE ENCOUNTER
Spoke with patient and let her know that I will send another message to Amanda to return her call. She confirmed understanding and ask that she call her back after 230 pm

## 2023-11-16 NOTE — TELEPHONE ENCOUNTER
Called and spoke with patient and discussed providers review and recommendations based off recent MRI lumbar spine. Patient agreeable to plan and scheduled for L4/5, L5/S1 TFESI. Advised to hold NSAIDS x 2 days. Pre op information given and follow up appointment scheduled.

## 2023-11-16 NOTE — TELEPHONE ENCOUNTER
----- Message from Jenise Ryan sent at 11/16/2023 12:09 PM CST -----  Contact: Pt [phone]  Type:  Patient Returning Call    Who Called:  Pt   Who Left Message for Patient:  Amanda   Does the patient know what this is regarding?:  Yes  Best Call Back Number:  [phone]    Additional Information:  Pt requesting a call back before 1:30 if possible

## 2023-11-16 NOTE — TELEPHONE ENCOUNTER
Attempted to reach patient to schedule. No answer. Left voicemail to return call to office. [employer] message sent to patient as well.    *Patient's case request has been placed. Patient would need to pick a date.

## 2023-11-17 ENCOUNTER — OFFICE VISIT (OUTPATIENT)
Dept: GASTROENTEROLOGY | Facility: CLINIC | Age: 83
End: 2023-11-17
Payer: MEDICARE

## 2023-11-17 VITALS — WEIGHT: 147.69 LBS | HEIGHT: 64 IN | BODY MASS INDEX: 25.21 KG/M2

## 2023-11-17 DIAGNOSIS — Z90.49 S/P CHOLECYSTECTOMY: ICD-10-CM

## 2023-11-17 DIAGNOSIS — K52.9 CHRONIC DIARRHEA: ICD-10-CM

## 2023-11-17 DIAGNOSIS — K52.9 COLITIS: Primary | ICD-10-CM

## 2023-11-17 DIAGNOSIS — Z80.0 FAMILY HISTORY OF COLON CANCER IN MOTHER: ICD-10-CM

## 2023-11-17 DIAGNOSIS — K62.5 RECTAL BLEEDING: ICD-10-CM

## 2023-11-17 PROCEDURE — 99999 PR PBB SHADOW E&M-EST. PATIENT-LVL III: CPT | Mod: PBBFAC,HCNC,, | Performed by: NURSE PRACTITIONER

## 2023-11-17 PROCEDURE — 1157F ADVNC CARE PLAN IN RCRD: CPT | Mod: HCNC,CPTII,S$GLB, | Performed by: NURSE PRACTITIONER

## 2023-11-17 PROCEDURE — 99214 PR OFFICE/OUTPT VISIT, EST, LEVL IV, 30-39 MIN: ICD-10-PCS | Mod: HCNC,S$GLB,, | Performed by: NURSE PRACTITIONER

## 2023-11-17 PROCEDURE — 1101F PT FALLS ASSESS-DOCD LE1/YR: CPT | Mod: HCNC,CPTII,S$GLB, | Performed by: NURSE PRACTITIONER

## 2023-11-17 PROCEDURE — 1157F PR ADVANCE CARE PLAN OR EQUIV PRESENT IN MEDICAL RECORD: ICD-10-PCS | Mod: HCNC,CPTII,S$GLB, | Performed by: NURSE PRACTITIONER

## 2023-11-17 PROCEDURE — 1101F PR PT FALLS ASSESS DOC 0-1 FALLS W/OUT INJ PAST YR: ICD-10-PCS | Mod: HCNC,CPTII,S$GLB, | Performed by: NURSE PRACTITIONER

## 2023-11-17 PROCEDURE — 99999 PR PBB SHADOW E&M-EST. PATIENT-LVL III: ICD-10-PCS | Mod: PBBFAC,HCNC,, | Performed by: NURSE PRACTITIONER

## 2023-11-17 PROCEDURE — 1160F PR REVIEW ALL MEDS BY PRESCRIBER/CLIN PHARMACIST DOCUMENTED: ICD-10-PCS | Mod: HCNC,CPTII,S$GLB, | Performed by: NURSE PRACTITIONER

## 2023-11-17 PROCEDURE — 1159F PR MEDICATION LIST DOCUMENTED IN MEDICAL RECORD: ICD-10-PCS | Mod: HCNC,CPTII,S$GLB, | Performed by: NURSE PRACTITIONER

## 2023-11-17 PROCEDURE — 1159F MED LIST DOCD IN RCRD: CPT | Mod: HCNC,CPTII,S$GLB, | Performed by: NURSE PRACTITIONER

## 2023-11-17 PROCEDURE — 3288F FALL RISK ASSESSMENT DOCD: CPT | Mod: HCNC,CPTII,S$GLB, | Performed by: NURSE PRACTITIONER

## 2023-11-17 PROCEDURE — 3288F PR FALLS RISK ASSESSMENT DOCUMENTED: ICD-10-PCS | Mod: HCNC,CPTII,S$GLB, | Performed by: NURSE PRACTITIONER

## 2023-11-17 PROCEDURE — 1160F RVW MEDS BY RX/DR IN RCRD: CPT | Mod: HCNC,CPTII,S$GLB, | Performed by: NURSE PRACTITIONER

## 2023-11-17 PROCEDURE — 99214 OFFICE O/P EST MOD 30 MIN: CPT | Mod: HCNC,S$GLB,, | Performed by: NURSE PRACTITIONER

## 2023-11-17 PROCEDURE — 1125F AMNT PAIN NOTED PAIN PRSNT: CPT | Mod: HCNC,CPTII,S$GLB, | Performed by: NURSE PRACTITIONER

## 2023-11-17 PROCEDURE — 1125F PR PAIN SEVERITY QUANTIFIED, PAIN PRESENT: ICD-10-PCS | Mod: HCNC,CPTII,S$GLB, | Performed by: NURSE PRACTITIONER

## 2023-11-17 RX ORDER — MESALAMINE 0.38 G/1
1.5 CAPSULE, EXTENDED RELEASE ORAL DAILY
Qty: 120 CAPSULE | Refills: 11 | Status: SHIPPED | OUTPATIENT
Start: 2023-11-17 | End: 2024-11-16

## 2023-11-17 NOTE — PROGRESS NOTES
Subjective:       Patient ID: Dorothy Kramer is a 82 y.o. female, Body mass index is 25.35 kg/m².    Chief Complaint: Follow-up      Established patient of Dr. Oscar & myself.     Diarrhea   This is a recurrent problem. The current episode started more than 1 month ago (Recurred beginning of 10/2023). The problem occurs 2 to 4 times per day. The problem has been rapidly improving (since she increased the dose of Apriso to 3 capsules daily (1.125 g total)). The stool consistency is described as Bloody and mucous (describes stool as type 6 on bristol scale with mucus and bright red blood at onset of symptoms; now reports formed stool; bloody stools and mucus in stool have resolved). The patient states that diarrhea does not awaken her from sleep. Associated symptoms include abdominal pain (generalized abdominal pain; intermittent; now resolved). Pertinent negatives include no bloating, chills, coughing, fever, increased  flatus, vomiting or weight loss. Nothing aggravates the symptoms. There are no known risk factors (denies recent antibiotics, hospitalization or foreign travel). She has tried change of diet and anti-motility drug (Currently: Apriso 3 capsules (1.125 g total) once daily- symptoms improving; OTC Imodium PRN- helps) for the symptoms. There is no history of bowel resection, inflammatory bowel disease, irritable bowel syndrome or a recent abdominal surgery. Hx of colitis and diverticulitis     Review of Systems   Constitutional:  Negative for appetite change, chills, fever, unexpected weight change and weight loss.   HENT:  Negative for trouble swallowing.    Respiratory:  Negative for cough and shortness of breath.    Cardiovascular:  Negative for chest pain.   Gastrointestinal:  Positive for abdominal pain (generalized abdominal pain; intermittent; now resolved), anal bleeding, blood in stool and diarrhea. Negative for abdominal distention, bloating, constipation, flatus, nausea, rectal pain and  vomiting.   Genitourinary:  Negative for difficulty urinating and dysuria.   Musculoskeletal:  Positive for back pain and neck pain. Negative for gait problem.   Skin:  Negative for rash.   Neurological:  Negative for speech difficulty.   Psychiatric/Behavioral:  Negative for confusion.        Past Medical History:   Diagnosis Date    DDD (degenerative disc disease), cervical     Diverticulitis     Diverticulosis     DJD (degenerative joint disease) of hip     Dyspepsia     GERD (gastroesophageal reflux disease)     History of melanoma 05/20/2015    HTN (hypertension)     Melanoma     Migraine headache     Rectocele     Skin cancer     melanoma on face    Sleep apnea     doesnt use cpap    Thyroid disease     hypo    Trouble in sleeping     Ulcerative colitis     on meds    Uterine prolaps     followed by GYN    Vulvar lesion 05/20/2015      Past Surgical History:   Procedure Laterality Date    ABLATION OF MEDIAL BRANCH NERVE OF CERVICAL SPINE FACET JOINT Bilateral 10/4/2023    Procedure: ABLATION, NERVE, FACET JOINT, CERVICAL, MEDIAL BRANCH C3/4 anD c4/5;  Surgeon: Chintan Damon MD;  Location: [facility] OR;  Service: Pain Management;  Laterality: Bilateral;    APPENDECTOMY  1962    BACK SURGERY      BREAST BIOPSY      BREAST LUMPECTOMY  1989    CATARACT EXTRACTION Left 02/22/2021    ros    CATARACT EXTRACTION W/  INTRAOCULAR LENS IMPLANT Left 2/22/2021    Procedure: EXTRACTION, CATARACT, WITH IOL INSERTION;  Surgeon: Latisha Baker MD;  Location: [facility] OR;  Service: Ophthalmology;  Laterality: Left;  left    CATARACT EXTRACTION W/  INTRAOCULAR LENS IMPLANT Right 3/22/2021    Procedure: EXTRACTION, CATARACT, WITH IOL INSERTION;  Surgeon: Latisha Baker MD;  Location: [facility] OR;  Service: Ophthalmology;  Laterality: Right;  right    CERVICAL FUSION  1991    CHOLECYSTECTOMY      COLONOSCOPY      COLONOSCOPY N/A 11/9/2018    Dr Oscar; chronic active colitis; repeat in 5 years    COLONOSCOPY N/A 2/14/2020     Procedure: COLONOSCOPY;  Surgeon: Fadi Oscar MD;  Location: [facility] ENDO;  Service: Endoscopy;  Laterality: N/A;    EPIDURAL STEROID INJECTION INTO CERVICAL SPINE N/A 5/18/2023    Procedure: Injection-steroid-epidural-cervical C7/T1;  Surgeon: Chintan Damon MD;  Location: [facility] OR;  Service: Pain Management;  Laterality: N/A;  cervical    EPIDURAL STEROID INJECTION INTO LUMBAR SPINE N/A 7/12/2023    Procedure: Injection-steroid-epidural-lumbar L5/S1;  Surgeon: Chintan Damon MD;  Location: [facility] OR;  Service: Pain Management;  Laterality: N/A;    HYSTERECTOMY      TRACEE/BSO for benign disease    INJECTION OF ANESTHETIC AGENT AROUND MEDIAL BRANCH NERVES INNERVATING CERVICAL FACET JOINT Bilateral 8/28/2023    Procedure: Block-nerve-medial branch-cervical C3/4. C4/5;  Surgeon: Chintan Damon MD;  Location: [facility] OR;  Service: Pain Management;  Laterality: Bilateral;    INJECTION OF ANESTHETIC AGENT AROUND MEDIAL BRANCH NERVES INNERVATING CERVICAL FACET JOINT Bilateral 9/15/2023    Procedure: Block-nerve-medial branch-cervical C3/4 and C4/5;  Surgeon: Chintan Damon MD;  Location: [facility] OR;  Service: Pain Management;  Laterality: Bilateral;    INSERTION OF MIDURETHRAL SLING N/A 9/22/2022    Procedure: SLING, MIDURETHRAL;  Surgeon: Yannick Cabral MD;  Location: [facility] OR;  Service: OB/GYN;  Laterality: N/A;    JOINT REPLACEMENT Right 2016    Hip replacement    VT REMOVAL OF OVARY/TUBE(S)      ROBOT-ASSISTED LAPAROSCOPIC ABDOMINAL SACROCOLPOPEXY USING DA FLAKITO XI N/A 12/14/2021    Procedure: XI ROBOTIC SACROCOLPOPEXY, ABDOMINAL;  Surgeon: Yannick Cabral MD;  Location: [facility] OR;  Service: OB/GYN;  Laterality: N/A;    ROBOT-ASSISTED LAPAROSCOPIC LYSIS OF ADHESIONS USING DA FLAKITO XI  12/14/2021    Procedure: XI ROBOTIC LYSIS, ADHESIONS;  Surgeon: Yannick Cabral MD;  Location: [facility] OR;  Service: OB/GYN;;    TONSILLECTOMY      TOTAL ABDOMINAL HYSTERECTOMY W/ BILATERAL SALPINGOOPHORECTOMY  1990       Family History   Problem Relation Age of Onset    Heart failure Mother     Cancer Mother         Bladder    Colon cancer Mother 63    Cancer Father         Bladder    Parkinsonism Father     Diverticulitis Father     Uterine cancer Maternal Aunt     Ovarian cancer Neg Hx     Breast cancer Neg Hx     Crohn's disease Neg Hx     Ulcerative colitis Neg Hx     Glaucoma Neg Hx     Macular degeneration Neg Hx     Retinal detachment Neg Hx       Wt Readings from Last 10 Encounters:   11/17/23 67 kg (147 lb 11.3 oz)   10/31/23 67.6 kg (149 lb 0.5 oz)   09/29/23 67.1 kg (148 lb)   09/13/23 67.6 kg (149 lb)   08/24/23 67.6 kg (149 lb)   08/02/23 67.9 kg (149 lb 11.1 oz)   07/07/23 67.6 kg (149 lb)   06/30/23 67.9 kg (149 lb 12.8 oz)   06/19/23 66.2 kg (145 lb 15.1 oz)   06/15/23 66.2 kg (145 lb 15.1 oz)     Lab Results   Component Value Date    WBC 7.30 12/07/2022    HGB 14.1 12/07/2022    HCT 43.4 12/07/2022    MCV 96 12/07/2022     (L) 12/07/2022     CMP  Sodium   Date Value Ref Range Status   07/07/2023 142 136 - 145 mmol/L Final     Potassium   Date Value Ref Range Status   07/07/2023 3.8 3.5 - 5.1 mmol/L Final     Chloride   Date Value Ref Range Status   07/07/2023 105 95 - 110 mmol/L Final     CO2   Date Value Ref Range Status   07/07/2023 28 23 - 29 mmol/L Final     Glucose   Date Value Ref Range Status   07/07/2023 98 70 - 110 mg/dL Final     BUN   Date Value Ref Range Status   07/07/2023 14 8 - 23 mg/dL Final     Creatinine   Date Value Ref Range Status   07/07/2023 0.8 0.5 - 1.4 mg/dL Final     Calcium   Date Value Ref Range Status   07/07/2023 10.1 8.7 - 10.5 mg/dL Final     Total Protein   Date Value Ref Range Status   07/07/2023 7.1 6.0 - 8.4 g/dL Final     Albumin   Date Value Ref Range Status   07/07/2023 4.0 3.5 - 5.2 g/dL Final     Total Bilirubin   Date Value Ref Range Status   07/07/2023 1.0 0.1 - 1.0 mg/dL Final     Comment:     For infants and newborns, interpretation of results should be  based  on gestational age, weight and in agreement with clinical  observations.    Premature Infant recommended reference ranges:  Up to 24 hours.............<8.0 mg/dL  Up to 48 hours............<12.0 mg/dL  3-5 days..................<15.0 mg/dL  6-29 days.................<15.0 mg/dL       Alkaline Phosphatase   Date Value Ref Range Status   07/07/2023 63 55 - 135 U/L Final     AST   Date Value Ref Range Status   07/07/2023 18 10 - 40 U/L Final     ALT   Date Value Ref Range Status   07/07/2023 20 10 - 44 U/L Final     Anion Gap   Date Value Ref Range Status   07/07/2023 9 8 - 16 mmol/L Final     eGFR if    Date Value Ref Range Status   06/02/2022 >60.0 >60 mL/min/1.73 m^2 Final     eGFR if non    Date Value Ref Range Status   06/02/2022 >60.0 >60 mL/min/1.73 m^2 Final     Comment:     Calculation used to obtain the estimated glomerular filtration  rate (eGFR) is the CKD-EPI equation.                 Reviewed prior medical records including radiology report of CT of abdomen and pelvis 12/19/19 & endoscopy history (see surgical history).     Objective:      Physical Exam  Constitutional:       General: She is not in acute distress.     Appearance: She is well-developed.   HENT:      Head: Normocephalic.      Right Ear: Hearing normal.      Left Ear: Hearing normal.      Nose: Nose normal.      Mouth/Throat:      Mouth: No oral lesions.      Pharynx: Uvula midline.   Eyes:      General: Lids are normal.      Conjunctiva/sclera: Conjunctivae normal.      Pupils: Pupils are equal, round, and reactive to light.   Neck:      Trachea: Trachea normal.   Cardiovascular:      Rate and Rhythm: Normal rate and regular rhythm.      Heart sounds: Normal heart sounds. No murmur heard.  Pulmonary:      Effort: Pulmonary effort is normal. No respiratory distress.      Breath sounds: Normal breath sounds. No stridor. No wheezing.   Abdominal:      General: Bowel sounds are normal. There is no  distension.      Palpations: Abdomen is soft. There is no mass.      Tenderness: There is abdominal tenderness (mild) in the right lower quadrant. There is no guarding or rebound.   Musculoskeletal:         General: Normal range of motion.      Cervical back: Normal range of motion.   Skin:     General: Skin is warm and dry.      Findings: No rash.      Comments: Non jaundiced   Neurological:      Mental Status: She is alert and oriented to person, place, and time.   Psychiatric:         Speech: Speech normal.         Behavior: Behavior normal. Behavior is cooperative.           Assessment:       1. Colitis    2. Chronic diarrhea    3. Rectal bleeding    4. Family history of colon cancer in mother    5. S/P cholecystectomy           Plan:   All diagnoses and orders for this visit:    Colitis, Chronic diarrhea & Rectal bleeding  - Recommended increase fiber in diet, especially soluble fiber since this can help bulk up the stool consistency and may help to slow down how fast the stool goes through the colon and can prevent diarrhea   - Increase Apriso to 4 capsules (1.5 g total) once daily: mesalamine (APRISO) 0.375 gram Cp24; Take 4 capsules (1.5 g total) by mouth once daily.  Dispense: 120 capsule; Refill: 11  - Discussed stool studies, CT of abdomen and pelvis and colonoscopy but patient declined at this time (she will notify office if she reconsiders)    Family history of colon cancer in mother   - No repeat surveillance colonoscopy recommended    S/P cholecystectomy    If no improvement in symptoms or symptoms worsen, call/follow-up at clinic or go to ER

## 2023-11-20 ENCOUNTER — TELEPHONE (OUTPATIENT)
Dept: PAIN MEDICINE | Facility: CLINIC | Age: 83
End: 2023-11-20
Payer: MEDICARE

## 2023-11-27 ENCOUNTER — TELEPHONE (OUTPATIENT)
Dept: PAIN MEDICINE | Facility: CLINIC | Age: 83
End: 2023-11-27
Payer: MEDICARE

## 2023-11-29 ENCOUNTER — HOSPITAL ENCOUNTER (OUTPATIENT)
Dept: RADIOLOGY | Facility: HOSPITAL | Age: 83
Discharge: HOME | End: 2023-11-29
Attending: ANESTHESIOLOGY | Admitting: ANESTHESIOLOGY
Payer: MEDICARE

## 2023-11-29 ENCOUNTER — HOSPITAL ENCOUNTER (OUTPATIENT)
Facility: HOSPITAL | Age: 83
Discharge: HOME | End: 2023-11-29
Attending: ANESTHESIOLOGY | Admitting: ANESTHESIOLOGY
Payer: MEDICARE

## 2023-11-29 VITALS
SYSTOLIC BLOOD PRESSURE: 132 MMHG | BODY MASS INDEX: 25.1 KG/M2 | WEIGHT: 147 LBS | TEMPERATURE: 98 F | HEIGHT: 64 IN | HEART RATE: 72 BPM | DIASTOLIC BLOOD PRESSURE: 61 MMHG | OXYGEN SATURATION: 97 % | RESPIRATION RATE: 15 BRPM

## 2023-11-29 DIAGNOSIS — M54.16 LUMBAR RADICULOPATHY: ICD-10-CM

## 2023-11-29 DIAGNOSIS — M54.50 LOWER BACK PAIN: ICD-10-CM

## 2023-11-29 PROCEDURE — 63600175 PHARM REV CODE 636 W HCPCS: Mod: HCNC,PO | Performed by: ANESTHESIOLOGY

## 2023-11-29 PROCEDURE — 25000003 PHARM REV CODE 250: Mod: HCNC,PO | Performed by: ANESTHESIOLOGY

## 2023-11-29 PROCEDURE — 76000 FLUOROSCOPY <1 HR PHYS/QHP: CPT | Mod: TC,HCNC,PO

## 2023-11-29 PROCEDURE — 64483 NJX AA&/STRD TFRM EPI L/S 1: CPT | Mod: HCNC,LT,, | Performed by: ANESTHESIOLOGY

## 2023-11-29 PROCEDURE — 64484 NJX AA&/STRD TFRM EPI L/S EA: CPT | Mod: HCNC,PO,LT | Performed by: ANESTHESIOLOGY

## 2023-11-29 PROCEDURE — 64484 PRA INJECT ANES/STEROID FORAMEN LUMBAR/SACRAL W IMG GUIDE ,EA ADD LEVEL: ICD-10-PCS | Mod: HCNC,LT,, | Performed by: ANESTHESIOLOGY

## 2023-11-29 PROCEDURE — 64483 NJX AA&/STRD TFRM EPI L/S 1: CPT | Mod: HCNC,PO,LT | Performed by: ANESTHESIOLOGY

## 2023-11-29 PROCEDURE — 64483 PR EPIDURAL INJ, ANES/STEROID, TRANSFORAMINAL, LUMB/SACR, SNGL LEVL: ICD-10-PCS | Mod: HCNC,LT,, | Performed by: ANESTHESIOLOGY

## 2023-11-29 PROCEDURE — 64484 NJX AA&/STRD TFRM EPI L/S EA: CPT | Mod: HCNC,LT,, | Performed by: ANESTHESIOLOGY

## 2023-11-29 PROCEDURE — 25500020 PHARM REV CODE 255: Mod: HCNC,PO | Performed by: ANESTHESIOLOGY

## 2023-11-29 RX ORDER — METHYLPREDNISOLONE ACETATE 80 MG/ML
INJECTION, SUSPENSION INTRA-ARTICULAR; INTRALESIONAL; INTRAMUSCULAR; SOFT TISSUE
Status: DISCONTINUED | OUTPATIENT
Start: 2023-11-29 | End: 2023-11-29 | Stop reason: HOSPADM

## 2023-11-29 RX ORDER — BUPIVACAINE HYDROCHLORIDE 2.5 MG/ML
INJECTION, SOLUTION EPIDURAL; INFILTRATION; INTRACAUDAL
Status: DISCONTINUED | OUTPATIENT
Start: 2023-11-29 | End: 2023-11-29 | Stop reason: HOSPADM

## 2023-11-29 RX ORDER — MIDAZOLAM HYDROCHLORIDE 1 MG/ML
INJECTION INTRAMUSCULAR; INTRAVENOUS
Status: DISCONTINUED | OUTPATIENT
Start: 2023-11-29 | End: 2023-11-29 | Stop reason: HOSPADM

## 2023-11-29 RX ORDER — LIDOCAINE HYDROCHLORIDE 10 MG/ML
INJECTION, SOLUTION EPIDURAL; INFILTRATION; INTRACAUDAL; PERINEURAL
Status: DISCONTINUED | OUTPATIENT
Start: 2023-11-29 | End: 2023-11-29 | Stop reason: HOSPADM

## 2023-11-29 RX ORDER — SODIUM CHLORIDE, SODIUM LACTATE, POTASSIUM CHLORIDE, CALCIUM CHLORIDE 600; 310; 30; 20 MG/100ML; MG/100ML; MG/100ML; MG/100ML
INJECTION, SOLUTION INTRAVENOUS CONTINUOUS
Status: DISCONTINUED | OUTPATIENT
Start: 2023-11-29 | End: 2023-11-29 | Stop reason: HOSPADM

## 2023-11-29 RX ORDER — ALPRAZOLAM 0.5 MG/1
1 TABLET, ORALLY DISINTEGRATING ORAL ONCE AS NEEDED
Status: DISCONTINUED | OUTPATIENT
Start: 2023-11-29 | End: 2023-11-29

## 2023-11-29 RX ADMIN — SODIUM CHLORIDE, POTASSIUM CHLORIDE, SODIUM LACTATE AND CALCIUM CHLORIDE: 600; 310; 30; 20 INJECTION, SOLUTION INTRAVENOUS at 01:11

## 2023-11-29 NOTE — DISCHARGE SUMMARY
[facility] - Surgery  Discharge Note  Short Stay    Procedure(s) (LRB):  Injection,steroid,epidural,transforaminal approach      L4/5,L5/S1 (Left)      OUTCOME: Patient tolerated treatment/procedure well without complication and is now ready for discharge.    DISPOSITION: Home or Self Care    FINAL DIAGNOSIS:  Lumbar radiculopathy    FOLLOWUP: In clinic    DISCHARGE INSTRUCTIONS:    Discharge Procedure Orders   Diet Adult Regular     No dressing needed     Notify your health care provider if you experience any of the following:  temperature >100.4     Activity as tolerated

## 2023-11-29 NOTE — PLAN OF CARE
Pt states last time she received xanax, it did not work in time for procedure. States once she arrived at home, she was very sleepy. Spoke with Dr. Damon. States okay to give pt RN IV sedation. Pt agreeable to plan. Verbalizes understanding.   VSS, all questions answered. Denies recent fever or illness. Pt states ready for procedure.

## 2024-01-12 ENCOUNTER — PATIENT MESSAGE (OUTPATIENT)
Dept: PAIN MEDICINE | Facility: CLINIC | Age: 84
End: 2024-01-12

## 2024-01-12 ENCOUNTER — OFFICE VISIT (OUTPATIENT)
Dept: PAIN MEDICINE | Facility: CLINIC | Age: 84
End: 2024-01-12
Payer: MEDICARE

## 2024-01-12 VITALS
SYSTOLIC BLOOD PRESSURE: 115 MMHG | HEART RATE: 81 BPM | BODY MASS INDEX: 25.99 KG/M2 | DIASTOLIC BLOOD PRESSURE: 89 MMHG | HEIGHT: 64 IN | WEIGHT: 152.25 LBS

## 2024-01-12 DIAGNOSIS — M43.16 SPONDYLOLISTHESIS OF LUMBAR REGION: ICD-10-CM

## 2024-01-12 DIAGNOSIS — M54.16 LUMBAR RADICULOPATHY: Primary | ICD-10-CM

## 2024-01-12 DIAGNOSIS — M47.812 CERVICAL SPONDYLOSIS: ICD-10-CM

## 2024-01-12 DIAGNOSIS — M50.30 DDD (DEGENERATIVE DISC DISEASE), CERVICAL: ICD-10-CM

## 2024-01-12 DIAGNOSIS — M51.36 DDD (DEGENERATIVE DISC DISEASE), LUMBAR: ICD-10-CM

## 2024-01-12 PROCEDURE — 99999 PR PBB SHADOW E&M-EST. PATIENT-LVL III: CPT | Mod: PBBFAC,HCNC,, | Performed by: PHYSICIAN ASSISTANT

## 2024-01-12 PROCEDURE — 1125F AMNT PAIN NOTED PAIN PRSNT: CPT | Mod: HCNC,CPTII,S$GLB, | Performed by: PHYSICIAN ASSISTANT

## 2024-01-12 PROCEDURE — 99214 OFFICE O/P EST MOD 30 MIN: CPT | Mod: HCNC,S$GLB,, | Performed by: PHYSICIAN ASSISTANT

## 2024-01-12 PROCEDURE — 3288F FALL RISK ASSESSMENT DOCD: CPT | Mod: HCNC,CPTII,S$GLB, | Performed by: PHYSICIAN ASSISTANT

## 2024-01-12 PROCEDURE — 1157F ADVNC CARE PLAN IN RCRD: CPT | Mod: HCNC,CPTII,S$GLB, | Performed by: PHYSICIAN ASSISTANT

## 2024-01-12 PROCEDURE — 1101F PT FALLS ASSESS-DOCD LE1/YR: CPT | Mod: HCNC,CPTII,S$GLB, | Performed by: PHYSICIAN ASSISTANT

## 2024-01-12 PROCEDURE — 3074F SYST BP LT 130 MM HG: CPT | Mod: HCNC,CPTII,S$GLB, | Performed by: PHYSICIAN ASSISTANT

## 2024-01-12 PROCEDURE — 1159F MED LIST DOCD IN RCRD: CPT | Mod: HCNC,CPTII,S$GLB, | Performed by: PHYSICIAN ASSISTANT

## 2024-01-12 PROCEDURE — 3079F DIAST BP 80-89 MM HG: CPT | Mod: HCNC,CPTII,S$GLB, | Performed by: PHYSICIAN ASSISTANT

## 2024-01-12 PROCEDURE — 1160F RVW MEDS BY RX/DR IN RCRD: CPT | Mod: HCNC,CPTII,S$GLB, | Performed by: PHYSICIAN ASSISTANT

## 2024-01-12 NOTE — PROGRESS NOTES
This note was completed with dictation software and grammatical errors may exist.    Chief Complaint   Patient presents with    Low-back Pain        HPI: Dorothy Kramer is a 83 y.o. year old female patient who has a past medical history of DDD (degenerative disc disease), cervical, Diverticulitis, Diverticulosis, DJD (degenerative joint disease) of hip, Dyspepsia, GERD (gastroesophageal reflux disease), History of melanoma, HTN (hypertension), Melanoma, Migraine headache, Rectocele, Skin cancer, Sleep apnea, Thyroid disease, Trouble in sleeping, Ulcerative colitis, Uterine prolaps, and Vulvar lesion. She presents in referral from No ref. provider found for back and neck pain.   She is status post left L4/5 and L5/S1 transforaminal epidural steroid injection on 11/29/2023 with minimal relief lasting only 1 day.  She describes pain in the left low back traveling through the left buttock, posterior thigh and calf.  Pain in the back is worse than the pain in the leg.  She reports numbness in her left leg as well.  She has been taking gabapentin 300 mg nightly which controls the spasms and helps her sleep.  She continues to have some left greater than right upper cervical spine pain.  She reports having a  prickly feeling to the left neck and left trapezius muscle that started about 1 month after the radiofrequency ablation.  Otherwise, she denies any new weakness.    Initial history:  The patient states that over 30 years ago she had neck pain radiating into the right arm, developed weakness, eventually underwent a C5/6 fusion.  She did well with this and has no longer had severe neck pain.  However in the last several years she began having more pain in the neck radiating into the upper back, right rhomboid region, right shoulder.  She denies any weakness in the arm.  She describes it as aching, burning, sharp and deep.  Her pain is worse with turning her head side-to-side, extension.  She also has pain in the low  "back, left buttock that she calls sciatica that radiates further down her leg.  She also has pain worse with standing, walking, bending getting out of a bed or a chair.  She has some pain over the right trochanter, states if she lays on that side at night, it will wake her up and she only gets several hours of sleep.  Because her neck hurts so much she tries to lay on her right side to immobilize her arm but then starts having worsening right lateral hip pain.  She had a GTB injection with Dr. Carmona after she tried physical therapy for the pain but this was not successful.  The injection did seem to provide several days of relief but returned.  She states that the right GTB is tender to palpation, but also has some right low back pain as well.        Pain intervention history:  Right GTB injection gave 3 days of relief.  She is status post C7-T1 interlaminar epidural steroid injection on 05/18/2023 with minimal relief. She is status post L5/S1 KATHY on 07/12/2023 with 50% relief lasting for 1 day. She is status post bilateral C3/4 C4/5 radiofrequency ablation on 10/04/2023 with 90% relief.  She is status post bilateral C3/4 C4/5 radiofrequency ablation on 10/04/2023 with 90% relief.  She is status post left L4/5 and L5/S1 transforaminal epidural steroid injection on 11/29/2023 with minimal relief lasting only 1 day.     Spine surgeries:  ACDF C5-6 30 years ago    Antineuropathics:  Gabapentin 300 mg nightly  NSAIDs:  Physical therapy: She had done physical therapy several months ago for neck pain, back pain, trochanteric bursitis without much benefit she states that she does not want to return to physical therapy.  Antidepressants:  Muscle relaxers:  Opioids:  Antiplatelets/Anticoagulants:        ROS:  She reports rash, joint stiffness, joint swelling, back pain, difficulty sleeping possible dizziness and loss of balance.  Balance of review of systems is negative.    No results found for: "LABA1C", "HGBA1C"    Lab " Results   Component Value Date    WBC 7.30 12/07/2022    HGB 14.1 12/07/2022    HCT 43.4 12/07/2022    MCV 96 12/07/2022     (L) 12/07/2022             Past Medical History:   Diagnosis Date    DDD (degenerative disc disease), cervical     Diverticulitis     Diverticulosis     DJD (degenerative joint disease) of hip     Dyspepsia     GERD (gastroesophageal reflux disease)     History of melanoma 05/20/2015    HTN (hypertension)     Melanoma     Migraine headache     Rectocele     Skin cancer     melanoma on face    Sleep apnea     doesnt use cpap    Thyroid disease     hypo    Trouble in sleeping     Ulcerative colitis     on meds    Uterine prolaps     followed by GYN    Vulvar lesion 05/20/2015       Past Surgical History:   Procedure Laterality Date    ABLATION OF MEDIAL BRANCH NERVE OF CERVICAL SPINE FACET JOINT Bilateral 10/4/2023    Procedure: ABLATION, NERVE, FACET JOINT, CERVICAL, MEDIAL BRANCH C3/4 anD c4/5;  Surgeon: Chintan Damon MD;  Location: [facility] OR;  Service: Pain Management;  Laterality: Bilateral;    APPENDECTOMY  1962    BACK SURGERY      BREAST BIOPSY      BREAST LUMPECTOMY  1989    CATARACT EXTRACTION Left 02/22/2021    ros    CATARACT EXTRACTION W/  INTRAOCULAR LENS IMPLANT Left 2/22/2021    Procedure: EXTRACTION, CATARACT, WITH IOL INSERTION;  Surgeon: Latisha Baker MD;  Location: [facility] OR;  Service: Ophthalmology;  Laterality: Left;  left    CATARACT EXTRACTION W/  INTRAOCULAR LENS IMPLANT Right 3/22/2021    Procedure: EXTRACTION, CATARACT, WITH IOL INSERTION;  Surgeon: Latisha Baker MD;  Location: [facility] OR;  Service: Ophthalmology;  Laterality: Right;  right    CERVICAL FUSION  1991    CHOLECYSTECTOMY      COLONOSCOPY      COLONOSCOPY N/A 11/9/2018    Dr Oscar; chronic active colitis; repeat in 5 years    COLONOSCOPY N/A 2/14/2020    Procedure: COLONOSCOPY;  Surgeon: Fadi Oscar MD;  Location: [facility] ENDO;  Service: Endoscopy;  Laterality: N/A;    EPIDURAL  STEROID INJECTION INTO CERVICAL SPINE N/A 5/18/2023    Procedure: Injection-steroid-epidural-cervical C7/T1;  Surgeon: Chintan Damon MD;  Location: [facility] OR;  Service: Pain Management;  Laterality: N/A;  cervical    EPIDURAL STEROID INJECTION INTO LUMBAR SPINE N/A 7/12/2023    Procedure: Injection-steroid-epidural-lumbar L5/S1;  Surgeon: Chintan Damon MD;  Location: [facility] OR;  Service: Pain Management;  Laterality: N/A;    HYSTERECTOMY      TRACEE/BSO for benign disease    INJECTION OF ANESTHETIC AGENT AROUND MEDIAL BRANCH NERVES INNERVATING CERVICAL FACET JOINT Bilateral 8/28/2023    Procedure: Block-nerve-medial branch-cervical C3/4. C4/5;  Surgeon: Chintan Damon MD;  Location: [facility] OR;  Service: Pain Management;  Laterality: Bilateral;    INJECTION OF ANESTHETIC AGENT AROUND MEDIAL BRANCH NERVES INNERVATING CERVICAL FACET JOINT Bilateral 9/15/2023    Procedure: Block-nerve-medial branch-cervical C3/4 and C4/5;  Surgeon: Chintan Damon MD;  Location: [facility] OR;  Service: Pain Management;  Laterality: Bilateral;    INSERTION OF MIDURETHRAL SLING N/A 9/22/2022    Procedure: SLING, MIDURETHRAL;  Surgeon: Yannick Cabral MD;  Location: [facility] OR;  Service: OB/GYN;  Laterality: N/A;    JOINT REPLACEMENT Right 2016    Hip replacement    MA REMOVAL OF OVARY/TUBE(S)      ROBOT-ASSISTED LAPAROSCOPIC ABDOMINAL SACROCOLPOPEXY USING DA FLAKITO XI N/A 12/14/2021    Procedure: XI ROBOTIC SACROCOLPOPEXY, ABDOMINAL;  Surgeon: Yannick Cabral MD;  Location: [facility] OR;  Service: OB/GYN;  Laterality: N/A;    ROBOT-ASSISTED LAPAROSCOPIC LYSIS OF ADHESIONS USING DA FLAKITO XI  12/14/2021    Procedure: XI ROBOTIC LYSIS, ADHESIONS;  Surgeon: Yannick Cabral MD;  Location: [facility] OR;  Service: OB/GYN;;    TONSILLECTOMY      TOTAL ABDOMINAL HYSTERECTOMY W/ BILATERAL SALPINGOOPHORECTOMY  1990    TRANSFORAMINAL EPIDURAL INJECTION OF STEROID Left 11/29/2023    Procedure: Injection,steroid,epidural,transforaminal approach       "L4/5,L5/S1;  Surgeon: Chintan Damon MD;  Location: [facility] OR;  Service: Pain Management;  Laterality: Left;       Social History     Socioeconomic History    Marital status:    Tobacco Use    Smoking status: Never    Smokeless tobacco: Never   Substance and Sexual Activity    Alcohol use: No    Drug use: No    Sexual activity: Not Currently     Social Determinants of Health     Financial Resource Strain: Unknown (6/1/2022)    Overall Financial Resource Strain (CARDIA)     Difficulty of Paying Living Expenses: Patient declined   Food Insecurity: Unknown (6/1/2022)    Hunger Vital Sign     Worried About Running Out of Food in the Last Year: Patient declined     Ran Out of Food in the Last Year: Never true   Transportation Needs: No Transportation Needs (6/1/2022)    PRAPARE - Transportation     Lack of Transportation (Medical): No     Lack of Transportation (Non-Medical): No   Physical Activity: Unknown (6/1/2022)    Exercise Vital Sign     Days of Exercise per Week: Patient declined   Stress: Unknown (6/1/2022)    [employer] - Occupational Stress Questionnaire     Feeling of Stress : Patient declined   Social Connections: Unknown (6/1/2022)    Social Connection and Isolation Panel [NHANES]     Frequency of Communication with Friends and Family: More than three times a week     Frequency of Social Gatherings with Friends and Family: More than three times a week     Active Member of Clubs or Organizations: Yes     Attends Club or Organization Meetings: More than 4 times per year     Marital Status:    Housing Stability: Low Risk  (6/1/2022)    Housing Stability Vital Sign     Unable to Pay for Housing in the Last Year: No     Number of Places Lived in the Last Year: 1     Unstable Housing in the Last Year: No         Medications/Allergies: See med card    Vitals:    01/12/24 1526   BP: 115/89   Pulse: 81   Weight: 69 kg (152 lb 3.6 oz)   Height: 5' 4" (1.626 m)   PainSc: "   4   PainLoc: Back       Body mass index is 26.13 kg/m².    Physical exam:  Gen: A and O x3, pleasant, well-groomed  Skin: No rashes or obvious lesions  HEENT: PERRLA, no obvious deformities on ears or in canals.Trachea midline.  CVS: Regular rate and rhythm, normal palpable pulses.  Resp: Clear to auscultation bilaterally, no wheezes or rales.  Abdomen: Soft, NT/ND.  Musculoskeletal: No antalgic gait.     Neuro:  Motor:    Right Left   C4 Shoulder Abduction  5  5   C5 Elbow Flexion    5  5   C6 Wrist Extension  5  5   C7 Elbow Extension   5  5   C8/T1 Hand Intrinsics   5  5   C8 First Dorsal Interosseus  5  5   C8 Abductor Pollicus Brevis  5  5       Iliopsoas Quadriceps Knee  Flexion Tibialis  anterior Gastro- cnemius EHL   Lower: R 5/5 5/5 5/5 5/5 5/5 5/5    L 5/5 5/5 5/5 5/5 5/5 5/5        Left  Right    Triceps DTR 2+ 2+   Biceps DTR 2+ 2+   Brachioradialis DTR 2+ 2+   Patellar DTR 2+ 2+   Achilles DTR 2+ 2+   Castro Absent  Absent   Clonus Absent Absent   Babinski Absent Absent     Sensory: Intact and symmetrical to light touch and pinprick in C2-T1 dermatomes bilaterally. Intact and symmetrical to light touch and pinprick in L1-S1 dermatomes bilaterally.  Cervical spine: ROM is full in flexion, moderately reduced with extension and lateral rotation with increased pain on right lateral rotation causing neck pain and periscapular pain on the right side.    Spurling's maneuver causes right neck pain.  Myofascial exam: No Tenderness to palpation across cervical paraspinous region bilaterally.    Lumbar spine:  Lumbar spine: ROM is moderately reduced with both flexion extension and oblique extension with no increased pain.    Alek's test causes no increased pain on either side.    Supine straight leg raise is negative bilaterally.    Internal and external rotation of the hip causes no increased pain on either side.  Myofascial exam: No tenderness to palpation across lumbar paraspinous muscles.  There is  exquisite tenderness to palpation over the right GTB    Imagin19 Xray L-spine:  Bone density is normal.  There is a gentle levocurvature of the lumbar spine.  In her AP a the vertebral bodies maintain normal height and alignment.  There is no fracture.  There is marked disc space narrowing at the L4-5 level.  There is mild endplate sclerosis and osteophyte formation.  There is mild-to-moderate disc space narrowing at the L2-3, L3-4 levels and moderate disc space narrowing at the L5-S1 level.  There is multilevel facet joint arthropathy.  There is mild-to-moderate atherosclerosis.  There are surgical clips in the right upper abdomen from prior cholecystectomy.    19 MRI C-spine:  Vertebral column: There is no prior MRI or CT for comparison.  As seen on plain films, there is mild anterolisthesis of C3 on C4, C4 on C5.  There are changes of prior bony fusion of C5 and C6.  There is moderate to marked disc space narrowing at the C6-7 level.  There is no fracture.  The odontoid process is intact.  The discs are desiccated.  There is endplate osteophyte formation most apparent at the C6-7 level.  Baseline marrow signal is normal.   Spinal canal, cord, epidural space: The spinal canal is developmentally normal.  There is no abnormal epidural soft tissue mass or fluid collection.  There is minimal flattening of the right ventral cord surface at the C4-5 level where there is a large disc extrusion.  There is no cord edema or myelomalacia.  C2-3: There is no spinal canal or significant foraminal stenosis.   C3-4: There is moderate-to-marked left foraminal stenosis due to uncovertebral spurring and facet joint arthropathy.  There is a mild disc osteophyte complex.  There is no spinal stenosis or cord compression.  The right foramen is patent.   C4-5: There is a large right paracentral disc extrusion measuring approximately 6 x 10 x 13 mm (AP, transverse, craniocaudad).  This does result in flattening of the right  ventral cord surface but there is no obvious cord edema or myelomalacia.  There is moderate spinal stenosis at this level.  There is also moderate left foraminal narrowing due to uncovertebral spurring and facet joint arthropathy.   C5-6: There are changes of previous bony fusion.  There is no spinal canal or significant foraminal stenosis.   C6-7: There is moderate to marked disc space narrowing.  There is left greater than right uncovertebral spurring with bilateral facet joint arthropathy.  There is a mild disc osteophyte complex.  There is no spinal canal or significant foraminal stenosis.   C7-T1: There is left facet joint arthropathy.  There is at least mild left foraminal stenosis due to these changes.    12/17/22 MRI SI joints:  The SI joints are normal in appearance with no evidence of erosions, fusion, or other imaging findings of acute sacroiliitis/inflammatory arthritis.  No sacroiliac joint effusion.   There is degenerative disc disease in the lower lumbar spine resulting in degenerative disc desiccation, disc space narrowing, and degenerative endplate change, most pronounced at L4-L5 and L5-S1..  There is a grade I anterolisthesis of L4 on L5.  There is fluid within the intervertebral disc at L5-S1.  There is bilateral facet arthropathy and uncovering of the intervertebral disc with a superimposed mild broad disc bulge at L4-L5.  There is mild right lateral recess stenosis.  There is, at most, mild overall central canal stenosis at L4-L5.   The visualized bony structures of the pelvis show no evidence of acute fracture or pathologic marrow replacement process.  There is incidentally observed postoperative change of total right hip arthroplasty.  This results in metal artifact within the adjacent osseous and soft tissue structures resulting in poor fat suppression.      11/11/2023 MRI lumbar spine  Alignment: There is levocurvature centered at L4.  There is 2 mm anterolisthesis L5 on S1.     Vertebrae:  L2 vertebral body hemangioma.  No marrow replacement or acute fracture with preserved vertebral body heights.  Discogenic sub endplate degenerative changes about the L3-4 through L5-S1 levels.     Discs: Multilevel disc desiccation.  Disc height loss is severe at L4-5 and L5-S1 and moderate at L2-3 and L3-4.     Cord: Normal.  Conus terminates at L1.     Paraspinal muscles & soft tissues: Common bile duct is 7 mm diameter along the pancreas head.  Partially imaged right hip arthroplasty hardware.     Degenerative findings:     T12-L1:     L1-L2:     L2-L3: Broad-based posterior disc bulge with superimposed central disc protrusion with protruded segment measuring 1.4 x 0.4 cm TV by AP dimension.  See axial series 6, image 16.  Mild spinal canal stenosis.  Narrowed right lateral recess.     L3-L4: Broad-based disc bulge slightly asymmetric to the right.  Ligamentum flavum thickening and bilateral facet degenerative change.  Mild spinal canal stenosis.  Narrowing of both lateral recesses.     L4-L5: Mild posterior disc bulge.  Bilateral facet degenerative change.  Mild spinal canal stenosis.  Mild left neural foraminal narrowing.     L5-S1: Disc is slightly uncovered and there is mild broad-based posterior bulge.  Bilateral facet degenerative change.  Mild right and moderate left neural foraminal narrowing.       Assessment:  Dorothy Kramer is a 83 y.o. year old female patient who has a past medical history of DDD (degenerative disc disease), cervical, Diverticulitis, Diverticulosis, DJD (degenerative joint disease) of hip, Dyspepsia, GERD (gastroesophageal reflux disease), History of melanoma, HTN (hypertension), Melanoma, Migraine headache, Rectocele, Skin cancer, Sleep apnea, Thyroid disease, Trouble in sleeping, Ulcerative colitis, Uterine prolaps, and Vulvar lesion. She presents in referral from Dr. Kamila Gilbert for back and neck pain.  1. Lumbar radiculopathy        2. DDD (degenerative disc disease), lumbar         3. Spondylolisthesis of lumbar region        4. Cervical spondylosis        5. DDD (degenerative disc disease), cervical                Plan:   1.  Unfortunately the patient has not had relief of her low back and radicular symptoms following  an interlaminar injection several months ago or more recently transforaminal injections.  We reviewed her lumbar spine MRI and her symptoms are likely due to left lateral recess stenosis at L4/5 and foraminal stenosis at L5/S1.  We discussed different options to treat going forward.  She has not interested in returning to physical therapy.  I am going to have her increase gabapentin to 300 mg twice daily if tolerated.  She had only been taking this at night.  If she does not have sufficient relief with this I will have her see Neurosurgery prior to considering spinal cord stimulation.  2. Follow-up in 2 months or sooner as needed.

## 2024-02-06 ENCOUNTER — OFFICE VISIT (OUTPATIENT)
Dept: PAIN MEDICINE | Facility: CLINIC | Age: 84
End: 2024-02-06
Payer: MEDICARE

## 2024-02-06 VITALS
HEART RATE: 80 BPM | HEIGHT: 64 IN | DIASTOLIC BLOOD PRESSURE: 63 MMHG | WEIGHT: 150.69 LBS | BODY MASS INDEX: 25.73 KG/M2 | SYSTOLIC BLOOD PRESSURE: 155 MMHG

## 2024-02-06 DIAGNOSIS — M43.16 SPONDYLOLISTHESIS OF LUMBAR REGION: ICD-10-CM

## 2024-02-06 DIAGNOSIS — M51.36 DDD (DEGENERATIVE DISC DISEASE), LUMBAR: ICD-10-CM

## 2024-02-06 DIAGNOSIS — M54.16 LUMBAR RADICULOPATHY: Primary | ICD-10-CM

## 2024-02-06 PROCEDURE — 99999 PR PBB SHADOW E&M-EST. PATIENT-LVL III: CPT | Mod: PBBFAC,HCNC,, | Performed by: PHYSICIAN ASSISTANT

## 2024-02-06 PROCEDURE — 1159F MED LIST DOCD IN RCRD: CPT | Mod: HCNC,CPTII,S$GLB, | Performed by: PHYSICIAN ASSISTANT

## 2024-02-06 PROCEDURE — 99214 OFFICE O/P EST MOD 30 MIN: CPT | Mod: HCNC,S$GLB,, | Performed by: PHYSICIAN ASSISTANT

## 2024-02-06 PROCEDURE — 1125F AMNT PAIN NOTED PAIN PRSNT: CPT | Mod: HCNC,CPTII,S$GLB, | Performed by: PHYSICIAN ASSISTANT

## 2024-02-06 PROCEDURE — 3078F DIAST BP <80 MM HG: CPT | Mod: HCNC,CPTII,S$GLB, | Performed by: PHYSICIAN ASSISTANT

## 2024-02-06 PROCEDURE — 3288F FALL RISK ASSESSMENT DOCD: CPT | Mod: HCNC,CPTII,S$GLB, | Performed by: PHYSICIAN ASSISTANT

## 2024-02-06 PROCEDURE — 1160F RVW MEDS BY RX/DR IN RCRD: CPT | Mod: HCNC,CPTII,S$GLB, | Performed by: PHYSICIAN ASSISTANT

## 2024-02-06 PROCEDURE — 3077F SYST BP >= 140 MM HG: CPT | Mod: HCNC,CPTII,S$GLB, | Performed by: PHYSICIAN ASSISTANT

## 2024-02-06 PROCEDURE — 1101F PT FALLS ASSESS-DOCD LE1/YR: CPT | Mod: HCNC,CPTII,S$GLB, | Performed by: PHYSICIAN ASSISTANT

## 2024-02-06 PROCEDURE — 1157F ADVNC CARE PLAN IN RCRD: CPT | Mod: HCNC,CPTII,S$GLB, | Performed by: PHYSICIAN ASSISTANT

## 2024-02-06 RX ORDER — METHYLPREDNISOLONE 4 MG/1
TABLET ORAL
Qty: 21 EACH | Refills: 0 | Status: SHIPPED | OUTPATIENT
Start: 2024-02-06 | End: 2024-02-27

## 2024-02-06 RX ORDER — METHOCARBAMOL 750 MG/1
750 TABLET, FILM COATED ORAL NIGHTLY PRN
Qty: 30 TABLET | Refills: 2 | Status: SHIPPED | OUTPATIENT
Start: 2024-02-06 | End: 2024-03-08

## 2024-02-07 ENCOUNTER — TELEPHONE (OUTPATIENT)
Dept: NEUROSURGERY | Facility: CLINIC | Age: 84
End: 2024-02-07
Payer: MEDICARE

## 2024-02-07 NOTE — TELEPHONE ENCOUNTER
Called patient in regards to referral placed to Neurosurgery/Marcus Dowell MD. Current imaging uploaded to chart. No answer. LVM to call [phone] for appointment scheduling

## 2024-02-09 DIAGNOSIS — M54.16 LUMBAR RADICULOPATHY: ICD-10-CM

## 2024-02-09 RX ORDER — GABAPENTIN 100 MG/1
100 CAPSULE ORAL 2 TIMES DAILY
Qty: 60 CAPSULE | Refills: 2 | Status: CANCELLED | OUTPATIENT
Start: 2024-02-09

## 2024-02-14 ENCOUNTER — OFFICE VISIT (OUTPATIENT)
Dept: NEUROSURGERY | Facility: CLINIC | Age: 84
End: 2024-02-14
Payer: MEDICARE

## 2024-02-14 VITALS
HEIGHT: 64 IN | BODY MASS INDEX: 25.61 KG/M2 | WEIGHT: 150 LBS | SYSTOLIC BLOOD PRESSURE: 181 MMHG | HEART RATE: 86 BPM | DIASTOLIC BLOOD PRESSURE: 83 MMHG | RESPIRATION RATE: 18 BRPM

## 2024-02-14 DIAGNOSIS — M54.16 LUMBAR RADICULOPATHY: ICD-10-CM

## 2024-02-14 DIAGNOSIS — Z98.1 HISTORY OF SPINAL FUSION: Primary | ICD-10-CM

## 2024-02-14 DIAGNOSIS — M51.36 DDD (DEGENERATIVE DISC DISEASE), LUMBAR: ICD-10-CM

## 2024-02-14 DIAGNOSIS — M54.42 CHRONIC BILATERAL LOW BACK PAIN WITH LEFT-SIDED SCIATICA: ICD-10-CM

## 2024-02-14 DIAGNOSIS — G89.29 CHRONIC BILATERAL LOW BACK PAIN WITH LEFT-SIDED SCIATICA: ICD-10-CM

## 2024-02-14 PROCEDURE — 3077F SYST BP >= 140 MM HG: CPT | Mod: HCNC,CPTII,S$GLB, | Performed by: STUDENT IN AN ORGANIZED HEALTH CARE EDUCATION/TRAINING PROGRAM

## 2024-02-14 PROCEDURE — 1125F AMNT PAIN NOTED PAIN PRSNT: CPT | Mod: HCNC,CPTII,S$GLB, | Performed by: STUDENT IN AN ORGANIZED HEALTH CARE EDUCATION/TRAINING PROGRAM

## 2024-02-14 PROCEDURE — 1157F ADVNC CARE PLAN IN RCRD: CPT | Mod: HCNC,CPTII,S$GLB, | Performed by: STUDENT IN AN ORGANIZED HEALTH CARE EDUCATION/TRAINING PROGRAM

## 2024-02-14 PROCEDURE — 3288F FALL RISK ASSESSMENT DOCD: CPT | Mod: HCNC,CPTII,S$GLB, | Performed by: STUDENT IN AN ORGANIZED HEALTH CARE EDUCATION/TRAINING PROGRAM

## 2024-02-14 PROCEDURE — 1159F MED LIST DOCD IN RCRD: CPT | Mod: HCNC,CPTII,S$GLB, | Performed by: STUDENT IN AN ORGANIZED HEALTH CARE EDUCATION/TRAINING PROGRAM

## 2024-02-14 PROCEDURE — 1101F PT FALLS ASSESS-DOCD LE1/YR: CPT | Mod: HCNC,CPTII,S$GLB, | Performed by: STUDENT IN AN ORGANIZED HEALTH CARE EDUCATION/TRAINING PROGRAM

## 2024-02-14 PROCEDURE — 99204 OFFICE O/P NEW MOD 45 MIN: CPT | Mod: HCNC,S$GLB,, | Performed by: STUDENT IN AN ORGANIZED HEALTH CARE EDUCATION/TRAINING PROGRAM

## 2024-02-14 PROCEDURE — 3079F DIAST BP 80-89 MM HG: CPT | Mod: HCNC,CPTII,S$GLB, | Performed by: STUDENT IN AN ORGANIZED HEALTH CARE EDUCATION/TRAINING PROGRAM

## 2024-02-14 NOTE — PROGRESS NOTES
[facility] Neurosurgery [facility]  Clinic Consult     Consult Requested By: Kamila Gilbert MD, Maciel Steen *        SUBJECTIVE:     Chief Complaint: No chief complaint on file.      History of Present Illness:  Dorothy Kramer is a 83 y.o. female who presents with chronic history of neck and back pain  Was seen years ago.  For a cervical disc herniation with neck pain she had a repeat image in June which showed resolution of the disc herniation, she has no compression or significant adjacent segment pathology.  Her biggest complaint is back pain.  She has back pain and muscle spasms.  She says it is worse at night prevents her from sleeping  She will have intermittent radicular pain in the left leg below-the-knee  She had no lasting improvement with a transforaminal epidural steroid injection    Her chief complaint and treatment strategy is her back pain          Pertinent and Recent history, provider evaluations, imaging and data reviewed in [facility]           Diagnostic Results:  I have independently reviewed the following imaging:      Impression:     1. Grade 1 anterolisthesis L5 on S1.  Slight levoscoliosis.  2. Multilevel degenerative change including disc protrusion at L2-3 encroaching the descending right L3 nerve in the lateral recess.  Degenerative findings contribute to moderate left neural foraminal narrowing at L5-S1.  No high-grade spinal canal stenosis.  3. Mildly dilated common bile duct presumably senescent change and reservoir effect post cholecystectomy.  Correlate with bilirubin level as to the need for further imaging.        Electronically signed by: Wagner Villasenor  Date:                                            11/12/2023  Time:                                           15:38        Review of patient's allergies indicates:   Allergen Reactions    Ciprofloxacin Palpitations    Flagyl [metronidazole] Palpitations       Past Medical History:   Diagnosis Date    DDD  (degenerative disc disease), cervical     Diverticulitis     Diverticulosis     DJD (degenerative joint disease) of hip     Dyspepsia     GERD (gastroesophageal reflux disease)     History of melanoma 05/20/2015    HTN (hypertension)     Melanoma     Migraine headache     Rectocele     Skin cancer     melanoma on face    Sleep apnea     doesnt use cpap    Thyroid disease     hypo    Trouble in sleeping     Ulcerative colitis     on meds    Uterine prolaps     followed by GYN    Vulvar lesion 05/20/2015     Past Surgical History:   Procedure Laterality Date    ABLATION OF MEDIAL BRANCH NERVE OF CERVICAL SPINE FACET JOINT Bilateral 10/4/2023    Procedure: ABLATION, NERVE, FACET JOINT, CERVICAL, MEDIAL BRANCH C3/4 anD c4/5;  Surgeon: Chintan Damon MD;  Location: [facility] OR;  Service: Pain Management;  Laterality: Bilateral;    APPENDECTOMY  1962    BACK SURGERY      BREAST BIOPSY      BREAST LUMPECTOMY  1989    CATARACT EXTRACTION Left 02/22/2021    ros    CATARACT EXTRACTION W/  INTRAOCULAR LENS IMPLANT Left 2/22/2021    Procedure: EXTRACTION, CATARACT, WITH IOL INSERTION;  Surgeon: Latisha Baker MD;  Location: [facility] OR;  Service: Ophthalmology;  Laterality: Left;  left    CATARACT EXTRACTION W/  INTRAOCULAR LENS IMPLANT Right 3/22/2021    Procedure: EXTRACTION, CATARACT, WITH IOL INSERTION;  Surgeon: Latisha Baker MD;  Location: [facility] OR;  Service: Ophthalmology;  Laterality: Right;  right    CERVICAL FUSION  1991    CHOLECYSTECTOMY      COLONOSCOPY      COLONOSCOPY N/A 11/9/2018    Dr Oscar; chronic active colitis; repeat in 5 years    COLONOSCOPY N/A 2/14/2020    Procedure: COLONOSCOPY;  Surgeon: Fadi Oscar MD;  Location: [facility] ENDO;  Service: Endoscopy;  Laterality: N/A;    EPIDURAL STEROID INJECTION INTO CERVICAL SPINE N/A 5/18/2023    Procedure: Injection-steroid-epidural-cervical C7/T1;  Surgeon: Chintan Damon MD;  Location: [facility] OR;  Service: Pain Management;  Laterality: N/A;   cervical    EPIDURAL STEROID INJECTION INTO LUMBAR SPINE N/A 7/12/2023    Procedure: Injection-steroid-epidural-lumbar L5/S1;  Surgeon: Chintan Damon MD;  Location: [facility] OR;  Service: Pain Management;  Laterality: N/A;    HYSTERECTOMY      TRACEE/BSO for benign disease    INJECTION OF ANESTHETIC AGENT AROUND MEDIAL BRANCH NERVES INNERVATING CERVICAL FACET JOINT Bilateral 8/28/2023    Procedure: Block-nerve-medial branch-cervical C3/4. C4/5;  Surgeon: Chintan Damon MD;  Location: [facility] OR;  Service: Pain Management;  Laterality: Bilateral;    INJECTION OF ANESTHETIC AGENT AROUND MEDIAL BRANCH NERVES INNERVATING CERVICAL FACET JOINT Bilateral 9/15/2023    Procedure: Block-nerve-medial branch-cervical C3/4 and C4/5;  Surgeon: Chintan Damon MD;  Location: [facility] OR;  Service: Pain Management;  Laterality: Bilateral;    INSERTION OF MIDURETHRAL SLING N/A 9/22/2022    Procedure: SLING, MIDURETHRAL;  Surgeon: Yannick Cabral MD;  Location: [facility] OR;  Service: OB/GYN;  Laterality: N/A;    JOINT REPLACEMENT Right 2016    Hip replacement    OR REMOVAL OF OVARY/TUBE(S)      ROBOT-ASSISTED LAPAROSCOPIC ABDOMINAL SACROCOLPOPEXY USING DA FLAKITO XI N/A 12/14/2021    Procedure: XI ROBOTIC SACROCOLPOPEXY, ABDOMINAL;  Surgeon: Yannick Cabral MD;  Location: [facility] OR;  Service: OB/GYN;  Laterality: N/A;    ROBOT-ASSISTED LAPAROSCOPIC LYSIS OF ADHESIONS USING DA FLAKITO XI  12/14/2021    Procedure: XI ROBOTIC LYSIS, ADHESIONS;  Surgeon: Yannick Cabral MD;  Location: [facility] OR;  Service: OB/GYN;;    TONSILLECTOMY      TOTAL ABDOMINAL HYSTERECTOMY W/ BILATERAL SALPINGOOPHORECTOMY  1990    TRANSFORAMINAL EPIDURAL INJECTION OF STEROID Left 11/29/2023    Procedure: Injection,steroid,epidural,transforaminal approach      L4/5,L5/S1;  Surgeon: Chintan Damon MD;  Location: [facility] OR;  Service: Pain Management;  Laterality: Left;     Family History   Problem Relation Age of Onset    Heart failure Mother     Cancer Mother          Bladder    Colon cancer Mother 63    Cancer Father         Bladder    Parkinsonism Father     Diverticulitis Father     Uterine cancer Maternal Aunt     Ovarian cancer Neg Hx     Breast cancer Neg Hx     Crohn's disease Neg Hx     Ulcerative colitis Neg Hx     Glaucoma Neg Hx     Macular degeneration Neg Hx     Retinal detachment Neg Hx      Social History     Tobacco Use    Smoking status: Never    Smokeless tobacco: Never   Substance Use Topics    Alcohol use: No    Drug use: No        Review of Systems:      Constitutional: no fever, chills or night sweats. No abrupt changes in weight         OBJECTIVE:     Vital Signs (Most Recent):       Physical Exam:      General: well developed, well nourished, no distress. .  Mental Status: Awake, Alert, Oriented x 4  Language: No aphasia  Speech: No dysarthria  Head: normocephalic, atraumatic.  Neck: trachea midline, no JVD   Cardiovascular: no LE edema  Pulmonary: normal respirations, no signs of respiratory distress  Abdomen: soft, non-distended    Motor Strength:  No abnormal movements seen.     Strength  Deltoids Triceps Biceps Wrist Extension Wrist Flexion Hand  Interossei     Upper: R 5/5 5/5 5/5 5/5 5/5 5/5 5/5      L 5/5 5/5 5/5 5/5 5/5 5/5 5/5       Iliopsoas Quadriceps Knee  Flexion Tibialis  anterior Gastro- cnemius EHL  Foot Eversion Foot inversion   Lower: R 5/5 5/5 5/5 5/5 5/5 5/5 5/5 5/5 5/5    L 5/5 5/5 5/5 5/5 5/5 5/5 5/5 5/5 5/5     SILT,PP      DTR's: 1 + and symmetric in UE and LE  Castro: absent        Gait:independent, slow antalgic       Straight leg raise: negative bilaterally     SI Joint tenderness: + bilaterally   GABBY: + bilaterally  + ttp paraspinal TL          ASSESSMENT/PLAN:     History of spinal fusion    Lumbar radiculopathy  -     Ambulatory referral/consult to Neurosurgery    DDD (degenerative disc disease), lumbar    Chronic bilateral low back pain with left-sided sciatica      83-year-old female    He has had history of multiple  orthopedic issues with her neck or shoulder  History of an ACDF remotely with mild cervical spondylosis, previous disc herniation with resorb stenosis  Normal alignment  Nonoperative management      Her chief complaint is lower back pain and muscle spasms  This is a high frequency and severity but worse for her at night  She has aradicular component on the left, but her back pain is her chief complaint  She does not want to proceed with physical therapy, we discussed myofascial treatments needling which could benefit, like she is made worse by physical therapy in the past  She does not have a reasonable surgical target for back pain.  She is L2-S1 multilevel spondylosis with significant disc degeneration facet arthropathy, no significant central stenosis  Moderate multilevel facet arthropathy lateral recess and foraminal stenosis at each segment and a mild scoliosis.  I reviewed this in detail patient and her .  She really does not have a reasonable surgical target  At 83 years old with back pain I do not think the risk of a multilevel fusion outweighs the benefits  We discussed additional options or or 2nd opinions, though this is not really her goal    Maximizing pain management strategies with [facility], she will follow up with them to have a discussion of a trial for spinal cord stimulator whether not she is a reasonable candidate                          Marcus Dowell MD  Neurosurgery

## 2024-02-15 ENCOUNTER — TELEPHONE (OUTPATIENT)
Dept: PAIN MEDICINE | Facility: CLINIC | Age: 84
End: 2024-02-15
Payer: MEDICARE

## 2024-02-16 RX ORDER — GABAPENTIN 300 MG/1
300 CAPSULE ORAL 2 TIMES DAILY
Qty: 60 CAPSULE | Refills: 2 | Status: SHIPPED | OUTPATIENT
Start: 2024-02-16 | End: 2024-06-10 | Stop reason: SDUPTHER

## 2024-02-16 NOTE — PROGRESS NOTES
This note was completed with dictation software and grammatical errors may exist.    Chief Complaint   Patient presents with    Low-back Pain    Neck Pain        HPI: Dorothy Kramer is a 83 y.o. year old female patient who has a past medical history of DDD (degenerative disc disease), cervical, Diverticulitis, Diverticulosis, DJD (degenerative joint disease) of hip, Dyspepsia, GERD (gastroesophageal reflux disease), History of melanoma, HTN (hypertension), Melanoma, Migraine headache, Rectocele, Skin cancer, Sleep apnea, Thyroid disease, Trouble in sleeping, Ulcerative colitis, Uterine prolaps, and Vulvar lesion. She presents in referral from No ref. provider found for back and neck pain.   She returns in follow-up today with severe pain.  She states that the past 3-4 days have been significantly worse.  She tried to do some yard work and went to a soccer tournament on Saturday for her grandson.  She sat in an uncomfortable chair for a couple hours.  She has been having severe spasms at night.  She continues to have pain in the left low back radiating to the left buttock and posterior thigh.  She reports new pain in the left calf.  She denies new weakness or new numbness.    Initial history:  The patient states that over 30 years ago she had neck pain radiating into the right arm, developed weakness, eventually underwent a C5/6 fusion.  She did well with this and has no longer had severe neck pain.  However in the last several years she began having more pain in the neck radiating into the upper back, right rhomboid region, right shoulder.  She denies any weakness in the arm.  She describes it as aching, burning, sharp and deep.  Her pain is worse with turning her head side-to-side, extension.  She also has pain in the low back, left buttock that she calls sciatica that radiates further down her leg.  She also has pain worse with standing, walking, bending getting out of a bed or a chair.  She has some pain over the  "right trochanter, states if she lays on that side at night, it will wake her up and she only gets several hours of sleep.  Because her neck hurts so much she tries to lay on her right side to immobilize her arm but then starts having worsening right lateral hip pain.  She had a GTB injection with Dr. Carmona after she tried physical therapy for the pain but this was not successful.  The injection did seem to provide several days of relief but returned.  She states that the right GTB is tender to palpation, but also has some right low back pain as well.        Pain intervention history:  Right GTB injection gave 3 days of relief.  She is status post C7-T1 interlaminar epidural steroid injection on 05/18/2023 with minimal relief. She is status post L5/S1 KATHY on 07/12/2023 with 50% relief lasting for 1 day. She is status post bilateral C3/4 C4/5 radiofrequency ablation on 10/04/2023 with 90% relief.  She is status post bilateral C3/4 C4/5 radiofrequency ablation on 10/04/2023 with 90% relief.  She is status post left L4/5 and L5/S1 transforaminal epidural steroid injection on 11/29/2023 with minimal relief lasting only 1 day.     Spine surgeries:  ACDF C5-6 30 years ago    Antineuropathics:  Gabapentin 300 mg nightly  NSAIDs:  Physical therapy: She had done physical therapy several months ago for neck pain, back pain, trochanteric bursitis without much benefit she states that she does not want to return to physical therapy.  Antidepressants:  Muscle relaxers:  Opioids:  Antiplatelets/Anticoagulants:        ROS:  She reports rash, joint stiffness, joint swelling, back pain, difficulty sleeping possible dizziness and loss of balance.  Balance of review of systems is negative.    No results found for: "LABA1C", "HGBA1C"    Lab Results   Component Value Date    WBC 7.30 12/07/2022    HGB 14.1 12/07/2022    HCT 43.4 12/07/2022    MCV 96 12/07/2022     (L) 12/07/2022             Past Medical History:   Diagnosis Date "    DDD (degenerative disc disease), cervical     Diverticulitis     Diverticulosis     DJD (degenerative joint disease) of hip     Dyspepsia     GERD (gastroesophageal reflux disease)     History of melanoma 05/20/2015    HTN (hypertension)     Melanoma     Migraine headache     Rectocele     Skin cancer     melanoma on face    Sleep apnea     doesnt use cpap    Thyroid disease     hypo    Trouble in sleeping     Ulcerative colitis     on meds    Uterine prolaps     followed by GYN    Vulvar lesion 05/20/2015       Past Surgical History:   Procedure Laterality Date    ABLATION OF MEDIAL BRANCH NERVE OF CERVICAL SPINE FACET JOINT Bilateral 10/4/2023    Procedure: ABLATION, NERVE, FACET JOINT, CERVICAL, MEDIAL BRANCH C3/4 anD c4/5;  Surgeon: Chintan Damon MD;  Location: [facility] OR;  Service: Pain Management;  Laterality: Bilateral;    APPENDECTOMY  1962    BACK SURGERY      BREAST BIOPSY      BREAST LUMPECTOMY  1989    CATARACT EXTRACTION Left 02/22/2021    ros    CATARACT EXTRACTION W/  INTRAOCULAR LENS IMPLANT Left 2/22/2021    Procedure: EXTRACTION, CATARACT, WITH IOL INSERTION;  Surgeon: Latisha Baker MD;  Location: [facility] OR;  Service: Ophthalmology;  Laterality: Left;  left    CATARACT EXTRACTION W/  INTRAOCULAR LENS IMPLANT Right 3/22/2021    Procedure: EXTRACTION, CATARACT, WITH IOL INSERTION;  Surgeon: Latisha Baker MD;  Location: [facility] OR;  Service: Ophthalmology;  Laterality: Right;  right    CERVICAL FUSION  1991    CHOLECYSTECTOMY      COLONOSCOPY      COLONOSCOPY N/A 11/9/2018    Dr Oscar; chronic active colitis; repeat in 5 years    COLONOSCOPY N/A 2/14/2020    Procedure: COLONOSCOPY;  Surgeon: Fadi Oscar MD;  Location: [facility] ENDO;  Service: Endoscopy;  Laterality: N/A;    EPIDURAL STEROID INJECTION INTO CERVICAL SPINE N/A 5/18/2023    Procedure: Injection-steroid-epidural-cervical C7/T1;  Surgeon: Chintan Damon MD;  Location: [facility] OR;  Service: Pain Management;   Laterality: N/A;  cervical    EPIDURAL STEROID INJECTION INTO LUMBAR SPINE N/A 7/12/2023    Procedure: Injection-steroid-epidural-lumbar L5/S1;  Surgeon: Chintan Damon MD;  Location: [facility] OR;  Service: Pain Management;  Laterality: N/A;    HYSTERECTOMY      TRACEE/BSO for benign disease    INJECTION OF ANESTHETIC AGENT AROUND MEDIAL BRANCH NERVES INNERVATING CERVICAL FACET JOINT Bilateral 8/28/2023    Procedure: Block-nerve-medial branch-cervical C3/4. C4/5;  Surgeon: Chintan Damon MD;  Location: [facility] OR;  Service: Pain Management;  Laterality: Bilateral;    INJECTION OF ANESTHETIC AGENT AROUND MEDIAL BRANCH NERVES INNERVATING CERVICAL FACET JOINT Bilateral 9/15/2023    Procedure: Block-nerve-medial branch-cervical C3/4 and C4/5;  Surgeon: Chintan Damon MD;  Location: [facility] OR;  Service: Pain Management;  Laterality: Bilateral;    INSERTION OF MIDURETHRAL SLING N/A 9/22/2022    Procedure: SLING, MIDURETHRAL;  Surgeon: Yannick Cabral MD;  Location: [facility] OR;  Service: OB/GYN;  Laterality: N/A;    JOINT REPLACEMENT Right 2016    Hip replacement    TN REMOVAL OF OVARY/TUBE(S)      ROBOT-ASSISTED LAPAROSCOPIC ABDOMINAL SACROCOLPOPEXY USING DA FLAKITO XI N/A 12/14/2021    Procedure: XI ROBOTIC SACROCOLPOPEXY, ABDOMINAL;  Surgeon: Yannick Cabral MD;  Location: [facility] OR;  Service: OB/GYN;  Laterality: N/A;    ROBOT-ASSISTED LAPAROSCOPIC LYSIS OF ADHESIONS USING DA FLAKITO XI  12/14/2021    Procedure: XI ROBOTIC LYSIS, ADHESIONS;  Surgeon: Yannick Cabral MD;  Location: [facility] OR;  Service: OB/GYN;;    TONSILLECTOMY      TOTAL ABDOMINAL HYSTERECTOMY W/ BILATERAL SALPINGOOPHORECTOMY  1990    TRANSFORAMINAL EPIDURAL INJECTION OF STEROID Left 11/29/2023    Procedure: Injection,steroid,epidural,transforaminal approach      L4/5,L5/S1;  Surgeon: Chitnan Damon MD;  Location: [facility] OR;  Service: Pain Management;  Laterality: Left;       Social History     Socioeconomic History    Marital status:   "  Tobacco Use    Smoking status: Never    Smokeless tobacco: Never   Substance and Sexual Activity    Alcohol use: No    Drug use: No    Sexual activity: Not Currently     Social Determinants of Health     Financial Resource Strain: Unknown (6/1/2022)    Overall Financial Resource Strain (CARDIA)     Difficulty of Paying Living Expenses: Patient declined   Food Insecurity: Unknown (6/1/2022)    Hunger Vital Sign     Worried About Running Out of Food in the Last Year: Patient declined     Ran Out of Food in the Last Year: Never true   Transportation Needs: No Transportation Needs (6/1/2022)    PRAPARE - Transportation     Lack of Transportation (Medical): No     Lack of Transportation (Non-Medical): No   Physical Activity: Unknown (6/1/2022)    Exercise Vital Sign     Days of Exercise per Week: Patient declined   Stress: Unknown (6/1/2022)    [place] [employer] - Occupational Stress Questionnaire     Feeling of Stress : Patient declined   Social Connections: Unknown (6/1/2022)    Social Connection and Isolation Panel [NHANES]     Frequency of Communication with Friends and Family: More than three times a week     Frequency of Social Gatherings with Friends and Family: More than three times a week     Active Member of Clubs or Organizations: Yes     Attends Club or Organization Meetings: More than 4 times per year     Marital Status:    Housing Stability: Low Risk  (6/1/2022)    Housing Stability Vital Sign     Unable to Pay for Housing in the Last Year: No     Number of Places Lived in the Last Year: 1     Unstable Housing in the Last Year: No         Medications/Allergies: See med card    Vitals:    02/06/24 1540   BP: (!) 155/63   Pulse: 80   Weight: 68.3 kg (150 lb 11 oz)   Height: 5' 4" (1.626 m)   PainSc:   5   PainLoc: Back       Body mass index is 25.86 kg/m².    Physical exam:  Gen: A and O x3, pleasant, well-groomed  Skin: No rashes or obvious lesions  HEENT: PERRLA, no obvious " deformities on ears or in canals.Trachea midline.  CVS: Regular rate and rhythm, normal palpable pulses.  Resp: Clear to auscultation bilaterally, no wheezes or rales.  Abdomen: Soft, NT/ND.  Musculoskeletal: No antalgic gait.     Neuro:  Motor:    Right Left   C4 Shoulder Abduction  5  5   C5 Elbow Flexion    5  5   C6 Wrist Extension  5  5   C7 Elbow Extension   5  5   C8/T1 Hand Intrinsics   5  5   C8 First Dorsal Interosseus  5  5   C8 Abductor Pollicus Brevis  5  5       Iliopsoas Quadriceps Knee  Flexion Tibialis  anterior Gastro- cnemius EHL   Lower: R / 5/5 5/5 5/ 5 5/5    L  5        Left  Right    Triceps DTR 2+ 2+   Biceps DTR 2+ 2+   Brachioradialis DTR 2+ 2+   Patellar DTR 2+ 2+   Achilles DTR 2+ 2+   Castro Absent  Absent   Clonus Absent Absent   Babinski Absent Absent     Sensory: Intact and symmetrical to light touch and pinprick in C2-T1 dermatomes bilaterally. Intact and symmetrical to light touch and pinprick in L1-S1 dermatomes bilaterally.  Cervical spine: ROM is full in flexion, moderately reduced with extension and lateral rotation with increased pain on right lateral rotation causing neck pain and periscapular pain on the right side.    Spurling's maneuver causes right neck pain.  Myofascial exam: No Tenderness to palpation across cervical paraspinous region bilaterally.    Lumbar spine:  Lumbar spine: ROM is moderately reduced with both flexion extension and oblique extension with no increased pain.    Alek's test causes no increased pain on either side.    Supine straight leg raise is negative bilaterally.    Internal and external rotation of the hip causes no increased pain on either side.  Myofascial exam: No tenderness to palpation across lumbar paraspinous muscles.  There is exquisite tenderness to palpation over the right GTB    Imagin19 Xray L-spine:  Bone density is normal.  There is a gentle levocurvature of the lumbar spine.  In her AP a the  vertebral bodies maintain normal height and alignment.  There is no fracture.  There is marked disc space narrowing at the L4-5 level.  There is mild endplate sclerosis and osteophyte formation.  There is mild-to-moderate disc space narrowing at the L2-3, L3-4 levels and moderate disc space narrowing at the L5-S1 level.  There is multilevel facet joint arthropathy.  There is mild-to-moderate atherosclerosis.  There are surgical clips in the right upper abdomen from prior cholecystectomy.    7/18/19 MRI C-spine:  Vertebral column: There is no prior MRI or CT for comparison.  As seen on plain films, there is mild anterolisthesis of C3 on C4, C4 on C5.  There are changes of prior bony fusion of C5 and C6.  There is moderate to marked disc space narrowing at the C6-7 level.  There is no fracture.  The odontoid process is intact.  The discs are desiccated.  There is endplate osteophyte formation most apparent at the C6-7 level.  Baseline marrow signal is normal.   Spinal canal, cord, epidural space: The spinal canal is developmentally normal.  There is no abnormal epidural soft tissue mass or fluid collection.  There is minimal flattening of the right ventral cord surface at the C4-5 level where there is a large disc extrusion.  There is no cord edema or myelomalacia.  C2-3: There is no spinal canal or significant foraminal stenosis.   C3-4: There is moderate-to-marked left foraminal stenosis due to uncovertebral spurring and facet joint arthropathy.  There is a mild disc osteophyte complex.  There is no spinal stenosis or cord compression.  The right foramen is patent.   C4-5: There is a large right paracentral disc extrusion measuring approximately 6 x 10 x 13 mm (AP, transverse, craniocaudad).  This does result in flattening of the right ventral cord surface but there is no obvious cord edema or myelomalacia.  There is moderate spinal stenosis at this level.  There is also moderate left foraminal narrowing due to  uncovertebral spurring and facet joint arthropathy.   C5-6: There are changes of previous bony fusion.  There is no spinal canal or significant foraminal stenosis.   C6-7: There is moderate to marked disc space narrowing.  There is left greater than right uncovertebral spurring with bilateral facet joint arthropathy.  There is a mild disc osteophyte complex.  There is no spinal canal or significant foraminal stenosis.   C7-T1: There is left facet joint arthropathy.  There is at least mild left foraminal stenosis due to these changes.    12/17/22 MRI SI joints:  The SI joints are normal in appearance with no evidence of erosions, fusion, or other imaging findings of acute sacroiliitis/inflammatory arthritis.  No sacroiliac joint effusion.   There is degenerative disc disease in the lower lumbar spine resulting in degenerative disc desiccation, disc space narrowing, and degenerative endplate change, most pronounced at L4-L5 and L5-S1..  There is a grade I anterolisthesis of L4 on L5.  There is fluid within the intervertebral disc at L5-S1.  There is bilateral facet arthropathy and uncovering of the intervertebral disc with a superimposed mild broad disc bulge at L4-L5.  There is mild right lateral recess stenosis.  There is, at most, mild overall central canal stenosis at L4-L5.   The visualized bony structures of the pelvis show no evidence of acute fracture or pathologic marrow replacement process.  There is incidentally observed postoperative change of total right hip arthroplasty.  This results in metal artifact within the adjacent osseous and soft tissue structures resulting in poor fat suppression.      11/11/2023 MRI lumbar spine  Alignment: There is levocurvature centered at L4.  There is 2 mm anterolisthesis L5 on S1.     Vertebrae: L2 vertebral body hemangioma.  No marrow replacement or acute fracture with preserved vertebral body heights.  Discogenic sub endplate degenerative changes about the L3-4 through  L5-S1 levels.     Discs: Multilevel disc desiccation.  Disc height loss is severe at L4-5 and L5-S1 and moderate at L2-3 and L3-4.     Cord: Normal.  Conus terminates at L1.     Paraspinal muscles & soft tissues: Common bile duct is 7 mm diameter along the pancreas head.  Partially imaged right hip arthroplasty hardware.     Degenerative findings:     T12-L1:     L1-L2:     L2-L3: Broad-based posterior disc bulge with superimposed central disc protrusion with protruded segment measuring 1.4 x 0.4 cm TV by AP dimension.  See axial series 6, image 16.  Mild spinal canal stenosis.  Narrowed right lateral recess.     L3-L4: Broad-based disc bulge slightly asymmetric to the right.  Ligamentum flavum thickening and bilateral facet degenerative change.  Mild spinal canal stenosis.  Narrowing of both lateral recesses.     L4-L5: Mild posterior disc bulge.  Bilateral facet degenerative change.  Mild spinal canal stenosis.  Mild left neural foraminal narrowing.     L5-S1: Disc is slightly uncovered and there is mild broad-based posterior bulge.  Bilateral facet degenerative change.  Mild right and moderate left neural foraminal narrowing.       Assessment:  Dorothy Kramer is a 83 y.o. year old female patient who has a past medical history of DDD (degenerative disc disease), cervical, Diverticulitis, Diverticulosis, DJD (degenerative joint disease) of hip, Dyspepsia, GERD (gastroesophageal reflux disease), History of melanoma, HTN (hypertension), Melanoma, Migraine headache, Rectocele, Skin cancer, Sleep apnea, Thyroid disease, Trouble in sleeping, Ulcerative colitis, Uterine prolaps, and Vulvar lesion. She presents in referral from Dr. Kamila Gilbert for back and neck pain.  1. Lumbar radiculopathy  Ambulatory referral/consult to Neurosurgery      2. DDD (degenerative disc disease), lumbar        3. Spondylolisthesis of lumbar region                Plan:  1. Due to her worsening pain I am going to have her see Neurosurgery for  further evaluation.  If surgery is not planned I will have her follow-up to discuss spinal cord stimulation in detail.    2. I provided a prescription for methocarbamol and she will continue gabapentin.    3. She has currently not interested in physical therapy.  [facility] and land-based physical therapy.  4. Follow-up in 2 months or sooner as needed.

## 2024-02-16 NOTE — TELEPHONE ENCOUNTER
I have reviewed the [place] Board of Pharmacy website and there are no abberancies.        Refill sent

## 2024-03-08 ENCOUNTER — OFFICE VISIT (OUTPATIENT)
Dept: PAIN MEDICINE | Facility: CLINIC | Age: 84
End: 2024-03-08
Payer: MEDICARE

## 2024-03-08 VITALS — HEART RATE: 75 BPM | SYSTOLIC BLOOD PRESSURE: 154 MMHG | DIASTOLIC BLOOD PRESSURE: 68 MMHG

## 2024-03-08 DIAGNOSIS — G89.4 CHRONIC PAIN DISORDER: ICD-10-CM

## 2024-03-08 DIAGNOSIS — M54.16 LUMBAR RADICULITIS: ICD-10-CM

## 2024-03-08 DIAGNOSIS — M62.838 MUSCLE SPASM: Primary | ICD-10-CM

## 2024-03-08 PROCEDURE — 3288F FALL RISK ASSESSMENT DOCD: CPT | Mod: HCNC,CPTII,S$GLB, | Performed by: PHYSICIAN ASSISTANT

## 2024-03-08 PROCEDURE — 99999 PR PBB SHADOW E&M-EST. PATIENT-LVL III: CPT | Mod: PBBFAC,HCNC,, | Performed by: PHYSICIAN ASSISTANT

## 2024-03-08 PROCEDURE — 1157F ADVNC CARE PLAN IN RCRD: CPT | Mod: HCNC,CPTII,S$GLB, | Performed by: PHYSICIAN ASSISTANT

## 2024-03-08 PROCEDURE — 99214 OFFICE O/P EST MOD 30 MIN: CPT | Mod: HCNC,S$GLB,, | Performed by: PHYSICIAN ASSISTANT

## 2024-03-08 PROCEDURE — 1160F RVW MEDS BY RX/DR IN RCRD: CPT | Mod: HCNC,CPTII,S$GLB, | Performed by: PHYSICIAN ASSISTANT

## 2024-03-08 PROCEDURE — 1101F PT FALLS ASSESS-DOCD LE1/YR: CPT | Mod: HCNC,CPTII,S$GLB, | Performed by: PHYSICIAN ASSISTANT

## 2024-03-08 PROCEDURE — 3078F DIAST BP <80 MM HG: CPT | Mod: HCNC,CPTII,S$GLB, | Performed by: PHYSICIAN ASSISTANT

## 2024-03-08 PROCEDURE — 1159F MED LIST DOCD IN RCRD: CPT | Mod: HCNC,CPTII,S$GLB, | Performed by: PHYSICIAN ASSISTANT

## 2024-03-08 PROCEDURE — 3077F SYST BP >= 140 MM HG: CPT | Mod: HCNC,CPTII,S$GLB, | Performed by: PHYSICIAN ASSISTANT

## 2024-03-08 RX ORDER — CYCLOBENZAPRINE HCL 10 MG
10 TABLET ORAL NIGHTLY PRN
Qty: 30 TABLET | Refills: 2 | Status: SHIPPED | OUTPATIENT
Start: 2024-03-08 | End: 2024-05-31

## 2024-03-11 ENCOUNTER — OFFICE VISIT (OUTPATIENT)
Dept: OPTOMETRY | Facility: CLINIC | Age: 84
End: 2024-03-11
Payer: MEDICARE

## 2024-03-11 DIAGNOSIS — H02.403 ACQUIRED PTOSIS OF BOTH EYELIDS: ICD-10-CM

## 2024-03-11 DIAGNOSIS — H43.813 POSTERIOR VITREOUS DETACHMENT, BILATERAL: Primary | ICD-10-CM

## 2024-03-11 DIAGNOSIS — Z13.5 GLAUCOMA SCREENING: ICD-10-CM

## 2024-03-11 DIAGNOSIS — Z85.820 H/O MALIGNANT MELANOMA OF SKIN: ICD-10-CM

## 2024-03-11 DIAGNOSIS — Z96.1 BILATERAL PSEUDOPHAKIA: ICD-10-CM

## 2024-03-11 PROBLEM — H25.10 SENILE NUCLEAR SCLEROSIS: Status: RESOLVED | Noted: 2021-02-22 | Resolved: 2024-03-11

## 2024-03-11 PROCEDURE — 1101F PT FALLS ASSESS-DOCD LE1/YR: CPT | Mod: HCNC,CPTII,S$GLB, | Performed by: OPTOMETRIST

## 2024-03-11 PROCEDURE — 99999 PR PBB SHADOW E&M-EST. PATIENT-LVL II: CPT | Mod: PBBFAC,HCNC,, | Performed by: OPTOMETRIST

## 2024-03-11 PROCEDURE — 1159F MED LIST DOCD IN RCRD: CPT | Mod: HCNC,CPTII,S$GLB, | Performed by: OPTOMETRIST

## 2024-03-11 PROCEDURE — 92014 COMPRE OPH EXAM EST PT 1/>: CPT | Mod: HCNC,S$GLB,, | Performed by: OPTOMETRIST

## 2024-03-11 PROCEDURE — 92250 FUNDUS PHOTOGRAPHY W/I&R: CPT | Mod: HCNC,S$GLB,, | Performed by: OPTOMETRIST

## 2024-03-11 PROCEDURE — 1126F AMNT PAIN NOTED NONE PRSNT: CPT | Mod: HCNC,CPTII,S$GLB, | Performed by: OPTOMETRIST

## 2024-03-11 PROCEDURE — 1157F ADVNC CARE PLAN IN RCRD: CPT | Mod: HCNC,CPTII,S$GLB, | Performed by: OPTOMETRIST

## 2024-03-11 PROCEDURE — 3288F FALL RISK ASSESSMENT DOCD: CPT | Mod: HCNC,CPTII,S$GLB, | Performed by: OPTOMETRIST

## 2024-03-11 NOTE — PATIENT INSTRUCTIONS
"DRY EYES -- BURNING OR MAXIMO SYMPTOMS:  Use Over The Counter artificial tears as needed for dry eye symptoms.   Some common brands include:  Systane, Optive, Refresh, and Thera-Tears.  These drops can be used as frequently as desired, but may be most helpful use during long periods of concentrated work.  For example, reading / working at the computer. Start with 3-4x per day.     Nighttime Ophthalmic gel or ointments are available: Refresh PM, Genteal, and Lacrilube.    Avoid drops that "get redness out" (Visine, Murine, Clear Eyes), as these may contain medication that could further irritate the eyes, especially with chronic use.    ALLERGY EYES -- ITCHING SYMPTOMS:  Over the counter medications include--Pataday, Zaditor, and Alaway.  Use as directed 1-2 drops daily for symptoms of itching / watering eyes.  These drops will not help for dry eye or exposure symptoms.    REDNESS RELIEF:  Lumify---is a good redness reliever that will not cause irritation if used chronically.       FLASHES / FLOATERS / POSTERIOR VITREOUS DETACHMENT    Call the clinic if you have any further changes in symptoms.  Including:  Increased numbers of floaters or flashing lights, dimness or darkness that moves through or stays constant in your vision, or any pain in the eye (s).    You may sometimes see small specks or clouds moving in your field of vision.  They are called FLOATERS.  You can often see them when looking at a plain background, like a blank wall or blue dex.  Floaters are actually tiny clumps of gel or cells inside the VITREOUS, the clear jelly-like fluid that fills the inside of your eye.    While these objects look like they are in front of your eye, they are actually floating inside.  What you see are the shadows they cast on the RETINA, the nerve layer at the back of the eye that senses light and allows you to see.      POSTERIOR VITREOUS DETACHMENT    The appearance of new floaters may be alarming.  If you suddenly develop " new floaters, you should contact your eye care professional  right away.    The retina can tear if the shrinking vitreous pulls away from the wall of the eye.  This sometimes causes a small amount of bleeding in the eye that may appear as new floaters.    A torn retina is always a serious problem, since it can lead to a retinal detachment.  You should see your eye care professional as soon as possible if:    even one new floater appears suddenly;  you see sudden flashes of light;  you notice other symptoms, like the loss of side vision, or a curtain closes down in your vision        POSTERIOR VITREOUS DETACHMENT is more common for people who:    are nearsighted;  have had cataract surgery;  have had YAG laser surgery of the eye;  have had inflammation inside the eye;  are over age 60.      While some floaters may remain visible, many of them will fade over time and become less noticeable.  Even if you've had some floaters for years, you should have your eyes checked as soon as possible if you notice new ones.    FLASHING LIGHTS    When the vitreous gel rubs or pulls on the retina, you may see what look like flashing lights or lightning streaks.  These flashes can appear off and on for several weeks or months.      Some people experience flashes of light that appear as jagged lines or heat waves in both eyes, lasting 10-20 minutes.  These flashes are caused by a spasm of blood vessels in the brain, which is called a migraine.    If a headache follows these flashes, it's called a migraine headache.  If   no headache occurs, these flashes are called Ophthalmic or Ocular Migraine.

## 2024-03-11 NOTE — PROGRESS NOTES
HPI    Routine eye exam-dle-3/23    Pt denies any changes since last visit. Wearing rx reading glasses but   feels they do not help. Denies any flashes or floaters. States she can see   better with OD than OS. Trouble focusing at distance after reading.   Last edited by Veronica Pablo on 3/11/2024  1:34 PM.            Assessment /Plan     For exam results, see Encounter Report.    Posterior vitreous detachment, bilateral    Acquired ptosis of both eyelids    H/O malignant melanoma of skin  -     Color Fundus Photography - OU - Both Eyes    Glaucoma screening    Bilateral pseudophakia      RD precautions given and reviewed. Patient knows to call/ message if any further changes in symptoms occur.  Moderate vis sig d-chalasis, somewhat variable w/ fatigue, pt to message if wants to consider consult   H/o melanoma/ face   OPTOS 3/2024  Baseline with h/o melanoma --normal fundus OU     Not suspect   Stable OU, w/ near only --no change from previous     Discussed and educated patient on current findings /plan.  RTC 1 year, prn if any changes / issues

## 2024-03-16 NOTE — PROGRESS NOTES
This note was completed with dictation software and grammatical errors may exist.    Chief Complaint   Patient presents with    Low-back Pain     sciatica and muscle spasms that keep her from sleeping at night.         HPI: Dorothy Kramer is a 83 y.o. year old female patient who has a past medical history of DDD (degenerative disc disease), cervical, Diverticulitis, Diverticulosis, DJD (degenerative joint disease) of hip, Dyspepsia, GERD (gastroesophageal reflux disease), History of melanoma, HTN (hypertension), Melanoma, Migraine headache, Rectocele, Skin cancer, Sleep apnea, Thyroid disease, Trouble in sleeping, Ulcerative colitis, Uterine prolaps, and Vulvar lesion. She presents in referral from No ref. provider found for back and neck pain.   She returns in follow-up today with continued left low back pain radiating to the left buttock and posterior thigh down to her knee.  She reports spasms in her left leg that keeps her up at night.  She has no relief with Robaxin or gabapentin.  She denies weakness or numbness.      Initial history:  The patient states that over 30 years ago she had neck pain radiating into the right arm, developed weakness, eventually underwent a C5/6 fusion.  She did well with this and has no longer had severe neck pain.  However in the last several years she began having more pain in the neck radiating into the upper back, right rhomboid region, right shoulder.  She denies any weakness in the arm.  She describes it as aching, burning, sharp and deep.  Her pain is worse with turning her head side-to-side, extension.  She also has pain in the low back, left buttock that she calls sciatica that radiates further down her leg.  She also has pain worse with standing, walking, bending getting out of a bed or a chair.  She has some pain over the right trochanter, states if she lays on that side at night, it will wake her up and she only gets several hours of sleep.  Because her neck hurts so much  "she tries to lay on her right side to immobilize her arm but then starts having worsening right lateral hip pain.  She had a GTB injection with Dr. Carmona after she tried physical therapy for the pain but this was not successful.  The injection did seem to provide several days of relief but returned.  She states that the right GTB is tender to palpation, but also has some right low back pain as well.        Pain intervention history:  Right GTB injection gave 3 days of relief.  She is status post C7-T1 interlaminar epidural steroid injection on 05/18/2023 with minimal relief. She is status post L5/S1 KATHY on 07/12/2023 with 50% relief lasting for 1 day. She is status post bilateral C3/4 C4/5 radiofrequency ablation on 10/04/2023 with 90% relief.  She is status post bilateral C3/4 C4/5 radiofrequency ablation on 10/04/2023 with 90% relief.  She is status post left L4/5 and L5/S1 transforaminal epidural steroid injection on 11/29/2023 with minimal relief lasting only 1 day.     Spine surgeries:  ACDF C5-6 30 years ago    Antineuropathics:  Gabapentin 300 mg nightly  NSAIDs:  Physical therapy: She had done physical therapy several months ago for neck pain, back pain, trochanteric bursitis without much benefit she states that she does not want to return to physical therapy.  Antidepressants:  Muscle relaxers:  Opioids:  Antiplatelets/Anticoagulants:        ROS:  She reports rash, joint stiffness, joint swelling, back pain, difficulty sleeping possible dizziness and loss of balance.  Balance of review of systems is negative.    No results found for: "LABA1C", "HGBA1C"    Lab Results   Component Value Date    WBC 7.30 12/07/2022    HGB 14.1 12/07/2022    HCT 43.4 12/07/2022    MCV 96 12/07/2022     (L) 12/07/2022             Past Medical History:   Diagnosis Date    DDD (degenerative disc disease), cervical     Diverticulitis     Diverticulosis     DJD (degenerative joint disease) of hip     Dyspepsia     GERD " (gastroesophageal reflux disease)     History of melanoma 05/20/2015    HTN (hypertension)     Melanoma     Migraine headache     Rectocele     Skin cancer     melanoma on face    Sleep apnea     doesnt use cpap    Thyroid disease     hypo    Trouble in sleeping     Ulcerative colitis     on meds    Uterine prolaps     followed by GYN    Vulvar lesion 05/20/2015       Past Surgical History:   Procedure Laterality Date    ABLATION OF MEDIAL BRANCH NERVE OF CERVICAL SPINE FACET JOINT Bilateral 10/4/2023    Procedure: ABLATION, NERVE, FACET JOINT, CERVICAL, MEDIAL BRANCH C3/4 anD c4/5;  Surgeon: Chintan Damon MD;  Location: [facility] OR;  Service: Pain Management;  Laterality: Bilateral;    APPENDECTOMY  1962    BACK SURGERY      BREAST BIOPSY      BREAST LUMPECTOMY  1989    CATARACT EXTRACTION Left 02/22/2021    ros    CATARACT EXTRACTION W/  INTRAOCULAR LENS IMPLANT Left 2/22/2021    Procedure: EXTRACTION, CATARACT, WITH IOL INSERTION;  Surgeon: Latisha Baker MD;  Location: [facility] OR;  Service: Ophthalmology;  Laterality: Left;  left    CATARACT EXTRACTION W/  INTRAOCULAR LENS IMPLANT Right 3/22/2021    Procedure: EXTRACTION, CATARACT, WITH IOL INSERTION;  Surgeon: Latisha Baker MD;  Location: [facility] OR;  Service: Ophthalmology;  Laterality: Right;  right    CERVICAL FUSION  1991    CHOLECYSTECTOMY      COLONOSCOPY      COLONOSCOPY N/A 11/9/2018    Dr Oscar; chronic active colitis; repeat in 5 years    COLONOSCOPY N/A 2/14/2020    Procedure: COLONOSCOPY;  Surgeon: Fadi Oscar MD;  Location: [facility];  Service: Endoscopy;  Laterality: N/A;    EPIDURAL STEROID INJECTION INTO CERVICAL SPINE N/A 5/18/2023    Procedure: Injection-steroid-epidural-cervical C7/T1;  Surgeon: Chintan Damon MD;  Location: [facility] OR;  Service: Pain Management;  Laterality: N/A;  cervical    EPIDURAL STEROID INJECTION INTO LUMBAR SPINE N/A 7/12/2023    Procedure: Injection-steroid-epidural-lumbar L5/S1;  Surgeon: Chintan  SONDRA Damon MD;  Location: [facility] OR;  Service: Pain Management;  Laterality: N/A;    HYSTERECTOMY      TRACEE/BSO for benign disease    INJECTION OF ANESTHETIC AGENT AROUND MEDIAL BRANCH NERVES INNERVATING CERVICAL FACET JOINT Bilateral 8/28/2023    Procedure: Block-nerve-medial branch-cervical C3/4. C4/5;  Surgeon: Chintan Damon MD;  Location: [facility] OR;  Service: Pain Management;  Laterality: Bilateral;    INJECTION OF ANESTHETIC AGENT AROUND MEDIAL BRANCH NERVES INNERVATING CERVICAL FACET JOINT Bilateral 9/15/2023    Procedure: Block-nerve-medial branch-cervical C3/4 and C4/5;  Surgeon: Chintan Damon MD;  Location: [facility] OR;  Service: Pain Management;  Laterality: Bilateral;    INSERTION OF MIDURETHRAL SLING N/A 9/22/2022    Procedure: SLING, MIDURETHRAL;  Surgeon: Yannick Cabral MD;  Location: [facility] OR;  Service: OB/GYN;  Laterality: N/A;    JOINT REPLACEMENT Right 2016    Hip replacement    CA REMOVAL OF OVARY/TUBE(S)      ROBOT-ASSISTED LAPAROSCOPIC ABDOMINAL SACROCOLPOPEXY USING DA FLAKITO XI N/A 12/14/2021    Procedure: XI ROBOTIC SACROCOLPOPEXY, ABDOMINAL;  Surgeon: Yannick Cabral MD;  Location: [facility] OR;  Service: OB/GYN;  Laterality: N/A;    ROBOT-ASSISTED LAPAROSCOPIC LYSIS OF ADHESIONS USING DA FLAKITO XI  12/14/2021    Procedure: XI ROBOTIC LYSIS, ADHESIONS;  Surgeon: Yannick Cabral MD;  Location: [facility] OR;  Service: OB/GYN;;    TONSILLECTOMY      TOTAL ABDOMINAL HYSTERECTOMY W/ BILATERAL SALPINGOOPHORECTOMY  1990    TRANSFORAMINAL EPIDURAL INJECTION OF STEROID Left 11/29/2023    Procedure: Injection,steroid,epidural,transforaminal approach      L4/5,L5/S1;  Surgeon: Chintna Damon MD;  Location: [facility] OR;  Service: Pain Management;  Laterality: Left;       Social History     Socioeconomic History    Marital status:    Tobacco Use    Smoking status: Never    Smokeless tobacco: Never   Substance and Sexual Activity    Alcohol use: No    Drug use: No    Sexual activity: Not Currently      Social Determinants of Health     Financial Resource Strain: Unknown (6/1/2022)    Overall Financial Resource Strain (CARDIA)     Difficulty of Paying Living Expenses: Patient declined   Food Insecurity: Unknown (6/1/2022)    Hunger Vital Sign     Worried About Running Out of Food in the Last Year: Patient declined     Ran Out of Food in the Last Year: Never true   Transportation Needs: No Transportation Needs (6/1/2022)    PRAPARE - Transportation     Lack of Transportation (Medical): No     Lack of Transportation (Non-Medical): No   Physical Activity: Unknown (6/1/2022)    Exercise Vital Sign     Days of Exercise per Week: Patient declined   Stress: Unknown (6/1/2022)    [employer] - Occupational Stress Questionnaire     Feeling of Stress : Patient declined   Social Connections: Unknown (6/1/2022)    Social Connection and Isolation Panel [NHANES]     Frequency of Communication with Friends and Family: More than three times a week     Frequency of Social Gatherings with Friends and Family: More than three times a week     Active Member of Clubs or Organizations: Yes     Attends Club or Organization Meetings: More than 4 times per year     Marital Status:    Housing Stability: Low Risk  (6/1/2022)    Housing Stability Vital Sign     Unable to Pay for Housing in the Last Year: No     Number of Places Lived in the Last Year: 1     Unstable Housing in the Last Year: No         Medications/Allergies: See med card    Vitals:    03/08/24 1422   BP: (!) 154/68   Pulse: 75       There is no height or weight on file to calculate BMI.    Physical exam:  Gen: A and O x3, pleasant, well-groomed  Skin: No rashes or obvious lesions  HEENT: PERRLA, no obvious deformities on ears or in canals.Trachea midline.  CVS: Regular rate and rhythm, normal palpable pulses.  Resp: Clear to auscultation bilaterally, no wheezes or rales.  Abdomen: Soft, NT/ND.  Musculoskeletal: No antalgic gait.      Neuro:  Motor:    Right Left   C4 Shoulder Abduction  5  5   C5 Elbow Flexion    5  5   C6 Wrist Extension  5  5   C7 Elbow Extension   5  5   C8/T1 Hand Intrinsics   5  5   C8 First Dorsal Interosseus  5  5   C8 Abductor Pollicus Brevis  5  5       Iliopsoas Quadriceps Knee  Flexion Tibialis  anterior Gastro- cnemius EHL   Lower: R     L         Left  Right    Triceps DTR 2+ 2+   Biceps DTR 2+ 2+   Brachioradialis DTR 2+ 2+   Patellar DTR 2+ 2+   Achilles DTR 2+ 2+   Castro Absent  Absent   Clonus Absent Absent   Babinski Absent Absent     Sensory: Intact and symmetrical to light touch and pinprick in C2-T1 dermatomes bilaterally. Intact and symmetrical to light touch and pinprick in L1-S1 dermatomes bilaterally.  Cervical spine: ROM is full in flexion, moderately reduced with extension and lateral rotation with increased pain on right lateral rotation causing neck pain and periscapular pain on the right side.    Spurling's maneuver causes right neck pain.  Myofascial exam: No Tenderness to palpation across cervical paraspinous region bilaterally.    Lumbar spine:  Lumbar spine: ROM is moderately reduced with both flexion extension and oblique extension with no increased pain.    Alek's test causes no increased pain on either side.    Supine straight leg raise is negative bilaterally.    Internal and external rotation of the hip causes no increased pain on either side.  Myofascial exam: No tenderness to palpation across lumbar paraspinous muscles.  There is exquisite tenderness to palpation over the right GTB    Imagin19 Xray L-spine:  Bone density is normal.  There is a gentle levocurvature of the lumbar spine.  In her AP a the vertebral bodies maintain normal height and alignment.  There is no fracture.  There is marked disc space narrowing at the L4-5 level.  There is mild endplate sclerosis and osteophyte formation.  There is mild-to-moderate disc  space narrowing at the L2-3, L3-4 levels and moderate disc space narrowing at the L5-S1 level.  There is multilevel facet joint arthropathy.  There is mild-to-moderate atherosclerosis.  There are surgical clips in the right upper abdomen from prior cholecystectomy.    7/18/19 MRI C-spine:  Vertebral column: There is no prior MRI or CT for comparison.  As seen on plain films, there is mild anterolisthesis of C3 on C4, C4 on C5.  There are changes of prior bony fusion of C5 and C6.  There is moderate to marked disc space narrowing at the C6-7 level.  There is no fracture.  The odontoid process is intact.  The discs are desiccated.  There is endplate osteophyte formation most apparent at the C6-7 level.  Baseline marrow signal is normal.   Spinal canal, cord, epidural space: The spinal canal is developmentally normal.  There is no abnormal epidural soft tissue mass or fluid collection.  There is minimal flattening of the right ventral cord surface at the C4-5 level where there is a large disc extrusion.  There is no cord edema or myelomalacia.  C2-3: There is no spinal canal or significant foraminal stenosis.   C3-4: There is moderate-to-marked left foraminal stenosis due to uncovertebral spurring and facet joint arthropathy.  There is a mild disc osteophyte complex.  There is no spinal stenosis or cord compression.  The right foramen is patent.   C4-5: There is a large right paracentral disc extrusion measuring approximately 6 x 10 x 13 mm (AP, transverse, craniocaudad).  This does result in flattening of the right ventral cord surface but there is no obvious cord edema or myelomalacia.  There is moderate spinal stenosis at this level.  There is also moderate left foraminal narrowing due to uncovertebral spurring and facet joint arthropathy.   C5-6: There are changes of previous bony fusion.  There is no spinal canal or significant foraminal stenosis.   C6-7: There is moderate to marked disc space narrowing.  There is  left greater than right uncovertebral spurring with bilateral facet joint arthropathy.  There is a mild disc osteophyte complex.  There is no spinal canal or significant foraminal stenosis.   C7-T1: There is left facet joint arthropathy.  There is at least mild left foraminal stenosis due to these changes.    12/17/22 MRI SI joints:  The SI joints are normal in appearance with no evidence of erosions, fusion, or other imaging findings of acute sacroiliitis/inflammatory arthritis.  No sacroiliac joint effusion.   There is degenerative disc disease in the lower lumbar spine resulting in degenerative disc desiccation, disc space narrowing, and degenerative endplate change, most pronounced at L4-L5 and L5-S1..  There is a grade I anterolisthesis of L4 on L5.  There is fluid within the intervertebral disc at L5-S1.  There is bilateral facet arthropathy and uncovering of the intervertebral disc with a superimposed mild broad disc bulge at L4-L5.  There is mild right lateral recess stenosis.  There is, at most, mild overall central canal stenosis at L4-L5.   The visualized bony structures of the pelvis show no evidence of acute fracture or pathologic marrow replacement process.  There is incidentally observed postoperative change of total right hip arthroplasty.  This results in metal artifact within the adjacent osseous and soft tissue structures resulting in poor fat suppression.      11/11/2023 MRI lumbar spine  Alignment: There is levocurvature centered at L4.  There is 2 mm anterolisthesis L5 on S1.     Vertebrae: L2 vertebral body hemangioma.  No marrow replacement or acute fracture with preserved vertebral body heights.  Discogenic sub endplate degenerative changes about the L3-4 through L5-S1 levels.     Discs: Multilevel disc desiccation.  Disc height loss is severe at L4-5 and L5-S1 and moderate at L2-3 and L3-4.     Cord: Normal.  Conus terminates at L1.     Paraspinal muscles & soft tissues: Common bile duct is  7 mm diameter along the pancreas head.  Partially imaged right hip arthroplasty hardware.     Degenerative findings:     T12-L1:     L1-L2:     L2-L3: Broad-based posterior disc bulge with superimposed central disc protrusion with protruded segment measuring 1.4 x 0.4 cm TV by AP dimension.  See axial series 6, image 16.  Mild spinal canal stenosis.  Narrowed right lateral recess.     L3-L4: Broad-based disc bulge slightly asymmetric to the right.  Ligamentum flavum thickening and bilateral facet degenerative change.  Mild spinal canal stenosis.  Narrowing of both lateral recesses.     L4-L5: Mild posterior disc bulge.  Bilateral facet degenerative change.  Mild spinal canal stenosis.  Mild left neural foraminal narrowing.     L5-S1: Disc is slightly uncovered and there is mild broad-based posterior bulge.  Bilateral facet degenerative change.  Mild right and moderate left neural foraminal narrowing.       Assessment:  Dorothy Kramer is a 83 y.o. year old female patient who has a past medical history of DDD (degenerative disc disease), cervical, Diverticulitis, Diverticulosis, DJD (degenerative joint disease) of hip, Dyspepsia, GERD (gastroesophageal reflux disease), History of melanoma, HTN (hypertension), Melanoma, Migraine headache, Rectocele, Skin cancer, Sleep apnea, Thyroid disease, Trouble in sleeping, Ulcerative colitis, Uterine prolaps, and Vulvar lesion. She presents in referral from Dr. Kamila Gilbert for back and neck pain.  1. Muscle spasm  cyclobenzaprine (FLEXERIL) 10 MG tablet      2. Chronic pain disorder        3. Lumbar radiculitis                Plan:  1. I provided a prescription for Flexeril and I will have her discontinue Robaxin.  2. We discussed spinal cord stimulation in detail and I have given her information from [employer].  3. Follow-up in 1 month or sooner as needed.      The total time spent for evaluation and management today including reviewing separately obtained history,  performing a medically appropriate exam and evaluation, documenting clinical information in the health record, independently interpreting results and communicating them to the patient/family/caregiver, and ordering medications/tests/procedures was between 30-39 minutes.

## 2024-04-08 ENCOUNTER — OFFICE VISIT (OUTPATIENT)
Dept: PAIN MEDICINE | Facility: CLINIC | Age: 84
End: 2024-04-08
Payer: MEDICARE

## 2024-04-08 VITALS
BODY MASS INDEX: 25.56 KG/M2 | SYSTOLIC BLOOD PRESSURE: 172 MMHG | DIASTOLIC BLOOD PRESSURE: 72 MMHG | HEIGHT: 64 IN | HEART RATE: 94 BPM | WEIGHT: 149.69 LBS

## 2024-04-08 DIAGNOSIS — M54.16 LUMBAR RADICULITIS: ICD-10-CM

## 2024-04-08 DIAGNOSIS — G89.4 CHRONIC PAIN DISORDER: Primary | ICD-10-CM

## 2024-04-08 PROCEDURE — 99215 OFFICE O/P EST HI 40 MIN: CPT | Mod: HCNC,S$GLB,, | Performed by: PHYSICIAN ASSISTANT

## 2024-04-08 PROCEDURE — 1157F ADVNC CARE PLAN IN RCRD: CPT | Mod: HCNC,CPTII,S$GLB, | Performed by: PHYSICIAN ASSISTANT

## 2024-04-08 PROCEDURE — 3078F DIAST BP <80 MM HG: CPT | Mod: HCNC,CPTII,S$GLB, | Performed by: PHYSICIAN ASSISTANT

## 2024-04-08 PROCEDURE — 99999 PR PBB SHADOW E&M-EST. PATIENT-LVL III: CPT | Mod: PBBFAC,HCNC,, | Performed by: PHYSICIAN ASSISTANT

## 2024-04-08 PROCEDURE — 1101F PT FALLS ASSESS-DOCD LE1/YR: CPT | Mod: HCNC,CPTII,S$GLB, | Performed by: PHYSICIAN ASSISTANT

## 2024-04-08 PROCEDURE — 1159F MED LIST DOCD IN RCRD: CPT | Mod: HCNC,CPTII,S$GLB, | Performed by: PHYSICIAN ASSISTANT

## 2024-04-08 PROCEDURE — 3077F SYST BP >= 140 MM HG: CPT | Mod: HCNC,CPTII,S$GLB, | Performed by: PHYSICIAN ASSISTANT

## 2024-04-08 PROCEDURE — 1160F RVW MEDS BY RX/DR IN RCRD: CPT | Mod: HCNC,CPTII,S$GLB, | Performed by: PHYSICIAN ASSISTANT

## 2024-04-08 PROCEDURE — 3288F FALL RISK ASSESSMENT DOCD: CPT | Mod: HCNC,CPTII,S$GLB, | Performed by: PHYSICIAN ASSISTANT

## 2024-04-08 PROCEDURE — 1125F AMNT PAIN NOTED PAIN PRSNT: CPT | Mod: HCNC,CPTII,S$GLB, | Performed by: PHYSICIAN ASSISTANT

## 2024-04-09 ENCOUNTER — PATIENT MESSAGE (OUTPATIENT)
Dept: PAIN MEDICINE | Facility: CLINIC | Age: 84
End: 2024-04-09
Payer: MEDICARE

## 2024-04-12 NOTE — PROGRESS NOTES
This note was completed with dictation software and grammatical errors may exist.    Chief Complaint   Patient presents with    Low-back Pain        HPI: Dorothy Kramer is a 83 y.o. year old female patient who has a past medical history of DDD (degenerative disc disease), cervical, Diverticulitis, Diverticulosis, DJD (degenerative joint disease) of hip, Dyspepsia, GERD (gastroesophageal reflux disease), History of melanoma, HTN (hypertension), Melanoma, Migraine headache, Rectocele, Skin cancer, Sleep apnea, Thyroid disease, Trouble in sleeping, Ulcerative colitis, Uterine prolaps, and Vulvar lesion. She presents in referral from No ref. provider found for back and neck pain.   She returns in follow-up today with low back, left buttock and posterior thigh pain.  She has questions today about spinal cord stimulation which we have discussed in the past.  Since her last visit she does continue to have lower extremity muscle spasms but it is not as bad since taking Flexeril.  However, her pain continues to significantly affect her quality of life.  She denies weakness or numbness.    Initial history:  The patient states that over 30 years ago she had neck pain radiating into the right arm, developed weakness, eventually underwent a C5/6 fusion.  She did well with this and has no longer had severe neck pain.  However in the last several years she began having more pain in the neck radiating into the upper back, right rhomboid region, right shoulder.  She denies any weakness in the arm.  She describes it as aching, burning, sharp and deep.  Her pain is worse with turning her head side-to-side, extension.  She also has pain in the low back, left buttock that she calls sciatica that radiates further down her leg.  She also has pain worse with standing, walking, bending getting out of a bed or a chair.  She has some pain over the right trochanter, states if she lays on that side at night, it will wake her up and she only  "gets several hours of sleep.  Because her neck hurts so much she tries to lay on her right side to immobilize her arm but then starts having worsening right lateral hip pain.  She had a GTB injection with Dr. Carmona after she tried physical therapy for the pain but this was not successful.  The injection did seem to provide several days of relief but returned.  She states that the right GTB is tender to palpation, but also has some right low back pain as well.        Pain intervention history:  Right GTB injection gave 3 days of relief.  She is status post C7-T1 interlaminar epidural steroid injection on 05/18/2023 with minimal relief. She is status post L5/S1 KATHY on 07/12/2023 with 50% relief lasting for 1 day. She is status post bilateral C3/4 C4/5 radiofrequency ablation on 10/04/2023 with 90% relief.  She is status post bilateral C3/4 C4/5 radiofrequency ablation on 10/04/2023 with 90% relief.  She is status post left L4/5 and L5/S1 transforaminal epidural steroid injection on 11/29/2023 with minimal relief lasting only 1 day.     Spine surgeries:  ACDF C5-6 30 years ago    Antineuropathics:  Gabapentin 300 mg nightly  NSAIDs:  Physical therapy: She had done physical therapy several months ago for neck pain, back pain, trochanteric bursitis without much benefit she states that she does not want to return to physical therapy.  Antidepressants:  Muscle relaxers:  Opioids:  Antiplatelets/Anticoagulants:        ROS:  She reports rash, joint stiffness, joint swelling, back pain, difficulty sleeping possible dizziness and loss of balance.  Balance of review of systems is negative.    No results found for: "LABA1C", "HGBA1C"    Lab Results   Component Value Date    WBC 7.30 12/07/2022    HGB 14.1 12/07/2022    HCT 43.4 12/07/2022    MCV 96 12/07/2022     (L) 12/07/2022             Past Medical History:   Diagnosis Date    DDD (degenerative disc disease), cervical     Diverticulitis     Diverticulosis     DJD " (degenerative joint disease) of hip     Dyspepsia     GERD (gastroesophageal reflux disease)     History of melanoma 05/20/2015    HTN (hypertension)     Melanoma     Migraine headache     Rectocele     Skin cancer     melanoma on face    Sleep apnea     doesnt use cpap    Thyroid disease     hypo    Trouble in sleeping     Ulcerative colitis     on meds    Uterine prolaps     followed by GYN    Vulvar lesion 05/20/2015       Past Surgical History:   Procedure Laterality Date    ABLATION OF MEDIAL BRANCH NERVE OF CERVICAL SPINE FACET JOINT Bilateral 10/4/2023    Procedure: ABLATION, NERVE, FACET JOINT, CERVICAL, MEDIAL BRANCH C3/4 anD c4/5;  Surgeon: Chintan Damon MD;  Location: [facility] OR;  Service: Pain Management;  Laterality: Bilateral;    APPENDECTOMY  1962    BACK SURGERY      BREAST BIOPSY      BREAST LUMPECTOMY  1989    CATARACT EXTRACTION Left 02/22/2021    ros    CATARACT EXTRACTION W/  INTRAOCULAR LENS IMPLANT Left 2/22/2021    Procedure: EXTRACTION, CATARACT, WITH IOL INSERTION;  Surgeon: Latisha Baker MD;  Location: [facility] OR;  Service: Ophthalmology;  Laterality: Left;  left    CATARACT EXTRACTION W/  INTRAOCULAR LENS IMPLANT Right 3/22/2021    Procedure: EXTRACTION, CATARACT, WITH IOL INSERTION;  Surgeon: Latisha Baker MD;  Location: [facility] OR;  Service: Ophthalmology;  Laterality: Right;  right    CERVICAL FUSION  1991    CHOLECYSTECTOMY      COLONOSCOPY      COLONOSCOPY N/A 11/9/2018    Dr Oscar; chronic active colitis; repeat in 5 years    COLONOSCOPY N/A 2/14/2020    Procedure: COLONOSCOPY;  Surgeon: Fadi Oscar MD;  Location: [facility] ENDO;  Service: Endoscopy;  Laterality: N/A;    EPIDURAL STEROID INJECTION INTO CERVICAL SPINE N/A 5/18/2023    Procedure: Injection-steroid-epidural-cervical C7/T1;  Surgeon: Chintan Damon MD;  Location: [facility] OR;  Service: Pain Management;  Laterality: N/A;  cervical    EPIDURAL STEROID INJECTION INTO LUMBAR SPINE N/A 7/12/2023     Procedure: Injection-steroid-epidural-lumbar L5/S1;  Surgeon: Chintan Damon MD;  Location: [facility] OR;  Service: Pain Management;  Laterality: N/A;    HYSTERECTOMY      TRACEE/BSO for benign disease    INJECTION OF ANESTHETIC AGENT AROUND MEDIAL BRANCH NERVES INNERVATING CERVICAL FACET JOINT Bilateral 8/28/2023    Procedure: Block-nerve-medial branch-cervical C3/4. C4/5;  Surgeon: Chintan Damon MD;  Location: [facility] OR;  Service: Pain Management;  Laterality: Bilateral;    INJECTION OF ANESTHETIC AGENT AROUND MEDIAL BRANCH NERVES INNERVATING CERVICAL FACET JOINT Bilateral 9/15/2023    Procedure: Block-nerve-medial branch-cervical C3/4 and C4/5;  Surgeon: Chintan Damon MD;  Location: [facility] OR;  Service: Pain Management;  Laterality: Bilateral;    INSERTION OF MIDURETHRAL SLING N/A 9/22/2022    Procedure: SLING, MIDURETHRAL;  Surgeon: Yannick Cabral MD;  Location: [facility] OR;  Service: OB/GYN;  Laterality: N/A;    JOINT REPLACEMENT Right 2016    Hip replacement    DC REMOVAL OF OVARY/TUBE(S)      ROBOT-ASSISTED LAPAROSCOPIC ABDOMINAL SACROCOLPOPEXY USING DA FLAKITO XI N/A 12/14/2021    Procedure: XI ROBOTIC SACROCOLPOPEXY, ABDOMINAL;  Surgeon: Yannick Cabral MD;  Location: [facility] OR;  Service: OB/GYN;  Laterality: N/A;    ROBOT-ASSISTED LAPAROSCOPIC LYSIS OF ADHESIONS USING DA FLAKITO XI  12/14/2021    Procedure: XI ROBOTIC LYSIS, ADHESIONS;  Surgeon: Yannick Cabral MD;  Location: [facility] OR;  Service: OB/GYN;;    TONSILLECTOMY      TOTAL ABDOMINAL HYSTERECTOMY W/ BILATERAL SALPINGOOPHORECTOMY  1990    TRANSFORAMINAL EPIDURAL INJECTION OF STEROID Left 11/29/2023    Procedure: Injection,steroid,epidural,transforaminal approach      L4/5,L5/S1;  Surgeon: Chintan Damon MD;  Location: [facility] OR;  Service: Pain Management;  Laterality: Left;       Social History     Socioeconomic History    Marital status:    Tobacco Use    Smoking status: Never    Smokeless tobacco: Never   Substance and Sexual Activity  "   Alcohol use: No    Drug use: No    Sexual activity: Not Currently     Social Determinants of Health     Financial Resource Strain: Unknown (6/1/2022)    Overall Financial Resource Strain (CARDIA)     Difficulty of Paying Living Expenses: Patient declined   Food Insecurity: Unknown (6/1/2022)    Hunger Vital Sign     Worried About Running Out of Food in the Last Year: Patient declined     Ran Out of Food in the Last Year: Never true   Transportation Needs: No Transportation Needs (6/1/2022)    PRAPARE - Transportation     Lack of Transportation (Medical): No     Lack of Transportation (Non-Medical): No   Physical Activity: Unknown (6/1/2022)    Exercise Vital Sign     Days of Exercise per Week: Patient declined   Stress: Unknown (6/1/2022)    [employer] [place] [employer] - Occupational Stress Questionnaire     Feeling of Stress : Patient declined   Social Connections: Unknown (6/1/2022)    Social Connection and Isolation Panel [NHANES]     Frequency of Communication with Friends and Family: More than three times a week     Frequency of Social Gatherings with Friends and Family: More than three times a week     Active Member of Clubs or Organizations: Yes     Attends Club or Organization Meetings: More than 4 times per year     Marital Status:    Housing Stability: Low Risk  (6/1/2022)    Housing Stability Vital Sign     Unable to Pay for Housing in the Last Year: No     Number of Places Lived in the Last Year: 1     Unstable Housing in the Last Year: No         Medications/Allergies: See med card    Vitals:    04/08/24 1358   BP: (!) 172/72   Pulse: 94   Weight: 67.9 kg (149 lb 11.1 oz)   Height: 5' 4" (1.626 m)   PainSc:   5   PainLoc: Back       Body mass index is 25.69 kg/m².    Physical exam:  Gen: A and O x3, pleasant, well-groomed  Skin: No rashes or obvious lesions  HEENT: PERRLA, no obvious deformities on ears or in canals.Trachea midline.  CVS: Regular rate and rhythm, normal palpable " pulses.  Resp: Clear to auscultation bilaterally, no wheezes or rales.  Abdomen: Soft, NT/ND.  Musculoskeletal: No antalgic gait.     Neuro:  Motor:    Right Left   C4 Shoulder Abduction  5  5   C5 Elbow Flexion    5  5   C6 Wrist Extension  5  5   C7 Elbow Extension   5  5   C8/T1 Hand Intrinsics   5  5   C8 First Dorsal Interosseus  5  5   C8 Abductor Pollicus Brevis  5  5       Iliopsoas Quadriceps Knee  Flexion Tibialis  anterior Gastro- cnemius EHL   Lower: R / 5 5 5    L / 5        Left  Right    Triceps DTR 2+ 2+   Biceps DTR 2+ 2+   Brachioradialis DTR 2+ 2+   Patellar DTR 2+ 2+   Achilles DTR 2+ 2+   Castro Absent  Absent   Clonus Absent Absent   Babinski Absent Absent     Sensory: Intact and symmetrical to light touch and pinprick in C2-T1 dermatomes bilaterally. Intact and symmetrical to light touch and pinprick in L1-S1 dermatomes bilaterally.    Cervical spine: ROM is full in flexion, moderately reduced with extension and lateral rotation with increased pain on right lateral rotation causing neck pain and periscapular pain on the right side.    Spurling's maneuver causes right neck pain.  Myofascial exam: No Tenderness to palpation across cervical paraspinous region bilaterally.    Lumbar spine:  Lumbar spine: ROM is moderately reduced with both flexion extension and oblique extension with no increased pain.    Alek's test causes no increased pain on either side.    Supine straight leg raise is negative bilaterally.    Internal and external rotation of the hip causes no increased pain on either side.  Myofascial exam: No tenderness to palpation across lumbar paraspinous muscles.     Imagin19 Xray L-spine:  Bone density is normal.  There is a gentle levocurvature of the lumbar spine.  In her AP a the vertebral bodies maintain normal height and alignment.  There is no fracture.  There is marked disc space narrowing at the L4-5 level.  There is mild endplate  sclerosis and osteophyte formation.  There is mild-to-moderate disc space narrowing at the L2-3, L3-4 levels and moderate disc space narrowing at the L5-S1 level.  There is multilevel facet joint arthropathy.  There is mild-to-moderate atherosclerosis.  There are surgical clips in the right upper abdomen from prior cholecystectomy.    7/18/19 MRI C-spine:  Vertebral column: There is no prior MRI or CT for comparison.  As seen on plain films, there is mild anterolisthesis of C3 on C4, C4 on C5.  There are changes of prior bony fusion of C5 and C6.  There is moderate to marked disc space narrowing at the C6-7 level.  There is no fracture.  The odontoid process is intact.  The discs are desiccated.  There is endplate osteophyte formation most apparent at the C6-7 level.  Baseline marrow signal is normal.   Spinal canal, cord, epidural space: The spinal canal is developmentally normal.  There is no abnormal epidural soft tissue mass or fluid collection.  There is minimal flattening of the right ventral cord surface at the C4-5 level where there is a large disc extrusion.  There is no cord edema or myelomalacia.  C2-3: There is no spinal canal or significant foraminal stenosis.   C3-4: There is moderate-to-marked left foraminal stenosis due to uncovertebral spurring and facet joint arthropathy.  There is a mild disc osteophyte complex.  There is no spinal stenosis or cord compression.  The right foramen is patent.   C4-5: There is a large right paracentral disc extrusion measuring approximately 6 x 10 x 13 mm (AP, transverse, craniocaudad).  This does result in flattening of the right ventral cord surface but there is no obvious cord edema or myelomalacia.  There is moderate spinal stenosis at this level.  There is also moderate left foraminal narrowing due to uncovertebral spurring and facet joint arthropathy.   C5-6: There are changes of previous bony fusion.  There is no spinal canal or significant foraminal  stenosis.   C6-7: There is moderate to marked disc space narrowing.  There is left greater than right uncovertebral spurring with bilateral facet joint arthropathy.  There is a mild disc osteophyte complex.  There is no spinal canal or significant foraminal stenosis.   C7-T1: There is left facet joint arthropathy.  There is at least mild left foraminal stenosis due to these changes.    12/17/22 MRI SI joints:  The SI joints are normal in appearance with no evidence of erosions, fusion, or other imaging findings of acute sacroiliitis/inflammatory arthritis.  No sacroiliac joint effusion.   There is degenerative disc disease in the lower lumbar spine resulting in degenerative disc desiccation, disc space narrowing, and degenerative endplate change, most pronounced at L4-L5 and L5-S1..  There is a grade I anterolisthesis of L4 on L5.  There is fluid within the intervertebral disc at L5-S1.  There is bilateral facet arthropathy and uncovering of the intervertebral disc with a superimposed mild broad disc bulge at L4-L5.  There is mild right lateral recess stenosis.  There is, at most, mild overall central canal stenosis at L4-L5.   The visualized bony structures of the pelvis show no evidence of acute fracture or pathologic marrow replacement process.  There is incidentally observed postoperative change of total right hip arthroplasty.  This results in metal artifact within the adjacent osseous and soft tissue structures resulting in poor fat suppression.      11/11/2023 MRI lumbar spine  Alignment: There is levocurvature centered at L4.  There is 2 mm anterolisthesis L5 on S1.     Vertebrae: L2 vertebral body hemangioma.  No marrow replacement or acute fracture with preserved vertebral body heights.  Discogenic sub endplate degenerative changes about the L3-4 through L5-S1 levels.     Discs: Multilevel disc desiccation.  Disc height loss is severe at L4-5 and L5-S1 and moderate at L2-3 and L3-4.     Cord: Normal.   Conus terminates at L1.     Paraspinal muscles & soft tissues: Common bile duct is 7 mm diameter along the pancreas head.  Partially imaged right hip arthroplasty hardware.     Degenerative findings:     T12-L1:     L1-L2:     L2-L3: Broad-based posterior disc bulge with superimposed central disc protrusion with protruded segment measuring 1.4 x 0.4 cm TV by AP dimension.  See axial series 6, image 16.  Mild spinal canal stenosis.  Narrowed right lateral recess.     L3-L4: Broad-based disc bulge slightly asymmetric to the right.  Ligamentum flavum thickening and bilateral facet degenerative change.  Mild spinal canal stenosis.  Narrowing of both lateral recesses.     L4-L5: Mild posterior disc bulge.  Bilateral facet degenerative change.  Mild spinal canal stenosis.  Mild left neural foraminal narrowing.     L5-S1: Disc is slightly uncovered and there is mild broad-based posterior bulge.  Bilateral facet degenerative change.  Mild right and moderate left neural foraminal narrowing.       Assessment:  Dorothy Kramer is a 83 y.o. year old female patient who has a past medical history of DDD (degenerative disc disease), cervical, Diverticulitis, Diverticulosis, DJD (degenerative joint disease) of hip, Dyspepsia, GERD (gastroesophageal reflux disease), History of melanoma, HTN (hypertension), Melanoma, Migraine headache, Rectocele, Skin cancer, Sleep apnea, Thyroid disease, Trouble in sleeping, Ulcerative colitis, Uterine prolaps, and Vulvar lesion. She presents in referral from Dr. Kamila Gilbert for back and neck pain.  1. Chronic pain disorder        2. Lumbar radiculitis                Plan:  1. We discussed spinal cord stimulation in detail and she would like to proceed with a psychological evaluation.  I have placed this order.  I also have contacted the spinal cord stimulator representative from [place].  Once we receive the report from psychology we will schedule the patient for a trial.  We discussed  the risks involved and I have ordered a back brace with lateral support.  If the trial is successful we will proceed with an implant.  2. Follow-up 5 days postop or sooner as needed.    The total time spent for evaluation and management today including reviewing separately obtained history, performing a medically appropriate exam and evaluation, documenting clinical information in the health record, independently interpreting results and communicating them to the patient/family/caregiver, and ordering medications/tests/procedures was between 40-54 minutes.

## 2024-04-22 ENCOUNTER — TELEPHONE (OUTPATIENT)
Dept: PSYCHIATRY | Facility: CLINIC | Age: 84
End: 2024-04-22
Payer: MEDICARE

## 2024-04-24 ENCOUNTER — OFFICE VISIT (OUTPATIENT)
Dept: PSYCHIATRY | Facility: CLINIC | Age: 84
End: 2024-04-24
Payer: MEDICARE

## 2024-04-24 DIAGNOSIS — M54.12 CERVICAL RADICULOPATHY: ICD-10-CM

## 2024-04-24 DIAGNOSIS — G89.4 CHRONIC PAIN DISORDER: ICD-10-CM

## 2024-04-24 DIAGNOSIS — M47.812 CERVICAL SPONDYLOSIS: ICD-10-CM

## 2024-04-24 DIAGNOSIS — M54.32 SCIATICA OF LEFT SIDE: ICD-10-CM

## 2024-04-24 DIAGNOSIS — M54.16 LUMBAR RADICULOPATHY: ICD-10-CM

## 2024-04-24 DIAGNOSIS — Z01.818 PREOPERATIVE EVALUATION TO RULE OUT SURGICAL CONTRAINDICATION: Primary | ICD-10-CM

## 2024-04-24 PROCEDURE — 1157F ADVNC CARE PLAN IN RCRD: CPT | Mod: HCNC,CPTII,S$GLB, | Performed by: PSYCHOLOGIST

## 2024-04-24 PROCEDURE — 96139 PSYCL/NRPSYC TST TECH EA: CPT | Mod: HCNC,S$GLB,, | Performed by: PSYCHOLOGIST

## 2024-04-24 PROCEDURE — 90791 PSYCH DIAGNOSTIC EVALUATION: CPT | Mod: HCNC,S$GLB,, | Performed by: PSYCHOLOGIST

## 2024-04-24 PROCEDURE — 99999 PR PBB SHADOW E&M-EST. PATIENT-LVL I: CPT | Mod: PBBFAC,HCNC,, | Performed by: PSYCHOLOGIST

## 2024-04-24 PROCEDURE — 96138 PSYCL/NRPSYC TECH 1ST: CPT | Mod: HCNC,S$GLB,, | Performed by: PSYCHOLOGIST

## 2024-04-24 PROCEDURE — 96131 PSYCL TST EVAL PHYS/QHP EA: CPT | Mod: HCNC,S$GLB,, | Performed by: PSYCHOLOGIST

## 2024-04-24 PROCEDURE — 96130 PSYCL TST EVAL PHYS/QHP 1ST: CPT | Mod: HCNC,S$GLB,, | Performed by: PSYCHOLOGIST

## 2024-05-01 NOTE — PROGRESS NOTES
Psychiatry Initial Visit (PhD)  Presurgical Psychological Evaluation  Psychological Intake    NAME:  Dorothy Kramer  MRN: [id number]  : 1940    Date:   2024  Site: [facility]   CPT Code: 36627  Clinical status of patient:  Outpatient  Met with:  Patient  Referred by:  LALA Dia    Chief complaint/reason for encounter:  Psychological Evaluation prior to surgical implantation of a spinal cord stimulator (SCS) to address chronic pain.      Before this evaluation was initiated, the purposes and process of the assessment and the limits of confidentiality were discussed with the patient who expressed understanding of these issues and verbally consented to proceed with the evaluation.    History of present illness:  Ms. Kramer is an 83-year-old female referred for Psychological Evaluation prior to surgical implantation of a spinal cord stimulator (SCS) to address chronic pain.  She has chronic pain in her lower back, which radiates down to both legs and feet. She noted that the pain is more severe in her left leg.  Her pain has been present for about 10 years. She has tried physical therapy, medications, and injections without receiving sufficient relief.  Her activities are greatly limited.  She is unable to stand on her feet for a reasonable amount of time. She is unable to do housework or gardening.  Ms. Kramer was able to walk to her appointment today without assistance. She uses a walker when she wakes at night to prevent falls.     Ms. Kramer is now interested in a trial of SCS.  She has reviewed the educational materials and is familiar with the procedures involved.  She understood risks of surgery including bleeding, infection, failure of surgery, misplaced hardware, migration of hardware, need for reoperation, etc.  When asked about potential concerns regarding SCS, she denied significant concerns.  Her expectations regarding SCS include relief form the muscle spasms at night and some  pain relief.  She is motivated to continue in routine pain management following SCS.  If SCS is ineffective for her, she noted she will consult with pain management about other options.  She denied possibility of suicide if the SCS is ineffective. Her  will be available to help her during recovery after surgery. She reported plan to complete eval in September due to life events.    When asked how pain affects her mood, Ms. Kramer reported she sometimes gets down but does not allow her pain to affect her mood significantly.  Regarding mental health history, she denied past psychiatric treatment and history. She does not report current psychiatric problems or major psychosocial stressors.  She was pleasant and cooperative in interview and appeared to be in good spirits.    Relevant Medical History:    Past Medical History:   Diagnosis Date    DDD (degenerative disc disease), cervical     Diverticulitis     Diverticulosis     DJD (degenerative joint disease) of hip     Dyspepsia     GERD (gastroesophageal reflux disease)     History of melanoma 2015    HTN (hypertension)     Melanoma     Migraine headache     Rectocele     Skin cancer     melanoma on face    Sleep apnea     doesnt use cpap    Thyroid disease     hypo    Trouble in sleeping     Ulcerative colitis     on meds    Uterine prolaps     followed by GYN    Vulvar lesion 2015      Pain Scales:   Current level of pain:  3-4/10  Worst pain ratin/10 (Pt has almost passed out from pain)  Best pain rating: 3-4/10 (Always higher than 4 if walking or standing)    Current Health Behaviors:  Compliant with medical regimens and appointments:  Yes  Prescription medication misuse:  No  Exercise:  Try to do some walking (I'm not sedentary.)  Adequate sleep:  No  Adequate cognitive functioning:  Yes    Current and Past Substance Use/Abuse:  Alcohol: Denied current use; denied history of abuse or dependency.   Drugs: Denied current use; denied history  of abuse or dependency.  Tobacco: None.   Caffeine: None.    Past Psychiatric History:  Previous diagnosis:  Denied  Inpatient treatment:  Denied.  Prior substance abuse treatment:  Denied.  Outpatient treatment:  Denied.  Suicide attempt:  Denied.  Non-suicidal self-injury:  Denied.    Family History:  Psychiatric illness:  Mother had anxiety and depression (Mother hospitalized for 2 months when pt was 15. Made me and my brother stronger.)  Substance abuse: Pt denied.  Suicide:  Pt denied.    Trauma History:  Pt denied.     Psychosocial History and Current Social Situation:     Ms. Kramer was born and raised in [place], [place] by her parents. She moved around to [facility], and [place]. She was raised with one brother, who lives in [place]. She denied childhood trauma, abuse, and neglect.  She denied being enrolled in special education or being held back.  She denied significant detentions, suspensions, and expulsions.  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 She is not on disability and finances are stable.  She is  and has 3 adult children, one who lives nearby ([place]). She currently lives with her  in her [facility]. She has been  49 years.    Legal history:  She denied history of arrests and convictions.  She denied current involvement in litigation.  Access to guns:  Pt denied.  has some guns.    Current Psychiatric Treatment:  Medications: Pt denied.  Psychotherapy: Pt denied.    Current Psychiatric Symptoms:  Depression: Denied. She denied episodes of depressed mood or depression-related anhedonia, lack of motivation, lethargy, difficulty concentrating, feelings of worthlessness or guilt, hopelessness, appetite changes, or psychomotor changes.    Cindy/Hypomania:  Denied.  She denied periods of elevated mood or abnormally increased energy or goal-directed activity.  Anxiety:     Generalized Anxiety:  She reported experiencing excessive anxiety about her finances. She denied minimal worry about other domains.  Panic Disorder:  Denied.  OCD:  Denied.  Insomnia:  Takes Gabapentin and Flexeril at night (sleeps 4 hours). Constant muscle spasms at night when medication wears off.  Thought dysfunction:  Denied delusions, hallucinations.  Non-suicidal or Suicidal thoughts/behaviors:  Denied.  Personality functioning: The patient does not display any personality characteristics which would be an impediment to pursuing SCS.    Current Stress Management:  Current psychosocial stressors:  Pain (limitations from pain interfering with life)  Report of coping skills:  Read, pray  Recreational activities:  Reading; Used to love sewing, but hands don't function as well. Games Monday mornings with [employer] senior group.  Support system:  , children (daughters call almost daily); good friends at [employer]      Mental Status Exam:  General appearance:  appears stated age, neatly dressed, well groomed  Speech:  normal rate and tone  Level of cooperation:  cooperative  Thought processes:  logical, goal-directed  Mood:  euthymic  Thought content:  no illusions, no visual hallucinations, no auditory hallucinations, no delusions, no active or passive homicidal thoughts, no active or passive suicidal ideation, no obsessions, no compulsions, no violence  Affect:  appropriate  Orientation:  oriented to person, place, and date  Memory:  Recent memory:  3 of 3 objects after brief delay.    Remote memory - intact  Attention span and concentration:  spelled HOUSE forward and backwards  Fund of general knowledge: 3 of 3 recent presidents  Abstract reasoning:    Similarities: abstract.    Proverbs: abstract.  Judgment and insight: fair  Language:  intact    Diagnostic impressions:  Ms. Kramer does not meet criteria for any DSM-5 psychiatric disorder.    Summary:  Ms. Kramer is an 83-year-old female referred for  Psychological Evaluation prior to surgical implantation of a spinal cord stimulator (SCS) to address chronic pain.  There are no indications of disabling psychopathology, substance use/abuse, cognitive problems, or disabilities that would prevent understanding and competence with medical treatment.  There are no reports or major psychosocial stressors that would interfere with her engagement in treatment.  There is no evidence of suicidality.  She exhibits medium social stability and good social support.  She has good coping strategies to deal with stress and the demands of surgery and recovery.  She has good knowledge about SCS, appropriate expectations for surgery and recovery, adequate understanding of possible risks of this treatment option, and a willingness to sustain effort for lifestyle changes and health adaptations required.  She reports adequate compliance with prior medical regimens.      Plan:  Ms. Kramer completed psychological testing.  The report of this psychological evaluation will follow in the Notes folder in the patient's chart in the encounter titled Psychological Testing.  Overall impressions and recommendations will be included in the final report.        Length of time:    60 minutes

## 2024-05-02 ENCOUNTER — OFFICE VISIT (OUTPATIENT)
Dept: FAMILY MEDICINE | Facility: CLINIC | Age: 84
End: 2024-05-02
Payer: MEDICARE

## 2024-05-02 ENCOUNTER — HOSPITAL ENCOUNTER (OUTPATIENT)
Dept: RADIOLOGY | Facility: HOSPITAL | Age: 84
Discharge: HOME | End: 2024-05-02
Attending: INTERNAL MEDICINE
Payer: MEDICARE

## 2024-05-02 VITALS
BODY MASS INDEX: 25.61 KG/M2 | HEART RATE: 82 BPM | SYSTOLIC BLOOD PRESSURE: 125 MMHG | DIASTOLIC BLOOD PRESSURE: 60 MMHG | HEIGHT: 64 IN | WEIGHT: 150 LBS | RESPIRATION RATE: 16 BRPM

## 2024-05-02 DIAGNOSIS — M25.532 LEFT WRIST PAIN: Primary | ICD-10-CM

## 2024-05-02 DIAGNOSIS — M25.532 LEFT WRIST PAIN: ICD-10-CM

## 2024-05-02 PROCEDURE — 3078F DIAST BP <80 MM HG: CPT | Mod: HCNC,CPTII,S$GLB, | Performed by: INTERNAL MEDICINE

## 2024-05-02 PROCEDURE — 73110 X-RAY EXAM OF WRIST: CPT | Mod: TC,HCNC,PN,LT

## 2024-05-02 PROCEDURE — 1159F MED LIST DOCD IN RCRD: CPT | Mod: HCNC,CPTII,S$GLB, | Performed by: INTERNAL MEDICINE

## 2024-05-02 PROCEDURE — 3288F FALL RISK ASSESSMENT DOCD: CPT | Mod: HCNC,CPTII,S$GLB, | Performed by: INTERNAL MEDICINE

## 2024-05-02 PROCEDURE — 1101F PT FALLS ASSESS-DOCD LE1/YR: CPT | Mod: HCNC,CPTII,S$GLB, | Performed by: INTERNAL MEDICINE

## 2024-05-02 PROCEDURE — 73110 X-RAY EXAM OF WRIST: CPT | Mod: 26,HCNC,LT, | Performed by: RADIOLOGY

## 2024-05-02 PROCEDURE — 1157F ADVNC CARE PLAN IN RCRD: CPT | Mod: HCNC,CPTII,S$GLB, | Performed by: INTERNAL MEDICINE

## 2024-05-02 PROCEDURE — 99999 PR PBB SHADOW E&M-EST. PATIENT-LVL V: CPT | Mod: PBBFAC,HCNC,, | Performed by: INTERNAL MEDICINE

## 2024-05-02 PROCEDURE — 1160F RVW MEDS BY RX/DR IN RCRD: CPT | Mod: HCNC,CPTII,S$GLB, | Performed by: INTERNAL MEDICINE

## 2024-05-02 PROCEDURE — 99214 OFFICE O/P EST MOD 30 MIN: CPT | Mod: HCNC,S$GLB,, | Performed by: INTERNAL MEDICINE

## 2024-05-02 PROCEDURE — 3074F SYST BP LT 130 MM HG: CPT | Mod: HCNC,CPTII,S$GLB, | Performed by: INTERNAL MEDICINE

## 2024-05-02 NOTE — PSYCH TESTING
[facility]  [address] [place] [address]  [phone]    REPORT OF PSYCHOLOGICAL TESTING    NAME: Dorothy Kramer  MRN: [id number]  : 1940     REFERRED BY: LALA Dia    REASON FOR REFERRAL:  Psychological Evaluation prior to surgical implantation of a spinal cord stimulator (SCS) to address chronic pain    EVALUATED BY:  Jam Caraballo, Ph.D., Clinical Psychologist  MAYRA Bragg, Psychometrician    DATES OF EVALUATION: 2024 and 2024    EVALUATION PROCEDURES AND TIMES:  Conducted by Psychologist:  Integration of patient data, interpretation of standardized test results and clinical data, clinical decision-making, treatment planning and report, and interactive feedback to the patient  CPT Codes:  95368 - 1 hour; 75126 - 1 hour  Conducted by Technician:  Psychological test administration and scoring by technician, two or more tests, any method:  [place] Multiphasic Personality Inventory - 3 (MMPI-3); Pain and Impairment Relationship Scale (PAIRS); Mora Pain Catastrophizing Scale (PCS)  CPT Codes:  00113 - 30 minutes; 86161 - 30 minutes, 37806 - 30 minutes, 67465 - 30 minutes    EVALUATION FINDINGS:  The diagnostic interview revealed that Ms. Kramer is an 83 year-old cisgender female referred for psychological evaluation prior to surgical implantation of a spinal cord stimulator (SCS) to address chronic pain in her lower back, which radiates down to both legs and feet. She noted that the pain is more severe in her left leg.  Her pain has been present for about 10 years.  Her activities are greatly limited, and she has tried numerous pain treatments without success. Ms. Kramer is now interested in a trial of SCS. She understood risks of surgery and indicated no significant concerns. She has reasonable expectations for SCS and will consider other treatment options in the future if SCS is ineffective. Ms. Kramer does not meet criteria for any DSM-5 psychiatric  diagnoses at this time, and she denied history of mental health disorder.      She denied problems with substance abuse, suicidal or homicidal ideation, psychotic symptoms, and cognitive functioning.    The medical record also revealed the following diagnoses relevant to this evaluation:    Past Medical History:   Diagnosis Date    DDD (degenerative disc disease), cervical     Diverticulitis     Diverticulosis     DJD (degenerative joint disease) of hip     Dyspepsia     GERD (gastroesophageal reflux disease)     History of melanoma 05/20/2015    HTN (hypertension)     Melanoma     Migraine headache     Rectocele     Skin cancer     melanoma on face    Sleep apnea     doesnt use cpap    Thyroid disease     hypo    Trouble in sleeping     Ulcerative colitis     on meds    Uterine prolaps     followed by GYN    Vulvar lesion 05/20/2015       TEST DATA:  All tests were administered according to standardized procedures and were selected based on the reason for referral.  Effort on all tests was satisfactory to produce valid results.    MMPI-3.  The MMPI-3 provides an assessment of personality and psychopathology with specific evaluation of psychosocial risk factors associated with outcomes of spinal cord stimulation.    Scores on the MMPI-3 Validity Scales raise concerns about the possible impact of unscorable responses and under-reporting on the validity of this protocol. With that caution noted, there are no indications of somatic or cognitive complaints, or of emotional, thought, behavioral, or interpersonal dysfunction. No risks are indicated by this profile.    PAIRS and PCS.  The PAIRS and PCS reveal beliefs and attitudes related to pain that may impact outcomes of spinal cord stimulation.  Elevated scores indicate the need to query the ability to cope with the pain experience, high levels of emotional distress when pain occurs, and a negative mental set and persistent attention brought to bear during the  experience of pain.      The PAIRS indicated non-significant complications related to perceptions of impairment with a total score of 60 (a score over 75 is clinically significant). These results suggest adaptive beliefs about the ability to function and enjoy life despite discomfort from pain.    The PCS indicated minimal catastrophizing of pain with a total score of 11, which is in the 28th percentile (a score over 30 is in the 75th percentile and is clinically significant).  Her subscale scores indicated minimal Rumination (31st percentile), minimal Magnification (42nd percentile), and minimal Helplessness (29th percentile). These results suggest a tendency toward reasonable pain perception, reasonable perception of the seriousness of pain, and adaptive thinking about one's ability to cope with the pain experience    FEEDBACK.  Ms. Kramer was provided with test results and offered the opportunity to respond to feedback and clarify results if needed.    DIAGNOSTIC IMPRESSIONS:  Ms. Kramer does not meet criteria for any DSM-5 psychiatric disorder.    SUMMARY AND RECOMMENDATIONS:  Ms. Kramer has a long history of severe pain and is pursuing the spinal cord stimulator (SCS) in an effort to improve pain and quality of life.  Test results revealed no indications of psychological dysfunction and no indications of maladaptive pain cognitions.  Based on research and recommendations regarding presurgical psychological screening for pain control procedures, her test results and reports are within the expected range to predict adequate outcomes and patient satisfaction.      Ms. Kramer's testing profile was largely consistent with her reports in the clinical interview.  In the clinical interview, Ms. Kramer denied past psychiatric history and treatment.  Currently she reports no psychiatric problems, major psychosocial stressors, or other problems that would contraindicate surgery or impact her ability to engage in treatment.   She has adequate and appropriate knowledge and expectations regarding surgery, and is motivated and willing to engage in behaviors to achieve successful outcomes with SCS.  She has multiple protective factors that should help her during surgery and recovery.  There are no recommendations for psychological intervention at this time. This evaluation revealed NO contraindications to SCS from a psychological perspective.  She is cleared for SCS from a psychological perspective.        Report and interpretation and coding were completed on 05/01/2024.

## 2024-05-02 NOTE — PROGRESS NOTES
Assessment and Plan:    1. Left wrist pain  Suspect most likely de Quervain tenosynovitis.  Reviewed prior x-ray from 2022, did have arthritis at this left base of thumb.  Discussed options with patient she would prefer to recheck an x-ray today.  She does not like to take oral NSAIDs due to issues with colitis in the past.  We discussed using topical NSAIDs in a thumb spica splint with a plan to see hand surgeon and consider a tendon sheath injection if symptoms are not improving in the next few days.  - X-Ray Wrist Complete Left; Future    ______________________________________________________________________  Subjective:    Chief Complaint:  Wrist pain    HPI:  Dorothy is a 83 y.o. year old female here for evaluation of pain in her wrist area.    She reports this pain started 2-3 days ago.  Feels like the pain started at the base of her thumb.  As of today it feels like it is radiating further up her arm.    She takes losartan 100 mg daily and HCTZ 25 mg daily for HTN. BP at home has been averaging 120s/60s    Medications:  Current Outpatient Medications on File Prior to Visit   Medication Sig Dispense Refill    cyclobenzaprine (FLEXERIL) 10 MG tablet Take 1 tablet (10 mg total) by mouth nightly as needed for Muscle spasms. 30 tablet 2    estradioL (ESTRACE) 0.01 % (0.1 mg/gram) vaginal cream Place 1 g vaginally once a week. 42.5 g 2    gabapentin (NEURONTIN) 300 MG capsule Take 1 capsule (300 mg total) by mouth 2 (two) times daily. 60 capsule 2    levothyroxine (SYNTHROID) 88 MCG tablet Take 1 tablet (88 mcg total) by mouth once daily. 90 tablet 3    losartan-hydrochlorothiazide 100-25 mg (HYZAAR) 100-25 mg per tablet TAKE 1 TABLET BY MOUTH EVERY DAY 90 tablet 1    magnesium 250 mg Tab Take 250 mg by mouth 2 (two) times a day.      mesalamine (APRISO) 0.375 gram Cp24 Take 4 capsules (1.5 g total) by mouth once daily. 120 capsule 11    multivitamin/iron/folic acid (CENTRUM COMPLETE ORAL) Take by mouth.       No  "current facility-administered medications on file prior to visit.       Review of Systems:  Review of Systems   Musculoskeletal:  Positive for arthralgias and joint swelling.   Neurological:  Negative for weakness and numbness.       Past Medical History:  Past Medical History:   Diagnosis Date    DDD (degenerative disc disease), cervical     Diverticulitis     Diverticulosis     DJD (degenerative joint disease) of hip     Dyspepsia     GERD (gastroesophageal reflux disease)     History of melanoma 05/20/2015    HTN (hypertension)     Melanoma     Migraine headache     Rectocele     Skin cancer     melanoma on face    Sleep apnea     doesnt use cpap    Thyroid disease     hypo    Trouble in sleeping     Ulcerative colitis     on meds    Uterine prolaps     followed by GYN    Vulvar lesion 05/20/2015       Objective:    Vitals:  Vitals:    05/02/24 1323 05/02/24 1435 05/02/24 1436   BP: (!) 152/64 (!) 144/62 125/60   Pulse: 82     Resp: 16     Weight: 68.1 kg (150 lb 0.4 oz)     Height: 5' 4" (1.626 m)         Physical Exam  Vitals reviewed.   Constitutional:       General: She is not in acute distress.     Appearance: She is well-developed.   Eyes:      General:         Right eye: No discharge.         Left eye: No discharge.      Conjunctiva/sclera: Conjunctivae normal.   Cardiovascular:      Rate and Rhythm: Normal rate and regular rhythm.   Pulmonary:      Effort: Pulmonary effort is normal. No respiratory distress.   Musculoskeletal:      Comments: Base of left thumb is very tender to palpation, positive Finkelstein   Skin:     General: Skin is warm and dry.   Neurological:      Mental Status: She is alert and oriented to person, place, and time.   Psychiatric:         Behavior: Behavior normal.         Thought Content: Thought content normal.         Judgment: Judgment normal.                 Data:  Creatinine normal on most recent labs    Kamila Gilbert MD  Internal Medicine    "

## 2024-05-03 ENCOUNTER — TELEPHONE (OUTPATIENT)
Dept: PAIN MEDICINE | Facility: CLINIC | Age: 84
End: 2024-05-03
Payer: MEDICARE

## 2024-05-03 NOTE — TELEPHONE ENCOUNTER
Physician - Dr Damon    Type of Procedure/Injection - Spinal Cord Stimulator Trial     [place]      Laterality - NA      Anxiolysis- MAC      Need to hold medication - Yes      N/A          Clearance needed - No      Follow up - 5 day post op

## 2024-05-07 DIAGNOSIS — G89.4 CHRONIC PAIN SYNDROME: ICD-10-CM

## 2024-05-07 DIAGNOSIS — G89.4 CHRONIC PAIN DISORDER: Primary | ICD-10-CM

## 2024-05-07 RX ORDER — SODIUM CHLORIDE, SODIUM LACTATE, POTASSIUM CHLORIDE, CALCIUM CHLORIDE 600; 310; 30; 20 MG/100ML; MG/100ML; MG/100ML; MG/100ML
INJECTION, SOLUTION INTRAVENOUS CONTINUOUS
OUTPATIENT
Start: 2024-05-07

## 2024-05-07 RX ORDER — CEFAZOLIN SODIUM 2 G/50ML
2 SOLUTION INTRAVENOUS
OUTPATIENT
Start: 2024-05-07

## 2024-05-07 NOTE — TELEPHONE ENCOUNTER
Spoke with patient to schedule  SCS trial. Patient had many questions and concerns with recovery time. Patient was scheduled for an appointment with Dr. Damon to discuss before scheduling.     Patient's case request has been placed.

## 2024-05-23 ENCOUNTER — OFFICE VISIT (OUTPATIENT)
Dept: DERMATOLOGY | Facility: CLINIC | Age: 84
End: 2024-05-23
Payer: MEDICARE

## 2024-05-23 DIAGNOSIS — L82.1 SK (SEBORRHEIC KERATOSIS): ICD-10-CM

## 2024-05-23 DIAGNOSIS — L21.9 SEBORRHEIC DERMATITIS: Primary | ICD-10-CM

## 2024-05-23 DIAGNOSIS — Z85.828 HISTORY OF NONMELANOMA SKIN CANCER: ICD-10-CM

## 2024-05-23 DIAGNOSIS — D22.9 MULTIPLE BENIGN NEVI: ICD-10-CM

## 2024-05-23 DIAGNOSIS — Z12.83 SCREENING FOR SKIN CANCER: ICD-10-CM

## 2024-05-23 DIAGNOSIS — D18.01 CHERRY ANGIOMA: ICD-10-CM

## 2024-05-23 DIAGNOSIS — L82.0 INFLAMED SEBORRHEIC KERATOSIS: ICD-10-CM

## 2024-05-23 DIAGNOSIS — Z85.820 HISTORY OF MALIGNANT MELANOMA: ICD-10-CM

## 2024-05-23 DIAGNOSIS — D48.5 NEOPLASM OF UNCERTAIN BEHAVIOR OF SKIN: ICD-10-CM

## 2024-05-23 DIAGNOSIS — L81.4 LENTIGINES: ICD-10-CM

## 2024-05-23 PROCEDURE — 88342 IMHCHEM/IMCYTCHM 1ST ANTB: CPT | Mod: HCNC,PO | Performed by: PATHOLOGY

## 2024-05-23 PROCEDURE — 11103 TANGNTL BX SKIN EA SEP/ADDL: CPT | Mod: HCNC,S$GLB,, | Performed by: DERMATOLOGY

## 2024-05-23 PROCEDURE — 88305 TISSUE EXAM BY PATHOLOGIST: CPT | Mod: 59,HCNC,PO | Performed by: PATHOLOGY

## 2024-05-23 PROCEDURE — 1101F PT FALLS ASSESS-DOCD LE1/YR: CPT | Mod: HCNC,CPTII,S$GLB, | Performed by: DERMATOLOGY

## 2024-05-23 PROCEDURE — 11104 PUNCH BX SKIN SINGLE LESION: CPT | Mod: HCNC,S$GLB,, | Performed by: DERMATOLOGY

## 2024-05-23 PROCEDURE — 1157F ADVNC CARE PLAN IN RCRD: CPT | Mod: HCNC,CPTII,S$GLB, | Performed by: DERMATOLOGY

## 2024-05-23 PROCEDURE — 99999 PR PBB SHADOW E&M-EST. PATIENT-LVL III: CPT | Mod: PBBFAC,HCNC,, | Performed by: DERMATOLOGY

## 2024-05-23 PROCEDURE — 88305 TISSUE EXAM BY PATHOLOGIST: CPT | Mod: 26,HCNC,, | Performed by: PATHOLOGY

## 2024-05-23 PROCEDURE — 99214 OFFICE O/P EST MOD 30 MIN: CPT | Mod: 25,HCNC,S$GLB, | Performed by: DERMATOLOGY

## 2024-05-23 PROCEDURE — 88342 IMHCHEM/IMCYTCHM 1ST ANTB: CPT | Mod: 26,HCNC,, | Performed by: PATHOLOGY

## 2024-05-23 PROCEDURE — 88313 SPECIAL STAINS GROUP 2: CPT | Mod: 26,HCNC,, | Performed by: PATHOLOGY

## 2024-05-23 PROCEDURE — 3288F FALL RISK ASSESSMENT DOCD: CPT | Mod: HCNC,CPTII,S$GLB, | Performed by: DERMATOLOGY

## 2024-05-23 PROCEDURE — 88313 SPECIAL STAINS GROUP 2: CPT | Mod: HCNC,PO | Performed by: PATHOLOGY

## 2024-05-23 PROCEDURE — 17110 DESTRUCTION B9 LES UP TO 14: CPT | Mod: HCNC,XS,S$GLB, | Performed by: DERMATOLOGY

## 2024-05-23 PROCEDURE — 1159F MED LIST DOCD IN RCRD: CPT | Mod: HCNC,CPTII,S$GLB, | Performed by: DERMATOLOGY

## 2024-05-23 RX ORDER — MOMETASONE FUROATE 1 MG/ML
SOLUTION TOPICAL 2 TIMES DAILY PRN
Qty: 60 ML | Refills: 5 | Status: SHIPPED | OUTPATIENT
Start: 2024-05-23

## 2024-05-23 NOTE — PROGRESS NOTES
"  Subjective:      Patient ID:  Dorothy Kramer is a 83 y.o. female who presents for   Chief Complaint   Patient presents with    Skin Check     HPI    Established patient.  Here today for total body skin exam.   Hx of MM, NMSC as below.   No lesions of concern today.     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        Past Medical History:   Diagnosis Date    DDD (degenerative disc disease), cervical     Diverticulitis     Diverticulosis     DJD (degenerative joint disease) of hip     Dyspepsia     GERD (gastroesophageal reflux disease)     History of melanoma 05/20/2015    HTN (hypertension)     Melanoma     Migraine headache     Rectocele     Skin cancer     melanoma on face    Sleep apnea     doesnt use cpap    Thyroid disease     hypo    Trouble in sleeping     Ulcerative colitis     on meds    Uterine prolaps     followed by GYN    Vulvar lesion 05/20/2015       Review of Systems   Skin:  Positive for itching, rash, dry skin and dry lips. Negative for daily sunscreen use, activity-related sunscreen use, recent sunburn and wears hat.   Hematologic/Lymphatic: Bruises/bleeds easily.       Objective:   Physical Exam   Constitutional: She appears well-developed and well-nourished. No distress.   Musculoskeletal: Lymphadenopathy:      Cervical: No cervical adenopathy.      Upper Body:   No axillary adenopathy present.No supraclavicular adenopathy is present.     Lymphadenopathy: No supraclavicular adenopathy is present.     She has no cervical adenopathy.     She has no axillary adenopathy.   Neurological: She is alert and oriented to person, place, and time. She is not disoriented.   Psychiatric: She has a normal mood and affect.   Skin:   Areas Examined (abnormalities noted in diagram):   Scalp / Hair Palpated and Inspected  Head / Face Inspection Performed  Neck Inspection Performed  Chest / " Axilla Inspection Performed  Abdomen Inspection Performed  Genitals / Buttocks / Groin Inspection Performed  Back Inspection Performed  RUE Inspected  LUE Inspection Performed  RLE Inspected  LLE Inspection Performed  Nails and Digits Inspection Performed                     Diagram Legend     Erythematous scaling macule/papule c/w actinic keratosis       Vascular papule c/w angioma      Pigmented verrucoid papule/plaque c/w seborrheic keratosis      Yellow umbilicated papule c/w sebaceous hyperplasia      Irregularly shaped tan macule c/w lentigo     1-2 mm smooth white papules consistent with Milia      Movable subcutaneous cyst with punctum c/w epidermal inclusion cyst      Subcutaneous movable cyst c/w pilar cyst      Firm pink to brown papule c/w dermatofibroma      Pedunculated fleshy papule(s) c/w skin tag(s)      Evenly pigmented macule c/w junctional nevus     Mildly variegated pigmented, slightly irregular-bordered macule c/w mildly atypical nevus      Flesh colored to evenly pigmented papule c/w intradermal nevus       Pink pearly papule/plaque c/w basal cell carcinoma      Erythematous hyperkeratotic cursted plaque c/w SCC      Surgical scar with no sign of skin cancer recurrence      Open and closed comedones      Inflammatory papules and pustules      Verrucoid papule consistent consistent with wart     Erythematous eczematous patches and plaques     Dystrophic onycholytic nail with subungual debris c/w onychomycosis     Umbilicated papule    Erythematous-base heme-crusted tan verrucoid plaque consistent with inflamed seborrheic keratosis     Erythematous Silvery Scaling Plaque c/w Psoriasis     See annotation              Assessment / Plan:      Pathology Orders:       Normal Orders This Visit    Specimen to Pathology, Dermatology     Comments:    Number of Specimens:->2  ------------------------->-------------------------  Spec 1 Procedure:->Biopsy  Spec 1 Clinical Impression:->r/o bcc  Spec 1  Source:->left tail of brow  ------------------------->-------------------------  Spec 2 Procedure:->Biopsy  Spec 2 Clinical Impression:->tattoo vs blue nevus r/o atypia  Spec 2 Source:->upper back  Release to patient->Immediate    Questions:    Procedure Type: Dermatology and skin neoplasms    Number of Specimens: 2    ------------------------: -------------------------    Spec 1 Procedure: Biopsy    Spec 1 Clinical Impression: r/o bcc    Spec 1 Source: left tail of brow    ------------------------: -------------------------    Spec 2 Procedure: Biopsy    Spec 2 Clinical Impression: tattoo vs blue nevus r/o atypia    Spec 2 Source: upper back    Release to patient: Immediate          NUB  - Discussed diagnosis with patient and explained uncertain nature of condition, including differential DDX.   - Discussed treatment options (biopsy, close monitoring) with patient, including the risks and benefits of each. Patient opted to pursue biopsy.  - Shave Biopsy Procedure Note: Discussed procedure with patient/patient's guardian including risks and benefits as well as treatment alternatives. Risks of procedure include pain, bleeding, infection, post-inflammatory pigmentary alteration, scar, recurrence. Patient informed that the purpose of a biopsy is sampling of condition in question rather than removal in entirety; further treatment may be necessary. Verbal consent obtained. Area to be biopsied marked and cleansed with alcohol. Local anesthesia achieved by injecting approximately 1 cc of 1% lidocaine with epinephrine. One shave biopsy performed using a double edge razor blade; specimen submitted to pathology. Hemostasis achieved with aluminum chloride. Petroleum jelly and bandage applied to wound. Patient tolerated procedure well. After-visit wound care instructions reviewed and provided in writing.   - Punch Biopsy Procedure Note: Discussed procedure with patient/patient's guardian including risks and benefits as well as  treatment alternatives. Risks of procedure include pain, bleeding, infection, post-inflammatory pigmentary alteration, scar, recurrence. Patient informed that the purpose of a biopsy is sampling of condition in question rather than removal in entirety; further treatment may be necessary. Verbal consent obtained. Area to be biopsied marked and cleansed with alcohol. Local anesthesia achieved by injecting approximately 1 cc of 1% lidocaine with epinephrine. One punch biopsy performed using a 3 mm disposable punch; specimen submitted to pathology. Hemostasis and closure achieved with 4-0 Prolene sutures. Petroleum jelly and bandage applied to wound. Patient tolerated procedure well. After-visit wound care instructions reviewed and provided in writing. F/u 14 days for S/R.      ISK  - Discussed diagnosis, etiology, and treatment options.   - Cryosurgery Procedure Note: Discussed procedure with patient/patient's guardian including risks and benefits as well as treatment alternatives. Risks of procedure include pain, itching, swelling, redness, blistering, crusting, wound formation, post-inflammatory pigmentary alteration, scar, recurrence. Verbal consent obtained. LN2 cryosurgery performed to 1 lesion(s). Patient tolerated procedure well. After-visit wound care instructions reviewed and provided in writing.     Multiple benign nevi  - Discussed diagnosis, etiology, and benign-nature of condition.  - Reassured; no lesions suspicious for malignancy noted on exam today.   - Recommended routine self examination of skin. Discussed the ABCDEs of melanoma and ugly duckling sign.   - Recommended daily sun protection, including the use of OTC broad-spectrum sunscreen (SPF 30 or greater) and sun-protective clothing.      Lentigines  - Benign; reassured treatment not necessary.   - Recommended daily sun protection, including the use of OTC broad-spectrum sunscreen (SPF 30 or greater) and sun-protective clothing.       SK  (seborrheic keratosis)  Cherry angioma  - Benign; reassured treatment not necessary.      History of malignant melanoma  History of nonmelanoma skin cancer  Screening for skin cancer  - Total body skin examination performed today.  - Findings listed above.   - Sites of prior malignancy, regional LN examined - no concern for recurrence today.    - Recommended routine self examination of skin.    - Recommended daily sun protection, including the use of OTC broad-spectrum sunscreen (SPF 30 or greater) and sun-protective clothing.      Seborrheic dermatitis  -     mometasone (ELOCON) 0.1 % solution; Apply topically 2 (two) times daily as needed (rash, itching at scalp). Avoid use of medication on face, body folds, groin/genitalia.  Dispense: 60 mL; Refill: 5  - Discussed diagnosis, etiology, and treatment options.  - topical as above.   - Counseled on potential SE of medication(s) and instructed on use.   - Avoid heavy moisturizers/conditioners.                Follow up for annual skin checks, sooner PRN, sooner pending pathology.

## 2024-05-23 NOTE — PATIENT INSTRUCTIONS
Punch Biopsy Wound Care    Your doctor has performed a punch biopsy today.  A band aid and antibiotic ointment has been placed over the site.  This should remain in place for 24 hours.  It is recommended that you keep the area dry for the first 24 hours.  After 24 hours, you may remove the band aid and wash the area with warm soap and water and apply Vaseline jelly.  Many patients prefer to use Neosporin or Bacitracin ointment.  This is acceptable; however know that you can develop an allergy to this medication even if you have used it safely for years.  It is important to keep the area moist.  Letting it dry out and get air slows healing time, will worsen the scar, and make it more difficult to remove the stitches if they were placed.  Band aid is optional after first 24 hours.      If you notice increasing redness, tenderness, pain, or yellow drainage at the biopsy or surgical site, please notify your doctor.  These are signs of an infection.    If your biopsy/surgical site is bleeding, apply firm pressure for 15 minutes straight.  Repeat for another 15 minutes, if it is still bleeding.   If the surgical site continues to bleed, then please contact your doctor.      For [facility] users:   You will receive your biopsy results in [facility] as soon as they are available. Please be assured that your physician/provider will review your results and will then determine what further treatment, evaluation, or planning is required. You should be contacted by your physician's/provider's office within 5 business days of receiving your results; If not, please reach out to directly. This is one more way [facility] is putting you first.       [facility] [place] [address]/ [phone] FAX/ [email or web address]       Shave Biopsy Wound Care    Your doctor has performed a shave biopsy today.  A band aid and vaseline ointment has been placed over the site.  This should remain in place for NO LONGER THAN 48  hours.  It is fine to remove the bandaid after 24 hours, if the area is no longer bleeding. It is recommended that you keep the area dry (do not wet)) for the first 24 hours.  After 24 hours, wash the area with warm soap and water and apply Vaseline jelly.  Many patients prefer to use Neosporin or Bacitracin ointment.  This is acceptable; however, know that you can develop an allergy to this medication even if you have used it safely for years.  It is important to keep the area moist.  Letting it dry out and get air slows healing time, and will worsen the scar.        If you notice increasing redness, tenderness, pain, or yellow drainage at the biopsy site, please notify your doctor.  These are signs of an infection.    If your biopsy site is bleeding, apply firm pressure for 15 minutes straight.  Repeat for another 15 minutes, if it is still bleeding.   If the surgical site continues to bleed, then please contact your doctor.      For [facility] users:   You will receive your biopsy results in [facility] as soon as they are available. Please be assured that your physician/provider will review your results and will then determine what further treatment, evaluation, or planning is required. You should be contacted by your physician's/provider's office within 5 business days of receiving your results; If not, please reach out to directly. This is one more way [facility] is putting you first.     [facility] [place] [address]/ [phone] FAX/ [email or web address]     CRYOSURGERY      Your doctor has used a method called cryosurgery to treat your skin condition. Cryosurgery refers to the use of very cold substances to treat a variety of skin conditions such as warts, pre-skin cancers, molluscum contagiosum, sun spots, and several benign growths. The substance we use in cryosurgery is liquid nitrogen and is so cold (-195 degrees Celsius) that is burns when administered.     Following treatment in  the office, the skin may immediately burn and become red. You may find the area around the lesion is affected as well. It is sometimes necessary to treat not only the lesion, but a small area of the surrounding normal skin to achieve a good response.     A blister, and even a blood filled blister, may form after treatment.   This is a normal response. If the blister is painful, it is acceptable to sterilize a needle and with rubbing alcohol and gently pop the blister. It is important that you gently wash the area with soap and warm water as the blister fluid may contain wart virus if a wart was treated. Do no remove the roof of the blister.     The area treated can take anywhere from 1-3 weeks to heal. Healing time depends on the kind skin lesion treated, the location, and how aggressively the lesion was treated. It is recommended that the areas treated are covered with Vaseline or bacitracin ointment and a band-aid. If a band-aid is not practical, just ointment applied several times per day will do. Keeping these areas moist will speed the healing time.    Treatment with liquid nitrogen can leave a scar. In dark skin, it may be a light or dark scar, in light skin it may be a white or pink scar. These will generally fade with time, but may never go away completely.     If you have any concerns after your treatment, please feel free to call the office.       [address] [place] [address]/ [phone] FAX/ [email or web address] What Are the Symptoms of Skin Cancer?  A change in your skin is the most common sign of skin cancer. This could be a new growth, a sore that doesnt heal, or a change in a mole. Not all skin cancers look the same.    For melanoma specifically, a simple way to remember the warning signs is to remember the A-B-C-D-Es of melanoma--    A stands for asymmetrical. Does the mole or spot have an irregular shape with two parts that look very different?  B stands for border.  Is the border irregular or jagged?  C is for color. Is the color uneven?  D is for diameter. Is the mole or spot larger than the size of a pea?  E is for evolving. Has the mole or spot changed during the past few weeks or months?    Talk to your doctor if you notice changes in your skin such as a new growth, a sore that doesnt heal, a change in an old growth, or any of the A-B-C-D-Es of melanoma    What Can I Do to Reduce My Risk of Skin Cancer?  Protection from ultraviolet (UV) radiation is important all year, not just during the summer or at the beach. UV rays from the sun can reach you on cloudy and hazy days, not just on bright and christian days. UV rays also reflect off of surfaces like water, cement, sand, and snow. Indoor tanning (using a tanning bed, cr, or sunlamp to get tan) exposes users to UV radiation.    The hours between 10 a.m. and 4 p.m. Daylight Saving Time (9 a.m. to 3 p.m. standard time) are the most hazardous for UV exposure outdoors in the continental [place]. UV rays from sunlight are the greatest during the late spring and early summer in [place].    CDC recommends easy options for protection from UV radiation--    Stay in the shade or indoors, especially during midday hours.  Wear clothing that covers your arms and legs.  Wear a hat with a wide brim to shade your face, head, ears, and neck.  Wear sunglasses that wrap around and block both UVA and UVB rays.  Use sunscreen with a sun protection factor (SPF) of 30 or higher, and both UVA and UVB (broad spectrum) protection.  Avoid indoor tanning.    Adapted from https://www.cdc.gov/cancer/skin/basic_info/

## 2024-05-28 ENCOUNTER — PATIENT MESSAGE (OUTPATIENT)
Dept: DERMATOLOGY | Facility: CLINIC | Age: 84
End: 2024-05-28
Payer: MEDICARE

## 2024-05-29 LAB
FINAL PATHOLOGIC DIAGNOSIS: NORMAL
GROSS: NORMAL
Lab: NORMAL
MICROSCOPIC EXAM: NORMAL

## 2024-05-30 NOTE — PROGRESS NOTES
1. Skin, left tail of brow, shave biopsy:   -BASAL CELL CARCINOMA, NODULAR TYPE, EXTENDING TO THE PERIPHERAL AND DEEP BIOPSY EDGES   2. Skin, upper back, punch biopsy:   -TATTOO PIGMENT     Please call to discuss results / plan / schedule:   1) BCC, recommend Mohs. Please forward to mohs team once patient informed. 3-6 month f/u .  2) tattoo pigment, benign, no further treatment necessary  Thanks!

## 2024-05-31 ENCOUNTER — TELEPHONE (OUTPATIENT)
Dept: DERMATOLOGY | Facility: CLINIC | Age: 84
End: 2024-05-31
Payer: MEDICARE

## 2024-05-31 DIAGNOSIS — M62.838 MUSCLE SPASM: ICD-10-CM

## 2024-05-31 RX ORDER — CYCLOBENZAPRINE HCL 10 MG
TABLET ORAL
Qty: 30 TABLET | Refills: 2 | Status: SHIPPED | OUTPATIENT
Start: 2024-05-31

## 2024-05-31 NOTE — TELEPHONE ENCOUNTER
Spoke w/ pt. Informed pt about results and recommendations per provider. pt verbalized understanding.    Pt scheduled in November for f/u per Dr. Johnson request.

## 2024-05-31 NOTE — TELEPHONE ENCOUNTER
----- Message from Christiane Clark sent at 5/31/2024  1:59 PM CDT -----  Regarding: rt call  Type:  Patient Returning Call    Who Called:pt    Who Left Message for Patient:Teresita Rosemarie    Does the patient know what this is regarding?:yes    Best Call Back Number:[phone]      Additional Information:

## 2024-06-03 DIAGNOSIS — C44.319 BASAL CELL CARCINOMA OF BROW: Primary | ICD-10-CM

## 2024-06-08 DIAGNOSIS — M54.16 LUMBAR RADICULOPATHY: ICD-10-CM

## 2024-06-10 DIAGNOSIS — M54.16 LUMBAR RADICULOPATHY: ICD-10-CM

## 2024-06-10 RX ORDER — GABAPENTIN 300 MG/1
300 CAPSULE ORAL 2 TIMES DAILY
Qty: 60 CAPSULE | Refills: 2 | Status: SHIPPED | OUTPATIENT
Start: 2024-06-10 | End: 2024-06-17

## 2024-06-12 ENCOUNTER — PROCEDURE VISIT (OUTPATIENT)
Dept: DERMATOLOGY | Facility: CLINIC | Age: 84
End: 2024-06-12
Payer: MEDICARE

## 2024-06-12 VITALS
DIASTOLIC BLOOD PRESSURE: 82 MMHG | SYSTOLIC BLOOD PRESSURE: 152 MMHG | HEART RATE: 88 BPM | WEIGHT: 145 LBS | BODY MASS INDEX: 24.75 KG/M2 | HEIGHT: 64 IN

## 2024-06-12 DIAGNOSIS — C44.319 BASAL CELL CARCINOMA OF BROW: ICD-10-CM

## 2024-06-12 PROCEDURE — 17312 MOHS ADDL STAGE: CPT | Mod: HCNC,S$GLB,, | Performed by: DERMATOLOGY

## 2024-06-12 PROCEDURE — 14040 TIS TRNFR F/C/C/M/N/A/G/H/F: CPT | Mod: HCNC,S$GLB,, | Performed by: DERMATOLOGY

## 2024-06-12 PROCEDURE — 17311 MOHS 1 STAGE H/N/HF/G: CPT | Mod: HCNC,51,S$GLB, | Performed by: DERMATOLOGY

## 2024-06-12 RX ORDER — DOXYCYCLINE 100 MG/1
100 CAPSULE ORAL 2 TIMES DAILY
Qty: 14 CAPSULE | Refills: 0 | Status: SHIPPED | OUTPATIENT
Start: 2024-06-12 | End: 2024-06-19

## 2024-06-12 NOTE — PROGRESS NOTES
MOHS MICROGRAPHIC SURGERY OPERATIVE NOTE  Name:  Dorothy Kramer  Date: 2024  Patient : 1940  Attending Surgeon: Renée Francis MD  Assistants: Baylee Maldonado - Surgical Technician  Anesthetic Agent: 1% lidocaine with 1:100,000 epinephrine  Clinical Diagnosis: basal cell carcinoma - nodular  Operation: Mohs Micrographic Surgery  Location: left tail of brow  Indications: Location in mask areas of face including central face, nose, eyelids, eyebrows, lips, chin, preauricular, temple, and ear.  Surgical Preparation: povidone-iodine  Pre-op anbitioics: No     Description of Operation:  The nature and purpose of the procedure, associated risks and alternative treatments were explained to the patient in detail. All patient questions were answered completely. An informed operative consent and photography permit were obtained. The tumor location was then identified and marked with agreement by the patient of the correct location. The patient was positioned, prepped, and draped in the usual sterile manner. Local anesthesia was obtained with 1 cc(s) of 1% lidocaine with 1:100,000 epinephrine. The lesion pre-operatively measures 0.5 by 0.4 cm.     1ST STAGE:  A 2+ mm rim of normal appearing skin was marked circumferentially around the lesion after scraping with a curette to define the margin. The area thus outlined was excised at a 45 degree angle. Hemostasis was obtained with electrodesiccation. The specimen was oriented, mapped, and subdivided into at least two sections. Sections were then chromacoded and submitted for horizontal frozen sections. The patient tolerated the procedure well and there were no complications. Upon microscopic examination of the processed horizontal frozen sections of this stage:  Tumor was present at the margin of the specimen; these areas of residual tumor were marked on the reference map with red pencil, pinpointing the location in which further tissue excision was  necessary.  Tumor type noted on stage 1: Superficial basal cell carcinoma: Foci of basaloid cells with peripheral palisading and focal retraction artifact arising along the dermoepidermal junction and extending into the papillary dermis.     SUBSEQUENT STAGES:  The patient returned to the operating room for additional stages of tumor removal, and prepped in the manner described above. Surgery was directed to the areas having residual tumor, with thin layers of tissue being excised from these regions. A new reference map was prepared during the surgery to maintain precise orientation as described above. Hemostasis was achieved and the excised tissue was processed for microscopic analysis.  These sections were then examined by the Mohs surgeon, and areas of persistent tumor were indicated on the reference map. This process was repeated until the frozen horizontal sections of STAGE 3 revealed no further tumor cells and tumor eradication was considered to be  complete.  Tumor type noted on subsequent stages: Superficial basal cell carcinoma: Foci of basaloid cells with peripheral palisading and focal retraction artifact arising along the dermoepidermal junction and extending into the papillary dermis.     SUMMARY:  The tumor was extirpated in 3 Mohs Stages resulting in a final defect measuring 1.2 by 0.8 cm².   The final defect extended deep to subcutaneous fat  The patient tolerated the procedure well and no complications were noted.     DEFECT PHOTO:      Renée Francis MD      FLAP CLOSURE OF MOHS DEFECT OPERATIVE REPORT  Patient Name: Dorothy Kramer  Patient YOB: 1940  Date of procedure: 6/12/2024  Attending Surgeon: Renée Francis MD FAAD  Anesthetic Agent: 1% lidocaine with 1:100,000 epinephrine   Assistant: Baylee Maldonado - Surgical Technician  Clinical Diagnosis: A 1.2 x 0.8 cm² defect after Mohs Micrographic Surgery  Location: left tail of brow  Operation:  Advancement flap    Surgical Preparation: povidone-iodine    Description of Operation:  The nature and purpose of the procedure, associated risks and alternative treatments were explained to the patient in detail. All patient questions were answered completely. In order to minimize tension on the closure and avoid compromising the anatomic contour of the anatomic region and maximize functional capacity, the above noted adjacent tissue transfer was performed.    An informed operative consent and photography permit were obtained. The patient was positioned, prepped, and draped in the usual sterile manner. Local anesthesia was obtained with 3 cc(s) of 1% lidocaine with 1:100,000 epinephrine The defect edges were debeveled with a #15 blade. Using gentian violet, the flap was designed adjacent to the defect including all anticipated dog ears. The area thus outlined was incised deep to subcutaneous fat with the scalpel. This resulted in a final undermined and elevated flap defect measuring 2.0 x 2.0 cm².   The flap and skin margins were then undermined 50 to 75% of the defects diameter in all directions utilizing supercut iris scissors. Hemostasis was achieved with electrodessication. The secondary defect was closed first utilizing 4-0 Monocryl interrupted buried subcutaneous vertical mattress sutures.     The adjacent tissue flap was then mobilized into place and anchored with additional 4-0 Monocryl interrupted buried subcutaneous vertical mattress sutures. The flap and recipient sites were sculpted in the adipose and dermal planes for a good fit. The skin edges were then reapposed with 5-0 Prolene. Redundant tissue was noted at the inferior and superior aspect of the adjacent tissue transfer. Burows triangles were removed utilizing a #15 blade and the epidermal edges reapposed with 5-0 Prolene. This repair resulted in excellent contour with normal symmetry. The patient tolerated the procedure well and there were no complications.    Polysporin, non-adherent gauze and a pressure dressing were applied to wound after gentle cleansing with saline.    Post op meds: Doxycycline 100 BID x 7 days     Photos:      MD ROHAN Malhotra

## 2024-06-17 RX ORDER — GABAPENTIN 100 MG/1
100 CAPSULE ORAL 2 TIMES DAILY
Qty: 60 CAPSULE | Refills: 2 | Status: SHIPPED | OUTPATIENT
Start: 2024-06-17 | End: 2024-06-20

## 2024-06-19 ENCOUNTER — CLINICAL SUPPORT (OUTPATIENT)
Dept: DERMATOLOGY | Facility: CLINIC | Age: 84
End: 2024-06-19
Payer: MEDICARE

## 2024-06-19 DIAGNOSIS — Z48.02 VISIT FOR SUTURE REMOVAL: Primary | ICD-10-CM

## 2024-06-20 ENCOUNTER — OFFICE VISIT (OUTPATIENT)
Dept: PAIN MEDICINE | Facility: CLINIC | Age: 84
End: 2024-06-20
Payer: MEDICARE

## 2024-06-20 VITALS
WEIGHT: 147.69 LBS | HEIGHT: 64 IN | HEART RATE: 88 BPM | DIASTOLIC BLOOD PRESSURE: 70 MMHG | SYSTOLIC BLOOD PRESSURE: 167 MMHG | BODY MASS INDEX: 25.21 KG/M2

## 2024-06-20 DIAGNOSIS — M54.16 LUMBAR RADICULITIS: ICD-10-CM

## 2024-06-20 DIAGNOSIS — G89.4 CHRONIC PAIN SYNDROME: Primary | ICD-10-CM

## 2024-06-20 DIAGNOSIS — M62.838 MUSCLE SPASM: ICD-10-CM

## 2024-06-20 PROCEDURE — 99999 PR PBB SHADOW E&M-EST. PATIENT-LVL III: CPT | Mod: PBBFAC,HCNC,, | Performed by: ANESTHESIOLOGY

## 2024-06-20 RX ORDER — GABAPENTIN 300 MG/1
300 CAPSULE ORAL NIGHTLY
Qty: 30 CAPSULE | Refills: 2 | Status: SHIPPED | OUTPATIENT
Start: 2024-06-20 | End: 2025-06-20

## 2024-06-20 RX ORDER — CYCLOBENZAPRINE HCL 10 MG
10 TABLET ORAL NIGHTLY
Qty: 30 TABLET | Refills: 6 | Status: SHIPPED | OUTPATIENT
Start: 2024-06-20

## 2024-06-20 NOTE — PROGRESS NOTES
This note was completed with dictation software and grammatical errors may exist.    Chief Complaint   Patient presents with    Low-back Pain        HPI: Dorothy Kramer is a 83 y.o. year old female patient who has a past medical history of DDD (degenerative disc disease), cervical, Diverticulitis, Diverticulosis, DJD (degenerative joint disease) of hip, Dyspepsia, GERD (gastroesophageal reflux disease), History of melanoma, HTN (hypertension), Melanoma, Migraine headache, Rectocele, Skin cancer, Sleep apnea, Thyroid disease, Trouble in sleeping, Ulcerative colitis, Uterine prolaps, and Vulvar lesion. She presents in referral from No ref. provider found for back and neck pain.  Since her last visit she states that she no longer has midback low back muscle spasms and she is sleeping much better, currently taking gabapentin 300 mg and Flexeril 10 mg nightly.  She denies any side effects with this medication.  She continues to have left leg pain, low back pain and is still considering spinal cord stimulation.  She has completed her psych eval.  She states that she does not want to proceed just yet because she needs to go see her brother in [place] who is having health problems.  Otherwise she denies any major issues, no new weakness or bowel or bladder incontinence.  She is beginning to have some more neck pain, feels like the ablation we had done previously worked well but perhaps the pain is coming back.      Initial history:  The patient states that over 30 years ago she had neck pain radiating into the right arm, developed weakness, eventually underwent a C5/6 fusion.  She did well with this and has no longer had severe neck pain.  However in the last several years she began having more pain in the neck radiating into the upper back, right rhomboid region, right shoulder.  She denies any weakness in the arm.  She describes it as aching, burning, sharp and deep.  Her pain is worse with turning her head side-to-side,  extension.  She also has pain in the low back, left buttock that she calls sciatica that radiates further down her leg.  She also has pain worse with standing, walking, bending getting out of a bed or a chair.  She has some pain over the right trochanter, states if she lays on that side at night, it will wake her up and she only gets several hours of sleep.  Because her neck hurts so much she tries to lay on her right side to immobilize her arm but then starts having worsening right lateral hip pain.  She had a GTB injection with Dr. Carmona after she tried physical therapy for the pain but this was not successful.  The injection did seem to provide several days of relief but returned.  She states that the right GTB is tender to palpation, but also has some right low back pain as well.        Pain intervention history:  Right GTB injection gave 3 days of relief.  She is status post C7-T1 interlaminar epidural steroid injection on 05/18/2023 with minimal relief. She is status post L5/S1 KATHY on 07/12/2023 with 50% relief lasting for 1 day. She is status post bilateral C3/4 C4/5 radiofrequency ablation on 10/04/2023 with 90% relief.  She is status post bilateral C3/4 C4/5 radiofrequency ablation on 10/04/2023 with 90% relief.  She is status post left L4/5 and L5/S1 transforaminal epidural steroid injection on 11/29/2023 with minimal relief lasting only 1 day.     Spine surgeries:  ACDF C5-6 30 years ago    Antineuropathics:  Gabapentin 300 mg nightly  NSAIDs:  Physical therapy: She had done physical therapy several months ago for neck pain, back pain, trochanteric bursitis without much benefit she states that she does not want to return to physical therapy.  Antidepressants:  Muscle relaxers:  Opioids:  Antiplatelets/Anticoagulants:        ROS:  She reports rash, joint stiffness, joint swelling, back pain, difficulty sleeping possible dizziness and loss of balance.  Balance of review of systems is negative.    No  "results found for: "LABA1C", "HGBA1C"    Lab Results   Component Value Date    WBC 7.30 12/07/2022    HGB 14.1 12/07/2022    HCT 43.4 12/07/2022    MCV 96 12/07/2022     (L) 12/07/2022             Past Medical History:   Diagnosis Date    DDD (degenerative disc disease), cervical     Diverticulitis     Diverticulosis     DJD (degenerative joint disease) of hip     Dyspepsia     GERD (gastroesophageal reflux disease)     History of melanoma 05/20/2015    HTN (hypertension)     Melanoma     Migraine headache     Rectocele     Skin cancer     melanoma on face    Sleep apnea     doesnt use cpap    Thyroid disease     hypo    Trouble in sleeping     Ulcerative colitis     on meds    Uterine prolaps     followed by GYN    Vulvar lesion 05/20/2015       Past Surgical History:   Procedure Laterality Date    ABLATION OF MEDIAL BRANCH NERVE OF CERVICAL SPINE FACET JOINT Bilateral 10/4/2023    Procedure: ABLATION, NERVE, FACET JOINT, CERVICAL, MEDIAL BRANCH C3/4 anD c4/5;  Surgeon: Chintan Damon MD;  Location: [facility] OR;  Service: Pain Management;  Laterality: Bilateral;    APPENDECTOMY  1962    BACK SURGERY      BREAST BIOPSY      BREAST LUMPECTOMY  1989    CATARACT EXTRACTION Left 02/22/2021    ros    CATARACT EXTRACTION W/  INTRAOCULAR LENS IMPLANT Left 2/22/2021    Procedure: EXTRACTION, CATARACT, WITH IOL INSERTION;  Surgeon: Latisha Bkaer MD;  Location: [facility] OR;  Service: Ophthalmology;  Laterality: Left;  left    CATARACT EXTRACTION W/  INTRAOCULAR LENS IMPLANT Right 3/22/2021    Procedure: EXTRACTION, CATARACT, WITH IOL INSERTION;  Surgeon: Latisha Baker MD;  Location: [facility] OR;  Service: Ophthalmology;  Laterality: Right;  right    CERVICAL FUSION  1991    CHOLECYSTECTOMY      COLONOSCOPY      COLONOSCOPY N/A 11/9/2018    Dr Oscar; chronic active colitis; repeat in 5 years    COLONOSCOPY N/A 2/14/2020    Procedure: COLONOSCOPY;  Surgeon: Fadi Oscar MD;  Location: [facility];  Service: " Endoscopy;  Laterality: N/A;    EPIDURAL STEROID INJECTION INTO CERVICAL SPINE N/A 5/18/2023    Procedure: Injection-steroid-epidural-cervical C7/T1;  Surgeon: Chintan Damon MD;  Location: [facility] OR;  Service: Pain Management;  Laterality: N/A;  cervical    EPIDURAL STEROID INJECTION INTO LUMBAR SPINE N/A 7/12/2023    Procedure: Injection-steroid-epidural-lumbar L5/S1;  Surgeon: Chintan Damon MD;  Location: [facility] OR;  Service: Pain Management;  Laterality: N/A;    HYSTERECTOMY      TRACEE/BSO for benign disease    INJECTION OF ANESTHETIC AGENT AROUND MEDIAL BRANCH NERVES INNERVATING CERVICAL FACET JOINT Bilateral 8/28/2023    Procedure: Block-nerve-medial branch-cervical C3/4. C4/5;  Surgeon: Chintan Damon MD;  Location: [facility] OR;  Service: Pain Management;  Laterality: Bilateral;    INJECTION OF ANESTHETIC AGENT AROUND MEDIAL BRANCH NERVES INNERVATING CERVICAL FACET JOINT Bilateral 9/15/2023    Procedure: Block-nerve-medial branch-cervical C3/4 and C4/5;  Surgeon: Chintan Damon MD;  Location: [facility] OR;  Service: Pain Management;  Laterality: Bilateral;    INSERTION OF MIDURETHRAL SLING N/A 9/22/2022    Procedure: SLING, MIDURETHRAL;  Surgeon: Yannick Cabral MD;  Location: [facility] OR;  Service: OB/GYN;  Laterality: N/A;    JOINT REPLACEMENT Right 2016    Hip replacement    ND REMOVAL OF OVARY/TUBE(S)      ROBOT-ASSISTED LAPAROSCOPIC ABDOMINAL SACROCOLPOPEXY USING DA FLAKITO XI N/A 12/14/2021    Procedure: XI ROBOTIC SACROCOLPOPEXY, ABDOMINAL;  Surgeon: Yannick Cabral MD;  Location: [facility] OR;  Service: OB/GYN;  Laterality: N/A;    ROBOT-ASSISTED LAPAROSCOPIC LYSIS OF ADHESIONS USING DA FLAKITO XI  12/14/2021    Procedure: XI ROBOTIC LYSIS, ADHESIONS;  Surgeon: Yannick Cabral MD;  Location: [facility] OR;  Service: OB/GYN;;    TONSILLECTOMY      TOTAL ABDOMINAL HYSTERECTOMY W/ BILATERAL SALPINGOOPHORECTOMY  1990    TRANSFORAMINAL EPIDURAL INJECTION OF STEROID Left 11/29/2023    Procedure:  "Injection,steroid,epidural,transforaminal approach      L4/5,L5/S1;  Surgeon: Chintan Damon MD;  Location: [facility];  Service: Pain Management;  Laterality: Left;       Social History     Socioeconomic History    Marital status:    Tobacco Use    Smoking status: Never    Smokeless tobacco: Never   Substance and Sexual Activity    Alcohol use: No    Drug use: No    Sexual activity: Not Currently     Social Determinants of Health     Financial Resource Strain: Low Risk  (6/11/2024)    Overall Financial Resource Strain (CARDIA)     Difficulty of Paying Living Expenses: Not hard at all   Food Insecurity: No Food Insecurity (6/11/2024)    Hunger Vital Sign     Worried About Running Out of Food in the Last Year: Never true     Ran Out of Food in the Last Year: Never true   Transportation Needs: No Transportation Needs (6/1/2022)    PRAPARE - Transportation     Lack of Transportation (Medical): No     Lack of Transportation (Non-Medical): No   Physical Activity: Patient Declined (6/11/2024)    Exercise Vital Sign     Days of Exercise per Week: Patient declined     Minutes of Exercise per Session: Patient declined   Stress: No Stress Concern Present (6/11/2024)    [employer] - Occupational Stress Questionnaire     Feeling of Stress : Not at all   Housing Stability: Low Risk  (6/1/2022)    Housing Stability Vital Sign     Unable to Pay for Housing in the Last Year: No     Number of Places Lived in the Last Year: 1     Unstable Housing in the Last Year: No         Medications/Allergies: See med card    Vitals:    06/20/24 0900   BP: (!) 167/70   Pulse: 88   Weight: 67 kg (147 lb 11.3 oz)   Height: 5' 4" (1.626 m)   PainSc:   6   PainLoc: Back       Body mass index is 25.35 kg/m².    Physical exam:  Gen: A and O x3, pleasant, well-groomed  Skin: No rashes or obvious lesions  HEENT: PERRLA, no obvious deformities on ears or in canals.Trachea midline.  CVS: Regular rate and rhythm, " normal palpable pulses.  Resp: Clear to auscultation bilaterally, no wheezes or rales.  Abdomen: Soft, NT/ND.  Musculoskeletal: No antalgic gait.       Imagin19 Xray L-spine:  Bone density is normal.  There is a gentle levocurvature of the lumbar spine.  In her AP a the vertebral bodies maintain normal height and alignment.  There is no fracture.  There is marked disc space narrowing at the L4-5 level.  There is mild endplate sclerosis and osteophyte formation.  There is mild-to-moderate disc space narrowing at the L2-3, L3-4 levels and moderate disc space narrowing at the L5-S1 level.  There is multilevel facet joint arthropathy.  There is mild-to-moderate atherosclerosis.  There are surgical clips in the right upper abdomen from prior cholecystectomy.    19 MRI C-spine:  Vertebral column: There is no prior MRI or CT for comparison.  As seen on plain films, there is mild anterolisthesis of C3 on C4, C4 on C5.  There are changes of prior bony fusion of C5 and C6.  There is moderate to marked disc space narrowing at the C6-7 level.  There is no fracture.  The odontoid process is intact.  The discs are desiccated.  There is endplate osteophyte formation most apparent at the C6-7 level.  Baseline marrow signal is normal.   Spinal canal, cord, epidural space: The spinal canal is developmentally normal.  There is no abnormal epidural soft tissue mass or fluid collection.  There is minimal flattening of the right ventral cord surface at the C4-5 level where there is a large disc extrusion.  There is no cord edema or myelomalacia.  C2-3: There is no spinal canal or significant foraminal stenosis.   C3-4: There is moderate-to-marked left foraminal stenosis due to uncovertebral spurring and facet joint arthropathy.  There is a mild disc osteophyte complex.  There is no spinal stenosis or cord compression.  The right foramen is patent.   C4-5: There is a large right paracentral disc extrusion measuring  approximately 6 x 10 x 13 mm (AP, transverse, craniocaudad).  This does result in flattening of the right ventral cord surface but there is no obvious cord edema or myelomalacia.  There is moderate spinal stenosis at this level.  There is also moderate left foraminal narrowing due to uncovertebral spurring and facet joint arthropathy.   C5-6: There are changes of previous bony fusion.  There is no spinal canal or significant foraminal stenosis.   C6-7: There is moderate to marked disc space narrowing.  There is left greater than right uncovertebral spurring with bilateral facet joint arthropathy.  There is a mild disc osteophyte complex.  There is no spinal canal or significant foraminal stenosis.   C7-T1: There is left facet joint arthropathy.  There is at least mild left foraminal stenosis due to these changes.    12/17/22 MRI SI joints:  The SI joints are normal in appearance with no evidence of erosions, fusion, or other imaging findings of acute sacroiliitis/inflammatory arthritis.  No sacroiliac joint effusion.   There is degenerative disc disease in the lower lumbar spine resulting in degenerative disc desiccation, disc space narrowing, and degenerative endplate change, most pronounced at L4-L5 and L5-S1..  There is a grade I anterolisthesis of L4 on L5.  There is fluid within the intervertebral disc at L5-S1.  There is bilateral facet arthropathy and uncovering of the intervertebral disc with a superimposed mild broad disc bulge at L4-L5.  There is mild right lateral recess stenosis.  There is, at most, mild overall central canal stenosis at L4-L5.   The visualized bony structures of the pelvis show no evidence of acute fracture or pathologic marrow replacement process.  There is incidentally observed postoperative change of total right hip arthroplasty.  This results in metal artifact within the adjacent osseous and soft tissue structures resulting in poor fat suppression.      11/11/2023 MRI lumbar  spine  Alignment: There is levocurvature centered at L4.  There is 2 mm anterolisthesis L5 on S1.   Vertebrae: L2 vertebral body hemangioma.  No marrow replacement or acute fracture with preserved vertebral body heights.  Discogenic sub endplate degenerative changes about the L3-4 through L5-S1 levels.   Discs: Multilevel disc desiccation.  Disc height loss is severe at L4-5 and L5-S1 and moderate at L2-3 and L3-4.   Cord: Normal.  Conus terminates at L1.   Paraspinal muscles & soft tissues: Common bile duct is 7 mm diameter along the pancreas head.  Partially imaged right hip arthroplasty hardware.   Degenerative findings:   T12/L1:   L1-L2:   L2-L3: Broad-based posterior disc bulge with superimposed central disc protrusion with protruded segment measuring 1.4 x 0.4 cm TV by AP dimension.  See axial series 6, image 16.  Mild spinal canal stenosis.  Narrowed right lateral recess.   L3-L4: Broad-based disc bulge slightly asymmetric to the right.  Ligamentum flavum thickening and bilateral facet degenerative change.  Mild spinal canal stenosis.  Narrowing of both lateral recesses.   L4-L5: Mild posterior disc bulge.  Bilateral facet degenerative change.  Mild spinal canal stenosis.  Mild left neural foraminal narrowing.   L5-S1: Disc is slightly uncovered and there is mild broad-based posterior bulge.  Bilateral facet degenerative change.  Mild right and moderate left neural foraminal narrowing.       Assessment:  Dorothy Kramer is a 83 y.o. year old female patient who has a past medical history of DDD (degenerative disc disease), cervical, Diverticulitis, Diverticulosis, DJD (degenerative joint disease) of hip, Dyspepsia, GERD (gastroesophageal reflux disease), History of melanoma, HTN (hypertension), Melanoma, Migraine headache, Rectocele, Skin cancer, Sleep apnea, Thyroid disease, Trouble in sleeping, Ulcerative colitis, Uterine prolaps, and Vulvar lesion. She presents in referral from Dr. Kamila Gilbert for back and  neck pain.  1. Chronic pain syndrome        2. Muscle spasm  cyclobenzaprine (FLEXERIL) 10 MG tablet      3. Lumbar radiculitis                  Plan:  1.  She will contact us in the future to discuss setting up spinal cord stimulation with [email or web address] for her back and leg pain.    2. She may contact us about scheduling cervical radiofrequency ablation since she had done well with this and the pain has begun returning.    3. I refilled her Flexeril and gabapentin for the next several months.             None known in lifetime

## 2024-06-24 NOTE — PROGRESS NOTES
Mohs Surgery Suture Removal Note   Patient Name: Dorothy Kramer  Patient : 1940  Date of Service: 24      CC: Pt. Presents for removal of sutures on the left temporal scalp today  Subjective: patient reports wound healing as expected     Objective: Wound well healed, sutures clean/dry/intact. No evidence of any complications noted.     Visit For Suture Removal:  - Suture removal today  - Steri strips/mastisol were not applied  - Patient counseled on silicone gel/silicone sheeting and their use in the management of post-operative scars  - Handout on silicone gel/silicone gel sheeting was provided  - Patient to follow up with their referring provider in 3 months for further skin evaluation      Baylee Maldonado

## 2024-06-26 ENCOUNTER — PATIENT MESSAGE (OUTPATIENT)
Dept: FAMILY MEDICINE | Facility: CLINIC | Age: 84
End: 2024-06-26
Payer: MEDICARE

## 2024-06-27 ENCOUNTER — OFFICE VISIT (OUTPATIENT)
Dept: FAMILY MEDICINE | Facility: CLINIC | Age: 84
End: 2024-06-27
Payer: MEDICARE

## 2024-06-27 VITALS
WEIGHT: 148.56 LBS | HEART RATE: 83 BPM | DIASTOLIC BLOOD PRESSURE: 72 MMHG | OXYGEN SATURATION: 97 % | HEIGHT: 64 IN | SYSTOLIC BLOOD PRESSURE: 128 MMHG | BODY MASS INDEX: 25.36 KG/M2

## 2024-06-27 DIAGNOSIS — I10 ESSENTIAL HYPERTENSION: Primary | ICD-10-CM

## 2024-06-27 DIAGNOSIS — N99.3 PROLAPSE OF VAGINAL VAULT AFTER HYSTERECTOMY: ICD-10-CM

## 2024-06-27 DIAGNOSIS — E03.9 HYPOTHYROIDISM, UNSPECIFIED TYPE: ICD-10-CM

## 2024-06-27 DIAGNOSIS — N81.6 RECTOCELE: ICD-10-CM

## 2024-06-27 PROBLEM — I70.0 AORTIC ATHEROSCLEROSIS: Status: RESOLVED | Noted: 2023-06-30 | Resolved: 2024-06-27

## 2024-06-27 PROCEDURE — 99999 PR PBB SHADOW E&M-EST. PATIENT-LVL IV: CPT | Mod: PBBFAC,HCNC,, | Performed by: INTERNAL MEDICINE

## 2024-06-27 RX ORDER — ESTRADIOL 0.1 MG/G
1 CREAM VAGINAL WEEKLY
Qty: 42.5 G | Refills: 2 | Status: SHIPPED | OUTPATIENT
Start: 2024-06-27 | End: 2026-12-07

## 2024-06-27 RX ORDER — LEVOTHYROXINE SODIUM 88 UG/1
88 TABLET ORAL DAILY
Qty: 90 TABLET | Refills: 3 | Status: SHIPPED | OUTPATIENT
Start: 2024-06-27

## 2024-06-27 NOTE — PROGRESS NOTES
Assessment and Plan:    1. Essential hypertension  - Lipid Panel; Future  - Comprehensive Metabolic Panel; Future    2. Hypothyroidism, unspecified type  - T4, Free; Future  - TSH; Future  - levothyroxine (SYNTHROID) 88 MCG tablet; Take 1 tablet (88 mcg total) by mouth once daily.  Dispense: 90 tablet; Refill: 3    3. Rectocele  - estradioL (ESTRACE) 0.01 % (0.1 mg/gram) vaginal cream; Place 1 g vaginally once a week.  Dispense: 42.5 g; Refill: 2    4. Prolapse of vaginal vault after hysterectomy  - estradioL (ESTRACE) 0.01 % (0.1 mg/gram) vaginal cream; Place 1 g vaginally once a week.  Dispense: 42.5 g; Refill: 2    Can refill apriso when needed.    Visit today included increased complexity associated with the care of the episodic problems listed above, including addressing and managing the longitudinal care of the patient due to the serious and/or complex managed problem(s).    ______________________________________________________________________  Subjective:    Chief Complaint:  Follow up chronic medical conditions.    HPI:  Dorothy is a 83 y.o. year old female here to follow up chronic medical conditions.     She takes levothyroxine 88 mcg daily for hypothyroidism. No symptoms currently.      She takes losartan 100 mg daily and HCTZ 25 mg daily for HTN. She does check her BP at home, about every other day. Typically 120s/60s.     Had been seeing Urogyn for mixed urge and stress incontinence. Using estrace cream. She has been discharged by Dr. Cabral, told to take the estrace once weekly.    Remote history of melanoma. Sees Dr. Johnson. Recently had a BCC removed from left brow.     She had been seeing Dr. Oscar for nonspecific colitis and recurrent diverticulitis. Taking apriso.     She is seeing pain management regularly for back and neck pain. Has had multiple injections. Using flexeril and gabapentin nightly. Has had some improvement with this, specifically with the muscle spasms and pain in her legs.  Considering neurostimulator.    Medications:  Current Outpatient Medications on File Prior to Visit   Medication Sig Dispense Refill    cyclobenzaprine (FLEXERIL) 10 MG tablet Take 1 tablet (10 mg total) by mouth every evening. 30 tablet 6    estradioL (ESTRACE) 0.01 % (0.1 mg/gram) vaginal cream Place 1 g vaginally once a week. 42.5 g 2    gabapentin (NEURONTIN) 300 MG capsule Take 1 capsule (300 mg total) by mouth every evening. 30 capsule 2    levothyroxine (SYNTHROID) 88 MCG tablet Take 1 tablet (88 mcg total) by mouth once daily. 90 tablet 3    losartan-hydrochlorothiazide 100-25 mg (HYZAAR) 100-25 mg per tablet TAKE 1 TABLET BY MOUTH EVERY DAY 90 tablet 0    magnesium 250 mg Tab Take 250 mg by mouth 2 (two) times a day.      mesalamine (APRISO) 0.375 gram Cp24 Take 4 capsules (1.5 g total) by mouth once daily. 120 capsule 11    multivitamin/iron/folic acid (CENTRUM COMPLETE ORAL) Take by mouth.      mometasone (ELOCON) 0.1 % solution Apply topically 2 (two) times daily as needed (rash, itching at scalp). Avoid use of medication on face, body folds, groin/genitalia. (Patient not taking: Reported on 6/27/2024) 60 mL 5     No current facility-administered medications on file prior to visit.       Review of Systems:  Review of Systems   Constitutional:  Negative for activity change and unexpected weight change.   HENT:  Negative for hearing loss, rhinorrhea and trouble swallowing.    Eyes:  Negative for discharge and visual disturbance.   Respiratory:  Negative for chest tightness and wheezing.    Cardiovascular:  Negative for chest pain and palpitations.   Gastrointestinal:  Negative for blood in stool, constipation, diarrhea and vomiting.   Endocrine: Negative for polydipsia and polyuria.   Genitourinary:  Negative for difficulty urinating, dysuria, hematuria and menstrual problem.   Musculoskeletal:  Positive for arthralgias, joint swelling and neck pain.   Neurological:  Negative for weakness and headaches.  "  Psychiatric/Behavioral:  Negative for confusion and dysphoric mood.        Past Medical History:  Past Medical History:   Diagnosis Date    DDD (degenerative disc disease), cervical     Diverticulitis     Diverticulosis     DJD (degenerative joint disease) of hip     Dyspepsia     GERD (gastroesophageal reflux disease)     History of melanoma 05/20/2015    HTN (hypertension)     Melanoma     Migraine headache     Rectocele     Skin cancer     melanoma on face    Sleep apnea     doesnt use cpap    Thyroid disease     hypo    Trouble in sleeping     Ulcerative colitis     on meds    Uterine prolaps     followed by GYN    Vulvar lesion 05/20/2015       Objective:    Vitals:  Vitals:    06/27/24 1054   BP: 128/72   Pulse: 83   SpO2: 97%   Weight: 67.4 kg (148 lb 9.4 oz)   Height: 5' 4" (1.626 m)   PainSc: 0-No pain       Physical Exam  Vitals reviewed.   Constitutional:       General: She is not in acute distress.     Appearance: She is well-developed.   Eyes:      General: No scleral icterus.  Cardiovascular:      Rate and Rhythm: Normal rate and regular rhythm.      Heart sounds: No murmur heard.  Pulmonary:      Effort: Pulmonary effort is normal. No respiratory distress.      Breath sounds: Normal breath sounds. No wheezing, rhonchi or rales.   Musculoskeletal:      Right lower leg: No edema.      Left lower leg: No edema.   Skin:     General: Skin is warm and dry.   Neurological:      Mental Status: She is alert and oriented to person, place, and time.   Psychiatric:         Behavior: Behavior normal.         Data:  Last TSH borderline low with normal FT4      Kamila Gilbert MD  Internal Medicine    "

## 2024-06-28 ENCOUNTER — LAB VISIT (OUTPATIENT)
Dept: LAB | Facility: HOSPITAL | Age: 84
End: 2024-06-28
Attending: INTERNAL MEDICINE
Payer: MEDICARE

## 2024-06-28 DIAGNOSIS — I10 ESSENTIAL HYPERTENSION: ICD-10-CM

## 2024-06-28 DIAGNOSIS — E03.9 HYPOTHYROIDISM, UNSPECIFIED TYPE: ICD-10-CM

## 2024-06-28 LAB
ALBUMIN SERPL BCP-MCNC: 3.8 G/DL (ref 3.5–5.2)
ALP SERPL-CCNC: 67 U/L (ref 55–135)
ALT SERPL W/O P-5'-P-CCNC: 17 U/L (ref 10–44)
ANION GAP SERPL CALC-SCNC: 11 MMOL/L (ref 8–16)
AST SERPL-CCNC: 24 U/L (ref 10–40)
BILIRUB SERPL-MCNC: 1.1 MG/DL (ref 0.1–1)
BUN SERPL-MCNC: 15 MG/DL (ref 8–23)
CALCIUM SERPL-MCNC: 10.2 MG/DL (ref 8.7–10.5)
CHLORIDE SERPL-SCNC: 103 MMOL/L (ref 95–110)
CHOLEST SERPL-MCNC: 172 MG/DL (ref 120–199)
CHOLEST/HDLC SERPL: 3.6 {RATIO} (ref 2–5)
CO2 SERPL-SCNC: 26 MMOL/L (ref 23–29)
CREAT SERPL-MCNC: 0.8 MG/DL (ref 0.5–1.4)
EST. GFR  (NO RACE VARIABLE): >60 ML/MIN/1.73 M^2
GLUCOSE SERPL-MCNC: 100 MG/DL (ref 70–110)
HDLC SERPL-MCNC: 48 MG/DL (ref 40–75)
HDLC SERPL: 27.9 % (ref 20–50)
LDLC SERPL CALC-MCNC: 84.8 MG/DL (ref 63–159)
NONHDLC SERPL-MCNC: 124 MG/DL
POTASSIUM SERPL-SCNC: 3.8 MMOL/L (ref 3.5–5.1)
PROT SERPL-MCNC: 7.2 G/DL (ref 6–8.4)
SODIUM SERPL-SCNC: 140 MMOL/L (ref 136–145)
T4 FREE SERPL-MCNC: 1.04 NG/DL (ref 0.71–1.51)
TRIGL SERPL-MCNC: 196 MG/DL (ref 30–150)
TSH SERPL DL<=0.005 MIU/L-ACNC: 0.57 UIU/ML (ref 0.4–4)

## 2024-06-28 PROCEDURE — 80061 LIPID PANEL: CPT | Mod: HCNC | Performed by: INTERNAL MEDICINE

## 2024-06-28 PROCEDURE — 80053 COMPREHEN METABOLIC PANEL: CPT | Mod: HCNC | Performed by: INTERNAL MEDICINE

## 2024-06-28 PROCEDURE — 84443 ASSAY THYROID STIM HORMONE: CPT | Mod: HCNC | Performed by: INTERNAL MEDICINE

## 2024-06-28 PROCEDURE — 36415 COLL VENOUS BLD VENIPUNCTURE: CPT | Mod: HCNC,PN | Performed by: INTERNAL MEDICINE

## 2024-06-28 PROCEDURE — 84439 ASSAY OF FREE THYROXINE: CPT | Mod: HCNC | Performed by: INTERNAL MEDICINE

## 2024-10-27 DIAGNOSIS — I10 ESSENTIAL HYPERTENSION: ICD-10-CM

## 2024-10-28 RX ORDER — LOSARTAN POTASSIUM AND HYDROCHLOROTHIAZIDE 25; 100 MG/1; MG/1
1 TABLET ORAL
Qty: 90 TABLET | Refills: 2 | Status: SHIPPED | OUTPATIENT
Start: 2024-10-28

## 2024-11-02 NOTE — TELEPHONE ENCOUNTER
Done    Resting in bed, on cell phone, sister at bedside.   NAD noted.   Skin w/d oral mucosa moist/pale, lips nailbeds pale pink brisk crf, resp easy unlabored with symmetrical rise and fall of chest wall.   CM SR without ectopy    Call light within reach, bed in low position,   Declines needs currently.     No changes with pain, report remains at 8/10

## 2024-11-06 ENCOUNTER — OFFICE VISIT (OUTPATIENT)
Facility: CLINIC | Age: 84
End: 2024-11-06
Payer: MEDICARE

## 2024-11-06 DIAGNOSIS — L81.4 LENTIGINES: ICD-10-CM

## 2024-11-06 DIAGNOSIS — Z12.83 SCREENING FOR SKIN CANCER: ICD-10-CM

## 2024-11-06 DIAGNOSIS — L30.8 OTHER ECZEMA: ICD-10-CM

## 2024-11-06 DIAGNOSIS — Z78.0 MENOPAUSE: ICD-10-CM

## 2024-11-06 DIAGNOSIS — Z85.828 HISTORY OF NONMELANOMA SKIN CANCER: ICD-10-CM

## 2024-11-06 DIAGNOSIS — D23.9 DILATED PORE OF WINER: ICD-10-CM

## 2024-11-06 DIAGNOSIS — D18.01 CHERRY ANGIOMA: ICD-10-CM

## 2024-11-06 DIAGNOSIS — L82.1 SK (SEBORRHEIC KERATOSIS): ICD-10-CM

## 2024-11-06 DIAGNOSIS — D22.9 MULTIPLE BENIGN NEVI: Primary | ICD-10-CM

## 2024-11-06 DIAGNOSIS — Z85.820 HISTORY OF MALIGNANT MELANOMA: ICD-10-CM

## 2024-11-06 DIAGNOSIS — L91.8 ACROCHORDON: ICD-10-CM

## 2024-11-06 PROCEDURE — 99999 PR PBB SHADOW E&M-EST. PATIENT-LVL II: CPT | Mod: PBBFAC,HCNC,, | Performed by: DERMATOLOGY

## 2024-11-06 PROCEDURE — 1157F ADVNC CARE PLAN IN RCRD: CPT | Mod: HCNC,CPTII,S$GLB, | Performed by: DERMATOLOGY

## 2024-11-06 PROCEDURE — G2211 COMPLEX E/M VISIT ADD ON: HCPCS | Mod: HCNC,S$GLB,, | Performed by: DERMATOLOGY

## 2024-11-06 PROCEDURE — 1101F PT FALLS ASSESS-DOCD LE1/YR: CPT | Mod: HCNC,CPTII,S$GLB, | Performed by: DERMATOLOGY

## 2024-11-06 PROCEDURE — 99214 OFFICE O/P EST MOD 30 MIN: CPT | Mod: HCNC,S$GLB,, | Performed by: DERMATOLOGY

## 2024-11-06 PROCEDURE — 3288F FALL RISK ASSESSMENT DOCD: CPT | Mod: HCNC,CPTII,S$GLB, | Performed by: DERMATOLOGY

## 2024-11-06 PROCEDURE — 1159F MED LIST DOCD IN RCRD: CPT | Mod: HCNC,CPTII,S$GLB, | Performed by: DERMATOLOGY

## 2024-11-06 RX ORDER — TRIAMCINOLONE ACETONIDE 1 MG/G
CREAM TOPICAL 2 TIMES DAILY PRN
Qty: 80 G | Refills: 1 | Status: SHIPPED | OUTPATIENT
Start: 2024-11-06

## 2024-11-06 NOTE — PATIENT INSTRUCTIONS
No more than 1 shower or bath a day.   Out of shower or bath in less than 10 minutes.   Use only warm water, NOT hot.   Soap only where needed - areas that make body odor or are visibly dirty.  Use gentle soaps only (eg, Cetaphil Body Wash; non soap cleansers are more gentle than bar soaps).   After shower or bath, pat dry - do not scrub or rub aggressively.   Apply thick moisturizing cream twice daily, once being after the shower or bath (eg, Cerave, Cetaphil, Vanicream).   Avoid common allergens/irritants - fragrances, botanicals, etc.     What Are the Symptoms of Skin Cancer?  A change in your skin is the most common sign of skin cancer. This could be a new growth, a sore that doesnt heal, or a change in a mole. Not all skin cancers look the same.    For melanoma specifically, a simple way to remember the warning signs is to remember the A-B-C-D-Es of melanoma--    A stands for asymmetrical. Does the mole or spot have an irregular shape with two parts that look very different?  B stands for border. Is the border irregular or jagged?  C is for color. Is the color uneven?  D is for diameter. Is the mole or spot larger than the size of a pea?  E is for evolving. Has the mole or spot changed during the past few weeks or months?    Talk to your doctor if you notice changes in your skin such as a new growth, a sore that doesnt heal, a change in an old growth, or any of the A-B-C-D-Es of melanoma    What Can I Do to Reduce My Risk of Skin Cancer?  Protection from ultraviolet (UV) radiation is important all year, not just during the summer or at the beach. UV rays from the sun can reach you on cloudy and hazy days, not just on bright and christian days. UV rays also reflect off of surfaces like water, cement, sand, and snow. Indoor tanning (using a tanning bed, cr, or sunlamp to get tan) exposes users to UV radiation.    The hours between 10 a.m. and 4 p.m. Daylight Saving Time (9 a.m. to 3 p.m. standard time)  are the most hazardous for UV exposure outdoors in the continental [place]. UV rays from sunlight are the greatest during the late spring and early summer in [place].    CDC recommends easy options for protection from UV radiation--    Stay in the shade or indoors, especially during midday hours.  Wear clothing that covers your arms and legs.  Wear a hat with a wide brim to shade your face, head, ears, and neck.  Wear sunglasses that wrap around and block both UVA and UVB rays.  Use sunscreen with a sun protection factor (SPF) of 30 or higher, and both UVA and UVB (broad spectrum) protection.  Avoid indoor tanning.    Adapted from https://www.cdc.gov/cancer/skin/basic_info/

## 2024-11-06 NOTE — PROGRESS NOTES
"  Subjective:      Patient ID:  Dorothy Kramer is a 83 y.o. female who presents for   Chief Complaint   Patient presents with    Skin Check     Patient is present today for follow up skin check. TBSC. She is concerned about a spot on her nose.      HPI    Established patient.  Here today for total body skin exam.   Hx of MM, NMSC as below.   C/o spot on nose.     5/2024  1. Skin, left tail of brow, shave biopsy:   -BASAL CELL CARCINOMA, NODULAR TYPE, EXTENDING TO THE PERIPHERAL AND DEEP BIOPSY EDGES   2. 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 ([facility])       Past Medical History:   Diagnosis Date    DDD (degenerative disc disease), cervical     Diverticulitis     Diverticulosis     DJD (degenerative joint disease) of hip     Dyspepsia     GERD (gastroesophageal reflux disease)     History of melanoma 05/20/2015    HTN (hypertension)     Melanoma     Migraine headache     Rectocele     Skin cancer     melanoma on face    Sleep apnea     doesnt use cpap    Thyroid disease     hypo    Trouble in sleeping     Ulcerative colitis     on meds    Uterine prolaps     followed by GYN    Vulvar lesion 05/20/2015       Review of Systems   Skin:  Positive for itching, rash, dry skin and dry lips. Negative for daily sunscreen use, activity-related sunscreen use, recent sunburn and wears hat.   Hematologic/Lymphatic: Bruises/bleeds easily.       Objective:   Physical Exam   Constitutional: She appears well-developed and well-nourished. No distress.   Musculoskeletal: Lymphadenopathy:      Cervical: No cervical adenopathy.      Upper Body:   No axillary adenopathy present.No supraclavicular adenopathy is present.     Lymphadenopathy: No supraclavicular adenopathy is present.     She has no cervical adenopathy.     She " has no axillary adenopathy.   Neurological: She is alert and oriented to person, place, and time. She is not disoriented.   Psychiatric: She has a normal mood and affect.   Skin:   Areas Examined (abnormalities noted in diagram):   Scalp / Hair Palpated and Inspected  Head / Face Inspection Performed  Neck Inspection Performed  Chest / Axilla Inspection Performed  Abdomen Inspection Performed  Genitals / Buttocks / Groin Inspection Performed  Back Inspection Performed  RUE Inspected  LUE Inspection Performed  RLE Inspected  LLE Inspection Performed  Nails and Digits Inspection Performed                     Diagram Legend     Erythematous scaling macule/papule c/w actinic keratosis       Vascular papule c/w angioma      Pigmented verrucoid papule/plaque c/w seborrheic keratosis      Yellow umbilicated papule c/w sebaceous hyperplasia      Irregularly shaped tan macule c/w lentigo     1-2 mm smooth white papules consistent with Milia      Movable subcutaneous cyst with punctum c/w epidermal inclusion cyst      Subcutaneous movable cyst c/w pilar cyst      Firm pink to brown papule c/w dermatofibroma      Pedunculated fleshy papule(s) c/w skin tag(s)      Evenly pigmented macule c/w junctional nevus     Mildly variegated pigmented, slightly irregular-bordered macule c/w mildly atypical nevus      Flesh colored to evenly pigmented papule c/w intradermal nevus       Pink pearly papule/plaque c/w basal cell carcinoma      Erythematous hyperkeratotic cursted plaque c/w SCC      Surgical scar with no sign of skin cancer recurrence      Open and closed comedones      Inflammatory papules and pustules      Verrucoid papule consistent consistent with wart     Erythematous eczematous patches and plaques     Dystrophic onycholytic nail with subungual debris c/w onychomycosis     Umbilicated papule    Erythematous-base heme-crusted tan verrucoid plaque consistent with inflamed seborrheic keratosis     Erythematous Silvery Scaling  Plaque c/w Psoriasis     See annotation    Assessment / Plan:      Multiple benign nevi  - Discussed diagnosis, etiology, and benign-nature of condition.  - Reassured; no lesions suspicious for malignancy noted on exam today.   - Recommended routine self examination of skin. Discussed the ABCDEs of melanoma and ugly duckling sign.   - Recommended daily sun protection, including the use of OTC broad-spectrum sunscreen (SPF 30 or greater) and sun-protective clothing.      Lentigines  - Benign; reassured treatment not necessary.   - Recommended daily sun protection, including the use of OTC broad-spectrum sunscreen (SPF 30 or greater) and sun-protective clothing.       SK (seborrheic keratosis)  Acrochordon  Cherry angioma  Dilated pore  - Benign; reassured treatment not necessary.      History of malignant melanoma  History of nonmelanoma skin cancer  Screening for skin cancer  - Total body skin examination performed today.  - Findings listed above.   - Sites of prior malignancy, regional LN examined - no concern for recurrence today.    - Recommended routine self examination of skin.    - Recommended daily sun protection, including the use of OTC broad-spectrum sunscreen (SPF 30 or greater) and sun-protective clothing.      Other eczema  -     triamcinolone acetonide 0.1% (KENALOG) 0.1 % cream; Apply topically 2 (two) times daily as needed (itchy rash on body).  Dispense: 80 g; Refill: 1  - Discussed diagnosis, etiology, and treatment options.   - Counseled on gentle, dry skin care and avoidance of common allergens/irritants.    - TAC cream BID PRN rash, itching at body.   - Counseled on potential SE of medication(s) and instructed on use.        Follow up in about 6 months (around 5/6/2025) for skin check.

## 2024-12-08 DIAGNOSIS — K52.9 COLITIS: ICD-10-CM

## 2024-12-09 RX ORDER — MESALAMINE 0.38 G/1
1.5 CAPSULE, EXTENDED RELEASE ORAL DAILY
Qty: 360 CAPSULE | Refills: 0 | Status: SHIPPED | OUTPATIENT
Start: 2024-12-09 | End: 2025-12-09

## 2024-12-30 RX ORDER — GABAPENTIN 300 MG/1
300 CAPSULE ORAL NIGHTLY
Qty: 30 CAPSULE | Refills: 2 | Status: SHIPPED | OUTPATIENT
Start: 2024-12-30

## 2025-02-22 DIAGNOSIS — Z00.00 ENCOUNTER FOR MEDICARE ANNUAL WELLNESS EXAM: ICD-10-CM

## 2025-03-04 DIAGNOSIS — M62.838 MUSCLE SPASM: ICD-10-CM

## 2025-03-05 RX ORDER — CYCLOBENZAPRINE HCL 10 MG
10 TABLET ORAL NIGHTLY
Qty: 30 TABLET | Refills: 6 | Status: SHIPPED | OUTPATIENT
Start: 2025-03-05

## 2025-03-17 ENCOUNTER — OFFICE VISIT (OUTPATIENT)
Dept: OPTOMETRY | Facility: CLINIC | Age: 85
End: 2025-03-17
Payer: MEDICARE

## 2025-03-17 DIAGNOSIS — Z13.5 GLAUCOMA SCREENING: ICD-10-CM

## 2025-03-17 DIAGNOSIS — Z96.1 BILATERAL PSEUDOPHAKIA: ICD-10-CM

## 2025-03-17 DIAGNOSIS — H02.403 ACQUIRED PTOSIS OF BOTH EYELIDS: ICD-10-CM

## 2025-03-17 DIAGNOSIS — Z85.820 H/O MALIGNANT MELANOMA OF SKIN: ICD-10-CM

## 2025-03-17 DIAGNOSIS — H43.813 POSTERIOR VITREOUS DETACHMENT, BILATERAL: Primary | ICD-10-CM

## 2025-03-17 PROCEDURE — 3288F FALL RISK ASSESSMENT DOCD: CPT | Mod: CPTII,S$GLB,, | Performed by: OPTOMETRIST

## 2025-03-17 PROCEDURE — 92014 COMPRE OPH EXAM EST PT 1/>: CPT | Mod: S$GLB,,, | Performed by: OPTOMETRIST

## 2025-03-17 PROCEDURE — 1157F ADVNC CARE PLAN IN RCRD: CPT | Mod: CPTII,S$GLB,, | Performed by: OPTOMETRIST

## 2025-03-17 PROCEDURE — 99999 PR PBB SHADOW E&M-EST. PATIENT-LVL III: CPT | Mod: PBBFAC,,, | Performed by: OPTOMETRIST

## 2025-03-17 PROCEDURE — 1101F PT FALLS ASSESS-DOCD LE1/YR: CPT | Mod: CPTII,S$GLB,, | Performed by: OPTOMETRIST

## 2025-03-17 PROCEDURE — 1126F AMNT PAIN NOTED NONE PRSNT: CPT | Mod: CPTII,S$GLB,, | Performed by: OPTOMETRIST

## 2025-03-17 PROCEDURE — 1159F MED LIST DOCD IN RCRD: CPT | Mod: CPTII,S$GLB,, | Performed by: OPTOMETRIST

## 2025-03-17 PROCEDURE — 92015 DETERMINE REFRACTIVE STATE: CPT | Mod: S$GLB,,, | Performed by: OPTOMETRIST

## 2025-03-17 NOTE — PROGRESS NOTES
HPI    Pt presents today for a dfe.  Pt c/o occasional blurry VA.  Pt wears OTC +2.50 readers.  Does not instill gtts.  Denies ocular pain/disc.  Denies f/f.    HTN controlled w/meds.  Last edited by Tammy Cabello on 3/17/2025  2:49 PM.            Assessment /Plan     For exam results, see Encounter Report.    Posterior vitreous detachment, bilateral    Acquired ptosis of both eyelids    H/O malignant melanoma of skin    Glaucoma screening    Bilateral pseudophakia      RD precautions given and reviewed. Patient knows to call/ message if any further changes in symptoms occur.  Moderate vis sig d-chalasis, somewhat variable w/ fatigue--+ improved OS following skin cancer removed above brow line 2024, ptosis OD not vis sig for consult---monitor     H/o melanoma/ face   OPTOS 3/2024  Baseline with h/o melanoma --normal fundus OU      Not suspect   Stable OU, updated and gave copy for PAL vs FT this year, may consider new bifocal      Discussed and educated patient on current findings /plan.  RTC 1 year w/ DFE + OPTOS,  prn if any changes / issues

## 2025-03-17 NOTE — PATIENT INSTRUCTIONS
"DRY EYES -- BURNING OR MAXIMO SYMPTOMS:  Use Over The Counter artificial tears as needed for dry eye symptoms.   Some common brands include:  Systane, Optive, Refresh, and Thera-Tears.  These drops can be used as frequently as desired, but may be most helpful use during long periods of concentrated work.  For example, reading / working at the computer. Start with 3-4x per day.     Nighttime Ophthalmic gel or ointments are available: Refresh PM, Genteal, and Lacrilube.    Avoid drops that "get redness out" (Visine, Murine, Clear Eyes), as these may contain medication that could further irritate the eyes, especially with chronic use.    ALLERGY EYES -- ITCHING SYMPTOMS:  Over the counter medications include--Pataday, Zaditor, and Alaway.  Use as directed 1-2 drops daily for symptoms of itching / watering eyes.  These drops will not help for dry eye or exposure symptoms.    REDNESS RELIEF:  Lumify---is a good redness reliever that will not cause irritation if used chronically.        FLASHES / FLOATERS / POSTERIOR VITREOUS DETACHMENT    Call the clinic if you have any further changes in symptoms.  Including:  Increased numbers of floaters or flashing lights, dimness or darkness that moves through or stays constant in your vision, or any pain in the eye (s).    You may sometimes see small specks or clouds moving in your field of vision.  They are called FLOATERS.  You can often see them when looking at a plain background, like a blank wall or blue dex.  Floaters are actually tiny clumps of gel or cells inside the VITREOUS, the clear jelly-like fluid that fills the inside of your eye.    While these objects look like they are in front of your eye, they are actually floating inside.  What you see are the shadows they cast on the RETINA, the nerve layer at the back of the eye that senses light and allows you to see.      POSTERIOR VITREOUS DETACHMENT    The appearance of new floaters may be alarming.  If you suddenly " develop new floaters, you should contact your eye care professional  right away.    The retina can tear if the shrinking vitreous pulls away from the wall of the eye.  This sometimes causes a small amount of bleeding in the eye that may appear as new floaters.    A torn retina is always a serious problem, since it can lead to a retinal detachment.  You should see your eye care professional as soon as possible if:    even one new floater appears suddenly;  you see sudden flashes of light;  you notice other symptoms, like the loss of side vision, or a curtain closes down in your vision        POSTERIOR VITREOUS DETACHMENT is more common for people who:    are nearsighted;  have had cataract surgery;  have had YAG laser surgery of the eye;  have had inflammation inside the eye;  are over age 60.      While some floaters may remain visible, many of them will fade over time and become less noticeable.  Even if you've had some floaters for years, you should have your eyes checked as soon as possible if you notice new ones.    FLASHING LIGHTS    When the vitreous gel rubs or pulls on the retina, you may see what look like flashing lights or lightning streaks.  These flashes can appear off and on for several weeks or months.      Some people experience flashes of light that appear as jagged lines or heat waves in both eyes, lasting 10-20 minutes.  These flashes are caused by a spasm of blood vessels in the brain, which is called a migraine.    If a headache follows these flashes, it's called a migraine headache.  If   no headache occurs, these flashes are called Ophthalmic or Ocular Migraine.           Using the Amsler Grid  If you are at risk for vision loss, you may be told to check your eyesight regularly using the Amsler grid. Below is the grid and instructions for using it.         The Amsler grid helps you track changes in your vision.    How to Use the Amsler Grid  Use the grid in a well-lighted area.  Wear  glasses or contact lenses if you usually wear them.  Hold the grid at your normal reading distance (about 16 inches).  Cover your left eye.  With your right eye, look at the dot in the center of the grid.  While looking at the dot, notice if any of the lines look wavy, if any lines disappear, or if the boxes change shape.  Write down on a piece of paper any vision changes from the last time you used the grid.  Now repeat the exercise, this time covering your right eye.  Call your doctor right away if you notice any vision changes.  How Often Should I Check My Vision?  Use the Amsler grid as often as your eye doctor suggests. Keep the grid where youll remember to use it. Call your eye doctor right away if you notice any changes with your eyesight. This includes if your vision improves.  © 5209-3979 [employer], [address] [place] [address], [place] [address]. All rights reserved. This information is not intended as a substitute for professional medical care. Always follow your healthcare professional's instructions.

## 2025-03-25 ENCOUNTER — OFFICE VISIT (OUTPATIENT)
Dept: FAMILY MEDICINE | Facility: CLINIC | Age: 85
End: 2025-03-25
Payer: MEDICARE

## 2025-03-25 VITALS
WEIGHT: 150.88 LBS | OXYGEN SATURATION: 99 % | BODY MASS INDEX: 25.76 KG/M2 | HEART RATE: 102 BPM | DIASTOLIC BLOOD PRESSURE: 72 MMHG | HEIGHT: 64 IN | SYSTOLIC BLOOD PRESSURE: 138 MMHG

## 2025-03-25 DIAGNOSIS — B37.2 CANDIDAL INTERTRIGO: Primary | ICD-10-CM

## 2025-03-25 PROCEDURE — 1126F AMNT PAIN NOTED NONE PRSNT: CPT | Mod: CPTII,S$GLB,, | Performed by: INTERNAL MEDICINE

## 2025-03-25 PROCEDURE — 99214 OFFICE O/P EST MOD 30 MIN: CPT | Mod: S$GLB,,, | Performed by: INTERNAL MEDICINE

## 2025-03-25 PROCEDURE — 1160F RVW MEDS BY RX/DR IN RCRD: CPT | Mod: CPTII,S$GLB,, | Performed by: INTERNAL MEDICINE

## 2025-03-25 PROCEDURE — 3078F DIAST BP <80 MM HG: CPT | Mod: CPTII,S$GLB,, | Performed by: INTERNAL MEDICINE

## 2025-03-25 PROCEDURE — 1101F PT FALLS ASSESS-DOCD LE1/YR: CPT | Mod: CPTII,S$GLB,, | Performed by: INTERNAL MEDICINE

## 2025-03-25 PROCEDURE — 3288F FALL RISK ASSESSMENT DOCD: CPT | Mod: CPTII,S$GLB,, | Performed by: INTERNAL MEDICINE

## 2025-03-25 PROCEDURE — 3075F SYST BP GE 130 - 139MM HG: CPT | Mod: CPTII,S$GLB,, | Performed by: INTERNAL MEDICINE

## 2025-03-25 PROCEDURE — 1157F ADVNC CARE PLAN IN RCRD: CPT | Mod: CPTII,S$GLB,, | Performed by: INTERNAL MEDICINE

## 2025-03-25 PROCEDURE — 99999 PR PBB SHADOW E&M-EST. PATIENT-LVL III: CPT | Mod: PBBFAC,,, | Performed by: INTERNAL MEDICINE

## 2025-03-25 PROCEDURE — 1159F MED LIST DOCD IN RCRD: CPT | Mod: CPTII,S$GLB,, | Performed by: INTERNAL MEDICINE

## 2025-03-25 RX ORDER — KETOCONAZOLE 20 MG/G
CREAM TOPICAL DAILY
Qty: 30 G | Refills: 1 | Status: SHIPPED | OUTPATIENT
Start: 2025-03-25

## 2025-03-25 RX ORDER — NYSTATIN 100000 [USP'U]/G
POWDER TOPICAL 4 TIMES DAILY
Qty: 30 G | Refills: 1 | Status: SHIPPED | OUTPATIENT
Start: 2025-03-25

## 2025-03-25 NOTE — PROGRESS NOTES
Assessment and Plan:    1. Candidal intertrigo (Primary)  Discussed that she can use the triamcinolone along with the ketoconazole cream while this is acutely inflamed.  Once this improves, can switch to nystatin powder.  - ketoconazole (NIZORAL) 2 % cream; Apply topically once daily.  Dispense: 30 g; Refill: 1  - nystatin (MYCOSTATIN) powder; Apply topically 4 (four) times daily.  Dispense: 30 g; Refill: 1    ______________________________________________________________________  Subjective:    Chief Complaint:  Rash    HPI:  Dorothy is a 84 y.o. year old female here for evaluation of rash    She reports that she has had a rash in her left-sided groin area for several weeks.  It is mildly itchy.  She had been previously prescribed triamcinolone cream for eczema from her dermatologist.  She is not sure if she was supposed to use that on rash like this.  She has not ever had a rash like this before.  She does not have any issues under her breasts or on the right side.    Medications:  Medications Ordered Prior to Encounter[1]    Review of Systems:  Review of Systems   Constitutional:  Negative for fever.   HENT:  Negative for congestion, rhinorrhea and sore throat.    Eyes:  Negative for pain.   Respiratory:  Negative for cough and shortness of breath.    Gastrointestinal:  Negative for diarrhea and vomiting.   Skin:  Positive for rash.         Past Medical History:  Past Medical History:   Diagnosis Date    DDD (degenerative disc disease), cervical     Diverticulitis     Diverticulosis     DJD (degenerative joint disease) of hip     Dyspepsia     GERD (gastroesophageal reflux disease)     History of melanoma 05/20/2015    HTN (hypertension)     Melanoma     Migraine headache     Rectocele     Skin cancer     melanoma on face    Sleep apnea     doesnt use cpap    Thyroid disease     hypo    Trouble in sleeping     Ulcerative colitis     on meds    Uterine prolaps     followed by GYN    Vulvar lesion 05/20/2015  "      Objective:    Vitals:  Vitals:    03/25/25 1408   BP: 138/72   Pulse: 102   SpO2: 99%   Weight: 68.5 kg (150 lb 14.5 oz)   Height: 5' 4" (1.626 m)   PainSc: 0-No pain       Physical Exam  Vitals reviewed.   Constitutional:       General: She is not in acute distress.     Appearance: She is well-developed.   Eyes:      General:         Right eye: No discharge.         Left eye: No discharge.      Conjunctiva/sclera: Conjunctivae normal.   Cardiovascular:      Rate and Rhythm: Normal rate and regular rhythm.   Pulmonary:      Effort: Pulmonary effort is normal. No respiratory distress.   Skin:     General: Skin is warm and dry.      Comments: Left groin fold with erythematous rash with slight exudate and satellite lesions   Neurological:      Mental Status: She is alert and oriented to person, place, and time.   Psychiatric:         Behavior: Behavior normal.         Thought Content: Thought content normal.         Judgment: Judgment normal.         Kamila Gilbert MD  Internal Medicine         [1]   Current Outpatient Medications on File Prior to Visit   Medication Sig Dispense Refill    cyclobenzaprine (FLEXERIL) 10 MG tablet TAKE 1 TABLET BY MOUTH EVERY DAY IN THE EVENING 30 tablet 6    estradioL (ESTRACE) 0.01 % (0.1 mg/gram) vaginal cream Place 1 g vaginally once a week. 42.5 g 2    gabapentin (NEURONTIN) 300 MG capsule TAKE 1 CAPSULE BY MOUTH EVERY EVENING 30 capsule 2    levothyroxine (SYNTHROID) 88 MCG tablet Take 1 tablet (88 mcg total) by mouth once daily. 90 tablet 3    losartan-hydrochlorothiazide 100-25 mg (HYZAAR) 100-25 mg per tablet TAKE 1 TABLET BY MOUTH EVERY DAY 90 tablet 2    magnesium 250 mg Tab Take 250 mg by mouth 2 (two) times a day.      mesalamine (APRISO) 0.375 gram Cp24 TAKE 4 CAPSULES (1.5 G TOTAL) BY MOUTH ONCE DAILY. 360 capsule 0    mometasone (ELOCON) 0.1 % solution Apply topically 2 (two) times daily as needed (rash, itching at scalp). Avoid use of medication on face, body folds, " groin/genitalia. 60 mL 5    multivitamin/iron/folic acid (CENTRUM COMPLETE ORAL) Take by mouth.      triamcinolone acetonide 0.1% (KENALOG) 0.1 % cream Apply topically 2 (two) times daily as needed (itchy rash on body). 80 g 1     No current facility-administered medications on file prior to visit.

## 2025-04-03 ENCOUNTER — PATIENT MESSAGE (OUTPATIENT)
Dept: FAMILY MEDICINE | Facility: CLINIC | Age: 85
End: 2025-04-03
Payer: MEDICARE

## 2025-04-04 RX ORDER — GABAPENTIN 300 MG/1
300 CAPSULE ORAL NIGHTLY
Qty: 30 CAPSULE | Refills: 2 | OUTPATIENT
Start: 2025-04-04

## 2025-04-04 RX ORDER — GABAPENTIN 300 MG/1
300 CAPSULE ORAL
Qty: 30 CAPSULE | Refills: 2 | Status: SHIPPED | OUTPATIENT
Start: 2025-04-04

## 2025-04-04 NOTE — TELEPHONE ENCOUNTER
----- Message from Kassandra sent at 4/4/2025 11:41 AM CDT -----  Contact: pt  Type:  RX Refill RequestWho Called:  PTRefill or New Rx:   REFILLRX Name and Strength:   gabapentin (NEURONTIN) 300 MG capsule How is the patient currently taking it? (ex. 1XDay):As Ordered  Is this a 30 day or 90 day RX:  90Preferred Pharmacy with phone number:  [id number] - [place] - [id number] [facility] [address] [place] [id number]: [phone] Fax: [phone] or Mail Order:  localOrdering Provider:  Juan Carlos Call Back Number:  [phone] Information:  pt is almost out. Pt asking for refill to be sent today as she will be out Sunday

## 2025-04-07 ENCOUNTER — TELEPHONE (OUTPATIENT)
Dept: FAMILY MEDICINE | Facility: CLINIC | Age: 85
End: 2025-04-07
Payer: MEDICARE

## 2025-04-10 ENCOUNTER — PATIENT MESSAGE (OUTPATIENT)
Dept: FAMILY MEDICINE | Facility: CLINIC | Age: 85
End: 2025-04-10

## 2025-04-10 ENCOUNTER — OFFICE VISIT (OUTPATIENT)
Dept: FAMILY MEDICINE | Facility: CLINIC | Age: 85
End: 2025-04-10
Payer: MEDICARE

## 2025-04-10 VITALS
BODY MASS INDEX: 25.35 KG/M2 | HEIGHT: 64 IN | SYSTOLIC BLOOD PRESSURE: 130 MMHG | WEIGHT: 148.5 LBS | HEART RATE: 86 BPM | OXYGEN SATURATION: 96 % | DIASTOLIC BLOOD PRESSURE: 72 MMHG

## 2025-04-10 DIAGNOSIS — N90.89 VULVAR IRRITATION: Primary | ICD-10-CM

## 2025-04-10 PROCEDURE — 99999 PR PBB SHADOW E&M-EST. PATIENT-LVL III: CPT | Mod: PBBFAC,HCNC,, | Performed by: INTERNAL MEDICINE

## 2025-04-10 NOTE — PROGRESS NOTES
Assessment and Plan:    1. Vulvar irritation (Primary)  Exam really significant only for vulvar irritation without any obvious rash.  The area that previously had what appeared to be Candida intertrigo has resolved for the most part.  We discussed that she has a history of very sensitive skin and may just be reacting to something in the topical treatments.  Recommend discontinuing use of the ketoconazole and nystatin as well as triamcinolone.  Discussed lukewarm Sitz baths followed by gently drying the whole area with a hair drier on warm setting.  Can use a moisturizer without dyes or perfumes such as CeraVe or Cetaphil on the external labia majora.    ______________________________________________________________________  Subjective:    Chief Complaint:  Follow up rash    HPI:  Dorothy is a 84 y.o. year old female here for follow-up of rash.      She had been treated two weeks ago for Candida intertrigo of the left-sided groin fold with a combination of triamcinolone and ketoconazole.  She reports that the rash in that area got significantly better (there is only one small area that is still irritated but it does not even itch), however since starting this treatment she has developed significant vulvar irritation.  She has not been using anything for the last few days.  No discharge, no itching, the bulb which shows very irritated.      Medications:  Medications Ordered Prior to Encounter[1]    Review of Systems:  Review of Systems   Constitutional:  Negative for chills and fever.   Genitourinary:  Negative for difficulty urinating, dysuria, genital sores, vaginal bleeding, vaginal discharge and vaginal pain.   Skin:  Positive for rash. Negative for wound.       Past Medical History:  Past Medical History:   Diagnosis Date    DDD (degenerative disc disease), cervical     Diverticulitis     Diverticulosis     DJD (degenerative joint disease) of hip     Dyspepsia     GERD (gastroesophageal reflux disease)     History  "of melanoma 05/20/2015    HTN (hypertension)     Melanoma     Migraine headache     Rectocele     Skin cancer     melanoma on face    Sleep apnea     doesnt use cpap    Thyroid disease     hypo    Trouble in sleeping     Ulcerative colitis     on meds    Uterine prolaps     followed by GYN    Vulvar lesion 05/20/2015       Objective:    Vitals:  Vitals:    04/10/25 1208   BP: 130/72   Pulse: 86   SpO2: 96%   Weight: 67.4 kg (148 lb 7.7 oz)   Height: 5' 4" (1.626 m)   PainSc: 0-No pain       Physical Exam  Vitals reviewed.   Constitutional:       General: She is not in acute distress.     Appearance: She is well-developed.   Eyes:      General:         Right eye: No discharge.         Left eye: No discharge.      Conjunctiva/sclera: Conjunctivae normal.   Cardiovascular:      Rate and Rhythm: Normal rate and regular rhythm.   Pulmonary:      Effort: Pulmonary effort is normal. No respiratory distress.   Genitourinary:      Skin:     General: Skin is warm and dry.   Neurological:      Mental Status: She is alert and oriented to person, place, and time.   Psychiatric:         Behavior: Behavior normal.         Thought Content: Thought content normal.         Judgment: Judgment normal.             Kamila Gilbert MD  Internal Medicine         [1]   Current Outpatient Medications on File Prior to Visit   Medication Sig Dispense Refill    cyclobenzaprine (FLEXERIL) 10 MG tablet TAKE 1 TABLET BY MOUTH EVERY DAY IN THE EVENING 30 tablet 6    estradioL (ESTRACE) 0.01 % (0.1 mg/gram) vaginal cream Place 1 g vaginally once a week. 42.5 g 2    gabapentin (NEURONTIN) 300 MG capsule TAKE 1 CAPSULE BY MOUTH EVERY DAY IN THE EVENING 30 capsule 2    levothyroxine (SYNTHROID) 88 MCG tablet Take 1 tablet (88 mcg total) by mouth once daily. 90 tablet 3    losartan-hydrochlorothiazide 100-25 mg (HYZAAR) 100-25 mg per tablet TAKE 1 TABLET BY MOUTH EVERY DAY 90 tablet 2    magnesium 250 mg Tab Take 250 mg by mouth 2 (two) times a day.      " mesalamine (APRISO) 0.375 gram Cp24 TAKE 4 CAPSULES (1.5 G TOTAL) BY MOUTH ONCE DAILY. 360 capsule 0    multivitamin/iron/folic acid (CENTRUM COMPLETE ORAL) Take by mouth.      nystatin (MYCOSTATIN) powder Apply topically 4 (four) times daily. 30 g 1    ketoconazole (NIZORAL) 2 % cream Apply topically once daily. (Patient not taking: Reported on 4/10/2025) 30 g 1    mometasone (ELOCON) 0.1 % solution Apply topically 2 (two) times daily as needed (rash, itching at scalp). Avoid use of medication on face, body folds, groin/genitalia. (Patient not taking: Reported on 4/10/2025) 60 mL 5    triamcinolone acetonide 0.1% (KENALOG) 0.1 % cream Apply topically 2 (two) times daily as needed (itchy rash on body). (Patient not taking: Reported on 4/10/2025) 80 g 1     No current facility-administered medications on file prior to visit.

## 2025-04-10 NOTE — TELEPHONE ENCOUNTER
I would recommend trial of sitz bath with warm water only, then drying the area gently (such as with a hair dryer on low heat). I can't say for sure what else to try without seeing her.

## 2025-04-22 DIAGNOSIS — K52.9 COLITIS: ICD-10-CM

## 2025-04-22 RX ORDER — MESALAMINE 0.38 G/1
1.5 CAPSULE, EXTENDED RELEASE ORAL DAILY
Qty: 360 CAPSULE | Refills: 0 | Status: SHIPPED | OUTPATIENT
Start: 2025-04-22 | End: 2026-04-22

## 2025-05-02 DIAGNOSIS — E03.9 HYPOTHYROIDISM, UNSPECIFIED TYPE: ICD-10-CM

## 2025-05-02 RX ORDER — LEVOTHYROXINE SODIUM 88 UG/1
88 TABLET ORAL
Qty: 90 TABLET | Refills: 0 | Status: SHIPPED | OUTPATIENT
Start: 2025-05-02

## 2025-05-02 NOTE — TELEPHONE ENCOUNTER
Provider Staff:  Action required for this patient    Requires appointment   Requires labs      Please see care gap opportunities below in Care Due Message.    Thanks!  [facility]     Appointments      Date Provider   Last Visit   4/10/2025 Kamila Gilbert MD   Next Visit   Visit date not found Kamila Gilbert MD     Refill Decision Note   Dorothy Kramer  is requesting a refill authorization.  Brief Assessment and Rationale for Refill:  Approve     Medication Therapy Plan:        Comments:     Note composed:4:25 PM 05/02/2025

## 2025-05-02 NOTE — TELEPHONE ENCOUNTER
Care Due:                  Date            Visit Type   Department     Provider  --------------------------------------------------------------------------------                                EP -                              PRIMARY      [facility]  Last Visit: 04-      CARE ([facility])   MEDICINE       Kamila Gilbert  Next Visit: None Scheduled  None         None Found                                                            Last  Test          Frequency    Reason                     Performed    Due Date  --------------------------------------------------------------------------------    CMP.........  12 months..  losartan-hydrochlorothiaz  06- 06-                             alex......................    TSH.........  12 months..  levothyroxine............  06- 06-    [facility] Embedded Care Due Messages. Reference number: [id number].   5/02/2025 2:47:00 PM CDT

## 2025-06-10 ENCOUNTER — OFFICE VISIT (OUTPATIENT)
Dept: FAMILY MEDICINE | Facility: CLINIC | Age: 85
End: 2025-06-10
Payer: MEDICARE

## 2025-06-10 VITALS
DIASTOLIC BLOOD PRESSURE: 60 MMHG | SYSTOLIC BLOOD PRESSURE: 134 MMHG | BODY MASS INDEX: 25.36 KG/M2 | HEART RATE: 94 BPM | OXYGEN SATURATION: 99 % | WEIGHT: 148.56 LBS | HEIGHT: 64 IN

## 2025-06-10 DIAGNOSIS — N81.6 RECTOCELE: ICD-10-CM

## 2025-06-10 DIAGNOSIS — E03.9 HYPOTHYROIDISM, UNSPECIFIED TYPE: ICD-10-CM

## 2025-06-10 DIAGNOSIS — N99.3 PROLAPSE OF VAGINAL VAULT AFTER HYSTERECTOMY: ICD-10-CM

## 2025-06-10 DIAGNOSIS — M54.16 LUMBAR RADICULOPATHY: ICD-10-CM

## 2025-06-10 DIAGNOSIS — L21.9 SEBORRHEIC DERMATITIS: ICD-10-CM

## 2025-06-10 DIAGNOSIS — I10 ESSENTIAL HYPERTENSION: Primary | ICD-10-CM

## 2025-06-10 PROCEDURE — 99999 PR PBB SHADOW E&M-EST. PATIENT-LVL III: CPT | Mod: PBBFAC,,, | Performed by: INTERNAL MEDICINE

## 2025-06-10 RX ORDER — ESTRADIOL 0.1 MG/G
1 CREAM VAGINAL WEEKLY
Qty: 42.5 G | Refills: 2 | Status: SHIPPED | OUTPATIENT
Start: 2025-06-10 | End: 2027-11-20

## 2025-06-10 RX ORDER — GABAPENTIN 300 MG/1
300 CAPSULE ORAL NIGHTLY
Qty: 30 CAPSULE | Refills: 5 | Status: SHIPPED | OUTPATIENT
Start: 2025-06-30

## 2025-06-10 RX ORDER — MOMETASONE FUROATE 1 MG/ML
LOTION TOPICAL 2 TIMES DAILY PRN
Qty: 30 ML | Refills: 5 | Status: SHIPPED | OUTPATIENT
Start: 2025-06-10

## 2025-06-10 NOTE — PROGRESS NOTES
Assessment and Plan:    1. Essential hypertension (Primary)  Controlled, continue current medication  - Comprehensive Metabolic Panel; Future  - Lipid Panel; Future    2. Hypothyroidism, unspecified type  Controlled, update lab in continue current medication  - TSH; Future    3. Lumbar radiculopathy  Six-month follow-up  - gabapentin (NEURONTIN) 300 MG capsule; Take 1 capsule (300 mg total) by mouth every evening.  Dispense: 30 capsule; Refill: 5    4. Rectocele  - estradioL (ESTRACE) 0.01 % (0.1 mg/gram) vaginal cream; Place 1 g vaginally once a week.  Dispense: 42.5 g; Refill: 2    5. Prolapse of vaginal vault after hysterectomy  - estradioL (ESTRACE) 0.01 % (0.1 mg/gram) vaginal cream; Place 1 g vaginally once a week.  Dispense: 42.5 g; Refill: 2    6. Seborrheic dermatitis  - mometasone (ELOCON) 0.1 % solution; Apply topically 2 (two) times daily as needed (rash, itching at scalp). Avoid use of medication on face, body folds, groin/genitalia.  Dispense: 30 mL; Refill: 5      Visit today included increased complexity associated with the care of the episodic problems listed above, including addressing and managing the longitudinal care of the patient due to the serious and/or complex managed problem(s).    ______________________________________________________________________  Subjective:    Chief Complaint:  Follow up chronic medical conditions.    HPI:  Dorothy is a 84 y.o. year old female here to follow up chronic medical conditions.     She takes levothyroxine 88 mcg daily for hypothyroidism. No symptoms currently.      She takes losartan 100 mg daily and HCTZ 25 mg daily for HTN. She does check her BP at home, it has been consistently controlled when she checks it.     Had been seeing Urogyn for mixed urge and stress incontinence. Using estrace cream.     Remote history of melanoma. Sees Dr. Johnson.      She had been seeing Dr. Oscar for nonspecific colitis and recurrent diverticulitis. Taking apriso.  "Wants to know if I can refill when needed (yes).      She is seeing pain management regularly for back and neck pain. Has had multiple injections. Using flexeril and gabapentin nightly. Has had some improvement with this, specifically with the muscle spasms and pain in her legs.     Medications:  Medications Ordered Prior to Encounter[1]    Review of Systems:  Review of Systems   Constitutional:  Negative for chills and fever.   Respiratory:  Negative for shortness of breath and wheezing.    Cardiovascular:  Negative for chest pain and leg swelling.   Gastrointestinal:  Negative for diarrhea, nausea and vomiting.   Musculoskeletal:  Positive for back pain.   Neurological:  Negative for dizziness, syncope and light-headedness.   Psychiatric/Behavioral:  Positive for sleep disturbance.        Past Medical History:  Past Medical History:   Diagnosis Date    DDD (degenerative disc disease), cervical     Diverticulitis     Diverticulosis     DJD (degenerative joint disease) of hip     Dyspepsia     GERD (gastroesophageal reflux disease)     History of melanoma 05/20/2015    HTN (hypertension)     Melanoma     Migraine headache     Rectocele     Skin cancer     melanoma on face    Sleep apnea     doesnt use cpap    Thyroid disease     hypo    Trouble in sleeping     Ulcerative colitis     on meds    Uterine prolaps     followed by GYN    Vulvar lesion 05/20/2015       Objective:    Vitals:  Vitals:    06/10/25 1418   BP: 134/60   Pulse: 94   SpO2: 99%   Weight: 67.4 kg (148 lb 9.4 oz)   Height: 5' 4" (1.626 m)   PainSc:   3       Physical Exam  Vitals reviewed.   Constitutional:       General: She is not in acute distress.     Appearance: She is well-developed.   Eyes:      General: No scleral icterus.  Cardiovascular:      Rate and Rhythm: Normal rate and regular rhythm.   Pulmonary:      Effort: Pulmonary effort is normal. No respiratory distress.      Breath sounds: Normal breath sounds. No wheezing, rhonchi or rales. "   Musculoskeletal:      Right lower leg: No edema.      Left lower leg: No edema.   Skin:     General: Skin is warm and dry.   Neurological:      Mental Status: She is alert and oriented to person, place, and time.   Psychiatric:         Behavior: Behavior normal.         Data:  PDMP reviewed, no unexpected prescriptions    Kamila Gilbert MD  Internal Medicine         [1]   Current Outpatient Medications on File Prior to Visit   Medication Sig Dispense Refill    cyclobenzaprine (FLEXERIL) 10 MG tablet TAKE 1 TABLET BY MOUTH EVERY DAY IN THE EVENING 30 tablet 6    estradioL (ESTRACE) 0.01 % (0.1 mg/gram) vaginal cream Place 1 g vaginally once a week. 42.5 g 2    gabapentin (NEURONTIN) 300 MG capsule TAKE 1 CAPSULE BY MOUTH EVERY DAY IN THE EVENING 30 capsule 2    levothyroxine (SYNTHROID) 88 MCG tablet TAKE 1 TABLET BY MOUTH EVERY DAY 90 tablet 0    losartan-hydrochlorothiazide 100-25 mg (HYZAAR) 100-25 mg per tablet TAKE 1 TABLET BY MOUTH EVERY DAY 90 tablet 2    magnesium 250 mg Tab Take 250 mg by mouth 2 (two) times a day.      mesalamine (APRISO) 0.375 gram Cp24 TAKE 4 CAPSULES (1.5 G TOTAL) BY MOUTH ONCE DAILY. 360 capsule 0    mometasone (ELOCON) 0.1 % solution Apply topically 2 (two) times daily as needed (rash, itching at scalp). Avoid use of medication on face, body folds, groin/genitalia. 60 mL 5    multivitamin/iron/folic acid (CENTRUM COMPLETE ORAL) Take by mouth.      ketoconazole (NIZORAL) 2 % cream Apply topically once daily. (Patient not taking: Reported on 4/10/2025) 30 g 1    nystatin (MYCOSTATIN) powder Apply topically 4 (four) times daily. 30 g 1    triamcinolone acetonide 0.1% (KENALOG) 0.1 % cream Apply topically 2 (two) times daily as needed (itchy rash on body). (Patient not taking: Reported on 4/10/2025) 80 g 1     No current facility-administered medications on file prior to visit.

## 2025-06-11 ENCOUNTER — LAB VISIT (OUTPATIENT)
Dept: LAB | Facility: HOSPITAL | Age: 85
End: 2025-06-11
Attending: INTERNAL MEDICINE
Payer: MEDICARE

## 2025-06-11 DIAGNOSIS — E03.9 HYPOTHYROIDISM, UNSPECIFIED TYPE: ICD-10-CM

## 2025-06-11 DIAGNOSIS — I10 ESSENTIAL HYPERTENSION: ICD-10-CM

## 2025-06-11 LAB
ALBUMIN SERPL BCP-MCNC: 4.3 G/DL (ref 3.5–5.2)
ALP SERPL-CCNC: 65 UNIT/L (ref 40–150)
ALT SERPL W/O P-5'-P-CCNC: 18 UNIT/L (ref 10–44)
ANION GAP (OHS): 10 MMOL/L (ref 8–16)
AST SERPL-CCNC: 21 UNIT/L (ref 11–45)
BILIRUB SERPL-MCNC: 1 MG/DL (ref 0.1–1)
BUN SERPL-MCNC: 18 MG/DL (ref 8–23)
CALCIUM SERPL-MCNC: 9.3 MG/DL (ref 8.7–10.5)
CHLORIDE SERPL-SCNC: 105 MMOL/L (ref 95–110)
CHOLEST SERPL-MCNC: 178 MG/DL (ref 120–199)
CHOLEST/HDLC SERPL: 3.6 {RATIO} (ref 2–5)
CO2 SERPL-SCNC: 26 MMOL/L (ref 23–29)
CREAT SERPL-MCNC: 0.8 MG/DL (ref 0.5–1.4)
GFR SERPLBLD CREATININE-BSD FMLA CKD-EPI: >60 ML/MIN/1.73/M2
GLUCOSE SERPL-MCNC: 105 MG/DL (ref 70–110)
HDLC SERPL-MCNC: 50 MG/DL (ref 40–75)
HDLC SERPL: 28.1 % (ref 20–50)
LDLC SERPL CALC-MCNC: 98 MG/DL (ref 63–159)
NONHDLC SERPL-MCNC: 128 MG/DL
POTASSIUM SERPL-SCNC: 3.9 MMOL/L (ref 3.5–5.1)
PROT SERPL-MCNC: 7.3 GM/DL (ref 6–8.4)
SODIUM SERPL-SCNC: 141 MMOL/L (ref 136–145)
T4 FREE SERPL-MCNC: 1.26 NG/DL (ref 0.71–1.51)
TRIGL SERPL-MCNC: 150 MG/DL (ref 30–150)
TSH SERPL-ACNC: 0.36 UIU/ML (ref 0.4–4)

## 2025-06-11 PROCEDURE — 84439 ASSAY OF FREE THYROXINE: CPT

## 2025-06-11 PROCEDURE — 84443 ASSAY THYROID STIM HORMONE: CPT

## 2025-06-11 PROCEDURE — 82465 ASSAY BLD/SERUM CHOLESTEROL: CPT

## 2025-06-11 PROCEDURE — 36415 COLL VENOUS BLD VENIPUNCTURE: CPT | Mod: PN

## 2025-06-11 PROCEDURE — 84155 ASSAY OF PROTEIN SERUM: CPT

## 2025-07-02 ENCOUNTER — HOSPITAL ENCOUNTER (OUTPATIENT)
Dept: RADIOLOGY | Facility: HOSPITAL | Age: 85
Discharge: HOME | End: 2025-07-02
Attending: INTERNAL MEDICINE
Payer: MEDICARE

## 2025-07-02 ENCOUNTER — RESULTS FOLLOW-UP (OUTPATIENT)
Dept: FAMILY MEDICINE | Facility: CLINIC | Age: 85
End: 2025-07-02

## 2025-07-02 DIAGNOSIS — Z78.0 MENOPAUSE: ICD-10-CM

## 2025-07-02 PROCEDURE — 77080 DXA BONE DENSITY AXIAL: CPT | Mod: 26,,, | Performed by: RADIOLOGY

## 2025-07-02 PROCEDURE — 77091 TBS TECHL CALCULATION ONLY: CPT | Mod: PO

## 2025-07-02 PROCEDURE — 77092 TBS I&R FX RSK QHP: CPT | Mod: ,,, | Performed by: RADIOLOGY

## 2025-07-20 DIAGNOSIS — K52.9 COLITIS: ICD-10-CM

## 2025-07-21 RX ORDER — MESALAMINE 0.38 G/1
1.5 CAPSULE, EXTENDED RELEASE ORAL DAILY
Qty: 360 CAPSULE | Refills: 0 | Status: SHIPPED | OUTPATIENT
Start: 2025-07-21 | End: 2026-07-21

## 2025-08-17 DIAGNOSIS — I10 ESSENTIAL HYPERTENSION: ICD-10-CM

## 2025-08-17 RX ORDER — LOSARTAN POTASSIUM AND HYDROCHLOROTHIAZIDE 25; 100 MG/1; MG/1
1 TABLET ORAL
Qty: 90 TABLET | Refills: 3 | Status: SHIPPED | OUTPATIENT
Start: 2025-08-17

## (undated) DEVICE — SOL ELECTROLUBE ANTI-STIC

## (undated) DEVICE — KIT RETR PERI/GYN W/O STAYS

## (undated) DEVICE — SUT ETHIBOND EXCEL 0 CT2 30

## (undated) DEVICE — BLADE SURG CARBON STEEL SZ11

## (undated) DEVICE — SYR GLASS 5CC LUER LOK

## (undated) DEVICE — DRAPE COLUMN DAVINCI XI

## (undated) DEVICE — SOL BSS BALANCED SALT

## (undated) DEVICE — PACK BASIC

## (undated) DEVICE — RETRACTOR ENDO II 10MM DISP

## (undated) DEVICE — TRAY NERVE BLOCK

## (undated) DEVICE — TOWEL OR DISP STRL BLUE 4/PK

## (undated) DEVICE — COVER SURG LIGHT HANDLE

## (undated) DEVICE — GOWN POLY REINF BRTH SLV LG

## (undated) DEVICE — ELECTRODE REM PLYHSV RETURN 9

## (undated) DEVICE — GARTER EYE ADULT

## (undated) DEVICE — NDL SPINAL SPINOCAN 22GX3.5

## (undated) DEVICE — LINER SUCTION 3000CC

## (undated) DEVICE — APPLICATOR CHLORAPREP ORN 26ML

## (undated) DEVICE — APPLICATOR CHLORAPREP CLR 10.5

## (undated) DEVICE — Device

## (undated) DEVICE — SPONGE DERMACEA GAUZE 4X4

## (undated) DEVICE — SYR 10CC LUER LOCK

## (undated) DEVICE — CARTRIDGE BABCOCK GRASPER 5X45

## (undated) DEVICE — SCRUB HIBICLENS 4% CHG 4OZ

## (undated) DEVICE — HOOK STAY ELAS 5MM 8EA/PK

## (undated) DEVICE — NDL TUOHY EPIDURAL 20G X 3.5

## (undated) DEVICE — DRAPE ARM DAVINCI XI

## (undated) DEVICE — UNDERPAD ULTRASORB 300LB 30X36

## (undated) DEVICE — LEGGING CLEAR POLY 2/PACK

## (undated) DEVICE — SUT VICRYL 0 CT-2 27 DYE

## (undated) DEVICE — SOL NS 1000CC

## (undated) DEVICE — COVER TIP CURVED SCISSORS XI

## (undated) DEVICE — GLOVE BIOGEL ECLIPSE SZ 6.5

## (undated) DEVICE — SUT GORETEX CV-4 TH-26 36IN

## (undated) DEVICE — MARKER SKIN STND TIP BLUE BARR

## (undated) DEVICE — SPONGE WEC CEL SPEARS

## (undated) DEVICE — SEE MEDLINE ITEM 157181

## (undated) DEVICE — UNDERGLOVES BIOGEL PI SZ 7 LF

## (undated) DEVICE — SOL IRR STRL WATER 500ML

## (undated) DEVICE — SUT MONOCRYL 0 CT-1 UND MON

## (undated) DEVICE — SCRUB 10% POVIDONE IODINE 4OZ

## (undated) DEVICE — PACK OPHTHALMIC

## (undated) DEVICE — SEE L#120831

## (undated) DEVICE — SUT ABS CLIP LAPRA-TY CTD

## (undated) DEVICE — IRRIGATOR ENDOSCOPY DISP.

## (undated) DEVICE — BANDAGE GAUZE CONF STRL 6X4.1Y

## (undated) DEVICE — NDL SAFETY 25G X 1.5 ECLIPSE

## (undated) DEVICE — SOL IRR WATER STRL 3000 ML

## (undated) DEVICE — ADHESIVE DERMABOND ADVANCED

## (undated) DEVICE — SET CYSTO IRRIGATION UNIV SPIK

## (undated) DEVICE — APPLICATOR HEMOBLAST LAPSCP

## (undated) DEVICE — TUBING SUCTION STR .25INX6FT

## (undated) DEVICE — GLOVE SURG ULTRA TOUCH 7.5

## (undated) DEVICE — GLOVE SURGICAL LATEX SZ 7

## (undated) DEVICE — SEE MEDLINE ITEM 157117

## (undated) DEVICE — DRAPE OPHTHALMIC 48X62 FEN

## (undated) DEVICE — SUT VLOC 90 3-0 V-20 NDL 9

## (undated) DEVICE — PACK CUSTOM UNIV BASIN SLI

## (undated) DEVICE — SYS LABEL CORRECT MED

## (undated) DEVICE — SOL PVP-I SCRUB 7.5% 4OZ

## (undated) DEVICE — TROCAR KII FIOS 5MM X 100MM

## (undated) DEVICE — SOL CLEARIFY VISUALIZATION LAP

## (undated) DEVICE — OBTURATOR BLADELESS 8MM XI CLR

## (undated) DEVICE — SOL BETADINE 5%

## (undated) DEVICE — YANKAUER OPEN TIP W/O VENT

## (undated) DEVICE — SET TUBE PNEUMOCLEAR SE HI FLO

## (undated) DEVICE — BLADE SURG #15 CARBON STEEL

## (undated) DEVICE — SEE MEDLINE ITEM 146292

## (undated) DEVICE — GOWN B1 X-LG X-LONG

## (undated) DEVICE — BELLOW CANN HEMOBLAST 1.65GR

## (undated) DEVICE — TRAY DRY SPONGE SCRUB W FOAM

## (undated) DEVICE — TROCAR ENDOPATH XCEL 11MM 10CM

## (undated) DEVICE — SPONGE GAUZE 16PLY 4X4

## (undated) DEVICE — DRAPE ABDOMINAL TIBURON 14X11

## (undated) DEVICE — SUT EASE CROSSBOW CLSR SYS

## (undated) DEVICE — JELLY KY LUBRICATING 5G PACKET

## (undated) DEVICE — PACK EYE CUSTOM COVINGTON.

## (undated) DEVICE — GLOVE SURG ULTRA TOUCH 7

## (undated) DEVICE — NDL SAFETY 22G X 1.5 ECLIPSE

## (undated) DEVICE — SEAL UNIVERSAL 5MM-8MM XI

## (undated) DEVICE — SEE L#133928

## (undated) DEVICE — DRAPE ADPT RUMI II ADVINCULA

## (undated) DEVICE — SLEEVE SCD EXPRESS KNEE MEDIUM

## (undated) DEVICE — SOL 9P NACL IRR PIC IL

## (undated) DEVICE — DRAPE UINDERBUT GRAD PCH

## (undated) DEVICE — NDL HYPO REG 25G X 1 1/2

## (undated) DEVICE — UNDERGLOVES BIOGEL PI SIZE 7.5

## (undated) DEVICE — SUT 3-0 VICRYL / SH (J416)

## (undated) DEVICE — TRAY SKIN SCRUB DRY PREMIUM

## (undated) DEVICE — SUT MONOCRYL 4-0 PS-2

## (undated) DEVICE — SYR B-D DISP CONTROL 10CC100/C

## (undated) DEVICE — TIP SACROCOLPOPEXY SM 1.3X3.5

## (undated) DEVICE — PAD PINK TRENDELENBURG POS XL